# Patient Record
Sex: FEMALE | Race: WHITE | Employment: OTHER | URBAN - NONMETROPOLITAN AREA
[De-identification: names, ages, dates, MRNs, and addresses within clinical notes are randomized per-mention and may not be internally consistent; named-entity substitution may affect disease eponyms.]

---

## 2018-05-08 ENCOUNTER — HOSPITAL ENCOUNTER (EMERGENCY)
Age: 43
Discharge: ANOTHER HOSPITAL | End: 2018-05-09
Attending: EMERGENCY MEDICINE
Payer: COMMERCIAL

## 2018-05-08 DIAGNOSIS — D62 ACUTE BLOOD LOSS ANEMIA: ICD-10-CM

## 2018-05-08 DIAGNOSIS — K92.2 ACUTE GI BLEEDING: Primary | ICD-10-CM

## 2018-05-08 LAB
ABSOLUTE EOS #: 0.2 K/UL (ref 0–0.44)
ABSOLUTE IMMATURE GRANULOCYTE: <0.03 K/UL (ref 0–0.3)
ABSOLUTE LYMPH #: 1.77 K/UL (ref 1.1–3.7)
ABSOLUTE MONO #: 0.41 K/UL (ref 0.1–1.2)
ALBUMIN SERPL-MCNC: 3.2 G/DL (ref 3.5–5.2)
ALBUMIN/GLOBULIN RATIO: 1.6 (ref 1–2.5)
ALP BLD-CCNC: 63 U/L (ref 35–104)
ALT SERPL-CCNC: 7 U/L (ref 5–33)
ANION GAP SERPL CALCULATED.3IONS-SCNC: 11 MMOL/L (ref 9–17)
AST SERPL-CCNC: 13 U/L
BASOPHILS # BLD: 0 % (ref 0–2)
BASOPHILS ABSOLUTE: <0.03 K/UL (ref 0–0.2)
BILIRUB SERPL-MCNC: <0.1 MG/DL (ref 0.3–1.2)
BUN BLDV-MCNC: 5 MG/DL (ref 6–20)
BUN/CREAT BLD: 9 (ref 9–20)
CALCIUM SERPL-MCNC: 8.3 MG/DL (ref 8.6–10.4)
CHLORIDE BLD-SCNC: 104 MMOL/L (ref 98–107)
CO2: 26 MMOL/L (ref 20–31)
CREAT SERPL-MCNC: 0.53 MG/DL (ref 0.5–0.9)
DIFFERENTIAL TYPE: ABNORMAL
EOSINOPHILS RELATIVE PERCENT: 4 % (ref 1–4)
GFR AFRICAN AMERICAN: >60 ML/MIN
GFR NON-AFRICAN AMERICAN: >60 ML/MIN
GFR SERPL CREATININE-BSD FRML MDRD: ABNORMAL ML/MIN/{1.73_M2}
GFR SERPL CREATININE-BSD FRML MDRD: ABNORMAL ML/MIN/{1.73_M2}
GLUCOSE BLD-MCNC: 101 MG/DL (ref 70–99)
HCT VFR BLD CALC: 22.2 % (ref 36.3–47.1)
HEMOGLOBIN: 7.6 G/DL (ref 11.9–15.1)
IMMATURE GRANULOCYTES: 0 %
INR BLD: 1 (ref 0.9–1.2)
LYMPHOCYTES # BLD: 37 % (ref 24–43)
MCH RBC QN AUTO: 29.5 PG (ref 25.2–33.5)
MCHC RBC AUTO-ENTMCNC: 34.2 G/DL (ref 28.4–34.8)
MCV RBC AUTO: 86 FL (ref 82.6–102.9)
MONOCYTES # BLD: 9 % (ref 3–12)
NRBC AUTOMATED: 0 PER 100 WBC
PARTIAL THROMBOPLASTIN TIME: 27.3 SEC (ref 23.2–34.4)
PDW BLD-RTO: 12.6 % (ref 11.8–14.4)
PLATELET # BLD: 263 K/UL (ref 138–453)
PLATELET ESTIMATE: ABNORMAL
PMV BLD AUTO: 9.2 FL (ref 8.1–13.5)
POTASSIUM SERPL-SCNC: 3.1 MMOL/L (ref 3.7–5.3)
PROTHROMBIN TIME: 10 SEC (ref 9.7–12.2)
RBC # BLD: 2.58 M/UL (ref 3.95–5.11)
RBC # BLD: ABNORMAL 10*6/UL
SEG NEUTROPHILS: 50 % (ref 36–65)
SEGMENTED NEUTROPHILS ABSOLUTE COUNT: 2.4 K/UL (ref 1.5–8.1)
SODIUM BLD-SCNC: 141 MMOL/L (ref 135–144)
TOTAL PROTEIN: 5.2 G/DL (ref 6.4–8.3)
WBC # BLD: 4.8 K/UL (ref 3.5–11.3)
WBC # BLD: ABNORMAL 10*3/UL

## 2018-05-08 PROCEDURE — 85730 THROMBOPLASTIN TIME PARTIAL: CPT

## 2018-05-08 PROCEDURE — 96375 TX/PRO/DX INJ NEW DRUG ADDON: CPT

## 2018-05-08 PROCEDURE — 36415 COLL VENOUS BLD VENIPUNCTURE: CPT

## 2018-05-08 PROCEDURE — 85025 COMPLETE CBC W/AUTO DIFF WBC: CPT

## 2018-05-08 PROCEDURE — 96374 THER/PROPH/DIAG INJ IV PUSH: CPT

## 2018-05-08 PROCEDURE — S0028 INJECTION, FAMOTIDINE, 20 MG: HCPCS | Performed by: EMERGENCY MEDICINE

## 2018-05-08 PROCEDURE — 2500000003 HC RX 250 WO HCPCS: Performed by: EMERGENCY MEDICINE

## 2018-05-08 PROCEDURE — 96376 TX/PRO/DX INJ SAME DRUG ADON: CPT

## 2018-05-08 PROCEDURE — 99285 EMERGENCY DEPT VISIT HI MDM: CPT

## 2018-05-08 PROCEDURE — 2580000003 HC RX 258: Performed by: EMERGENCY MEDICINE

## 2018-05-08 PROCEDURE — 85610 PROTHROMBIN TIME: CPT

## 2018-05-08 PROCEDURE — C9113 INJ PANTOPRAZOLE SODIUM, VIA: HCPCS | Performed by: EMERGENCY MEDICINE

## 2018-05-08 PROCEDURE — 6360000002 HC RX W HCPCS: Performed by: EMERGENCY MEDICINE

## 2018-05-08 PROCEDURE — 80053 COMPREHEN METABOLIC PANEL: CPT

## 2018-05-08 PROCEDURE — 6360000002 HC RX W HCPCS: Performed by: PHYSICIAN ASSISTANT

## 2018-05-08 PROCEDURE — 96372 THER/PROPH/DIAG INJ SC/IM: CPT

## 2018-05-08 RX ORDER — PROMETHAZINE HYDROCHLORIDE 25 MG/ML
25 INJECTION, SOLUTION INTRAMUSCULAR; INTRAVENOUS ONCE
Status: COMPLETED | OUTPATIENT
Start: 2018-05-08 | End: 2018-05-08

## 2018-05-08 RX ORDER — DIPHENHYDRAMINE HYDROCHLORIDE 50 MG/ML
25 INJECTION INTRAMUSCULAR; INTRAVENOUS ONCE
Status: COMPLETED | OUTPATIENT
Start: 2018-05-09 | End: 2018-05-09

## 2018-05-08 RX ORDER — DIPHENHYDRAMINE HYDROCHLORIDE 50 MG/ML
50 INJECTION INTRAMUSCULAR; INTRAVENOUS ONCE
Status: COMPLETED | OUTPATIENT
Start: 2018-05-08 | End: 2018-05-08

## 2018-05-08 RX ORDER — PROMETHAZINE HYDROCHLORIDE 25 MG/ML
12.5 INJECTION, SOLUTION INTRAMUSCULAR; INTRAVENOUS ONCE
Status: DISCONTINUED | OUTPATIENT
Start: 2018-05-08 | End: 2018-05-08

## 2018-05-08 RX ORDER — MORPHINE SULFATE 4 MG/ML
2 INJECTION, SOLUTION INTRAMUSCULAR; INTRAVENOUS ONCE
Status: COMPLETED | OUTPATIENT
Start: 2018-05-08 | End: 2018-05-08

## 2018-05-08 RX ORDER — OXYCODONE AND ACETAMINOPHEN 10; 325 MG/1; MG/1
1 TABLET ORAL EVERY 6 HOURS PRN
Status: ON HOLD | COMMUNITY
End: 2020-02-13 | Stop reason: HOSPADM

## 2018-05-08 RX ADMIN — DIPHENHYDRAMINE HYDROCHLORIDE 50 MG: 50 INJECTION, SOLUTION INTRAMUSCULAR; INTRAVENOUS at 20:39

## 2018-05-08 RX ADMIN — MORPHINE SULFATE 2 MG: 4 INJECTION INTRAVENOUS at 23:07

## 2018-05-08 RX ADMIN — MORPHINE SULFATE 2 MG: 4 INJECTION INTRAVENOUS at 21:39

## 2018-05-08 RX ADMIN — FAMOTIDINE 20 MG: 10 INJECTION, SOLUTION INTRAVENOUS at 20:39

## 2018-05-08 RX ADMIN — SODIUM CHLORIDE 80 MG: 9 INJECTION, SOLUTION INTRAVENOUS at 20:37

## 2018-05-08 RX ADMIN — PROMETHAZINE HYDROCHLORIDE 25 MG: 25 INJECTION INTRAMUSCULAR; INTRAVENOUS at 19:29

## 2018-05-08 ASSESSMENT — PAIN DESCRIPTION - DESCRIPTORS: DESCRIPTORS: SHARP

## 2018-05-08 ASSESSMENT — PAIN SCALES - GENERAL
PAINLEVEL_OUTOF10: 7
PAINLEVEL_OUTOF10: 10
PAINLEVEL_OUTOF10: 9
PAINLEVEL_OUTOF10: 10

## 2018-05-08 ASSESSMENT — ENCOUNTER SYMPTOMS
ABDOMINAL PAIN: 0
EYE PAIN: 0
COUGH: 0
SINUS PAIN: 0
SORE THROAT: 0
VOMITING: 1
DIARRHEA: 0
EYE DISCHARGE: 0
NAUSEA: 1
SHORTNESS OF BREATH: 0
SINUS PRESSURE: 0

## 2018-05-08 ASSESSMENT — PAIN DESCRIPTION - PAIN TYPE
TYPE: ACUTE PAIN
TYPE: ACUTE PAIN

## 2018-05-08 ASSESSMENT — PAIN DESCRIPTION - LOCATION
LOCATION: THROAT
LOCATION: ABDOMEN

## 2018-05-08 ASSESSMENT — PAIN DESCRIPTION - FREQUENCY: FREQUENCY: CONTINUOUS

## 2018-05-09 VITALS
SYSTOLIC BLOOD PRESSURE: 122 MMHG | HEART RATE: 90 BPM | DIASTOLIC BLOOD PRESSURE: 80 MMHG | RESPIRATION RATE: 18 BRPM | OXYGEN SATURATION: 98 % | TEMPERATURE: 98.8 F | WEIGHT: 156 LBS | HEIGHT: 67 IN | BODY MASS INDEX: 24.48 KG/M2

## 2018-05-09 PROCEDURE — 96376 TX/PRO/DX INJ SAME DRUG ADON: CPT

## 2018-05-09 PROCEDURE — 6360000002 HC RX W HCPCS: Performed by: PHYSICIAN ASSISTANT

## 2018-05-09 RX ADMIN — DIPHENHYDRAMINE HYDROCHLORIDE 25 MG: 50 INJECTION, SOLUTION INTRAMUSCULAR; INTRAVENOUS at 00:02

## 2018-06-01 ENCOUNTER — HOSPITAL ENCOUNTER (EMERGENCY)
Age: 43
Discharge: HOME | End: 2018-06-01
Payer: COMMERCIAL

## 2018-06-01 VITALS
HEART RATE: 83 BPM | SYSTOLIC BLOOD PRESSURE: 134 MMHG | BODY MASS INDEX: 25.11 KG/M2 | OXYGEN SATURATION: 100 % | HEIGHT: 67 IN | RESPIRATION RATE: 20 BRPM | WEIGHT: 160 LBS | TEMPERATURE: 97.9 F | DIASTOLIC BLOOD PRESSURE: 105 MMHG

## 2018-06-01 DIAGNOSIS — R20.0 NUMBNESS: Primary | ICD-10-CM

## 2018-06-01 PROCEDURE — 96372 THER/PROPH/DIAG INJ SC/IM: CPT

## 2018-06-01 PROCEDURE — 6360000002 HC RX W HCPCS: Performed by: PHYSICIAN ASSISTANT

## 2018-06-01 PROCEDURE — 99283 EMERGENCY DEPT VISIT LOW MDM: CPT

## 2018-06-01 PROCEDURE — 96374 THER/PROPH/DIAG INJ IV PUSH: CPT

## 2018-06-01 RX ORDER — DEXAMETHASONE SODIUM PHOSPHATE 10 MG/ML
10 INJECTION, SOLUTION INTRAMUSCULAR; INTRAVENOUS ONCE
Status: COMPLETED | OUTPATIENT
Start: 2018-06-01 | End: 2018-06-01

## 2018-06-01 RX ORDER — DIPHENHYDRAMINE HCL 25 MG
50 CAPSULE ORAL ONCE
Status: COMPLETED | OUTPATIENT
Start: 2018-06-01 | End: 2018-06-01

## 2018-06-01 RX ORDER — PROMETHAZINE HYDROCHLORIDE 25 MG/ML
25 INJECTION, SOLUTION INTRAMUSCULAR; INTRAVENOUS ONCE
Status: COMPLETED | OUTPATIENT
Start: 2018-06-01 | End: 2018-06-01

## 2018-06-01 RX ADMIN — PROMETHAZINE HYDROCHLORIDE 25 MG: 25 INJECTION INTRAMUSCULAR; INTRAVENOUS at 22:12

## 2018-06-01 RX ADMIN — DEXAMETHASONE SODIUM PHOSPHATE 10 MG: 10 INJECTION, SOLUTION INTRAMUSCULAR; INTRAVENOUS at 21:21

## 2018-06-01 RX ADMIN — DIPHENHYDRAMINE HYDROCHLORIDE 50 MG: 25 CAPSULE ORAL at 22:12

## 2018-06-01 ASSESSMENT — PAIN SCALES - GENERAL
PAINLEVEL_OUTOF10: 8
PAINLEVEL_OUTOF10: 0

## 2018-06-01 ASSESSMENT — PAIN DESCRIPTION - PAIN TYPE: TYPE: ACUTE PAIN

## 2018-06-01 ASSESSMENT — ENCOUNTER SYMPTOMS: COUGH: 0

## 2018-06-02 ENCOUNTER — HOSPITAL ENCOUNTER (INPATIENT)
Age: 43
LOS: 2 days | Discharge: HOME | DRG: 043 | End: 2018-06-04
Attending: EMERGENCY MEDICINE | Admitting: INTERNAL MEDICINE
Payer: COMMERCIAL

## 2018-06-02 DIAGNOSIS — R07.9 CHEST PAIN, UNSPECIFIED TYPE: ICD-10-CM

## 2018-06-02 DIAGNOSIS — R13.10 DYSPHAGIA, UNSPECIFIED TYPE: ICD-10-CM

## 2018-06-02 DIAGNOSIS — G35 MULTIPLE SCLEROSIS EXACERBATION (HCC): Primary | ICD-10-CM

## 2018-06-02 LAB
ANION GAP SERPL CALCULATED.3IONS-SCNC: 13 MMOL/L (ref 9–17)
BUN BLDV-MCNC: 8 MG/DL (ref 6–20)
BUN/CREAT BLD: ABNORMAL (ref 9–20)
CALCIUM SERPL-MCNC: 8.8 MG/DL (ref 8.6–10.4)
CHLORIDE BLD-SCNC: 102 MMOL/L (ref 98–107)
CO2: 22 MMOL/L (ref 20–31)
CREAT SERPL-MCNC: 0.58 MG/DL (ref 0.5–0.9)
EKG ATRIAL RATE: 83 BPM
EKG P AXIS: 44 DEGREES
EKG P-R INTERVAL: 128 MS
EKG Q-T INTERVAL: 350 MS
EKG QRS DURATION: 82 MS
EKG QTC CALCULATION (BAZETT): 411 MS
EKG R AXIS: -7 DEGREES
EKG T AXIS: 8 DEGREES
EKG VENTRICULAR RATE: 83 BPM
GFR AFRICAN AMERICAN: >60 ML/MIN
GFR NON-AFRICAN AMERICAN: >60 ML/MIN
GFR SERPL CREATININE-BSD FRML MDRD: ABNORMAL ML/MIN/{1.73_M2}
GFR SERPL CREATININE-BSD FRML MDRD: ABNORMAL ML/MIN/{1.73_M2}
GLUCOSE BLD-MCNC: 132 MG/DL (ref 70–99)
HCT VFR BLD CALC: 34.4 % (ref 36.3–47.1)
HEMOGLOBIN: 10.7 G/DL (ref 11.9–15.1)
MCH RBC QN AUTO: 25.8 PG (ref 25.2–33.5)
MCHC RBC AUTO-ENTMCNC: 31.1 G/DL (ref 28.4–34.8)
MCV RBC AUTO: 82.9 FL (ref 82.6–102.9)
NRBC AUTOMATED: 0 PER 100 WBC
PDW BLD-RTO: 13.7 % (ref 11.8–14.4)
PLATELET # BLD: 291 K/UL (ref 138–453)
PMV BLD AUTO: 9.9 FL (ref 8.1–13.5)
POTASSIUM SERPL-SCNC: 4.2 MMOL/L (ref 3.7–5.3)
RBC # BLD: 4.15 M/UL (ref 3.95–5.11)
SODIUM BLD-SCNC: 137 MMOL/L (ref 135–144)
TROPONIN INTERP: NORMAL
TROPONIN INTERP: NORMAL
TROPONIN T: <0.03 NG/ML
TROPONIN T: <0.03 NG/ML
WBC # BLD: 15 K/UL (ref 3.5–11.3)

## 2018-06-02 PROCEDURE — 80048 BASIC METABOLIC PNL TOTAL CA: CPT

## 2018-06-02 PROCEDURE — 84484 ASSAY OF TROPONIN QUANT: CPT

## 2018-06-02 PROCEDURE — 6360000002 HC RX W HCPCS: Performed by: EMERGENCY MEDICINE

## 2018-06-02 PROCEDURE — 2580000003 HC RX 258: Performed by: STUDENT IN AN ORGANIZED HEALTH CARE EDUCATION/TRAINING PROGRAM

## 2018-06-02 PROCEDURE — 2500000003 HC RX 250 WO HCPCS: Performed by: STUDENT IN AN ORGANIZED HEALTH CARE EDUCATION/TRAINING PROGRAM

## 2018-06-02 PROCEDURE — 2060000000 HC ICU INTERMEDIATE R&B

## 2018-06-02 PROCEDURE — 93005 ELECTROCARDIOGRAM TRACING: CPT

## 2018-06-02 PROCEDURE — 99285 EMERGENCY DEPT VISIT HI MDM: CPT

## 2018-06-02 PROCEDURE — 36415 COLL VENOUS BLD VENIPUNCTURE: CPT

## 2018-06-02 PROCEDURE — 96374 THER/PROPH/DIAG INJ IV PUSH: CPT

## 2018-06-02 PROCEDURE — 85027 COMPLETE CBC AUTOMATED: CPT

## 2018-06-02 PROCEDURE — 96375 TX/PRO/DX INJ NEW DRUG ADDON: CPT

## 2018-06-02 PROCEDURE — S0028 INJECTION, FAMOTIDINE, 20 MG: HCPCS | Performed by: STUDENT IN AN ORGANIZED HEALTH CARE EDUCATION/TRAINING PROGRAM

## 2018-06-02 PROCEDURE — 85045 AUTOMATED RETICULOCYTE COUNT: CPT

## 2018-06-02 RX ORDER — SODIUM CHLORIDE 0.9 % (FLUSH) 0.9 %
10 SYRINGE (ML) INJECTION PRN
Status: DISCONTINUED | OUTPATIENT
Start: 2018-06-02 | End: 2018-06-04 | Stop reason: HOSPADM

## 2018-06-02 RX ORDER — OXYCODONE HYDROCHLORIDE AND ACETAMINOPHEN 5; 325 MG/1; MG/1
1 TABLET ORAL EVERY 6 HOURS PRN
Status: DISCONTINUED | OUTPATIENT
Start: 2018-06-02 | End: 2018-06-03

## 2018-06-02 RX ORDER — METHYLPREDNISOLONE SODIUM SUCCINATE 125 MG/2ML
125 INJECTION, POWDER, LYOPHILIZED, FOR SOLUTION INTRAMUSCULAR; INTRAVENOUS EVERY 12 HOURS
Status: DISCONTINUED | OUTPATIENT
Start: 2018-06-03 | End: 2018-06-04

## 2018-06-02 RX ORDER — FENTANYL CITRATE 50 UG/ML
50 INJECTION, SOLUTION INTRAMUSCULAR; INTRAVENOUS ONCE
Status: COMPLETED | OUTPATIENT
Start: 2018-06-02 | End: 2018-06-02

## 2018-06-02 RX ORDER — OXYCODONE HYDROCHLORIDE 5 MG/1
5 TABLET ORAL EVERY 6 HOURS PRN
Status: DISCONTINUED | OUTPATIENT
Start: 2018-06-02 | End: 2018-06-03

## 2018-06-02 RX ORDER — PROMETHAZINE HYDROCHLORIDE 25 MG/ML
12.5 INJECTION, SOLUTION INTRAMUSCULAR; INTRAVENOUS ONCE
Status: COMPLETED | OUTPATIENT
Start: 2018-06-02 | End: 2018-06-02

## 2018-06-02 RX ORDER — ONDANSETRON 2 MG/ML
4 INJECTION INTRAMUSCULAR; INTRAVENOUS EVERY 6 HOURS PRN
Status: DISCONTINUED | OUTPATIENT
Start: 2018-06-02 | End: 2018-06-04 | Stop reason: HOSPADM

## 2018-06-02 RX ORDER — DIPHENHYDRAMINE HYDROCHLORIDE 50 MG/ML
25 INJECTION INTRAMUSCULAR; INTRAVENOUS ONCE
Status: COMPLETED | OUTPATIENT
Start: 2018-06-02 | End: 2018-06-02

## 2018-06-02 RX ORDER — SODIUM CHLORIDE 0.9 % (FLUSH) 0.9 %
10 SYRINGE (ML) INJECTION EVERY 12 HOURS SCHEDULED
Status: DISCONTINUED | OUTPATIENT
Start: 2018-06-02 | End: 2018-06-04 | Stop reason: HOSPADM

## 2018-06-02 RX ORDER — METHYLPREDNISOLONE SODIUM SUCCINATE 125 MG/2ML
125 INJECTION, POWDER, LYOPHILIZED, FOR SOLUTION INTRAMUSCULAR; INTRAVENOUS ONCE
Status: COMPLETED | OUTPATIENT
Start: 2018-06-02 | End: 2018-06-02

## 2018-06-02 RX ORDER — FENTANYL CITRATE 50 UG/ML
25 INJECTION, SOLUTION INTRAMUSCULAR; INTRAVENOUS EVERY 4 HOURS PRN
Status: DISCONTINUED | OUTPATIENT
Start: 2018-06-02 | End: 2018-06-03

## 2018-06-02 RX ORDER — OXYCODONE AND ACETAMINOPHEN 10; 325 MG/1; MG/1
1 TABLET ORAL EVERY 6 HOURS PRN
Status: DISCONTINUED | OUTPATIENT
Start: 2018-06-02 | End: 2018-06-02

## 2018-06-02 RX ORDER — ACETAMINOPHEN 325 MG/1
650 TABLET ORAL EVERY 4 HOURS PRN
Status: DISCONTINUED | OUTPATIENT
Start: 2018-06-02 | End: 2018-06-04 | Stop reason: HOSPADM

## 2018-06-02 RX ORDER — SODIUM CHLORIDE 9 MG/ML
INJECTION, SOLUTION INTRAVENOUS CONTINUOUS
Status: ACTIVE | OUTPATIENT
Start: 2018-06-02 | End: 2018-06-03

## 2018-06-02 RX ADMIN — METHYLPREDNISOLONE SODIUM SUCCINATE 125 MG: 125 INJECTION, POWDER, FOR SOLUTION INTRAMUSCULAR; INTRAVENOUS at 20:34

## 2018-06-02 RX ADMIN — PROMETHAZINE HYDROCHLORIDE 12.5 MG: 25 INJECTION INTRAMUSCULAR; INTRAVENOUS at 20:34

## 2018-06-02 RX ADMIN — FENTANYL CITRATE 50 MCG: 50 INJECTION, SOLUTION INTRAMUSCULAR; INTRAVENOUS at 20:34

## 2018-06-02 RX ADMIN — SODIUM CHLORIDE: 9 INJECTION, SOLUTION INTRAVENOUS at 22:26

## 2018-06-02 RX ADMIN — FAMOTIDINE 20 MG: 10 INJECTION, SOLUTION INTRAVENOUS at 22:36

## 2018-06-02 RX ADMIN — DIPHENHYDRAMINE HYDROCHLORIDE 25 MG: 50 INJECTION INTRAMUSCULAR; INTRAVENOUS at 21:30

## 2018-06-02 ASSESSMENT — PAIN DESCRIPTION - ORIENTATION
ORIENTATION: LEFT
ORIENTATION: LEFT

## 2018-06-02 ASSESSMENT — PAIN DESCRIPTION - PAIN TYPE
TYPE: CHRONIC PAIN
TYPE: CHRONIC PAIN

## 2018-06-02 ASSESSMENT — PAIN DESCRIPTION - PROGRESSION
CLINICAL_PROGRESSION: NOT CHANGED
CLINICAL_PROGRESSION: NOT CHANGED

## 2018-06-02 ASSESSMENT — PAIN DESCRIPTION - DESCRIPTORS: DESCRIPTORS: NUMBNESS;TINGLING

## 2018-06-02 ASSESSMENT — PAIN DESCRIPTION - LOCATION
LOCATION: ARM;CHEST;ABDOMEN
LOCATION: OTHER (COMMENT)

## 2018-06-02 ASSESSMENT — PAIN SCALES - GENERAL
PAINLEVEL_OUTOF10: 9

## 2018-06-02 ASSESSMENT — PAIN DESCRIPTION - ONSET: ONSET: ON-GOING

## 2018-06-02 ASSESSMENT — PAIN DESCRIPTION - FREQUENCY
FREQUENCY: CONTINUOUS
FREQUENCY: CONTINUOUS

## 2018-06-03 ENCOUNTER — APPOINTMENT (OUTPATIENT)
Dept: GENERAL RADIOLOGY | Age: 43
DRG: 043 | End: 2018-06-03
Payer: COMMERCIAL

## 2018-06-03 PROBLEM — D64.9 ANEMIA: Status: ACTIVE | Noted: 2018-06-03

## 2018-06-03 LAB
ABSOLUTE EOS #: <0.03 K/UL (ref 0–0.44)
ABSOLUTE EOS #: <0.03 K/UL (ref 0–0.44)
ABSOLUTE IMMATURE GRANULOCYTE: 0.07 K/UL (ref 0–0.3)
ABSOLUTE IMMATURE GRANULOCYTE: 0.08 K/UL (ref 0–0.3)
ABSOLUTE LYMPH #: 0.8 K/UL (ref 1.1–3.7)
ABSOLUTE LYMPH #: 1.06 K/UL (ref 1.1–3.7)
ABSOLUTE MONO #: 0.12 K/UL (ref 0.1–1.2)
ABSOLUTE MONO #: 0.3 K/UL (ref 0.1–1.2)
ABSOLUTE RETIC #: 0.09 M/UL (ref 0.03–0.08)
ALBUMIN SERPL-MCNC: 3.6 G/DL (ref 3.5–5.2)
ALBUMIN/GLOBULIN RATIO: 1.4 (ref 1–2.5)
ALP BLD-CCNC: 58 U/L (ref 35–104)
ALT SERPL-CCNC: 8 U/L (ref 5–33)
ANION GAP SERPL CALCULATED.3IONS-SCNC: 14 MMOL/L (ref 9–17)
AST SERPL-CCNC: 14 U/L
BASOPHILS # BLD: 0 % (ref 0–2)
BASOPHILS # BLD: 0 % (ref 0–2)
BASOPHILS ABSOLUTE: <0.03 K/UL (ref 0–0.2)
BASOPHILS ABSOLUTE: <0.03 K/UL (ref 0–0.2)
BILIRUB SERPL-MCNC: 0.25 MG/DL (ref 0.3–1.2)
BILIRUBIN DIRECT: 0.09 MG/DL
BILIRUBIN, INDIRECT: 0.16 MG/DL (ref 0–1)
BUN BLDV-MCNC: 11 MG/DL (ref 6–20)
BUN/CREAT BLD: ABNORMAL (ref 9–20)
CALCIUM SERPL-MCNC: 8.2 MG/DL (ref 8.6–10.4)
CHLORIDE BLD-SCNC: 105 MMOL/L (ref 98–107)
CO2: 18 MMOL/L (ref 20–31)
CREAT SERPL-MCNC: 0.45 MG/DL (ref 0.5–0.9)
DIFFERENTIAL TYPE: ABNORMAL
DIFFERENTIAL TYPE: ABNORMAL
EOSINOPHILS RELATIVE PERCENT: 0 % (ref 1–4)
EOSINOPHILS RELATIVE PERCENT: 0 % (ref 1–4)
FERRITIN: 11 UG/L (ref 13–150)
GFR AFRICAN AMERICAN: >60 ML/MIN
GFR NON-AFRICAN AMERICAN: >60 ML/MIN
GFR SERPL CREATININE-BSD FRML MDRD: ABNORMAL ML/MIN/{1.73_M2}
GFR SERPL CREATININE-BSD FRML MDRD: ABNORMAL ML/MIN/{1.73_M2}
GLOBULIN: ABNORMAL G/DL (ref 1.5–3.8)
GLUCOSE BLD-MCNC: 136 MG/DL (ref 70–99)
HCT VFR BLD CALC: 32.7 % (ref 36.3–47.1)
HEMOGLOBIN: 9.8 G/DL (ref 11.9–15.1)
IMMATURE GRANULOCYTES: 0 %
IMMATURE GRANULOCYTES: 1 %
IMMATURE RETIC FRACT: 17 % (ref 2.7–18.3)
IRON SATURATION: 5 % (ref 20–55)
IRON: 20 UG/DL (ref 37–145)
LYMPHOCYTES # BLD: 5 % (ref 24–43)
LYMPHOCYTES # BLD: 7 % (ref 24–43)
MCH RBC QN AUTO: 25.6 PG (ref 25.2–33.5)
MCHC RBC AUTO-ENTMCNC: 30 G/DL (ref 28.4–34.8)
MCV RBC AUTO: 85.4 FL (ref 82.6–102.9)
MONOCYTES # BLD: 1 % (ref 3–12)
MONOCYTES # BLD: 2 % (ref 3–12)
NRBC AUTOMATED: 0 PER 100 WBC
PDW BLD-RTO: 13.7 % (ref 11.8–14.4)
PLATELET # BLD: 234 K/UL (ref 138–453)
PLATELET ESTIMATE: ABNORMAL
PLATELET ESTIMATE: ABNORMAL
PMV BLD AUTO: 10.2 FL (ref 8.1–13.5)
POTASSIUM SERPL-SCNC: 4.2 MMOL/L (ref 3.7–5.3)
RBC # BLD: 3.83 M/UL (ref 3.95–5.11)
RBC # BLD: ABNORMAL 10*6/UL
RBC # BLD: ABNORMAL 10*6/UL
RETIC %: 2.2 % (ref 0.5–1.9)
RETIC HEMOGLOBIN: 24 PG (ref 28.2–35.7)
SEG NEUTROPHILS: 91 % (ref 36–65)
SEG NEUTROPHILS: 94 % (ref 36–65)
SEGMENTED NEUTROPHILS ABSOLUTE COUNT: 14.3 K/UL (ref 1.5–8.1)
SEGMENTED NEUTROPHILS ABSOLUTE COUNT: 14.63 K/UL (ref 1.5–8.1)
SODIUM BLD-SCNC: 137 MMOL/L (ref 135–144)
TOTAL IRON BINDING CAPACITY: 374 UG/DL (ref 250–450)
TOTAL PROTEIN: 6.2 G/DL (ref 6.4–8.3)
TROPONIN INTERP: NORMAL
TROPONIN INTERP: NORMAL
TROPONIN T: <0.03 NG/ML
TROPONIN T: <0.03 NG/ML
UNSATURATED IRON BINDING CAPACITY: 354 UG/DL (ref 112–347)
WBC # BLD: 15.8 K/UL (ref 3.5–11.3)
WBC # BLD: ABNORMAL 10*3/UL
WBC # BLD: ABNORMAL 10*3/UL

## 2018-06-03 PROCEDURE — 83540 ASSAY OF IRON: CPT

## 2018-06-03 PROCEDURE — G9170 MEMORY D/C STATUS: HCPCS

## 2018-06-03 PROCEDURE — 97530 THERAPEUTIC ACTIVITIES: CPT

## 2018-06-03 PROCEDURE — G8979 MOBILITY GOAL STATUS: HCPCS

## 2018-06-03 PROCEDURE — 99223 1ST HOSP IP/OBS HIGH 75: CPT | Performed by: INTERNAL MEDICINE

## 2018-06-03 PROCEDURE — 6360000002 HC RX W HCPCS: Performed by: STUDENT IN AN ORGANIZED HEALTH CARE EDUCATION/TRAINING PROGRAM

## 2018-06-03 PROCEDURE — 92611 MOTION FLUOROSCOPY/SWALLOW: CPT

## 2018-06-03 PROCEDURE — 85025 COMPLETE CBC W/AUTO DIFF WBC: CPT

## 2018-06-03 PROCEDURE — 94762 N-INVAS EAR/PLS OXIMTRY CONT: CPT

## 2018-06-03 PROCEDURE — 6370000000 HC RX 637 (ALT 250 FOR IP): Performed by: STUDENT IN AN ORGANIZED HEALTH CARE EDUCATION/TRAINING PROGRAM

## 2018-06-03 PROCEDURE — G8996 SWALLOW CURRENT STATUS: HCPCS

## 2018-06-03 PROCEDURE — 82728 ASSAY OF FERRITIN: CPT

## 2018-06-03 PROCEDURE — 36415 COLL VENOUS BLD VENIPUNCTURE: CPT

## 2018-06-03 PROCEDURE — 83550 IRON BINDING TEST: CPT

## 2018-06-03 PROCEDURE — G8978 MOBILITY CURRENT STATUS: HCPCS

## 2018-06-03 PROCEDURE — 92523 SPEECH SOUND LANG COMPREHEN: CPT

## 2018-06-03 PROCEDURE — 2060000000 HC ICU INTERMEDIATE R&B

## 2018-06-03 PROCEDURE — 74230 X-RAY XM SWLNG FUNCJ C+: CPT

## 2018-06-03 PROCEDURE — 2500000003 HC RX 250 WO HCPCS: Performed by: STUDENT IN AN ORGANIZED HEALTH CARE EDUCATION/TRAINING PROGRAM

## 2018-06-03 PROCEDURE — 80076 HEPATIC FUNCTION PANEL: CPT

## 2018-06-03 PROCEDURE — S0028 INJECTION, FAMOTIDINE, 20 MG: HCPCS | Performed by: STUDENT IN AN ORGANIZED HEALTH CARE EDUCATION/TRAINING PROGRAM

## 2018-06-03 PROCEDURE — G9168 MEMORY CURRENT STATUS: HCPCS

## 2018-06-03 PROCEDURE — 80048 BASIC METABOLIC PNL TOTAL CA: CPT

## 2018-06-03 PROCEDURE — 84484 ASSAY OF TROPONIN QUANT: CPT

## 2018-06-03 PROCEDURE — 97162 PT EVAL MOD COMPLEX 30 MIN: CPT

## 2018-06-03 PROCEDURE — G8997 SWALLOW GOAL STATUS: HCPCS

## 2018-06-03 RX ORDER — OXYCODONE HYDROCHLORIDE AND ACETAMINOPHEN 5; 325 MG/1; MG/1
1 TABLET ORAL EVERY 4 HOURS PRN
Status: DISCONTINUED | OUTPATIENT
Start: 2018-06-03 | End: 2018-06-03

## 2018-06-03 RX ORDER — MORPHINE SULFATE 2 MG/ML
2 INJECTION, SOLUTION INTRAMUSCULAR; INTRAVENOUS EVERY 4 HOURS PRN
Status: DISCONTINUED | OUTPATIENT
Start: 2018-06-03 | End: 2018-06-04

## 2018-06-03 RX ORDER — DIPHENHYDRAMINE HYDROCHLORIDE 50 MG/ML
INJECTION INTRAMUSCULAR; INTRAVENOUS
Status: DISPENSED
Start: 2018-06-03 | End: 2018-06-03

## 2018-06-03 RX ORDER — DIPHENHYDRAMINE HYDROCHLORIDE 50 MG/ML
25 INJECTION INTRAMUSCULAR; INTRAVENOUS ONCE
Status: COMPLETED | OUTPATIENT
Start: 2018-06-03 | End: 2018-06-03

## 2018-06-03 RX ORDER — LORAZEPAM 2 MG/ML
0.5 INJECTION INTRAMUSCULAR ONCE
Status: COMPLETED | OUTPATIENT
Start: 2018-06-03 | End: 2018-06-03

## 2018-06-03 RX ORDER — MORPHINE SULFATE 2 MG/ML
2 INJECTION, SOLUTION INTRAMUSCULAR; INTRAVENOUS EVERY 6 HOURS PRN
Status: DISCONTINUED | OUTPATIENT
Start: 2018-06-03 | End: 2018-06-03

## 2018-06-03 RX ORDER — OXYCODONE HYDROCHLORIDE 5 MG/1
5 TABLET ORAL EVERY 6 HOURS PRN
Status: DISCONTINUED | OUTPATIENT
Start: 2018-06-03 | End: 2018-06-03

## 2018-06-03 RX ADMIN — FENTANYL CITRATE 25 MCG: 50 INJECTION, SOLUTION INTRAMUSCULAR; INTRAVENOUS at 02:36

## 2018-06-03 RX ADMIN — MORPHINE SULFATE 2 MG: 2 INJECTION, SOLUTION INTRAMUSCULAR; INTRAVENOUS at 12:34

## 2018-06-03 RX ADMIN — DIPHENHYDRAMINE HYDROCHLORIDE 25 MG: 50 INJECTION, SOLUTION INTRAMUSCULAR; INTRAVENOUS at 09:22

## 2018-06-03 RX ADMIN — METHYLPREDNISOLONE SODIUM SUCCINATE 125 MG: 125 INJECTION, POWDER, FOR SOLUTION INTRAMUSCULAR; INTRAVENOUS at 20:25

## 2018-06-03 RX ADMIN — TRAZODONE HYDROCHLORIDE 150 MG: 100 TABLET ORAL at 20:25

## 2018-06-03 RX ADMIN — MORPHINE SULFATE 2 MG: 2 INJECTION, SOLUTION INTRAMUSCULAR; INTRAVENOUS at 16:32

## 2018-06-03 RX ADMIN — MORPHINE SULFATE 2 MG: 2 INJECTION, SOLUTION INTRAMUSCULAR; INTRAVENOUS at 08:00

## 2018-06-03 RX ADMIN — FAMOTIDINE 20 MG: 10 INJECTION, SOLUTION INTRAVENOUS at 09:16

## 2018-06-03 RX ADMIN — LORAZEPAM 0.5 MG: 2 INJECTION INTRAMUSCULAR; INTRAVENOUS at 01:49

## 2018-06-03 RX ADMIN — MORPHINE SULFATE 2 MG: 2 INJECTION, SOLUTION INTRAMUSCULAR; INTRAVENOUS at 20:26

## 2018-06-03 RX ADMIN — METHYLPREDNISOLONE SODIUM SUCCINATE 125 MG: 125 INJECTION, POWDER, FOR SOLUTION INTRAMUSCULAR; INTRAVENOUS at 08:10

## 2018-06-03 ASSESSMENT — PAIN DESCRIPTION - FREQUENCY: FREQUENCY: CONTINUOUS

## 2018-06-03 ASSESSMENT — PAIN DESCRIPTION - PROGRESSION
CLINICAL_PROGRESSION: NOT CHANGED

## 2018-06-03 ASSESSMENT — ENCOUNTER SYMPTOMS
DIARRHEA: 0
NAUSEA: 0
SINUS PRESSURE: 0
COUGH: 0
RHINORRHEA: 0
COLOR CHANGE: 0
VOMITING: 0
VOICE CHANGE: 0
SHORTNESS OF BREATH: 0
CONSTIPATION: 0
BACK PAIN: 0
SINUS PAIN: 0
ABDOMINAL PAIN: 0
EYE REDNESS: 0
WHEEZING: 0
TROUBLE SWALLOWING: 1
EYE PAIN: 0

## 2018-06-03 ASSESSMENT — PAIN DESCRIPTION - ONSET: ONSET: ON-GOING

## 2018-06-03 ASSESSMENT — PAIN SCALES - GENERAL
PAINLEVEL_OUTOF10: 7
PAINLEVEL_OUTOF10: 8

## 2018-06-03 ASSESSMENT — PAIN DESCRIPTION - LOCATION
LOCATION: OTHER (COMMENT)
LOCATION: OTHER (COMMENT)

## 2018-06-03 ASSESSMENT — PAIN DESCRIPTION - ORIENTATION
ORIENTATION: LEFT
ORIENTATION: LEFT

## 2018-06-03 ASSESSMENT — PAIN DESCRIPTION - DESCRIPTORS: DESCRIPTORS: NUMBNESS

## 2018-06-03 ASSESSMENT — PAIN DESCRIPTION - PAIN TYPE
TYPE: CHRONIC PAIN
TYPE: CHRONIC PAIN

## 2018-06-04 VITALS
BODY MASS INDEX: 25.4 KG/M2 | HEART RATE: 79 BPM | WEIGHT: 161.82 LBS | DIASTOLIC BLOOD PRESSURE: 101 MMHG | TEMPERATURE: 98.3 F | HEIGHT: 67 IN | RESPIRATION RATE: 21 BRPM | OXYGEN SATURATION: 97 % | SYSTOLIC BLOOD PRESSURE: 151 MMHG

## 2018-06-04 LAB
ABSOLUTE EOS #: <0.03 K/UL (ref 0–0.44)
ABSOLUTE IMMATURE GRANULOCYTE: 0.06 K/UL (ref 0–0.3)
ABSOLUTE LYMPH #: 1.13 K/UL (ref 1.1–3.7)
ABSOLUTE MONO #: 0.44 K/UL (ref 0.1–1.2)
ANION GAP SERPL CALCULATED.3IONS-SCNC: 11 MMOL/L (ref 9–17)
BASOPHILS # BLD: 0 % (ref 0–2)
BASOPHILS ABSOLUTE: <0.03 K/UL (ref 0–0.2)
BUN BLDV-MCNC: 11 MG/DL (ref 6–20)
BUN/CREAT BLD: ABNORMAL (ref 9–20)
CALCIUM SERPL-MCNC: 8.3 MG/DL (ref 8.6–10.4)
CHLORIDE BLD-SCNC: 105 MMOL/L (ref 98–107)
CO2: 22 MMOL/L (ref 20–31)
CREAT SERPL-MCNC: 0.53 MG/DL (ref 0.5–0.9)
DIFFERENTIAL TYPE: ABNORMAL
EOSINOPHILS RELATIVE PERCENT: 0 % (ref 1–4)
GFR AFRICAN AMERICAN: >60 ML/MIN
GFR NON-AFRICAN AMERICAN: >60 ML/MIN
GFR SERPL CREATININE-BSD FRML MDRD: ABNORMAL ML/MIN/{1.73_M2}
GFR SERPL CREATININE-BSD FRML MDRD: ABNORMAL ML/MIN/{1.73_M2}
GLUCOSE BLD-MCNC: 106 MG/DL (ref 65–105)
GLUCOSE BLD-MCNC: 109 MG/DL (ref 65–105)
GLUCOSE BLD-MCNC: 115 MG/DL (ref 70–99)
HCT VFR BLD CALC: 31.4 % (ref 36.3–47.1)
HEMOGLOBIN: 9.5 G/DL (ref 11.9–15.1)
IMMATURE GRANULOCYTES: 0 %
LYMPHOCYTES # BLD: 8 % (ref 24–43)
MCH RBC QN AUTO: 25.7 PG (ref 25.2–33.5)
MCHC RBC AUTO-ENTMCNC: 30.3 G/DL (ref 28.4–34.8)
MCV RBC AUTO: 85.1 FL (ref 82.6–102.9)
MONOCYTES # BLD: 3 % (ref 3–12)
NRBC AUTOMATED: 0 PER 100 WBC
PATHOLOGIST REVIEW: NORMAL
PDW BLD-RTO: 13.9 % (ref 11.8–14.4)
PLATELET # BLD: 247 K/UL (ref 138–453)
PLATELET ESTIMATE: ABNORMAL
PMV BLD AUTO: 10.7 FL (ref 8.1–13.5)
POTASSIUM SERPL-SCNC: 4.2 MMOL/L (ref 3.7–5.3)
RBC # BLD: 3.69 M/UL (ref 3.95–5.11)
RBC # BLD: ABNORMAL 10*6/UL
SEG NEUTROPHILS: 89 % (ref 36–65)
SEGMENTED NEUTROPHILS ABSOLUTE COUNT: 11.88 K/UL (ref 1.5–8.1)
SODIUM BLD-SCNC: 138 MMOL/L (ref 135–144)
SURGICAL PATHOLOGY REPORT: NORMAL
WBC # BLD: 13.5 K/UL (ref 3.5–11.3)
WBC # BLD: ABNORMAL 10*3/UL

## 2018-06-04 PROCEDURE — 80048 BASIC METABOLIC PNL TOTAL CA: CPT

## 2018-06-04 PROCEDURE — 82947 ASSAY GLUCOSE BLOOD QUANT: CPT

## 2018-06-04 PROCEDURE — 36415 COLL VENOUS BLD VENIPUNCTURE: CPT

## 2018-06-04 PROCEDURE — 94762 N-INVAS EAR/PLS OXIMTRY CONT: CPT

## 2018-06-04 PROCEDURE — 76937 US GUIDE VASCULAR ACCESS: CPT

## 2018-06-04 PROCEDURE — 85025 COMPLETE CBC W/AUTO DIFF WBC: CPT

## 2018-06-04 PROCEDURE — 99233 SBSQ HOSP IP/OBS HIGH 50: CPT | Performed by: PSYCHIATRY & NEUROLOGY

## 2018-06-04 PROCEDURE — 6360000002 HC RX W HCPCS: Performed by: STUDENT IN AN ORGANIZED HEALTH CARE EDUCATION/TRAINING PROGRAM

## 2018-06-04 PROCEDURE — 99239 HOSP IP/OBS DSCHRG MGMT >30: CPT | Performed by: INTERNAL MEDICINE

## 2018-06-04 RX ORDER — LANOLIN ALCOHOL/MO/W.PET/CERES
325 CREAM (GRAM) TOPICAL 2 TIMES DAILY WITH MEALS
Status: DISCONTINUED | OUTPATIENT
Start: 2018-06-04 | End: 2018-06-04 | Stop reason: HOSPADM

## 2018-06-04 RX ORDER — LANOLIN ALCOHOL/MO/W.PET/CERES
325 CREAM (GRAM) TOPICAL 2 TIMES DAILY WITH MEALS
Qty: 90 TABLET | Refills: 3 | Status: SHIPPED | OUTPATIENT
Start: 2018-06-04 | End: 2020-04-07

## 2018-06-04 RX ORDER — MORPHINE SULFATE 2 MG/ML
2 INJECTION, SOLUTION INTRAMUSCULAR; INTRAVENOUS EVERY 6 HOURS PRN
Status: DISCONTINUED | OUTPATIENT
Start: 2018-06-04 | End: 2018-06-04 | Stop reason: HOSPADM

## 2018-06-04 RX ORDER — MORPHINE SULFATE 2 MG/ML
2 INJECTION, SOLUTION INTRAMUSCULAR; INTRAVENOUS EVERY 4 HOURS PRN
Status: DISCONTINUED | OUTPATIENT
Start: 2018-06-04 | End: 2018-06-04

## 2018-06-04 RX ADMIN — MORPHINE SULFATE 2 MG: 2 INJECTION, SOLUTION INTRAMUSCULAR; INTRAVENOUS at 12:11

## 2018-06-04 RX ADMIN — MORPHINE SULFATE 2 MG: 2 INJECTION, SOLUTION INTRAMUSCULAR; INTRAVENOUS at 01:27

## 2018-06-04 RX ADMIN — MORPHINE SULFATE 2 MG: 2 INJECTION, SOLUTION INTRAMUSCULAR; INTRAVENOUS at 06:03

## 2018-06-04 ASSESSMENT — PAIN DESCRIPTION - PROGRESSION
CLINICAL_PROGRESSION: NOT CHANGED

## 2018-06-04 ASSESSMENT — PAIN DESCRIPTION - ORIENTATION: ORIENTATION: LEFT

## 2018-06-04 ASSESSMENT — PAIN SCALES - GENERAL
PAINLEVEL_OUTOF10: 8
PAINLEVEL_OUTOF10: 8
PAINLEVEL_OUTOF10: 9

## 2018-06-04 ASSESSMENT — PAIN DESCRIPTION - DESCRIPTORS: DESCRIPTORS: ACHING;CONSTANT;DISCOMFORT

## 2018-06-04 ASSESSMENT — PAIN DESCRIPTION - FREQUENCY: FREQUENCY: CONTINUOUS

## 2018-06-04 ASSESSMENT — PAIN DESCRIPTION - PAIN TYPE: TYPE: ACUTE PAIN

## 2020-02-05 ENCOUNTER — APPOINTMENT (OUTPATIENT)
Dept: CT IMAGING | Age: 45
End: 2020-02-05
Payer: COMMERCIAL

## 2020-02-05 ENCOUNTER — HOSPITAL ENCOUNTER (EMERGENCY)
Age: 45
Discharge: ANOTHER HOSPITAL | End: 2020-02-05
Attending: EMERGENCY MEDICINE
Payer: COMMERCIAL

## 2020-02-05 VITALS
HEART RATE: 77 BPM | WEIGHT: 180 LBS | OXYGEN SATURATION: 95 % | RESPIRATION RATE: 20 BRPM | SYSTOLIC BLOOD PRESSURE: 168 MMHG | TEMPERATURE: 99.7 F | DIASTOLIC BLOOD PRESSURE: 116 MMHG | HEIGHT: 67 IN | BODY MASS INDEX: 28.25 KG/M2

## 2020-02-05 LAB
-: NORMAL
ABSOLUTE EOS #: 0 K/UL (ref 0–0.44)
ABSOLUTE IMMATURE GRANULOCYTE: 0 K/UL (ref 0–0.3)
ABSOLUTE LYMPH #: 1.5 K/UL (ref 1.1–3.7)
ABSOLUTE MONO #: 0.28 K/UL (ref 0.1–1.2)
ANION GAP SERPL CALCULATED.3IONS-SCNC: 14 MMOL/L (ref 9–17)
BASOPHILS # BLD: 0 % (ref 0–2)
BASOPHILS ABSOLUTE: 0 K/UL (ref 0–0.2)
BUN BLDV-MCNC: 12 MG/DL (ref 6–20)
BUN/CREAT BLD: 19 (ref 9–20)
CALCIUM SERPL-MCNC: 9.1 MG/DL (ref 8.6–10.4)
CHLORIDE BLD-SCNC: 101 MMOL/L (ref 98–107)
CO2: 23 MMOL/L (ref 20–31)
CREAT SERPL-MCNC: 0.64 MG/DL (ref 0.5–0.9)
DIFFERENTIAL TYPE: ABNORMAL
EOSINOPHILS RELATIVE PERCENT: 0 % (ref 1–4)
GFR AFRICAN AMERICAN: >60 ML/MIN
GFR NON-AFRICAN AMERICAN: >60 ML/MIN
GFR SERPL CREATININE-BSD FRML MDRD: ABNORMAL ML/MIN/{1.73_M2}
GFR SERPL CREATININE-BSD FRML MDRD: ABNORMAL ML/MIN/{1.73_M2}
GLUCOSE BLD-MCNC: 110 MG/DL (ref 70–99)
HCT VFR BLD CALC: 41.2 % (ref 36.3–47.1)
HEMOGLOBIN: 13.5 G/DL (ref 11.9–15.1)
IMMATURE GRANULOCYTES: 0 %
LYMPHOCYTES # BLD: 16 % (ref 24–43)
MCH RBC QN AUTO: 29.2 PG (ref 25.2–33.5)
MCHC RBC AUTO-ENTMCNC: 32.8 G/DL (ref 28.4–34.8)
MCV RBC AUTO: 89 FL (ref 82.6–102.9)
MONOCYTES # BLD: 3 % (ref 3–12)
MORPHOLOGY: ABNORMAL
NRBC AUTOMATED: 0 PER 100 WBC
PDW BLD-RTO: 13.2 % (ref 11.8–14.4)
PLATELET # BLD: ABNORMAL K/UL (ref 138–453)
PLATELET ESTIMATE: ABNORMAL
PLATELET, FLUORESCENCE: 168 K/UL (ref 138–453)
PLATELET, IMMATURE FRACTION: 4.5 % (ref 1.1–10.3)
PMV BLD AUTO: ABNORMAL FL (ref 8.1–13.5)
POTASSIUM SERPL-SCNC: 4.4 MMOL/L (ref 3.7–5.3)
RBC # BLD: 4.63 M/UL (ref 3.95–5.11)
RBC # BLD: ABNORMAL 10*6/UL
REASON FOR REJECTION: NORMAL
SEDIMENTATION RATE, ERYTHROCYTE: 6 MM (ref 0–20)
SEG NEUTROPHILS: 81 % (ref 36–65)
SEGMENTED NEUTROPHILS ABSOLUTE COUNT: 7.62 K/UL (ref 1.5–8.1)
SODIUM BLD-SCNC: 138 MMOL/L (ref 135–144)
WBC # BLD: 9.4 K/UL (ref 3.5–11.3)
WBC # BLD: ABNORMAL 10*3/UL
ZZ NTE CLEAN UP: ORDERED TEST: NORMAL
ZZ NTE WITH NAME CLEAN UP: SPECIMEN SOURCE: NORMAL

## 2020-02-05 PROCEDURE — 96366 THER/PROPH/DIAG IV INF ADDON: CPT

## 2020-02-05 PROCEDURE — 2580000003 HC RX 258: Performed by: EMERGENCY MEDICINE

## 2020-02-05 PROCEDURE — 96365 THER/PROPH/DIAG IV INF INIT: CPT

## 2020-02-05 PROCEDURE — 36415 COLL VENOUS BLD VENIPUNCTURE: CPT

## 2020-02-05 PROCEDURE — 96372 THER/PROPH/DIAG INJ SC/IM: CPT

## 2020-02-05 PROCEDURE — 99285 EMERGENCY DEPT VISIT HI MDM: CPT

## 2020-02-05 PROCEDURE — 96375 TX/PRO/DX INJ NEW DRUG ADDON: CPT

## 2020-02-05 PROCEDURE — 6370000000 HC RX 637 (ALT 250 FOR IP): Performed by: EMERGENCY MEDICINE

## 2020-02-05 PROCEDURE — 80048 BASIC METABOLIC PNL TOTAL CA: CPT

## 2020-02-05 PROCEDURE — 6360000002 HC RX W HCPCS: Performed by: EMERGENCY MEDICINE

## 2020-02-05 PROCEDURE — 85055 RETICULATED PLATELET ASSAY: CPT

## 2020-02-05 PROCEDURE — 86140 C-REACTIVE PROTEIN: CPT

## 2020-02-05 PROCEDURE — 70450 CT HEAD/BRAIN W/O DYE: CPT

## 2020-02-05 PROCEDURE — 85651 RBC SED RATE NONAUTOMATED: CPT

## 2020-02-05 PROCEDURE — 85025 COMPLETE CBC W/AUTO DIFF WBC: CPT

## 2020-02-05 RX ORDER — METHYLPREDNISOLONE SODIUM SUCCINATE 500 MG/8ML
INJECTION INTRAMUSCULAR; INTRAVENOUS
Status: DISCONTINUED
Start: 2020-02-05 | End: 2020-02-06 | Stop reason: HOSPADM

## 2020-02-05 RX ORDER — ONDANSETRON 2 MG/ML
4 INJECTION INTRAMUSCULAR; INTRAVENOUS ONCE
Status: DISCONTINUED | OUTPATIENT
Start: 2020-02-05 | End: 2020-02-06 | Stop reason: HOSPADM

## 2020-02-05 RX ORDER — PROMETHAZINE HYDROCHLORIDE 25 MG/ML
25 INJECTION, SOLUTION INTRAMUSCULAR; INTRAVENOUS ONCE
Status: COMPLETED | OUTPATIENT
Start: 2020-02-05 | End: 2020-02-05

## 2020-02-05 RX ORDER — LORAZEPAM 1 MG/1
1 TABLET ORAL ONCE
Status: COMPLETED | OUTPATIENT
Start: 2020-02-05 | End: 2020-02-05

## 2020-02-05 RX ORDER — OXYCODONE HYDROCHLORIDE AND ACETAMINOPHEN 5; 325 MG/1; MG/1
2 TABLET ORAL ONCE
Status: COMPLETED | OUTPATIENT
Start: 2020-02-05 | End: 2020-02-05

## 2020-02-05 RX ORDER — MORPHINE SULFATE 4 MG/ML
4 INJECTION, SOLUTION INTRAMUSCULAR; INTRAVENOUS ONCE
Status: COMPLETED | OUTPATIENT
Start: 2020-02-05 | End: 2020-02-05

## 2020-02-05 RX ORDER — DIPHENHYDRAMINE HYDROCHLORIDE 50 MG/ML
50 INJECTION INTRAMUSCULAR; INTRAVENOUS ONCE
Status: COMPLETED | OUTPATIENT
Start: 2020-02-05 | End: 2020-02-05

## 2020-02-05 RX ORDER — MORPHINE SULFATE 10 MG/ML
10 INJECTION, SOLUTION INTRAMUSCULAR; INTRAVENOUS ONCE
Status: COMPLETED | OUTPATIENT
Start: 2020-02-05 | End: 2020-02-05

## 2020-02-05 RX ADMIN — OXYCODONE HYDROCHLORIDE AND ACETAMINOPHEN 2 TABLET: 5; 325 TABLET ORAL at 20:29

## 2020-02-05 RX ADMIN — MORPHINE SULFATE 10 MG: 10 INJECTION INTRAVENOUS at 19:07

## 2020-02-05 RX ADMIN — LORAZEPAM 1 MG: 1 TABLET ORAL at 19:07

## 2020-02-05 RX ADMIN — PROMETHAZINE HYDROCHLORIDE 25 MG: 25 INJECTION, SOLUTION INTRAMUSCULAR; INTRAVENOUS at 20:30

## 2020-02-05 RX ADMIN — MORPHINE SULFATE 4 MG: 4 INJECTION, SOLUTION INTRAMUSCULAR; INTRAVENOUS at 22:38

## 2020-02-05 RX ADMIN — SODIUM CHLORIDE 1000 MG: 9 INJECTION, SOLUTION INTRAVENOUS at 19:48

## 2020-02-05 RX ADMIN — DIPHENHYDRAMINE HYDROCHLORIDE 50 MG: 50 INJECTION, SOLUTION INTRAMUSCULAR; INTRAVENOUS at 19:06

## 2020-02-05 ASSESSMENT — PAIN DESCRIPTION - DESCRIPTORS
DESCRIPTORS: BURNING
DESCRIPTORS: BURNING

## 2020-02-05 ASSESSMENT — PAIN DESCRIPTION - ORIENTATION
ORIENTATION: LEFT
ORIENTATION: LEFT

## 2020-02-05 ASSESSMENT — PAIN SCALES - GENERAL
PAINLEVEL_OUTOF10: 8
PAINLEVEL_OUTOF10: 9
PAINLEVEL_OUTOF10: 9

## 2020-02-05 ASSESSMENT — PAIN DESCRIPTION - FREQUENCY: FREQUENCY: CONTINUOUS

## 2020-02-05 ASSESSMENT — PAIN DESCRIPTION - LOCATION
LOCATION: LEG
LOCATION: OTHER (COMMENT)

## 2020-02-05 ASSESSMENT — PAIN DESCRIPTION - PAIN TYPE: TYPE: ACUTE PAIN

## 2020-02-05 NOTE — ED PROVIDER NOTES
[facility] ED  EMERGENCY DEPARTMENT ENCOUNTER      Pt Name:Camelia Garvey  MRN: [id number]  Birthdate 1975  Date of evaluation: 2/5/2020  Provider: Nicole Rosario MD    CHIEF COMPLAINT     Chief Complaint   Patient presents with    Other     patient reports MS flare-up onset two days ago and worsening         HISTORY OF PRESENT ILLNESS   (Location/Symptom, Timing/Onset, Context/Setting, Quality, Duration, Modifying Factors, Severity)  Note limiting factors. HPI the patient is a 52-year-old female who has a history of multiple sclerosis. He presents today with a flare of her multiple sclerosis. The actual flare began 2 days ago. It is progressively getting worse. She has a numb sensation in her throat. It is hard for her to swallow. It is affecting her gag reflex. She also has left-sided hemiplegia and cannot walk. Her hands are swollen bilaterally. She states when this occurs she usually gets 3 days of steroids and is able to be discharged. Nursing Notes were reviewed. REVIEW OF SYSTEMS    (2-9 systems for level 4, 10 or more for level 5)     Review of Systems   Constitutional: Positive for activity change. Negative for fever. HENT: Negative for congestion. Eyes: Negative for visual disturbance. Respiratory: Negative for cough, shortness of breath and wheezing. Cardiovascular: Negative for chest pain, palpitations and leg swelling. Gastrointestinal: Negative for abdominal distention, abdominal pain, constipation, diarrhea, nausea and vomiting. Genitourinary: Negative for difficulty urinating, dysuria, flank pain and frequency. Musculoskeletal: Positive for gait problem. Negative for back pain, neck pain and neck stiffness. Skin: Negative for color change. Neurological: Negative for dizziness, light-headedness and headaches. Psychiatric/Behavioral: Negative for confusion, decreased concentration and dysphoric mood.               MEDICAL HISTORY     Past Solu-medrol [methylprednisolone]; Ketorolac tromethamine; Pcn [penicillins]; and Zofran    FAMILY HISTORY       Family History   Problem Relation Age of Onset    Cancer Mother     High Blood Pressure Father     Diabetes Brother           SOCIAL HISTORY       Social History     Socioeconomic History    Marital status:      Spouse name: Not on file    Number of children: Not on file    Years of education: Not on file    Highest education level: Not on file   Occupational History     Employer: N/A   Social Needs    Financial resource strain: Not on file    Food insecurity:     Worry: Not on file     Inability: Not on file    Transportation needs:     Medical: Not on file     Non-medical: Not on file   Tobacco Use    Smoking status: Current Every Day Smoker     Packs/day: 0.50     Years: 13.00     Pack years: 6.50     Types: Cigarettes    Smokeless tobacco: Former User     Quit date: 3/28/2016   Substance and Sexual Activity    Alcohol use: No    Drug use: No     Types: Marijuana     Comment: Not since highschool    Sexual activity: Yes     Partners: Male     Comment: currently incarcerated.    Lifestyle    Physical activity:     Days per week: Not on file     Minutes per session: Not on file    Stress: Not on file   Relationships    Social connections:     Talks on phone: Not on file     Gets together: Not on file     Attends Worship service: Not on file     Active member of club or organization: Not on file     Attends meetings of clubs or organizations: Not on file     Relationship status: Not on file    Intimate partner violence:     Fear of current or ex partner: Not on file     Emotionally abused: Not on file     Physically abused: Not on file     Forced sexual activity: Not on file   Other Topics Concern    Not on file   Social History Narrative    Not on file       SCREENINGS             PHYSICAL EXAM  (up to 7 for level 4, 8 or more for level 5)     ED Triage Vitals [02/05/20 1550]

## 2020-02-06 LAB — C-REACTIVE PROTEIN: 5.6 MG/L (ref 0–5)

## 2020-02-06 NOTE — ED NOTES
Blood draws have been attempted x3 by two nurses. Lab aware.       Maranda Benavidez RN  02/05/20 6606

## 2020-02-06 NOTE — ED PROVIDER NOTES
COMPARISON: CT head 06/02/2018 HISTORY: ORDERING SYSTEM PROVIDED HISTORY: L sided weakness TECHNOLOGIST PROVIDED HISTORY: L sided weakness Is the patient pregnant?->No FINDINGS: BRAIN/VENTRICLES: There is no acute intracranial hemorrhage, mass effect or midline shift. No abnormal extra-axial fluid collection. The gray-white differentiation is maintained without evidence of an acute infarct. There is no evidence of hydrocephalus. ORBITS: The visualized portion of the orbits demonstrate no acute abnormality. SINUSES: The visualized paranasal sinuses and mastoid air cells demonstrate no acute abnormality. SOFT TISSUES/SKULL:  No acute abnormality of the visualized skull or soft tissues. No acute intracranial abnormality. RECENT VITALS:     Temp: 99.7 °F (37.6 °C),  Pulse: 77, Resp: 20, BP: (!) 168/116, SpO2: 95 %    This patient is a 53 y.o. Female with left-sided weakness of the left lower and the left upper extremity. Left lower extremity weakness is worse than the right with associated blurriness on the left eye. Patient symptoms are identical to her usual MS exacerbation. Already has been given steroids IV. Symptom onset 2 days ago, CT head was done and did not show any signs of a acute stroke. Patient says that this is identical to her usual MS. No neurology is present here, therefore patient will be transferred for neurology evaluation. Patient prefers to go to [facility].   Transfer to [facility] due to patient preferenc for neurology evaluation     discussed with [facility] hospitalist, patient was accepted to their service. OUTSTANDING TASKS / RECOMMENDATIONS:    1. Reassess     FINAL IMPRESSION:     1. Multiple sclerosis ([facility]        DISPOSITION:         DISPOSITION:  []  Discharge   [x]  Transfer - [facility]   []  Admission -     []  Against Medical Advice   []  Eloped   FOLLOW-UP: No follow-up provider specified.    DISCHARGE MEDICATIONS:

## 2020-02-10 ENCOUNTER — HOSPITAL ENCOUNTER (EMERGENCY)
Age: 45
Discharge: ANOTHER HOSPITAL | End: 2020-02-11
Payer: COMMERCIAL

## 2020-02-10 LAB
ABSOLUTE EOS #: <0.03 K/UL (ref 0–0.44)
ABSOLUTE IMMATURE GRANULOCYTE: 0.19 K/UL (ref 0–0.3)
ABSOLUTE LYMPH #: 0.73 K/UL (ref 1.1–3.7)
ABSOLUTE MONO #: 0.26 K/UL (ref 0.1–1.2)
ANION GAP SERPL CALCULATED.3IONS-SCNC: 13 MMOL/L (ref 9–17)
BASOPHILS # BLD: 0 % (ref 0–2)
BASOPHILS ABSOLUTE: <0.03 K/UL (ref 0–0.2)
BILIRUBIN URINE: NEGATIVE
BUN BLDV-MCNC: 16 MG/DL (ref 6–20)
BUN/CREAT BLD: 27 (ref 9–20)
C-REACTIVE PROTEIN: 0.7 MG/L (ref 0–5)
CALCIUM SERPL-MCNC: 8.8 MG/DL (ref 8.6–10.4)
CHLORIDE BLD-SCNC: 94 MMOL/L (ref 98–107)
CO2: 28 MMOL/L (ref 20–31)
COLOR: YELLOW
COMMENT UA: NORMAL
CREAT SERPL-MCNC: 0.59 MG/DL (ref 0.5–0.9)
DIFFERENTIAL TYPE: ABNORMAL
EOSINOPHILS RELATIVE PERCENT: 0 % (ref 1–4)
GFR AFRICAN AMERICAN: >60 ML/MIN
GFR NON-AFRICAN AMERICAN: >60 ML/MIN
GFR SERPL CREATININE-BSD FRML MDRD: ABNORMAL ML/MIN/{1.73_M2}
GFR SERPL CREATININE-BSD FRML MDRD: ABNORMAL ML/MIN/{1.73_M2}
GLUCOSE BLD-MCNC: 145 MG/DL (ref 70–99)
GLUCOSE URINE: NEGATIVE
HCT VFR BLD CALC: 37.3 % (ref 36.3–47.1)
HEMOGLOBIN: 12.6 G/DL (ref 11.9–15.1)
IMMATURE GRANULOCYTES: 2 %
KETONES, URINE: NEGATIVE
LEUKOCYTE ESTERASE, URINE: NEGATIVE
LYMPHOCYTES # BLD: 7 % (ref 24–43)
MAGNESIUM: 2.1 MG/DL (ref 1.6–2.6)
MCH RBC QN AUTO: 29.2 PG (ref 25.2–33.5)
MCHC RBC AUTO-ENTMCNC: 33.8 G/DL (ref 28.4–34.8)
MCV RBC AUTO: 86.5 FL (ref 82.6–102.9)
MONOCYTES # BLD: 2 % (ref 3–12)
NITRITE, URINE: NEGATIVE
NRBC AUTOMATED: 0 PER 100 WBC
PDW BLD-RTO: 12.8 % (ref 11.8–14.4)
PH UA: 7 (ref 5–9)
PLATELET # BLD: 287 K/UL (ref 138–453)
PLATELET ESTIMATE: ABNORMAL
PMV BLD AUTO: 9.6 FL (ref 8.1–13.5)
POTASSIUM SERPL-SCNC: 3.5 MMOL/L (ref 3.7–5.3)
PROTEIN UA: NEGATIVE
RBC # BLD: 4.31 M/UL (ref 3.95–5.11)
RBC # BLD: ABNORMAL 10*6/UL
SEDIMENTATION RATE, ERYTHROCYTE: 12 MM (ref 0–20)
SEG NEUTROPHILS: 89 % (ref 36–65)
SEGMENTED NEUTROPHILS ABSOLUTE COUNT: 9.55 K/UL (ref 1.5–8.1)
SODIUM BLD-SCNC: 135 MMOL/L (ref 135–144)
SPECIFIC GRAVITY UA: 1.01 (ref 1.01–1.02)
TURBIDITY: CLEAR
URINE HGB: NEGATIVE
UROBILINOGEN, URINE: NORMAL
WBC # BLD: 10.7 K/UL (ref 3.5–11.3)
WBC # BLD: ABNORMAL 10*3/UL

## 2020-02-10 PROCEDURE — 6360000002 HC RX W HCPCS: Performed by: PHYSICIAN ASSISTANT

## 2020-02-10 PROCEDURE — 83735 ASSAY OF MAGNESIUM: CPT

## 2020-02-10 PROCEDURE — 96375 TX/PRO/DX INJ NEW DRUG ADDON: CPT

## 2020-02-10 PROCEDURE — 96372 THER/PROPH/DIAG INJ SC/IM: CPT

## 2020-02-10 PROCEDURE — 36415 COLL VENOUS BLD VENIPUNCTURE: CPT

## 2020-02-10 PROCEDURE — 2580000003 HC RX 258: Performed by: PHYSICIAN ASSISTANT

## 2020-02-10 PROCEDURE — 85651 RBC SED RATE NONAUTOMATED: CPT

## 2020-02-10 PROCEDURE — 96365 THER/PROPH/DIAG IV INF INIT: CPT

## 2020-02-10 PROCEDURE — 99284 EMERGENCY DEPT VISIT MOD MDM: CPT

## 2020-02-10 PROCEDURE — 85025 COMPLETE CBC W/AUTO DIFF WBC: CPT

## 2020-02-10 PROCEDURE — 96376 TX/PRO/DX INJ SAME DRUG ADON: CPT

## 2020-02-10 PROCEDURE — 81003 URINALYSIS AUTO W/O SCOPE: CPT

## 2020-02-10 PROCEDURE — 86140 C-REACTIVE PROTEIN: CPT

## 2020-02-10 PROCEDURE — 80048 BASIC METABOLIC PNL TOTAL CA: CPT

## 2020-02-10 RX ORDER — MORPHINE SULFATE 4 MG/ML
4 INJECTION, SOLUTION INTRAMUSCULAR; INTRAVENOUS ONCE
Status: COMPLETED | OUTPATIENT
Start: 2020-02-10 | End: 2020-02-10

## 2020-02-10 RX ORDER — PROMETHAZINE HYDROCHLORIDE 25 MG/ML
25 INJECTION, SOLUTION INTRAMUSCULAR; INTRAVENOUS ONCE
Status: COMPLETED | OUTPATIENT
Start: 2020-02-10 | End: 2020-02-10

## 2020-02-10 RX ORDER — LORAZEPAM 1 MG/1
1 TABLET ORAL ONCE
Status: DISCONTINUED | OUTPATIENT
Start: 2020-02-10 | End: 2020-02-10

## 2020-02-10 RX ORDER — LORAZEPAM 2 MG/ML
1 INJECTION INTRAMUSCULAR ONCE
Status: COMPLETED | OUTPATIENT
Start: 2020-02-10 | End: 2020-02-10

## 2020-02-10 RX ORDER — DIPHENHYDRAMINE HYDROCHLORIDE 50 MG/ML
50 INJECTION INTRAMUSCULAR; INTRAVENOUS ONCE
Status: COMPLETED | OUTPATIENT
Start: 2020-02-10 | End: 2020-02-10

## 2020-02-10 RX ADMIN — MORPHINE SULFATE 4 MG: 4 INJECTION, SOLUTION INTRAMUSCULAR; INTRAVENOUS at 20:56

## 2020-02-10 RX ADMIN — PROMETHAZINE HYDROCHLORIDE 25 MG: 25 INJECTION, SOLUTION INTRAMUSCULAR; INTRAVENOUS at 19:30

## 2020-02-10 RX ADMIN — DIPHENHYDRAMINE HYDROCHLORIDE 50 MG: 50 INJECTION, SOLUTION INTRAMUSCULAR; INTRAVENOUS at 17:21

## 2020-02-10 RX ADMIN — LORAZEPAM 1 MG: 2 INJECTION INTRAMUSCULAR; INTRAVENOUS at 18:08

## 2020-02-10 RX ADMIN — MORPHINE SULFATE 4 MG: 4 INJECTION, SOLUTION INTRAMUSCULAR; INTRAVENOUS at 19:30

## 2020-02-10 RX ADMIN — SODIUM CHLORIDE 1000 MG: 9 INJECTION, SOLUTION INTRAVENOUS at 17:29

## 2020-02-10 ASSESSMENT — PAIN DESCRIPTION - PAIN TYPE
TYPE: ACUTE PAIN

## 2020-02-10 ASSESSMENT — PAIN SCALES - GENERAL
PAINLEVEL_OUTOF10: 9
PAINLEVEL_OUTOF10: 7
PAINLEVEL_OUTOF10: 9
PAINLEVEL_OUTOF10: 9

## 2020-02-10 ASSESSMENT — PAIN DESCRIPTION - LOCATION
LOCATION: FACE
LOCATION: HEAD;FACE

## 2020-02-10 ASSESSMENT — PAIN DESCRIPTION - FREQUENCY: FREQUENCY: CONTINUOUS

## 2020-02-10 ASSESSMENT — PAIN DESCRIPTION - DESCRIPTORS
DESCRIPTORS: BURNING
DESCRIPTORS: BURNING;THROBBING
DESCRIPTORS: BURNING

## 2020-02-10 ASSESSMENT — PAIN DESCRIPTION - ORIENTATION
ORIENTATION: RIGHT

## 2020-02-10 NOTE — ED PROVIDER NOTES
[facility] ED  EMERGENCY DEPARTMENT ENCOUNTER      Pt Name: Mirian Keyes  MRN: [id number]  [place] 1975  Date of evaluation: 2/10/2020  Provider: Mir Prakash PA-C    CHIEF COMPLAINT       Chief Complaint   Patient presents with    Multiple Sclerosis     pt states she started having a flare three days ago       HISTORY OF PRESENT ILLNESS    Mirian Keyes is a 53 y.o. female who presents to the emergency department from home with complaint of having a flareup of her multiple sclerosis, last flareup was only 5 days ago when she was admitted to [facility] and she was discharged from there this morning but states her symptoms continue to worsen. She stated over the past 3 days she started having more numbness and tingling and weakness usually this affects only her right side but she is feeling it in both sides she also feels like her tongue is thick and she is having difficulty speaking due to her tongue feeling numb. She has had issues with her tongue being numb before and had swallowing studies but she states she is afraid that she is not able to swallow well she is also having difficulty walking. She states that when this happens she usually has to go into the hospital for IV steroids for about 3 days and then she will improve. Denies fevers chills chest pain shortness of breath nausea or vomiting. Patient ws previously cared for by Neurology Dr Katie Tanner but not seen in 7 months. Triage notes and Nursing notes were reviewed by myself. Any discrepancies are addressed above.     PAST MEDICAL HISTORY     Past Medical History:   Diagnosis Date    Blood transfusion reaction     GERD (gastroesophageal reflux disease)     Elk filter in place    [employer]     Movement disorder     [facility]) [facility]     Neuromuscular disorder ([facility]     Optic neuritis due to multiple sclerosis ([facility]     Psychiatric problem     depression    Seizures ([facility]     Self Inability: None    Transportation needs:     Medical: None     Non-medical: None   Tobacco Use    Smoking status: Current Every Day Smoker     Packs/day: 0.50     Years: 13.00     Pack years: 6.50     Types: Cigarettes    Smokeless tobacco: Former User     Quit date: 3/28/2016   Substance and Sexual Activity    Alcohol use: No    Drug use: No     Types: Marijuana     Comment: Not since highschool    Sexual activity: Yes     Partners: Male     Comment: currently incarcerated. Lifestyle    Physical activity:     Days per week: None     Minutes per session: None    Stress: None   Relationships    Social connections:     Talks on phone: None     Gets together: None     Attends Advent service: None     Active member of club or organization: None     Attends meetings of clubs or organizations: None     Relationship status: None    Intimate partner violence:     Fear of current or ex partner: None     Emotionally abused: None     Physically abused: None     Forced sexual activity: None   Other Topics Concern    None   Social History Narrative    None       REVIEW OF SYSTEMS     Review of Systems  Except as noted above the remainder of the review of systems was reviewed and is negative.      SCREENINGS    [place] Coma Scale  Eye Opening: Spontaneous  Best Verbal Response: Oriented  Best Motor Response: Obeys commands  [place] Coma Scale Score: 15      PHYSICAL EXAM    (up to 7 for level 4, 8 or more for level 5)     ED Triage Vitals [02/10/20 1447]   BP Temp Temp Source Pulse Resp SpO2 Height Weight   (!) 178/109 98.4 °F (36.9 °C) Tympanic 98 20 96 % -- 180 lb (81.6 kg)       Physical Exam    DIAGNOSTIC RESULTS     RADIOLOGY: (none if blank)   Interpretation per the Radiologistbelow, if available at the time of this note:    No orders to display       LABS:  Labs Reviewed   CBC WITH AUTO DIFFERENTIAL - Abnormal; Notable for the following components:       Result Value    Seg Neutrophils 89 (*) Lymphocytes 7 (*)     Monocytes 2 (*)     Eosinophils % 0 (*)     Immature Granulocytes 2 (*)     Segs Absolute 9.55 (*)     Absolute Lymph # 0.73 (*)     All other components within normal limits   BASIC METABOLIC PANEL W/ REFLEX TO MG FOR LOW K - Abnormal; Notable for the following components:    Glucose 145 (*)     Bun/Cre Ratio 27 (*)     Potassium 3.5 (*)     Chloride 94 (*)     All other components within normal limits   SEDIMENTATION RATE   MAGNESIUM   C-REACTIVE PROTEIN   URINALYSIS       All other labs were within normal range or not returned as of this dictation. EMERGENCY DEPARTMENT COURSE andMedical Decision Making:     Vitals:    Vitals:    02/10/20 1447 02/10/20 2032 02/10/20 2214   BP: (!) 178/109 (!) 188/115 (!) 166/111   Pulse: 98 72 71   Resp: 20 20 18   Temp: 98.4 °F (36.9 °C)     TempSrc: Tympanic     SpO2: 96% 96% 95%   Weight: 180 lb (81.6 kg)         MDM/   MRI Brain w/o Contrast   02/08/20 15:41:32   IMPRESSION:   1. Mild nonspecific FLAIR signal abnormalities of the supratentorial white matter. Primary considerations include small vessel ischemic changes versus a demyelinating disorder. 2. No acute infarct or mass effect. 3. Evaluation is limited due to motion, artifact, and incomplete imaging. Signed By: Jyoti Villanueva MD, [facility]     Neurology 2/9/20 [facility] Dr EDWARD WHITE [facility]  Reviewed MRI with the patient today was doing some ambulation in room can move leg side to side. She does have mild to moderate white matter disease with no enhancement GI is going to see her today because of her dysphasia. She does have a history of esophageal strictures and has required dilatations in the past. On a positive note there was no enhancement on her MRI and hopefully will be able to get her on Tecfidera in the near future.  She did have to stop Gilenya because it caused migraines     -----    The patient does request something for pain in the emergency department she states that even though her chart states that she is allergic to morphine she is able to take this and she requests this for pain medication. He was started on high-dose Solu-Medrol for MS flare. The patient requests transfer to [facility] for neurology consult. I do speak with Dr. Amy Dietz, the neurologist on-call who agrees to accept the patient in transfer        ED Medications administered this visit:    Medications   diphenhydrAMINE (BENADRYL) injection 50 mg (50 mg Intravenous Given by Other 2/10/20 1721)   methylPREDNISolone sodium (SOLU-MEDROL) 1,000 mg in sodium chloride 0.9 % 250 mL IVPB (0 mg Intravenous Stopped 2/10/20 1855)   LORazepam (ATIVAN) injection 1 mg (1 mg Intravenous Given 2/10/20 1808)   morphine injection 4 mg (4 mg Intravenous Given 2/10/20 1930)   promethazine (PHENERGAN) injection 25 mg (25 mg Intramuscular Given 2/10/20 1930)   morphine injection 4 mg (4 mg Intravenous Given 2/10/20 2056)       CONSULTS: (None if blank)  None    Procedures: (None if blank)       CLINICAL       1.  Multiple sclerosis [facility])          DISPOSITION/PLAN   DISPOSITION Decision To Transfer 02/10/2020 03:29:03 PM             (Please note that portions of this note were completed with a voice recognition program.  Efforts were made to edit the dictations but occasionallywords are mis-transcribed.)      Luis Miguel Feliz II, PA-C (electronically signed)           Luis Miguel Feliz II, PA-C  02/10/20 6399

## 2020-02-11 ENCOUNTER — HOSPITAL ENCOUNTER (INPATIENT)
Age: 45
LOS: 2 days | Discharge: HOME | DRG: 043 | End: 2020-02-13
Attending: PSYCHIATRY & NEUROLOGY | Admitting: PSYCHIATRY & NEUROLOGY
Payer: COMMERCIAL

## 2020-02-11 ENCOUNTER — APPOINTMENT (OUTPATIENT)
Dept: GENERAL RADIOLOGY | Age: 45
DRG: 043 | End: 2020-02-11
Attending: PSYCHIATRY & NEUROLOGY
Payer: COMMERCIAL

## 2020-02-11 ENCOUNTER — APPOINTMENT (OUTPATIENT)
Dept: MRI IMAGING | Age: 45
DRG: 043 | End: 2020-02-11
Attending: PSYCHIATRY & NEUROLOGY
Payer: COMMERCIAL

## 2020-02-11 VITALS
BODY MASS INDEX: 28.19 KG/M2 | DIASTOLIC BLOOD PRESSURE: 114 MMHG | WEIGHT: 180 LBS | HEART RATE: 77 BPM | OXYGEN SATURATION: 97 % | RESPIRATION RATE: 18 BRPM | TEMPERATURE: 98.4 F | SYSTOLIC BLOOD PRESSURE: 172 MMHG

## 2020-02-11 LAB
ABSOLUTE EOS #: <0.03 K/UL (ref 0–0.44)
ABSOLUTE IMMATURE GRANULOCYTE: 0.2 K/UL (ref 0–0.3)
ABSOLUTE LYMPH #: 0.84 K/UL (ref 1.1–3.7)
ABSOLUTE MONO #: 0.44 K/UL (ref 0.1–1.2)
ANION GAP SERPL CALCULATED.3IONS-SCNC: 14 MMOL/L (ref 9–17)
BASOPHILS # BLD: 0 % (ref 0–2)
BASOPHILS ABSOLUTE: <0.03 K/UL (ref 0–0.2)
BUN BLDV-MCNC: 20 MG/DL (ref 6–20)
BUN/CREAT BLD: ABNORMAL (ref 9–20)
CALCIUM SERPL-MCNC: 8.9 MG/DL (ref 8.6–10.4)
CHLORIDE BLD-SCNC: 98 MMOL/L (ref 98–107)
CO2: 25 MMOL/L (ref 20–31)
CREAT SERPL-MCNC: 0.52 MG/DL (ref 0.5–0.9)
DIFFERENTIAL TYPE: ABNORMAL
EOSINOPHILS RELATIVE PERCENT: 0 % (ref 1–4)
GFR AFRICAN AMERICAN: >60 ML/MIN
GFR NON-AFRICAN AMERICAN: >60 ML/MIN
GFR SERPL CREATININE-BSD FRML MDRD: ABNORMAL ML/MIN/{1.73_M2}
GFR SERPL CREATININE-BSD FRML MDRD: ABNORMAL ML/MIN/{1.73_M2}
GLUCOSE BLD-MCNC: 141 MG/DL (ref 70–99)
HCT VFR BLD CALC: 38.9 % (ref 36.3–47.1)
HEMOGLOBIN: 13.2 G/DL (ref 11.9–15.1)
IMMATURE GRANULOCYTES: 2 %
LYMPHOCYTES # BLD: 8 % (ref 24–43)
MCH RBC QN AUTO: 29.8 PG (ref 25.2–33.5)
MCHC RBC AUTO-ENTMCNC: 33.9 G/DL (ref 28.4–34.8)
MCV RBC AUTO: 87.8 FL (ref 82.6–102.9)
MONOCYTES # BLD: 4 % (ref 3–12)
NRBC AUTOMATED: 0 PER 100 WBC
PDW BLD-RTO: 13 % (ref 11.8–14.4)
PLATELET # BLD: 313 K/UL (ref 138–453)
PLATELET ESTIMATE: ABNORMAL
PMV BLD AUTO: 10.2 FL (ref 8.1–13.5)
POTASSIUM SERPL-SCNC: 3.9 MMOL/L (ref 3.7–5.3)
RBC # BLD: 4.43 M/UL (ref 3.95–5.11)
RBC # BLD: ABNORMAL 10*6/UL
SEG NEUTROPHILS: 86 % (ref 36–65)
SEGMENTED NEUTROPHILS ABSOLUTE COUNT: 8.56 K/UL (ref 1.5–8.1)
SODIUM BLD-SCNC: 137 MMOL/L (ref 135–144)
WBC # BLD: 10.1 K/UL (ref 3.5–11.3)
WBC # BLD: ABNORMAL 10*3/UL

## 2020-02-11 PROCEDURE — 6360000002 HC RX W HCPCS: Performed by: STUDENT IN AN ORGANIZED HEALTH CARE EDUCATION/TRAINING PROGRAM

## 2020-02-11 PROCEDURE — 6360000002 HC RX W HCPCS: Performed by: FAMILY MEDICINE

## 2020-02-11 PROCEDURE — A9576 INJ PROHANCE MULTIPACK: HCPCS | Performed by: STUDENT IN AN ORGANIZED HEALTH CARE EDUCATION/TRAINING PROGRAM

## 2020-02-11 PROCEDURE — 6370000000 HC RX 637 (ALT 250 FOR IP): Performed by: STUDENT IN AN ORGANIZED HEALTH CARE EDUCATION/TRAINING PROGRAM

## 2020-02-11 PROCEDURE — 85025 COMPLETE CBC W/AUTO DIFF WBC: CPT

## 2020-02-11 PROCEDURE — 6360000004 HC RX CONTRAST MEDICATION: Performed by: STUDENT IN AN ORGANIZED HEALTH CARE EDUCATION/TRAINING PROGRAM

## 2020-02-11 PROCEDURE — 92611 MOTION FLUOROSCOPY/SWALLOW: CPT

## 2020-02-11 PROCEDURE — 72156 MRI NECK SPINE W/O & W/DYE: CPT

## 2020-02-11 PROCEDURE — 70553 MRI BRAIN STEM W/O & W/DYE: CPT

## 2020-02-11 PROCEDURE — 96376 TX/PRO/DX INJ SAME DRUG ADON: CPT

## 2020-02-11 PROCEDURE — 6360000002 HC RX W HCPCS: Performed by: EMERGENCY MEDICINE

## 2020-02-11 PROCEDURE — 99223 1ST HOSP IP/OBS HIGH 75: CPT | Performed by: STUDENT IN AN ORGANIZED HEALTH CARE EDUCATION/TRAINING PROGRAM

## 2020-02-11 PROCEDURE — 2060000000 HC ICU INTERMEDIATE R&B

## 2020-02-11 PROCEDURE — 2580000003 HC RX 258: Performed by: STUDENT IN AN ORGANIZED HEALTH CARE EDUCATION/TRAINING PROGRAM

## 2020-02-11 PROCEDURE — 36415 COLL VENOUS BLD VENIPUNCTURE: CPT

## 2020-02-11 PROCEDURE — 74230 X-RAY XM SWLNG FUNCJ C+: CPT

## 2020-02-11 PROCEDURE — 80048 BASIC METABOLIC PNL TOTAL CA: CPT

## 2020-02-11 PROCEDURE — 2580000003 HC RX 258: Performed by: FAMILY MEDICINE

## 2020-02-11 PROCEDURE — 72157 MRI CHEST SPINE W/O & W/DYE: CPT

## 2020-02-11 RX ORDER — DIPHENHYDRAMINE HYDROCHLORIDE 50 MG/ML
25 INJECTION INTRAMUSCULAR; INTRAVENOUS PRN
Status: DISCONTINUED | OUTPATIENT
Start: 2020-02-11 | End: 2020-02-13 | Stop reason: HOSPADM

## 2020-02-11 RX ORDER — DIPHENHYDRAMINE HCL 25 MG
50 TABLET ORAL ONCE
Status: DISCONTINUED | OUTPATIENT
Start: 2020-02-11 | End: 2020-02-13 | Stop reason: HOSPADM

## 2020-02-11 RX ORDER — POTASSIUM CHLORIDE 7.45 MG/ML
10 INJECTION INTRAVENOUS PRN
Status: DISCONTINUED | OUTPATIENT
Start: 2020-02-11 | End: 2020-02-13 | Stop reason: HOSPADM

## 2020-02-11 RX ORDER — MAGNESIUM SULFATE 1 G/100ML
1 INJECTION INTRAVENOUS PRN
Status: DISCONTINUED | OUTPATIENT
Start: 2020-02-11 | End: 2020-02-13 | Stop reason: HOSPADM

## 2020-02-11 RX ORDER — SODIUM CHLORIDE 0.9 % (FLUSH) 0.9 %
10 SYRINGE (ML) INJECTION PRN
Status: DISCONTINUED | OUTPATIENT
Start: 2020-02-11 | End: 2020-02-13 | Stop reason: HOSPADM

## 2020-02-11 RX ORDER — LORAZEPAM 2 MG/ML
2 INJECTION INTRAMUSCULAR ONCE
Status: COMPLETED | OUTPATIENT
Start: 2020-02-11 | End: 2020-02-11

## 2020-02-11 RX ORDER — LORAZEPAM 2 MG/ML
0.5 INJECTION INTRAMUSCULAR EVERY 6 HOURS PRN
Status: DISCONTINUED | OUTPATIENT
Start: 2020-02-11 | End: 2020-02-11

## 2020-02-11 RX ORDER — DIPHENHYDRAMINE HYDROCHLORIDE 50 MG/ML
25 INJECTION INTRAMUSCULAR; INTRAVENOUS ONCE
Status: COMPLETED | OUTPATIENT
Start: 2020-02-11 | End: 2020-02-11

## 2020-02-11 RX ORDER — ONDANSETRON 2 MG/ML
4 INJECTION INTRAMUSCULAR; INTRAVENOUS EVERY 6 HOURS PRN
Status: DISCONTINUED | OUTPATIENT
Start: 2020-02-11 | End: 2020-02-13 | Stop reason: HOSPADM

## 2020-02-11 RX ORDER — LORAZEPAM 2 MG/ML
0.5 INJECTION INTRAMUSCULAR ONCE
Status: COMPLETED | OUTPATIENT
Start: 2020-02-11 | End: 2020-02-11

## 2020-02-11 RX ORDER — PREDNISONE 50 MG/1
50 TABLET ORAL EVERY 6 HOURS
Status: DISPENSED | OUTPATIENT
Start: 2020-02-11 | End: 2020-02-12

## 2020-02-11 RX ORDER — MIDAZOLAM HYDROCHLORIDE 1 MG/ML
0.5 INJECTION INTRAMUSCULAR; INTRAVENOUS
Status: COMPLETED | OUTPATIENT
Start: 2020-02-11 | End: 2020-02-11

## 2020-02-11 RX ORDER — SODIUM CHLORIDE 0.9 % (FLUSH) 0.9 %
10 SYRINGE (ML) INJECTION EVERY 12 HOURS SCHEDULED
Status: DISCONTINUED | OUTPATIENT
Start: 2020-02-11 | End: 2020-02-13 | Stop reason: HOSPADM

## 2020-02-11 RX ORDER — SODIUM CHLORIDE 9 MG/ML
INJECTION, SOLUTION INTRAVENOUS CONTINUOUS
Status: DISCONTINUED | OUTPATIENT
Start: 2020-02-11 | End: 2020-02-13 | Stop reason: HOSPADM

## 2020-02-11 RX ORDER — ALPRAZOLAM 1 MG/1
1 TABLET ORAL 3 TIMES DAILY
Status: DISCONTINUED | OUTPATIENT
Start: 2020-02-11 | End: 2020-02-13 | Stop reason: HOSPADM

## 2020-02-11 RX ORDER — POTASSIUM CHLORIDE 20 MEQ/1
40 TABLET, EXTENDED RELEASE ORAL PRN
Status: DISCONTINUED | OUTPATIENT
Start: 2020-02-11 | End: 2020-02-13 | Stop reason: HOSPADM

## 2020-02-11 RX ORDER — FAMOTIDINE 20 MG/1
20 TABLET, FILM COATED ORAL 2 TIMES DAILY
Status: DISCONTINUED | OUTPATIENT
Start: 2020-02-11 | End: 2020-02-13 | Stop reason: HOSPADM

## 2020-02-11 RX ORDER — ACETAMINOPHEN 325 MG/1
650 TABLET ORAL EVERY 4 HOURS PRN
Status: DISCONTINUED | OUTPATIENT
Start: 2020-02-11 | End: 2020-02-13 | Stop reason: HOSPADM

## 2020-02-11 RX ORDER — HYDROCODONE BITARTRATE AND ACETAMINOPHEN 5; 325 MG/1; MG/1
1 TABLET ORAL EVERY 6 HOURS PRN
Status: DISCONTINUED | OUTPATIENT
Start: 2020-02-11 | End: 2020-02-12

## 2020-02-11 RX ADMIN — SODIUM CHLORIDE, PRESERVATIVE FREE 10 ML: 5 INJECTION INTRAVENOUS at 20:36

## 2020-02-11 RX ADMIN — LORAZEPAM 2 MG: 2 INJECTION INTRAMUSCULAR at 12:20

## 2020-02-11 RX ADMIN — SODIUM CHLORIDE 500 MG: 9 INJECTION, SOLUTION INTRAVENOUS at 20:39

## 2020-02-11 RX ADMIN — LORAZEPAM 0.5 MG: 2 INJECTION INTRAMUSCULAR; INTRAVENOUS at 01:51

## 2020-02-11 RX ADMIN — SODIUM CHLORIDE, PRESERVATIVE FREE 10 ML: 5 INJECTION INTRAVENOUS at 13:22

## 2020-02-11 RX ADMIN — PREDNISONE 50 MG: 50 TABLET ORAL at 15:17

## 2020-02-11 RX ADMIN — LORAZEPAM 0.5 MG: 2 INJECTION INTRAMUSCULAR; INTRAVENOUS at 09:12

## 2020-02-11 RX ADMIN — HYDROCODONE BITARTRATE AND ACETAMINOPHEN 1 TABLET: 5; 325 TABLET ORAL at 15:17

## 2020-02-11 RX ADMIN — HYDROCODONE BITARTRATE AND ACETAMINOPHEN 1 TABLET: 5; 325 TABLET ORAL at 22:27

## 2020-02-11 RX ADMIN — PREDNISONE 50 MG: 50 TABLET ORAL at 20:31

## 2020-02-11 RX ADMIN — GADOTERIDOL 16 ML: 279.3 INJECTION, SOLUTION INTRAVENOUS at 13:26

## 2020-02-11 RX ADMIN — SODIUM CHLORIDE 500 MG: 9 INJECTION, SOLUTION INTRAVENOUS at 09:30

## 2020-02-11 RX ADMIN — LORAZEPAM 2 MG: 2 INJECTION INTRAMUSCULAR at 10:15

## 2020-02-11 RX ADMIN — ALPRAZOLAM 1 MG: 1 TABLET ORAL at 20:31

## 2020-02-11 RX ADMIN — DIPHENHYDRAMINE HYDROCHLORIDE 25 MG: 50 INJECTION, SOLUTION INTRAMUSCULAR; INTRAVENOUS at 09:12

## 2020-02-11 RX ADMIN — SODIUM CHLORIDE: 9 INJECTION, SOLUTION INTRAVENOUS at 09:28

## 2020-02-11 RX ADMIN — ALPRAZOLAM 1 MG: 1 TABLET ORAL at 17:57

## 2020-02-11 RX ADMIN — MIDAZOLAM HYDROCHLORIDE 0.5 MG: 1 INJECTION, SOLUTION INTRAMUSCULAR; INTRAVENOUS at 06:53

## 2020-02-11 RX ADMIN — LORAZEPAM 0.5 MG: 2 INJECTION INTRAMUSCULAR; INTRAVENOUS at 15:18

## 2020-02-11 RX ADMIN — DIPHENHYDRAMINE HYDROCHLORIDE 25 MG: 50 INJECTION, SOLUTION INTRAMUSCULAR; INTRAVENOUS at 20:32

## 2020-02-11 ASSESSMENT — ENCOUNTER SYMPTOMS
CONSTIPATION: 0
EYE DISCHARGE: 0
WHEEZING: 0
ABDOMINAL PAIN: 0
VOMITING: 0
SHORTNESS OF BREATH: 0
CHEST TIGHTNESS: 0
EYE REDNESS: 0
COUGH: 0
SORE THROAT: 0
ABDOMINAL DISTENTION: 0
NAUSEA: 0
DIARRHEA: 0
STRIDOR: 0
APNEA: 0

## 2020-02-11 ASSESSMENT — PAIN SCALES - GENERAL
PAINLEVEL_OUTOF10: 10
PAINLEVEL_OUTOF10: 10
PAINLEVEL_OUTOF10: 9
PAINLEVEL_OUTOF10: 9

## 2020-02-11 NOTE — CARE COORDINATION
Patient off unit for testing will return to obtain transition plan    [id number] Patient remains off unit for testing will return later today for assessment and transition plan.

## 2020-02-11 NOTE — H&P
[facility] Neurology   [address] [place] [address]    HISTORY AND PHYSICAL EXAMINATION            Date:   2/11/2020  Patient name:  Meño Moon  Date of admission:  2/11/2020  4:20 AM  MRN:   [id number]  Account:  [de-identified]  YOB: 1975  PCP:    Valerie Mitchell MD  Room:   [address] [place] [address]  Code Status:    Full Code    Chief Complaint:     No chief complaint on file. History Obtained From:     patient      History of Present Illness: The patient is a 53 y.o. Non-/non  female who presents with No chief complaint on file. 54 yo R handed patient presenting with facial weakness, numbness, tingling, and left lower extremity weakness ongoing x3 days. Patient has a history of multiple sclerosis previously on Gilenya no longer taking it due to side effects. Does not follow with a neurologist.  Patient complains of left eye pain on movement concerning for optic neuritis. Complains of whole face numbness and tingling, LUE and LLE  weakness, left foot drop, and  difficulty swallowing. patient has a history of requesting sedation prior to MRIs, please see last note 6/4/2018 by Dr. allison. 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 Patient has had inconclusive diagnosis with multiple prior neurologist evaluations. Patient is requesting to be fully sedated with anesthetic prior to MRI.       Past Medical History:     Past Medical History:   Diagnosis Date    Blood transfusion reaction     GERD (gastroesophageal reflux disease)     [place] filter in place     manchu     Movement disorder     [facility]) [facility]     Neuromuscular disorder ([facility] Zofran    Social History:     Tobacco:    reports that she has been smoking cigarettes. She has a 6.50 pack-year smoking history. She quit smokeless tobacco use about 3 years ago. Alcohol:      reports no history of alcohol use. Drug Use:  reports no history of drug use. Family History:     Family History   Problem Relation Age of Onset    Cancer Mother     High Blood Pressure Father     Diabetes Brother        Review of Systems:     ROS:    Review of Systems   Constitutional: Negative for activity change, appetite change, chills, diaphoresis, fatigue and fever. HENT: Negative for sore throat. Eyes: Negative for discharge and redness. Respiratory: Negative for apnea, cough, chest tightness, shortness of breath, wheezing and stridor. Cardiovascular: Negative for chest pain and leg swelling. Gastrointestinal: Negative for abdominal distention, abdominal pain, constipation, diarrhea, nausea and vomiting. Genitourinary: Negative for difficulty urinating, dysuria, flank pain, frequency, hematuria and urgency. Musculoskeletal: Negative for arthralgias. Neurological: Positive for weakness and numbness. Negative for dizziness, tremors, seizures, syncope, facial asymmetry, speech difficulty, light-headedness and headaches. Hematological: Negative for adenopathy. Physical Exam:   BP (!) 197/111   Pulse 75   Temp 97 °F (36.1 °C) (Oral)   Resp 15   Ht 5' 7\" (1.702 m)   Wt 180 lb 8.9 oz (81.9 kg)   SpO2 99%   BMI 28.28 kg/m²   Temp (24hrs), Av.7 °F (36.5 °C), Min:97 °F (36.1 °C), Max:98.4 °F (36.9 °C)    No results for input(s): POCGLU in the last 72 hours. No intake or output data in the 24 hours ending 20 3242    Physical Exam  Vitals signs and nursing note reviewed. Constitutional:       General: She is not in acute distress. Appearance: She is well-developed. She is not diaphoretic. HENT:      Head: Normocephalic and atraumatic.       Right Ear: External ear normal. Left Ear: External ear normal.   Eyes:      General: No scleral icterus. Right eye: No discharge. Left eye: No discharge. Conjunctiva/sclera: Conjunctivae normal.      Pupils: Pupils are equal, round, and reactive to light. Neck:      Musculoskeletal: Normal range of motion. Cardiovascular:      Rate and Rhythm: Normal rate. Pulmonary:      Effort: Pulmonary effort is normal.   Abdominal:      General: There is no distension. Palpations: Abdomen is soft. Tenderness: There is no abdominal tenderness. There is no guarding. Musculoskeletal: Normal range of motion. General: No tenderness or deformity. Skin:     General: Skin is warm and dry. Capillary Refill: Capillary refill takes less than 2 seconds. Findings: No erythema or rash. Neurological:      General: No focal deficit present. Mental Status: She is alert and oriented to person, place, and time. GCS: GCS eye subscore is 4. GCS verbal subscore is 5. GCS motor subscore is 6. Cranial Nerves: No cranial nerve deficit. Sensory: Sensory deficit present. Motor: Weakness present. No abnormal muscle tone or seizure activity. Coordination: Coordination normal.      Deep Tendon Reflexes: Reflexes are normal and symmetric. Reflexes normal.      Reflex Scores:       Tricep reflexes are 2+ on the right side and 2+ on the left side. Bicep reflexes are 2+ on the right side and 2+ on the left side. Brachioradialis reflexes are 2+ on the right side and 2+ on the left side. Patellar reflexes are 2+ on the right side and 2+ on the left side. Achilles reflexes are 2+ on the right side and 2+ on the left side.   Psychiatric:         Behavior: Behavior normal.           Investigations:      Laboratory Testing:  Recent Results (from the past 24 hour(s))   CBC Auto Differential    Collection Time: 02/10/20  4:32 PM   Result Value Ref Range    WBC 10.7 3.5 - 11.3 k/uL RBC 4.31 3.95 - 5.11 m/uL    Hemoglobin 12.6 11.9 - 15.1 g/dL    Hematocrit 37.3 36.3 - 47.1 %    MCV 86.5 82.6 - 102.9 fL    MCH 29.2 25.2 - 33.5 pg    MCHC 33.8 28.4 - 34.8 g/dL    RDW 12.8 11.8 - 14.4 %    Platelets 412 522 - 371 k/uL    MPV 9.6 8.1 - 13.5 fL    NRBC Automated 0.0 0.0 per 100 WBC    Differential Type NOT REPORTED     WBC Morphology NOT REPORTED     RBC Morphology NOT REPORTED     Platelet Estimate NOT REPORTED     Seg Neutrophils 89 (H) 36 - 65 %    Lymphocytes 7 (L) 24 - 43 %    Monocytes 2 (L) 3 - 12 %    Eosinophils % 0 (L) 1 - 4 %    Basophils 0 0 - 2 %    Immature Granulocytes 2 (H) 0 %    Segs Absolute 9.55 (H) 1.50 - 8.10 k/uL    Absolute Lymph # 0.73 (L) 1.10 - 3.70 k/uL    Absolute Mono # 0.26 0.10 - 1.20 k/uL    Absolute Eos # <0.03 0.00 - 0.44 k/uL    Basophils Absolute <0.03 0.00 - 0.20 k/uL    Absolute Immature Granulocyte 0.19 0.00 - 0.30 k/uL   Basic Metabolic Panel w/ Reflex to MG    Collection Time: 02/10/20  4:32 PM   Result Value Ref Range    Glucose 145 (H) 70 - 99 mg/dL    BUN 16 6 - 20 mg/dL    CREATININE 0.59 0.50 - 0.90 mg/dL    Bun/Cre Ratio 27 (H) 9 - 20    Calcium 8.8 8.6 - 10.4 mg/dL    Sodium 135 135 - 144 mmol/L    Potassium 3.5 (L) 3.7 - 5.3 mmol/L    Chloride 94 (L) 98 - 107 mmol/L    CO2 28 20 - 31 mmol/L    Anion Gap 13 9 - 17 mmol/L    GFR Non-African American >60 >60 mL/min    GFR African American >60 >60 mL/min    GFR Comment          GFR Staging         Sedimentation Rate    Collection Time: 02/10/20  4:32 PM   Result Value Ref Range    Sed Rate 12 0 - 20 mm   C-reactive protein    Collection Time: 02/10/20  4:32 PM   Result Value Ref Range    CRP 0.7 0.0 - 5.0 mg/L   Magnesium    Collection Time: 02/10/20  4:32 PM   Result Value Ref Range    Magnesium 2.1 1.6 - 2.6 mg/dL   Urinalysis, reflex to microscopic    Collection Time: 02/10/20 11:23 PM   Result Value Ref Range    Color, UA YELLOW YELLOW    Turbidity UA CLEAR CLEAR    Glucose, Ur NEGATIVE NEGATIVE Bilirubin Urine NEGATIVE NEGATIVE    Ketones, Urine NEGATIVE NEGATIVE    Specific Gravity, UA 1.015 1.010 - 1.020    Urine Hgb NEGATIVE NEGATIVE    pH, UA 7.0 5.0 - 9.0    Protein, UA NEGATIVE NEGATIVE    Urobilinogen, Urine Normal Normal    Nitrite, Urine NEGATIVE NEGATIVE    Leukocyte Esterase, Urine NEGATIVE NEGATIVE    Urinalysis Comments NOT REPORTED        Imaging/Diagnostics:    Ct Head Wo Contrast    Result Date: 2/5/2020  No acute intracranial abnormality. Assessment :      Primary Problem  <principal problem not specified>    Active Hospital Problems    Diagnosis Date Noted    Multiple sclerosis exacerbation ([facility] Carla Boldeniot 12/08/2016       Plan:     Patient status Admit as inpatient in the  Progressive Unit/Step down    49-year-old female presenting with left-sided weakness, facial numbness and tingling with history of MS-like presentations. 1. MRI Brain W WO  2. MRI Cervical W WO   3. Solumedrol 500 IV BID x6 doses  4. No signs of infection clinically or based on labs  5. Patient consulted pharmacy to verify medications  6. 0.5 mg IV Versed x1 as needed for anxiety  7. Barium swallow study  8. OARRS score 250    Consultations:   IP CONSULT TO IV TEAM  IP CONSULT TO SOCIAL WORK    Patient is admitted as inpatient status because of co-morbidities listed above, severity of signs and symptoms as outlined, requirement for current medical therapies and most importantly because of direct risk to patient if care not provided in a hospital setting. Mitchel Marroquin MD, CORTNEY  PGY-2 Neurology  2/11/2020 at 6:25 AM      Please note that this chart was generated using voice recognition Dragon dictation software. Although every effort was made to ensure the accuracy of this automated transcription, some errors in transcription may have occurred.

## 2020-02-11 NOTE — PROGRESS NOTES
NEUROLOGY INPATIENT PROGRESS NOTE    2/11/2020         Subjective: Farzana Prasad is a  53 y.o. female admitted on 2/11/2020 with Multiple sclerosis exacerbation ([facility] [address] Kevin Ramirez    Briefly, this is a  53 y.o. female admitted on 2/11/2020 with c/o MS exacerbation, last known flare-up was 5 days ago. She initially presented to [place] from home with 3 days of bilateral face, tongue, and mouth numbness. The tongue and mouth numbness had made it where she cannot swallow and she has not been eat/drinking for 3 days d/t fear of choking. She also endorses left sided arm and leg numbness and weakness, she cannot bear weight and needs a walker to ambulate. Additionally, she has left sided eye pain with movement and blurry vision, stating that \"it feels like I have vaseline covering my eye\". These symptoms are worse than her flare-up 5 days ago, which is what brought her back to the hospital so soon after discharge. Between flare-ups she is completely symptom free. She does not see a neurologist outpatient, and last received outpatient treatment with [facility] over 4 years ago. She saw Dr. Lilly Snowden 6 months ago, but does not want to pursue treatment there. 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 Per the patient, when she has a flare up she is admitted to the hospital where she receives 5 days of IV salu-medrol, which is preceded by 50 mg of IV benedryl d/t her allergy. After this time, she is discharged with complete resolution of symptoms. At her last admission in [place], she only received 4 days of steroids, which is why she believes her symptoms have worsened. She has a past medical history significant for PTSD, anxiety, and chronic pain syndrome. She takes xanax 1mg TID, but has been unable to take it for the past few days d/t difficulty swallowing.      Today, the patient is alert and sitting up in difficulty, light-headedness, and headaches. PSYCHIATRIC: Positive for anxiety. SKIN Positive for rash on right arm, near elbow. NEUROLOGIC EXAMINATION  GENERAL  In anxious distress. HEENT  NC/ AT. Left eye: 20/400, right eye: 20/50. HEART  S1 and S2 heard; palpation of pulses: radial pulse    NECK  Supple and no bruits heard   MENTAL STATUS:  Alert, oriented, intact memory, no confusion, normal speech, normal language, no hallucination or delusion   CRANIAL NERVES: II     -      Decreases confrontation on the left side. III,IV,VI -  PERR, EOMs full, no ptosis  V     -     Normal facial sensation   VII    -     Normal facial symmetry  VIII   -     Intact hearing   IX,X -     Symmetrical palate  XI    -     Symmetrical shoulder shrug  XII   -     Midline tongue, no atrophy    MOTOR FUNCTION: Difficult to assess d/t poor patient effort. Normal bulk, normal tone and no involuntary movements, no tremor   SENSORY FUNCTION:  Decreased sensation to touch and pinprick on face b/l and lower extremities b/l. CEREBELLAR FUNCTION:  Intact fine motor control over upper limbs and lower limbs   REFLEX FUNCTION:  Symmetric in upper and lower extremities.     STATION and GAIT  Not assessed     Data:    Lab Results:   CBC:   Recent Labs     02/10/20  1632 02/11/20  0648   WBC 10.7 10.1   HGB 12.6 13.2    313     BMP:    Recent Labs     02/10/20  1632 02/11/20  0648    137   K 3.5* 3.9   CL 94* 98   CO2 28 25   BUN 16 20   CREATININE 0.59 0.52   GLUCOSE 145* 141*         Lab Results   Component Value Date    CHOL 163 11/19/2011    LDLCHOLESTEROL 83 11/19/2011    HDL 38 (L) 11/19/2011    TRIG 211 (H) 11/19/2011    ALT 8 06/03/2018    AST 14 06/03/2018    TSH 0.25 (L) 11/30/2016    INR 1.0 05/08/2018    LABA1C 4.5 07/30/2014    YSTDUREQ91 446 11/30/2016       No results found for: PHENYTOIN, PHENYTOIN, VALPROATE, CBMZ    IMAGING  CT Head WO Contrast 2/05/2020:   No acute intracranial abnormality. Assessment and Plan  This is a 53year old female with a pmh significant for MS, PTSD, and anxiety who presents to the ED for left UE and LE extremity weakness and tingling associated with b/l facial numbness, tongue numbness, and mouth numbness. She believes that she is having a MS flare. Pt is having lots of anxiety and is requesting IV benadryl for symptoms.      MS Flare:   MRI Brain W WO Contrast   MRI Cervical Spine W WO Contrast   MRI Thoracic W WO Contrast     Dysphagia:   FL Modified Barium Swallow W Video     Anxiety:   .5 mg Ativan q6 PRN    Shabbir Kidd  Neurology Resident PGY-2  2/11/2020  9:09 AM

## 2020-02-11 NOTE — PROCEDURES
INSTRUMENTAL SWALLOW REPORT  MODIFIED BARIUM SWALLOW    NAME: Lorice Lake Cantu   : 1975  MRN: [id number]       Date of Eval: 2020              Referring Diagnosis(es):      Past Medical History:  has a past medical history of Blood transfusion reaction, GERD (gastroesophageal reflux disease), [place] filter in place, manchu, Movement disorder, MS (multiple sclerosis) ([address] Neuromuscular disorder ([address] Optic neuritis due to multiple sclerosis ([address] Psychiatric problem, Seizures ([address] Self inflicted injury, and Spinal cord stimulator status. Past Surgical History:  has a past surgical history that includes  section; cervical fusion; Tonsillectomy; lymphadenectomy; Carpal tunnel release; Hysterectomy; back surgery; Endoscopy, colon, diagnostic; Colonoscopy; vascular surgery; Abdomen surgery; and Vena Cava Filter Placement (2011). Current Diet Solid Consistency: NPO  Current Diet Liquid Consistency: NPO       Type of Study: Initial MBS         Patient Complaints/Reason for Referral:  Holly Burden was referred for a MBS to assess the efficiency of his/her swallow function, assess for aspiration, and to make recommendations regarding safe dietary consistencies, effective compensatory strategies, and safe eating environment. Onset of problem:     54 yo lady with numbness of face and throat , trouble swallowing with numbness left arm and leg with bilateral lower extremity weakness . She has history of possible multiple sclerosis with question of somatic overlay in the past . She has seen multiple local neurologist along with lately Dr Izzy Chance in [place] . She has emili on gilenya having stopped this 4 years ago when Dr Nicolás Luna closed his practice . She reports to have intermittent exacerbation every six months usually with left side numbness , weakness of legs getting IV solumedrol  having nomal exam in between .  She reports that she is being considered for possible tecfidera . Patient was at [facility] this past week with weakness of both legs , left side numbness, weakness of left arm with left eye pain and blurriness . She was pulsed with IV solumedrol  for 4 days noting partial improvement sent home although gain developed greater weakness of legs left more merrill right with trouble sustaining weight only walking few steps with walker . There was also numbness in bilateral face and throat unable to swallow not eating anything the past 3 days . She went to [facility] yesterday requesting to be seen at another neurological facility . MRI of Head in 2014 at [facility] with bilateral frontal nonspecific white matter intensities  CSF analysis non inflammatory 0 RBC, 0 WBC , total protein 108 , glucose 27 , IgG synthesis oligoclonal bands negative  She has PTSD and bipolar disease             Behavior/Cognition/Vision/Hearing:  Behavior/Cognition: Alert; Cooperative  Vision: Within Functional Limits  Hearing: Within functional limits    Impressions:  Patient presents with  safe swallow for Regular diet with thin liquids as evidenced by no observed aspiration noted with consistencies tested. Recommend small sips and bites, only feed when alert and awake and upright at 90 degrees for all PO intake. Recommend close monitoring for overt/clinical s/s of aspiration and D/C PO intake and complete Modified Barium Swallow Study should they occur. Results and recommendations reported to RN. Patient Position: Lateral and Patient Degrees: 90      Consistencies Administered: Dysphagia Soft and Bite-Sized (Dysphagia III); Reg solid; Dysphagia Pureed (Dysphagia I); Thin cup    Compensatory Swallowing Strategies Attempted:  Alternate solids and liquids;Small bites/sips;Eat/Feed slowly;Upright as possible for all oral intake  Postural Changes and/or Swallow Maneuvers Trialed: Upright 90 degrees      Recommended Diet:  Solid consistency: Regular  Liquid consistency:

## 2020-02-11 NOTE — CARE COORDINATION
Case Management Initial Discharge Plan  Nichole L Cantu,             Met with:patient to discuss discharge plans. Information verified: address, contacts, phone number, , insurance Yes  PCP: Jose Bronson MD  Date of last visit: past 4 months    Insurance Provider: yessi    Discharge Planning    Living Arrangements:  Spouse/Significant Other   Support Systems:  Spouse/Significant Other    Home has 1 stories  0 stairs to climb to get into front door, 0stairs to climb to reach second floor  Location of bedroom/bathroom in home main    Patient able to perform ADL's:Independent    Current Services (outpatient & in home) none   DME equipment: has walker when she is having a flare  DME provider:       Potential Assistance Needed:  N/A    Patient agreeable to home care: No  [place] of choice provided:  n/a    Prior SNF/Rehab Placement and Facility:   Agreeable to SNF/Rehab: No  [place] of choice provided: n/a   Evaluation: no    Expected Discharge date:  20  Patient expects to be discharged to:  home  Follow Up Appointment: Best Day/ Time: Monday AM    Transportation provider: cab/self/family  Transportation arrangements needed for discharge: Yes    Readmission Risk              Risk of Unplanned Readmission:        14             Does patient have a readmission risk score greater than 14?: No  If yes, follow-up appointment must be made within 7 days of discharge.      Goals of Care: get my medications to get back on my feet      Discharge Plan: home independlenty          Electronically signed by Cavanaugh RN on 20 at 3:56 PM

## 2020-02-12 PROBLEM — I10 ESSENTIAL HYPERTENSION: Status: ACTIVE | Noted: 2020-02-12

## 2020-02-12 LAB
ABSOLUTE EOS #: <0.03 K/UL (ref 0–0.44)
ABSOLUTE IMMATURE GRANULOCYTE: 0.17 K/UL (ref 0–0.3)
ABSOLUTE LYMPH #: 0.71 K/UL (ref 1.1–3.7)
ABSOLUTE MONO #: 0.35 K/UL (ref 0.1–1.2)
ANION GAP SERPL CALCULATED.3IONS-SCNC: 14 MMOL/L (ref 9–17)
BASOPHILS # BLD: 0 % (ref 0–2)
BASOPHILS ABSOLUTE: <0.03 K/UL (ref 0–0.2)
BUN BLDV-MCNC: 21 MG/DL (ref 6–20)
BUN/CREAT BLD: ABNORMAL (ref 9–20)
CALCIUM SERPL-MCNC: 8.1 MG/DL (ref 8.6–10.4)
CHLORIDE BLD-SCNC: 101 MMOL/L (ref 98–107)
CO2: 23 MMOL/L (ref 20–31)
CREAT SERPL-MCNC: 0.51 MG/DL (ref 0.5–0.9)
DIFFERENTIAL TYPE: ABNORMAL
EOSINOPHILS RELATIVE PERCENT: 0 % (ref 1–4)
GFR AFRICAN AMERICAN: >60 ML/MIN
GFR NON-AFRICAN AMERICAN: >60 ML/MIN
GFR SERPL CREATININE-BSD FRML MDRD: ABNORMAL ML/MIN/{1.73_M2}
GFR SERPL CREATININE-BSD FRML MDRD: ABNORMAL ML/MIN/{1.73_M2}
GLUCOSE BLD-MCNC: 145 MG/DL (ref 70–99)
HCT VFR BLD CALC: 38.8 % (ref 36.3–47.1)
HEMOGLOBIN: 12.9 G/DL (ref 11.9–15.1)
IMMATURE GRANULOCYTES: 2 %
LYMPHOCYTES # BLD: 7 % (ref 24–43)
MCH RBC QN AUTO: 29.5 PG (ref 25.2–33.5)
MCHC RBC AUTO-ENTMCNC: 33.2 G/DL (ref 28.4–34.8)
MCV RBC AUTO: 88.6 FL (ref 82.6–102.9)
MONOCYTES # BLD: 3 % (ref 3–12)
NRBC AUTOMATED: 0 PER 100 WBC
PDW BLD-RTO: 13 % (ref 11.8–14.4)
PLATELET # BLD: 282 K/UL (ref 138–453)
PLATELET ESTIMATE: ABNORMAL
PMV BLD AUTO: 10.2 FL (ref 8.1–13.5)
POTASSIUM SERPL-SCNC: 3.8 MMOL/L (ref 3.7–5.3)
RBC # BLD: 4.38 M/UL (ref 3.95–5.11)
RBC # BLD: ABNORMAL 10*6/UL
SEG NEUTROPHILS: 88 % (ref 36–65)
SEGMENTED NEUTROPHILS ABSOLUTE COUNT: 9.48 K/UL (ref 1.5–8.1)
SODIUM BLD-SCNC: 138 MMOL/L (ref 135–144)
WBC # BLD: 10.7 K/UL (ref 3.5–11.3)
WBC # BLD: ABNORMAL 10*3/UL

## 2020-02-12 PROCEDURE — 6360000002 HC RX W HCPCS: Performed by: STUDENT IN AN ORGANIZED HEALTH CARE EDUCATION/TRAINING PROGRAM

## 2020-02-12 PROCEDURE — 6370000000 HC RX 637 (ALT 250 FOR IP): Performed by: STUDENT IN AN ORGANIZED HEALTH CARE EDUCATION/TRAINING PROGRAM

## 2020-02-12 PROCEDURE — 2580000003 HC RX 258: Performed by: STUDENT IN AN ORGANIZED HEALTH CARE EDUCATION/TRAINING PROGRAM

## 2020-02-12 PROCEDURE — 6360000002 HC RX W HCPCS: Performed by: FAMILY MEDICINE

## 2020-02-12 PROCEDURE — 85025 COMPLETE CBC W/AUTO DIFF WBC: CPT

## 2020-02-12 PROCEDURE — 80048 BASIC METABOLIC PNL TOTAL CA: CPT

## 2020-02-12 PROCEDURE — 2060000000 HC ICU INTERMEDIATE R&B

## 2020-02-12 PROCEDURE — 99254 IP/OBS CNSLTJ NEW/EST MOD 60: CPT | Performed by: INTERNAL MEDICINE

## 2020-02-12 PROCEDURE — 99232 SBSQ HOSP IP/OBS MODERATE 35: CPT

## 2020-02-12 PROCEDURE — 6370000000 HC RX 637 (ALT 250 FOR IP): Performed by: INTERNAL MEDICINE

## 2020-02-12 RX ORDER — AMLODIPINE BESYLATE 5 MG/1
5 TABLET ORAL DAILY
Status: DISCONTINUED | OUTPATIENT
Start: 2020-02-12 | End: 2020-02-13 | Stop reason: HOSPADM

## 2020-02-12 RX ORDER — PROMETHAZINE HYDROCHLORIDE 25 MG/ML
12.5 INJECTION, SOLUTION INTRAMUSCULAR; INTRAVENOUS ONCE
Status: COMPLETED | OUTPATIENT
Start: 2020-02-12 | End: 2020-02-12

## 2020-02-12 RX ORDER — SODIUM CHLORIDE 9 MG/ML
10 INJECTION INTRAVENOUS DAILY
Status: DISCONTINUED | OUTPATIENT
Start: 2020-02-12 | End: 2020-02-13 | Stop reason: HOSPADM

## 2020-02-12 RX ORDER — DIPHENHYDRAMINE HYDROCHLORIDE 50 MG/ML
25 INJECTION INTRAMUSCULAR; INTRAVENOUS ONCE
Status: COMPLETED | OUTPATIENT
Start: 2020-02-12 | End: 2020-02-12

## 2020-02-12 RX ORDER — KETOROLAC TROMETHAMINE 15 MG/ML
15 INJECTION, SOLUTION INTRAMUSCULAR; INTRAVENOUS
Status: COMPLETED | OUTPATIENT
Start: 2020-02-12 | End: 2020-02-12

## 2020-02-12 RX ORDER — MORPHINE SULFATE 2 MG/ML
1 INJECTION, SOLUTION INTRAMUSCULAR; INTRAVENOUS EVERY 4 HOURS PRN
Status: DISCONTINUED | OUTPATIENT
Start: 2020-02-12 | End: 2020-02-13

## 2020-02-12 RX ORDER — SUCRALFATE 1 G/1
1 TABLET ORAL EVERY 6 HOURS SCHEDULED
Status: DISCONTINUED | OUTPATIENT
Start: 2020-02-12 | End: 2020-02-13 | Stop reason: HOSPADM

## 2020-02-12 RX ORDER — ALPRAZOLAM 1 MG/1
1 TABLET ORAL ONCE
Status: DISCONTINUED | OUTPATIENT
Start: 2020-02-12 | End: 2020-02-13 | Stop reason: HOSPADM

## 2020-02-12 RX ORDER — PANTOPRAZOLE SODIUM 40 MG/10ML
40 INJECTION, POWDER, LYOPHILIZED, FOR SOLUTION INTRAVENOUS DAILY
Status: DISCONTINUED | OUTPATIENT
Start: 2020-02-12 | End: 2020-02-13 | Stop reason: HOSPADM

## 2020-02-12 RX ADMIN — Medication 10 ML: at 22:57

## 2020-02-12 RX ADMIN — SODIUM CHLORIDE, PRESERVATIVE FREE 10 ML: 5 INJECTION INTRAVENOUS at 22:00

## 2020-02-12 RX ADMIN — DIPHENHYDRAMINE HYDROCHLORIDE 25 MG: 50 INJECTION, SOLUTION INTRAMUSCULAR; INTRAVENOUS at 03:37

## 2020-02-12 RX ADMIN — DIPHENHYDRAMINE HYDROCHLORIDE 25 MG: 50 INJECTION, SOLUTION INTRAMUSCULAR; INTRAVENOUS at 08:44

## 2020-02-12 RX ADMIN — DIPHENHYDRAMINE HYDROCHLORIDE 25 MG: 50 INJECTION, SOLUTION INTRAMUSCULAR; INTRAVENOUS at 17:02

## 2020-02-12 RX ADMIN — PROMETHAZINE HYDROCHLORIDE 12.5 MG: 25 INJECTION INTRAMUSCULAR; INTRAVENOUS at 09:06

## 2020-02-12 RX ADMIN — SODIUM CHLORIDE 500 MG: 9 INJECTION, SOLUTION INTRAVENOUS at 17:03

## 2020-02-12 RX ADMIN — MORPHINE SULFATE 1 MG: 2 INJECTION, SOLUTION INTRAMUSCULAR; INTRAVENOUS at 21:33

## 2020-02-12 RX ADMIN — DIPHENHYDRAMINE HYDROCHLORIDE 25 MG: 50 INJECTION, SOLUTION INTRAMUSCULAR; INTRAVENOUS at 22:57

## 2020-02-12 RX ADMIN — ONDANSETRON 4 MG: 2 INJECTION INTRAMUSCULAR; INTRAVENOUS at 22:57

## 2020-02-12 RX ADMIN — ALPRAZOLAM 1 MG: 1 TABLET ORAL at 21:33

## 2020-02-12 RX ADMIN — SUCRALFATE 1 G: 1 TABLET ORAL at 17:03

## 2020-02-12 RX ADMIN — KETOROLAC TROMETHAMINE 15 MG: 15 INJECTION, SOLUTION INTRAMUSCULAR; INTRAVENOUS at 03:37

## 2020-02-12 RX ADMIN — FAMOTIDINE 20 MG: 20 TABLET, FILM COATED ORAL at 08:44

## 2020-02-12 RX ADMIN — ALPRAZOLAM 1 MG: 1 TABLET ORAL at 08:44

## 2020-02-12 RX ADMIN — FAMOTIDINE 20 MG: 20 TABLET, FILM COATED ORAL at 21:33

## 2020-02-12 RX ADMIN — TRAZODONE HYDROCHLORIDE 150 MG: 100 TABLET ORAL at 21:33

## 2020-02-12 RX ADMIN — AMLODIPINE BESYLATE 5 MG: 5 TABLET ORAL at 17:01

## 2020-02-12 RX ADMIN — MORPHINE SULFATE 1 MG: 2 INJECTION, SOLUTION INTRAMUSCULAR; INTRAVENOUS at 17:02

## 2020-02-12 RX ADMIN — ALPRAZOLAM 1 MG: 1 TABLET ORAL at 13:18

## 2020-02-12 RX ADMIN — HYDROCODONE BITARTRATE AND ACETAMINOPHEN 1 TABLET: 5; 325 TABLET ORAL at 06:19

## 2020-02-12 RX ADMIN — SODIUM CHLORIDE, PRESERVATIVE FREE 10 ML: 5 INJECTION INTRAVENOUS at 10:55

## 2020-02-12 ASSESSMENT — PAIN SCALES - GENERAL
PAINLEVEL_OUTOF10: 9
PAINLEVEL_OUTOF10: 9
PAINLEVEL_OUTOF10: 8
PAINLEVEL_OUTOF10: 8
PAINLEVEL_OUTOF10: 10

## 2020-02-12 NOTE — PROGRESS NOTES
Smoking Cessation - topics covered   []  Health Risks  []  Benefits of Quitting   []  Smoking Cessation  []  Patient has no history of tobacco use per note in significant history. []  Patient is former smoker per note in significant history. Patient quit in   []  No need for tobacco cessation education. []  Booklet given  [x]  Patient verbalizes understanding. [x]  Patient denies need for tobacco cessation education. []  Unable to meet with patient today. Will follow up as able.   Bianca Knight  3:05 PM

## 2020-02-12 NOTE — CONSULTS
[address]     Department of Internal Medicine - Staff Internal Medicine Teaching Service          ADMISSION NOTE/HISTORY AND PHYSICAL EXAMINATION   Date: 2/12/2020  Patient Name: Gonzalez Law  Date of admission: 2/11/2020  4:20 AM  YOB: 1975  PCP: Cecile Matta MD  History Obtained From:  patient, electronic medical record    Consult Reason:     Consult Reason: Hematemesis and medical management    HISTORY OF PRESENTING ILLNESS     The patient is a pleasant 53 y.o. female presents with a chief complaint of face numbness, difficulty swallowing and bilateral lower extremity weakness. Patient was in outside care facility last week with MS exacerbation and treated with IV Solu-Medrol, eventually discharged. However as per the patient symptoms got worsened. Neurology admitted and evaluation shown possible multiple sclerosis exacerbation. Started on IV Solu-Medrol 200 mg for 6 days. MRI head shown/of increased intensity in frontal lobes. MRI cervical and thoracic spine were unremarkable. Modified barium swallow study was normal and patient on regular diet. Internal medicine consulted for nausea, hematemesis started this morning and for medical management. As per the patient symptoms started this morning, associated upper abdominal pain. Denies weight changes, black stools, diarrhea/constipation. Last bowel movement yesterday. No urinary symptoms. During evaluation, bright red blood seen in her tray, without saliva/food particles/dark color. As per the patient, similar episode 6 years ago and EGD was done to cauterize the vessels. However previous records shown, EGD 1/2019 for similar symptoms and was negative. 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  History of multiple admissions past with similar episodes with extensive evaluation.   Patient IV line leaking backwards and as per the nurse patient went to the restroom and possible ingestion of blood and spitting out. Past records also shown similar episodes. Patient stated she can take IV Protonix only with Benadryl because of allergy as skin rash. Requested multiple pain medications overnight since admission.     Review of Systems:  General ROS: Completed and except as mentioned above were negative   HEENT ROS: Completed and except as mentioned above were negative   Allergy and Immunology ROS:  Completed and except as mentioned above were negative  Hematological and Lymphatic ROS:  Completed and except as mentioned above were negative  Respiratory ROS:  Completed and except as mentioned above were negative  Cardiovascular ROS:  Completed and except as mentioned above were negative  Gastrointestinal ROS: Completed and except as mentioned above were negative  Genito-Urinary ROS:  Completed and except as mentioned above were negative  Musculoskeletal ROS:  Completed and except as mentioned above were negative  Neurological ROS:  Completed and except as mentioned above were negative  Skin & Dermatological ROS:  Completed and except as mentioned above were negative  Psychological ROS:  Completed and except as mentioned above were negative    PAST MEDICAL HISTORY     Past Medical History:   Diagnosis Date    Blood transfusion reaction     GERD (gastroesophageal reflux disease)     [place] filter in place    [employer]     Movement disorder     MS (multiple sclerosis) ([address]     Neuromuscular disorder ([address]     Optic neuritis due to multiple sclerosis ([address]     Psychiatric problem     depression    Seizures ([address]     Self inflicted injury     Spinal cord stimulator status     placement and removal       PAST SURGICAL HISTORY     Past Surgical History:   Procedure Laterality Date    ABDOMEN SURGERY      BACK SURGERY      CARPAL TUNNEL RELEASE      CERVICAL FUSION       SECTION      COLONOSCOPY      ENDOSCOPY, COLON, DIAGNOSTIC      HYSTERECTOMY      LYMPHADENECTOMY  TONSILLECTOMY      VASCULAR SURGERY      VENA CAVA FILTER PLACEMENT  09/2011    GFF Placed in 2011       ALLERGIES     Iv dye [iodides]; Protonix [pantoprazole sodium]; Fentanyl; Solu-medrol [methylprednisolone]; Ketorolac tromethamine; Pcn [penicillins]; and Zofran    MEDICATIONS PRIOR TO ADMISSION     Prior to Admission medications    Medication Sig Start Date End Date Taking? Authorizing Provider   busPIRone HCl (BUSPAR PO) Take 0.3 mg by mouth 3 times daily    Yes Historical Provider, MD   ferrous sulfate 325 (65 Fe) MG EC tablet Take 1 tablet by mouth 2 times daily (with meals)  Patient not taking: Reported on 2/11/2020 6/4/18   Brayan Lozada MD   oxyCODONE-acetaminophen (PERCOCET)  MG per tablet Take 1 tablet by mouth every 6 hours as needed for Pain. Dede Schmid Historical Provider, MD   ALPRAZolam Deri Earing) 1 MG tablet Take 1 mg by mouth 3 times daily    Historical Provider, MD   vitamin D (ERGOCALCIFEROL) 38294 UNITS capsule Take 1 capsule by mouth once a week for 6 doses 11/30/16 1/5/17  Nancy Bhatt MD   sertraline (ZOLOFT) 100 MG tablet Take 2 tablets by mouth daily [facility] 11/30/16   Nancy Bhatt MD   estrogens, conjugated, (PREMARIN) 1.25 MG tablet Take 1 tablet by mouth daily 11/30/16   Nancy Bhatt MD   traZODone (DESYREL) 100 MG tablet Take 1.5 tablets by mouth nightly 1/2 tab to 1 tab ;   Verified by [facility] pharmacy GO1- 11/30/16   Nancy Bhatt MD       SOCIAL HISTORY     Tobacco: Stated 2 packs for 2 weeks, smoking for more than 20 years. Alcohol: Denied as never alcoholic.   Illicits: Denies    FAMILY HISTORY     Family History   Problem Relation Age of Onset    Cancer Mother     High Blood Pressure Father     Diabetes Brother        PHYSICAL EXAM     Vitals: BP (!) 170/98   Pulse 72   Temp 97.6 °F (36.4 °C) (Oral)   Resp 11   Ht 5' 7\" (1.702 m)   Wt 180 lb 8.9 oz (81.9 kg)   SpO2 100%   BMI 28.28 kg/m²   Tmax: Temp (24hrs), Av.4 °F (36.3 °C), Min:97 °F (36.1 °C), Max:98.2 °F (36.8 °C)    Last Body weight:   Wt Readings from Last 3 Encounters:   20 180 lb 8.9 oz (81.9 kg)   02/10/20 180 lb (81.6 kg)   20 180 lb (81.6 kg)     Body Mass Index : Body mass index is 28.28 kg/m². PHYSICAL EXAMINATION:  Constitutional: This is a well developed, well nourished, 25-29.9 - Overweight 53y.o. year old female who is alert, oriented, cooperative and in no apparent distress. Head:normocephalic and atraumatic. EENT:  PERRLA. mucosa was without erythema, exudates or cobblestoning. No thrush was noted. Neck: Supple without thyromegaly. No elevated JVP. Trachea was midline. Respiratory: Chest was symmetrical without dullness to percussion. Breath sounds bilaterally were clear to auscultation. There were no wheezes, rhonchi or rales. There is no intercostal retraction or use of accessory muscles. Cardiovascular: Regular without murmur, clicks, gallops or rubs. Abdomen: Slightly rounded and soft without organomegaly. Epigastric tenderness but No rebound, rigidity or guarding was appreciated. Bowel sounds present. Musculoskeletal: Normal curvature of the spine. No gross muscle weakness. Extremities:  No lower extremity edema, tenderness, varicosities or erythema. Muscle size, tone and strength are normal.  No involuntary movements are noted. Skin:  Warm and dry. Good color, turgor and pigmentation.   Neurological/Psychiatric: The patient's general behavior, level of consciousness, thought content and emotional status is normal.          INVESTIGATIONS     Laboratory Testing:     Recent Results (from the past 24 hour(s))   Basic Metabolic Panel w/ Reflex to MG    Collection Time: 20  5:03 AM   Result Value Ref Range    Glucose 145 (H) 70 - 99 mg/dL    BUN 21 (H) 6 - 20 mg/dL    CREATININE 0.51 0.50 - 0.90 mg/dL    Bun/Cre Ratio NOT REPORTED 9 - 20    Calcium 8.1 (L) 8.6 - 10.4 mg/dL    Sodium 138 135 - 144 mmol/L    Potassium 3.8 3.7 - 5.3 mmol/L    Chloride 101 98 - 107 mmol/L    CO2 23 20 - 31 mmol/L    Anion Gap 14 9 - 17 mmol/L    GFR Non-African American >60 >60 mL/min    GFR African American >60 >60 mL/min    GFR Comment          GFR Staging NOT REPORTED    CBC auto differential    Collection Time: 02/12/20  5:03 AM   Result Value Ref Range    WBC 10.7 3.5 - 11.3 k/uL    RBC 4.38 3.95 - 5.11 m/uL    Hemoglobin 12.9 11.9 - 15.1 g/dL    Hematocrit 38.8 36.3 - 47.1 %    MCV 88.6 82.6 - 102.9 fL    MCH 29.5 25.2 - 33.5 pg    MCHC 33.2 28.4 - 34.8 g/dL    RDW 13.0 11.8 - 14.4 %    Platelets 774 211 - 550 k/uL    MPV 10.2 8.1 - 13.5 fL    NRBC Automated 0.0 0.0 per 100 WBC    Differential Type NOT REPORTED     Seg Neutrophils 88 (H) 36 - 65 %    Lymphocytes 7 (L) 24 - 43 %    Monocytes 3 3 - 12 %    Eosinophils % 0 (L) 1 - 4 %    Basophils 0 0 - 2 %    Immature Granulocytes 2 (H) 0 %    Segs Absolute 9.48 (H) 1.50 - 8.10 k/uL    Absolute Lymph # 0.71 (L) 1.10 - 3.70 k/uL    Absolute Mono # 0.35 0.10 - 1.20 k/uL    Absolute Eos # <0.03 0.00 - 0.44 k/uL    Basophils Absolute <0.03 0.00 - 0.20 k/uL    Absolute Immature Granulocyte 0.17 0.00 - 0.30 k/uL    WBC Morphology NOT REPORTED     RBC Morphology NOT REPORTED     Platelet Estimate NOT REPORTED        Imaging:   Ct Head Wo Contrast    Result Date: 2/5/2020  No acute intracranial abnormality. Mri Cervical Spine W Wo Contrast    Result Date: 2/11/2020  Anterior hardware fixation at H1-0 without complication. Mild multilevel degenerative disc disease with uncovertebral and facet hypertrophy resulting in canal stenosis at C3-4. Unremarkable pre and post-contrast evaluation of the spinal cord. Mri Thoracic Spine W Wo Contrast    Result Date: 2/11/2020  Multilevel degenerative disc disease and multilevel degenerative facet hypertrophy without canal stenosis or foraminal narrowing.      Mri Brain W Wo Contrast    Result Date: 2/11/2020  Scattered FLAIR signal

## 2020-02-12 NOTE — PROGRESS NOTES
Occupational Therapy    Occupational Therapy Not Seen Note    DATE: 2020  Name: Nacho Andrews  : 1975  MRN: [id number]    Patient not available for Occupational Therapy due to:    Patient Declined: D/t pain, RN informed and states pt just received pain meds, hold OT eval.    Next Scheduled Treatment: Attempt in pm as time allows.     Electronically signed by Donavan Cohen OT on 2020 at 8:30 AM

## 2020-02-12 NOTE — PROGRESS NOTES
Physical Therapy  DATE: 2020    NAME: Thien Yeager  MRN: [id number]   : 1975    Patient not seen this date for Physical Therapy due to:  [] Blood transfusion in progress  [] Hemodialysis  []  Patient Declined  [] Spine Precautions   [] Strict Bedrest  [] Surgery/ Procedure  [] Testing      [x] Other: pt refused at this time d/t pain, nursing notified; ck back in pm as able or 20        [] PT being discontinued at this time. Patient independent. No further needs. [] PT being discontinued at this time as the patient has been transferred to palliative care. No further needs. Brandon Benton   This treatment/evaluation completed by signing SPT. Signing PT agrees with treatment and documentation.

## 2020-02-12 NOTE — PROGRESS NOTES
NEUROLOGY INPATIENT PROGRESS NOTE    2/12/2020         Subjective: Brian Green is a  53 y.o. female admitted on 2/11/2020 with Multiple sclerosis exacerbation ([facility] [address] Mark Duenas    Briefly, this is a  53 y.o. female admitted on 2/11/2020 with c/o MS exacerbation, last known flare-up was 5 days ago. She initially presented to [facility] from home with 3 days of bilateral face, tongue, and mouth numbness. The tongue and mouth numbness made it where she cannot swallow and she has not been eat/drinking for 3 days d/t fear of choking. She also endorses left sided arm and leg numbness and weakness, she cannot bear weight and needs a walker to ambulate. Additionally, she has left sided eye pain with movement and blurry vision, stating that \"it feels like I have vaseline covering my eye\". These symptoms are worse than her flare-up 5 days ago, which is what brought her back to the hospital so soon after discharge. Between flare-ups she is completely symptom free. She does not see a neurologist outpatient, and last received outpatient treatment with [facility] over 4 years ago. She saw Dr. Katelynn Mar 6 months ago, but does not want to pursue treatment there. Of note, she was admited to [facility] in 2012 where she was diagnosed with Factitious Disorder Imposed on Self.         She has a past medical history significant for PTSD, anxiety, and chronic pain syndrome. Overnight, the patient repeatedly asked for pain medication, requesting narcotics. She was was told she could not get any narcotics, she settled for benadryl and  Toradol. This morning she was once again asking for pain medication and c/o non-specific pain. A few minutes later she began to vomit blood. The blood was bright red with some large, dark clots. There was no food or bile mixed with the blood. She has experienced this before several years ago, but does not remember the cause of hematemesis at that time.  Prior to the episode she was in the bathroom and the nurse notes that there was blood in the IV. She has a documented history of multiple EGD's which showed evidence of swallowed blood and no acute causes of the bleeding. She has a documented history of swallowing blood from her central line and then coughing it up in the presence of nurses. She later had another episode of hematemesis and her IV was found to be uncapped and bloody. The IV was wrapped by the nurse and a sitter was placed in the room. GI was consulted and we will wait on their guidance as how to proceed. In regards to the MS flare, pt reports no improvement of symptoms. She is still experiencing facial numbness, throat and mouth numbness, left eye pain with movement, and left sided arm and leg weakness. She states that she is no better than she was when she was admitted. She is still experiencing severe anxiety and requesting versed for the symptoms. She has xanax TID ordered. She is requesting to be placed back on her salu-medrol and IV benadryl, which was being held d/t hematemesis. No current facility-administered medications on file prior to encounter. Current Outpatient Medications on File Prior to Encounter   Medication Sig Dispense Refill    busPIRone HCl (BUSPAR PO) Take 0.3 mg by mouth 3 times daily       ferrous sulfate 325 (65 Fe) MG EC tablet Take 1 tablet by mouth 2 times daily (with meals) (Patient not taking: Reported on 2/11/2020) 90 tablet 3    oxyCODONE-acetaminophen (PERCOCET)  MG per tablet Take 1 tablet by mouth every 6 hours as needed for Pain. Julio Crate ALPRAZolam (XANAX) 1 MG tablet Take 1 mg by mouth 3 times daily      vitamin D (ERGOCALCIFEROL) 74715 UNITS capsule Take 1 capsule by mouth once a week for 6 doses 6 capsule 0    sertraline (ZOLOFT) 100 MG tablet Take 2 tablets by mouth daily Verified by [facility] pharmacy on 07- 30 tablet 3    estrogens, conjugated, (PREMARIN) 1.25 MG tablet Take 1 tablet by mouth daily 21 tablet 3    traZODone (DESYREL) 100 MG tablet Take 1.5 tablets by mouth nightly 1/2 tab to 1 tab ;   Verified by [facility] pharmacy QD7- 30 tablet 3       Allergies: Nichole L Cantu is allergic to iv dye [iodides]; protonix [pantoprazole sodium]; fentanyl; solu-medrol [methylprednisolone]; ketorolac tromethamine; pcn [penicillins]; and zofran.     Past Medical History:   Diagnosis Date    Blood transfusion reaction     GERD (gastroesophageal reflux disease)     Alexandra filter in place    [facility]     Movement disorder     [facility] sclerosis) ([facility])     Neuromuscular disorder ([facility] [address]     Optic neuritis due to multiple sclerosis ([facility] [address]     Psychiatric problem     depression    Seizures ([facility] [address]     Self inflicted injury     Spinal cord stimulator status     placement and removal       Past Surgical History:   Procedure Laterality Date    ABDOMEN SURGERY      BACK SURGERY      CARPAL TUNNEL RELEASE      CERVICAL FUSION       SECTION      COLONOSCOPY      ENDOSCOPY, COLON, DIAGNOSTIC      HYSTERECTOMY      LYMPHADENECTOMY      TONSILLECTOMY      VASCULAR SURGERY      VENA CAVA FILTER PLACEMENT  2011    GFF Placed in        Medications:     pantoprazole  40 mg Intravenous Daily    And    sodium chloride (PF)  10 mL Intravenous Daily    sucralfate  1 g Oral 4 times per day    sodium chloride flush  10 mL Intravenous 2 times per day    famotidine  20 mg Oral BID    enoxaparin  40 mg Subcutaneous Daily    [Held by provider] methylPREDNISolone  500 mg Intravenous Q12H    diphenhydrAMINE  50 mg Oral Once    ALPRAZolam  1 mg Oral TID    traZODone  150 mg Oral Nightly     PRN Meds include: sodium chloride flush, potassium chloride **OR** potassium alternative oral replacement **OR** potassium chloride, magnesium sulfate, magnesium hydroxide, ondansetron, [Held by provider] acetaminophen, sodium chloride flush, [Held by provider] HYDROcodone 5 mg - acetaminophen, diphenhydrAMINE    Objective:   BP upper and lower extremities. STATION and GAIT  Gait normal        Data:    Lab Results:   CBC:   Recent Labs     02/10/20  1632 02/11/20  0648 02/12/20  0503   WBC 10.7 10.1 10.7   HGB 12.6 13.2 12.9    313 282     BMP:    Recent Labs     02/10/20  1632 02/11/20  0648 02/12/20  0503    137 138   K 3.5* 3.9 3.8   CL 94* 98 101   CO2 28 25 23   BUN 16 20 21*   CREATININE 0.59 0.52 0.51   GLUCOSE 145* 141* 145*         Lab Results   Component Value Date    CHOL 163 11/19/2011    LDLCHOLESTEROL 83 11/19/2011    HDL 38 (L) 11/19/2011    TRIG 211 (H) 11/19/2011    ALT 8 06/03/2018    AST 14 06/03/2018    TSH 0.25 (L) 11/30/2016    INR 1.0 05/08/2018    LABA1C 4.5 07/30/2014    [id number] 446 11/30/2016       No results found for: PHENYTOIN, PHENYTOIN, VALPROATE, CBMZ    IMAGING  CT Head WO Contrast 2/05/2020:   No acute intracranial abnormality. MRI THORACIC SPINE W WO CONTRAST 2/11/2020:   Multilevel degenerative disc disease and multilevel degenerative facet  hypertrophy without canal stenosis or foraminal narrowing. MRI CERVICAL SPINE W WO CONTRAST 2/11/2020: Anterior hardware fixation at A9-4 without complication. Mild multilevel degenerative disc disease with uncovertebral and facet  hypertrophy resulting in canal stenosis at C3-4. Unremarkable pre and post-contrast evaluation of the spinal cord. MRI BRAIN 2/11/2020:   Scattered FLAIR signal abnormalities most pronounced in the frontal lobes  that may correlate with patient's history of a demyelinating process. J[facility]  is no evidence for active or acute demyelination. Assessment and Plan  This is a 53year old female with a pmh significant for MS, PTSD, and anxiety who presents to the ED for left UE and LE extremity weakness and tingling associated with b/l facial numbness, tongue numbness, and mouth numbness. She believes that she is having a MS flare.  Pt is having lots of anxiety and is requesting versed and morphine.       MS Flare:   MRI studies show no acute demyelinating processes.   Continue IV salu-medrol   Encourage her to participate in PT/OT      Dysphagia:   Pt passed swallow study and can have a normal diet      Anxiety:   .5 mg Ativan q6 PRN    PHILL Fletcher   2/12/2020  11:15 AM

## 2020-02-12 NOTE — CONSULTS
Onset    Cancer Mother     High Blood Pressure Father     Diabetes Brother        REVIEW OF SYSTEMS:    Constitutional: No fever, no chills, no lethargy, no weakness. HEENT:  No headache, otalgia, itchy eyes, nasal discharge or sore throat. + facial weakness  Cardiac:  No chest pain, dyspnea, orthopnea or PND. Chest:   No cough, phlegm or wheezing. Abdomen:  No abdominal pain, nausea or vomiting. Neuro:  + focal weakness, abnormal movements or seizure like activity. Skin:   No rashes, no itching. :   No hematuria, no pyuria, no dysuria, no flank pain. Extremities:  No swelling or joint pains. ROS was otherwise negative except as mentioned in the [address] PHYSICAL EXAM:    BP (!) 149/97   Pulse 80   Temp 97.1 °F (36.2 °C) (Oral)   Resp 20   Ht 5' 7\" (1.702 m)   Wt 180 lb 8.9 oz (81.9 kg)   SpO2 100%   BMI 28.28 kg/m²     GENERAL:   Well developed, Well nourished, No apparent distress  HEAD:   Normocephalic, Atraumatic  EENT:   EOMI, Sclera not icteric, Oropharynx moist   NECK:   Supple, Trachea midline  LUNGS:  CTA Bilaterally  HEART:  RRR, No murmur  ABDOMEN:   Soft, epigastric tenderness, Nondistended, BS WNL  EXT:   No clubbing. No cyanosis. No edema. SKIN:   No rashes. No jaundice. No stigmata of liver disease.     MUSC/SKEL:   Adequate muscle bulk for patient's age, No significant synovitis, No deformities  NEURO:  A&O x Three, CN II- XII grossly intact      LABS AND IMAGING:     CBC  Recent Labs     02/10/20  1632 02/11/20  0648 02/12/20  0503   WBC 10.7 10.1 10.7   HGB 12.6 13.2 12.9   HCT 37.3 38.9 38.8   MCV 86.5 87.8 88.6   MCH 29.2 29.8 29.5   MCHC 33.8 33.9 33.2    313 282       BMP  Recent Labs     02/10/20  1632 02/11/20  0648 02/12/20  0503    137 138   K 3.5* 3.9 3.8   CL 94* 98 101   CO2 28 25 23   BUN 16 20 21*   CREATININE 0.59 0.52 0.51   GLUCOSE 145* 141* 145*   CALCIUM 8.8 8.9 8.1*       LFTS  No results for input(s): ALKPHOS, ALT, AST, PROT, BILITOT, BILIDIR, LABALBU in the last 72 hours. AMYLASE/LIPASE/AMMONIA  No results for input(s): AMYLASE, LIPASE, AMMONIA in the last 72 hours. PT/INR  No results for input(s): PROTIME, INR in the last 72 hours. ANEMIA STUDIES  No results for input(s): IRON, LABIRON, TIBC, UIBC, FERRITIN, [id number], FOLATE, OCCULTBLD in the last 72 hours. IMAGING  Ct Head Wo Contrast    Result Date: 2/5/2020  EXAMINATION: CT OF THE HEAD WITHOUT CONTRAST  2/5/2020 7:28 pm TECHNIQUE: CT of the head was performed without the administration of intravenous contrast. Dose modulation, iterative reconstruction, and/or weight based adjustment of the mA/kV was utilized to reduce the radiation dose to as low as reasonably achievable. COMPARISON: CT head 06/02/2018 HISTORY: ORDERING SYSTEM PROVIDED HISTORY: L sided weakness TECHNOLOGIST PROVIDED HISTORY: L sided weakness Is the patient pregnant?->No FINDINGS: BRAIN/VENTRICLES: There is no acute intracranial hemorrhage, mass effect or midline shift. No abnormal extra-axial fluid collection. The gray-white differentiation is maintained without evidence of an acute infarct. There is no evidence of hydrocephalus. ORBITS: The visualized portion of the orbits demonstrate no acute abnormality. SINUSES: The visualized paranasal sinuses and mastoid air cells demonstrate no acute abnormality. SOFT TISSUES/SKULL:  No acute abnormality of the visualized skull or soft tissues. No acute intracranial abnormality. Mri Cervical Spine W Wo Contrast    Result Date: 2/11/2020  EXAMINATION: MRI OF THE CERVICAL SPINE WITHOUT AND WITH CONTRAST  2/11/2020 11:46 am: TECHNIQUE: Multiplanar multisequence MRI of the cervical spine was performed without and with the administration of intravenous contrast. COMPARISON: Cervical spine performed 07/30/2014.  HISTORY: ORDERING SYSTEM PROVIDED HISTORY: MS TECHNOLOGIST PROVIDED HISTORY: MS Is the patient pregnant?->No FINDINGS: BONES/ALIGNMENT: There is anterior

## 2020-02-12 NOTE — PLAN OF CARE
Problem: Pain:  Goal: Pain level will decrease  Description  Pain level will decrease  2/12/2020 0539 by Yokasta Garcia RN  Outcome: Ongoing     Problem: Pain:  Goal: Control of acute pain  Description  Control of acute pain  2/12/2020 0539 by Yokasta Garcia RN  Outcome: Ongoing     Problem: Pain:  Goal: Control of chronic pain  Description  Control of chronic pain  2/12/2020 0539 by Yokasta Garcia RN  Outcome: Ongoing    Patient's pain is assessed on a 0-10 pain scale frequently. Patient taught to call out when early onset of pain is felt. Pain is managed with PRN pain medication, repositioning, and emotional support.

## 2020-02-13 VITALS
HEART RATE: 64 BPM | SYSTOLIC BLOOD PRESSURE: 145 MMHG | OXYGEN SATURATION: 98 % | DIASTOLIC BLOOD PRESSURE: 84 MMHG | TEMPERATURE: 98 F | BODY MASS INDEX: 28.37 KG/M2 | WEIGHT: 180.78 LBS | HEIGHT: 67 IN | RESPIRATION RATE: 20 BRPM

## 2020-02-13 LAB
ABSOLUTE EOS #: 0 K/UL (ref 0–0.4)
ABSOLUTE IMMATURE GRANULOCYTE: 0 K/UL (ref 0–0.3)
ABSOLUTE LYMPH #: 0.21 K/UL (ref 1–4.8)
ABSOLUTE MONO #: 0.21 K/UL (ref 0.1–0.8)
ANION GAP SERPL CALCULATED.3IONS-SCNC: 15 MMOL/L (ref 9–17)
BASOPHILS # BLD: 0 % (ref 0–2)
BASOPHILS ABSOLUTE: 0 K/UL (ref 0–0.2)
BUN BLDV-MCNC: 16 MG/DL (ref 6–20)
BUN/CREAT BLD: ABNORMAL (ref 9–20)
CALCIUM SERPL-MCNC: 7.8 MG/DL (ref 8.6–10.4)
CHLORIDE BLD-SCNC: 103 MMOL/L (ref 98–107)
CO2: 24 MMOL/L (ref 20–31)
CREAT SERPL-MCNC: 0.47 MG/DL (ref 0.5–0.9)
DIFFERENTIAL TYPE: ABNORMAL
EOSINOPHILS RELATIVE PERCENT: 0 % (ref 1–4)
GFR AFRICAN AMERICAN: >60 ML/MIN
GFR NON-AFRICAN AMERICAN: >60 ML/MIN
GFR SERPL CREATININE-BSD FRML MDRD: ABNORMAL ML/MIN/{1.73_M2}
GFR SERPL CREATININE-BSD FRML MDRD: ABNORMAL ML/MIN/{1.73_M2}
GLUCOSE BLD-MCNC: 121 MG/DL (ref 70–99)
HCT VFR BLD CALC: 37.2 % (ref 36.3–47.1)
HEMOGLOBIN: 12.4 G/DL (ref 11.9–15.1)
IMMATURE GRANULOCYTES: 0 %
LYMPHOCYTES # BLD: 2 % (ref 24–44)
MAGNESIUM: 2.2 MG/DL (ref 1.6–2.6)
MCH RBC QN AUTO: 29.3 PG (ref 25.2–33.5)
MCHC RBC AUTO-ENTMCNC: 33.3 G/DL (ref 28.4–34.8)
MCV RBC AUTO: 87.9 FL (ref 82.6–102.9)
MONOCYTES # BLD: 2 % (ref 1–7)
MORPHOLOGY: NORMAL
NRBC AUTOMATED: 0 PER 100 WBC
PDW BLD-RTO: 12.7 % (ref 11.8–14.4)
PLATELET # BLD: 266 K/UL (ref 138–453)
PLATELET ESTIMATE: ABNORMAL
PMV BLD AUTO: 10.1 FL (ref 8.1–13.5)
POTASSIUM SERPL-SCNC: 3.3 MMOL/L (ref 3.7–5.3)
RBC # BLD: 4.23 M/UL (ref 3.95–5.11)
RBC # BLD: ABNORMAL 10*6/UL
SEG NEUTROPHILS: 96 % (ref 36–66)
SEGMENTED NEUTROPHILS ABSOLUTE COUNT: 9.88 K/UL (ref 1.8–7.7)
SODIUM BLD-SCNC: 142 MMOL/L (ref 135–144)
WBC # BLD: 10.3 K/UL (ref 3.5–11.3)
WBC # BLD: ABNORMAL 10*3/UL

## 2020-02-13 PROCEDURE — 99232 SBSQ HOSP IP/OBS MODERATE 35: CPT | Performed by: INTERNAL MEDICINE

## 2020-02-13 PROCEDURE — 36415 COLL VENOUS BLD VENIPUNCTURE: CPT

## 2020-02-13 PROCEDURE — 80048 BASIC METABOLIC PNL TOTAL CA: CPT

## 2020-02-13 PROCEDURE — 6370000000 HC RX 637 (ALT 250 FOR IP): Performed by: STUDENT IN AN ORGANIZED HEALTH CARE EDUCATION/TRAINING PROGRAM

## 2020-02-13 PROCEDURE — 97530 THERAPEUTIC ACTIVITIES: CPT

## 2020-02-13 PROCEDURE — 6360000002 HC RX W HCPCS: Performed by: FAMILY MEDICINE

## 2020-02-13 PROCEDURE — 6360000002 HC RX W HCPCS: Performed by: STUDENT IN AN ORGANIZED HEALTH CARE EDUCATION/TRAINING PROGRAM

## 2020-02-13 PROCEDURE — 85025 COMPLETE CBC W/AUTO DIFF WBC: CPT

## 2020-02-13 PROCEDURE — 2580000003 HC RX 258: Performed by: STUDENT IN AN ORGANIZED HEALTH CARE EDUCATION/TRAINING PROGRAM

## 2020-02-13 PROCEDURE — 83735 ASSAY OF MAGNESIUM: CPT

## 2020-02-13 PROCEDURE — C9113 INJ PANTOPRAZOLE SODIUM, VIA: HCPCS | Performed by: STUDENT IN AN ORGANIZED HEALTH CARE EDUCATION/TRAINING PROGRAM

## 2020-02-13 PROCEDURE — 97162 PT EVAL MOD COMPLEX 30 MIN: CPT

## 2020-02-13 PROCEDURE — 97166 OT EVAL MOD COMPLEX 45 MIN: CPT

## 2020-02-13 PROCEDURE — 97535 SELF CARE MNGMENT TRAINING: CPT

## 2020-02-13 RX ORDER — POTASSIUM CHLORIDE 20 MEQ/1
40 TABLET, EXTENDED RELEASE ORAL 2 TIMES DAILY WITH MEALS
Status: DISCONTINUED | OUTPATIENT
Start: 2020-02-13 | End: 2020-02-13 | Stop reason: HOSPADM

## 2020-02-13 RX ORDER — HYDROCODONE BITARTRATE AND ACETAMINOPHEN 5; 325 MG/1; MG/1
1 TABLET ORAL
Status: COMPLETED | OUTPATIENT
Start: 2020-02-13 | End: 2020-02-13

## 2020-02-13 RX ADMIN — DIPHENHYDRAMINE HYDROCHLORIDE 25 MG: 50 INJECTION, SOLUTION INTRAMUSCULAR; INTRAVENOUS at 05:52

## 2020-02-13 RX ADMIN — SODIUM CHLORIDE 10 ML: 9 INJECTION INTRAMUSCULAR; INTRAVENOUS; SUBCUTANEOUS at 09:29

## 2020-02-13 RX ADMIN — ONDANSETRON 4 MG: 2 INJECTION INTRAMUSCULAR; INTRAVENOUS at 09:29

## 2020-02-13 RX ADMIN — SODIUM CHLORIDE 500 MG: 9 INJECTION, SOLUTION INTRAVENOUS at 05:30

## 2020-02-13 RX ADMIN — AMLODIPINE BESYLATE 5 MG: 5 TABLET ORAL at 09:29

## 2020-02-13 RX ADMIN — SODIUM CHLORIDE 500 MG: 0.9 INJECTION, SOLUTION INTRAVENOUS at 12:27

## 2020-02-13 RX ADMIN — HYDROCODONE BITARTRATE AND ACETAMINOPHEN 1 TABLET: 5; 325 TABLET ORAL at 12:28

## 2020-02-13 RX ADMIN — PANTOPRAZOLE SODIUM 40 MG: 40 INJECTION, POWDER, FOR SOLUTION INTRAVENOUS at 09:29

## 2020-02-13 RX ADMIN — MORPHINE SULFATE 1 MG: 2 INJECTION, SOLUTION INTRAMUSCULAR; INTRAVENOUS at 07:15

## 2020-02-13 RX ADMIN — MORPHINE SULFATE 1 MG: 2 INJECTION, SOLUTION INTRAMUSCULAR; INTRAVENOUS at 03:13

## 2020-02-13 RX ADMIN — DIPHENHYDRAMINE HYDROCHLORIDE 25 MG: 50 INJECTION, SOLUTION INTRAMUSCULAR; INTRAVENOUS at 12:27

## 2020-02-13 RX ADMIN — Medication 10 ML: at 03:13

## 2020-02-13 RX ADMIN — ALPRAZOLAM 1 MG: 1 TABLET ORAL at 09:29

## 2020-02-13 RX ADMIN — FAMOTIDINE 20 MG: 20 TABLET, FILM COATED ORAL at 09:29

## 2020-02-13 ASSESSMENT — PAIN SCALES - GENERAL
PAINLEVEL_OUTOF10: 10
PAINLEVEL_OUTOF10: 10
PAINLEVEL_OUTOF10: 8
PAINLEVEL_OUTOF10: 9

## 2020-02-13 ASSESSMENT — PAIN DESCRIPTION - PAIN TYPE
TYPE: ACUTE PAIN
TYPE: ACUTE PAIN

## 2020-02-13 ASSESSMENT — PAIN DESCRIPTION - ORIENTATION
ORIENTATION: RIGHT
ORIENTATION: RIGHT

## 2020-02-13 ASSESSMENT — PAIN DESCRIPTION - FREQUENCY: FREQUENCY: CONTINUOUS

## 2020-02-13 ASSESSMENT — PAIN DESCRIPTION - DESCRIPTORS
DESCRIPTORS: ACHING;TENDER;DISCOMFORT;SORE
DESCRIPTORS: ACHING;DISCOMFORT;TINGLING;NUMBNESS

## 2020-02-13 ASSESSMENT — PAIN DESCRIPTION - LOCATION
LOCATION: GENERALIZED
LOCATION: LEG;ARM;FACE

## 2020-02-13 NOTE — PROGRESS NOTES
Genotype   No results found for: HEPATITISCGENOTYPE    HCV Quantitative   No results found for: HCVQNT    LIVER WORK UP:    AFP  No results found for: AFP    Alpha 1 antitrypsin   No results found for: A1A    Anti - Liver/Kidney Ab  No results found for: LIVER-KIDNEYMICROSOMALAB    CHAPIS  Lab Results   Component Value Date    CHAPIS POSITIVE 07/30/2014       AMA  No results found for: Wilmejia Crowder    ASMA  No results found for: SMOOTHMUSCAB    Ceruloplasmin  No results found for: CERULOPLSM    Celiac panel  No results found for: TISSTRNTIIGG, TTGIGA, IGA    PT/INR  No results for input(s): PROTIME, INR in the last 72 hours. Cancer Markers:  CEA:  No results for input(s): CEA in the last 72 hours. Ca 125:  No results for input(s):  in the last 72 hours. Ca 19-9:   Invalid input(s):   AFP: No results for input(s): AFP in the last 72 hours. Lactic acid:Invalid input(s): LACTIC ACID    Radiology Review:    No results found. Principal Problem:    Multiple sclerosis exacerbation ([address]  Active Problems:    Münchausen's syndrome    hx of Self-inflicted injury    Hematemesis without nausea    Essential hypertension  Resolved Problems:    * No resolved hospital problems. *       GI Assessment:  1. Hematemesis    Thank you for allowing me to participate in the care of your patient. Please feel free to contact me with any questions or concerns.      Girish Veras, [address] [facility]  [phone]

## 2020-02-13 NOTE — PROGRESS NOTES
This is a 53 y.o. female admitted 2/11/2020 for [facility] exacerbation [facility] Marek Meng. Patient admitted for chief complaint of face numbness and difficulty swallowing with bilateral lower extremity weakness, concern for MS exacerbation. Patient was admitted under neurology and was receiving high-dose Solu-Medrol. Patient today started complaining of nausea and bright red blood hematemesis in the central medicine was consulted. Patient also complains of severe pain throughout her body. Patient has had similar history of throwing up bright red blood, concern of munchausen syndrome. In the past patient has self-inflicted throat cuts, she has even sucked up her IV line blood to throw back as hematemesis. She has history of significant opioid use. Patient has had EGD in January 2019 which was negative. BMP:   Recent Labs     02/10/20  1632 02/11/20  0648 02/12/20  0503    137 138   K 3.5* 3.9 3.8   CL 94* 98 101   CO2 28 25 23   BUN 16 20 21*   CREATININE 0.59 0.52 0.51   GLUCOSE 145* 141* 145*     CBC: )  Recent Labs     02/10/20  1632 02/11/20  0648 02/12/20  0503   WBC 10.7 10.1 10.7   HGB 12.6 13.2 12.9   HCT 37.3 38.9 38.8    313 282          Assessment    Principal Problem:    Multiple sclerosis exacerbation (HCC)  Active Problems:    Münchausen's syndrome    hx of Self-inflicted injury    Hematemesis without nausea  Resolved Problems:    * No resolved hospital problems. *        Plan   Start Protonix 40 mg IV  Daily and Pepcid 20 mg twice a day as patient is on high-dose Solu-Medrol  Start Carafate  Sitter at bedside  GI consulted by neurology, no acute intervention    Hypertension-start amlodipine 5 mg    MS treatment as per neurology-on Solu-Medrol    Hold Lovenox as concern for hematemesis, put on EPC cuffs.     Xiomara Castro MD            Department of Internal Medicine  [facility], [facility]         2/12/2020, 9:51 PM

## 2020-02-13 NOTE — PROGRESS NOTES
1455- Paged Neuro Resident Dr. Marychuy Espana to inform him pt is throwing up blood. The vomit in the pan appeared to be strictly blood. Resident Marychuy Espana arrived at bedside. Pt refusing to take PO medications. Pt stated, \"I want all of my medications to be IV. I cannot take oral medications they upset my stomach. Also I have taken 50 of benadryl for 5 years before my steroid and I want it that way. \" Pt refused to accept teaching provided by Dr. Marychuy Espana stating, \"I will talk to my doctor in the morning. \" RN will continue to monitor.

## 2020-02-13 NOTE — PROGRESS NOTES
Vena Cava Filter Placement (09/2011). Restrictions  Restrictions/Precautions  Restrictions/Precautions: General Precautions, Fall Risk, Up as Tolerated  Required Braces or Orthoses?: No  Position Activity Restriction  Other position/activity restrictions: Inconsistent symptoms in R extremities noted, sitter in room    Subjective   General  Patient assessed for rehabilitation services?: Yes  Family / Caregiver Present: No  Diagnosis: MS exacerbation, Munchhausen's syndrome, R weakness/numbness/pain  Patient Currently in Pain: Yes  Pain Assessment  Pain Assessment: 0-10  Pain Level: 9(no grimaces or evidence of distress during session)  Pain Type: Acute pain  Pain Location: Generalized(\"whole R side from my face and tongue to toes\")  Pain Orientation: Right  Pain Descriptors: Aching;Discomfort;Tingling;Numbness  Pain Frequency: Continuous  Non-Pharmaceutical Pain Intervention(s): Ambulation/Increased Activity; Distraction; Emotional support  Response to Pain Intervention: Patient Satisfied  Vital Signs  Temp: 98 °F (36.7 °C)  Temp Source: Oral  Pulse: 64  Heart Rate Source: Monitor  Resp: 20  BP: (!) 145/84  BP Location: Left lower arm  BP Upper/Lower: Lower  MAP (mmHg): 95  Patient Position: Supine  Patient Currently in Pain: Yes  Oxygen Therapy  SpO2: 98 %  O2 Device: None (Room air)  Social/Functional History  Social/Functional History  Lives With: Significant other  Type of Home: Mobile home  Home Layout: One level  Home Access: Stairs to enter with rails  Entrance Stairs - Number of Steps: 4 FANG (sometimes scoots on bottom)  Entrance Stairs - Rails: Left  Bathroom Shower/Tub: Tub/Shower unit  Bathroom Toilet: Standard  Bathroom Equipment: Grab bars in shower  Bathroom Accessibility: Accessible  Home Equipment: Rolling walker(uses during MS exacerbations)  ADL Assistance: Independent  Homemaking Assistance: Independent  Homemaking Responsibilities: Yes  Ambulation Assistance: Independent  Transfer Assistance: Independent  Active : Yes(\"On good days\")  Mode of Transportation: Car  Occupation: On disability  Leisure & Hobbies: movies, hang with sig other  Additional Comments: Sig other home often     Objective   Vision: Within Functional Limits  Hearing: Within functional limits    Orientation  Overall Orientation Status: Within Functional Limits     Balance  Sitting Balance: Supervision  Standing Balance: Contact guard assistance  Standing Balance  Time: 9 min  Activity: Pt stood bedside, short func mob around room, required 1 seated rest break d/t unsteadiness mainly  Functional Mobility  Functional - Mobility Device: Rolling Walker  Activity: Other  Assist Level: Moderate assistance  Functional Mobility Comments: Pt's R foot was internally rotating, sliding behind L foot, and moving in inconsistent ways this date, pt observed to be able to dorsiflex ankle at times-other times pt dragging foot and taking both arms from RW handles seperately, fall risk   ADL  Feeding: Stand by assistance;Setup; Increased time to complete  Grooming: Setup; Increased time to complete;Contact guard assistance  UE Bathing: Minimal assistance;Setup; Increased time to complete  LE Bathing: Moderate assistance;Setup; Increased time to complete  UE Dressing: Moderate assistance;Setup; Increased time to complete  LE Dressing: Setup; Increased time to complete; Moderate assistance  Toileting: Setup; Increased time to complete; Moderate assistance  Additional Comments: Pt donned socks sitting up in bed-had to use LUE to bring foot to opposite thigh to reach foot d/t weakness/numbness, inconsistencies noted throughout session were pt would scratch ear with RUE and then later hand would fall off RW handle, pt was provided a built-up handle, pt's ADL's graded based on current deficits shown to writer this date  Tone RUE  RUE Tone: Normotonic  Tone LUE  LUE Tone: Normotonic  Coordination  Movements Are Fluid And Coordinated: No  Coordination and Movement description: Fine motor impairments;Gross motor impairments;Right UE;Decreased speed;Decreased accuracy     Bed mobility  Supine to Sit: Supervision  Sit to Supine: Supervision  Scooting: Modified independent  Transfers  Sit to stand: Contact guard assistance  Stand to sit: Contact guard assistance  Transfer Comments: Pt educated on pushing through LUE during transfers, able to reach back with LUE when sitting onto EOB appropriately     Cognition  Overall Cognitive Status: [facility]  Cognition Comment: Pt has a hx of anxiety/depression, per chart pt has been diagnosed with Munchausen's syndrome, pt cooperative with therapy     Sensation  Overall Sensation Status: Impaired(reports N/T R side face, tongue, UE/LE)  Light Touch: Partial deficits in the RLE;Partial deficits in the RUE(And R side of face/tongue)        LUE AROM (degrees)  LUE AROM : WFL  RUE AROM (degrees)  RUE AROM : Exceptions  R Shoulder Flexion 0-180: Observed to 90 degrees, during official MMT only able to acheive ~40 degrees,later observed reaching for cup on table, pt dropping RUE quickly as if the arm is weak and \"giving out\"  R Elbow Flexion 0-145: [facility]'s  R Elbow Extension 145-0: [facility]'s  Right Hand AROM (degrees)  Right Hand AROM: [facility]  Right Hand General AROM: Slow coordination, later in session pt demo'd inability to grasp RW handle properly, pt mentioned not being able to feel it d/t numbness, pt observed to make full composite fist this date  LUE Strength  Gross LUE Strength: [facility]  RUE Strength  Gross RUE Strength: Exceptions to [facility]  R Shoulder Flex: 3-/5  R Elbow Flex: 4-/5  R Elbow Ext: 4-/5  R Hand General: 4/5  RUE Strength Comment: Inconsistent symptoms noted this date, pt demo's tremors at times at times      Plan   Plan  Times per week: 3-4x  Current Treatment Recommendations: Functional Mobility Training, Balance Training, Endurance Training, Home Management Training, Equipment Evaluation, Education, & procurement, Safety Education & Training, Self-Care / ADL, Patient/Caregiver Education & Training, Pain Management, Strengthening, Neuromuscular Re-education        AM-PAC Inpatient Daily Activity Raw Score: 17 (02/13/20 1119)  AM-PAC Inpatient ADL T-Scale Score : 37.26 (02/13/20 1119)  ADL Inpatient CMS 0-100% Score: 50.11 (02/13/20 1119)  ADL Inpatient CMS G-Code Modifier : CK (02/13/20 1119)    Goals  Short term goals  Time Frame for Short term goals: Pt will by discharge  Short term goal 1: demo ADL UB/LB dressing/bathing activity with adaptive tech's, increased time and SUP  Short term goal 2: demo good safety awareness during func mob around room using LRD and SBA  Short term goal 3: demo RUE strength of 5/5 grossly for use in ADL completion  Short term goal 4: demo standing during func activity for 11 min with 1 seated rest break PRN, LRD, and CGA     Therapy Time   Individual Concurrent Group Co-treatment   Time In 1020         Time Out 1050         Minutes 30         Timed Code Treatment Minutes: [address], OTR/L

## 2020-02-13 NOTE — DISCHARGE INSTR - COC
Continuity of Care Form    Patient Name: Heriberto Rosen   :  1975  MRN:  [id number]    Admit date:  2020  Discharge date:  ***    Code Status Order: Full Code   Advance Directives:     Admitting Physician:  Maggi Syed MD  PCP: Shan Luna MD    Discharging Nurse: [facility] Unit/Room#: 0864/5394-94  Discharging Unit Phone Number: ***    Emergency Contact:   Extended Emergency Contact Information  Primary Emergency Contact: Irena Hale   [address] Phone: [phone]  Relation: Parent    Past Surgical History:  Past Surgical History:   Procedure Laterality Date    ABDOMEN SURGERY      BACK SURGERY      CARPAL TUNNEL RELEASE      CERVICAL FUSION       SECTION      COLONOSCOPY      ENDOSCOPY, COLON, DIAGNOSTIC      HYSTERECTOMY      LYMPHADENECTOMY      TONSILLECTOMY      VASCULAR SURGERY      VENA CAVA FILTER PLACEMENT  2011    GFF Placed in        Immunization History: There is no immunization history on file for this patient.     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    S/P IVC filter Z95.828    Spinal cord stimulator status Z96.89    hx of Self-inflicted injury J78.33    Left-sided weakness R53.1    Chronic neck pain M54.2, G89.29    Chronic low back pain M54.5, G89.29    Multiple sclerosis exacerbation (HCC) G35    Acute extremity pain M79.609    hx of GI bleeding K92.2    History of DVT (deep vein thrombosis) Z86.718    Diplopia H53.2    Dysphagia R13.10    Blurry vision, left eye H53.8    Numbness R20.0    Anemia D64.9    Hematemesis without nausea K92.0    Essential hypertension I10       Isolation/Infection:   Isolation          No Isolation        Patient Infection Status     None to display          Nurse Assessment:  Last Vital Signs: BP (!) 145/84   Pulse 64   Temp 98 °F (36.7 °C) (Oral)   Resp 20   Ht 5' 7.01\" (1.702 m)   Wt 180 lb 12.4 oz (82 kg)   SpO2 98%   BMI 28.31 kg/m²     Last documented pain score (0-10 scale): Pain Level: 8  Last Weight:   Wt Readings from Last 1 Encounters:   02/13/20 180 lb 12.4 oz (82 kg)     Mental Status:  oriented    IV Access:  - None    Nursing Mobility/ADLs:  Walking   Assisted  Transfer  Assisted  Bathing  Independent  Dressing  Independent  Toileting  Independent  Feeding  [address]  Independent  Med Delivery   whole    Wound Care Documentation and Therapy:        Elimination:  Continence:   · Bowel: Yes  · Bladder: Yes  Urinary Catheter: None   Colostomy/Ileostomy/Ileal Conduit: No       Date of Last BM: ***  No intake or output data in the 24 hours ending 02/13/20 1321  No intake/output data recorded. Safety Concerns:     None    Impairments/Disabilities:      None    Nutrition Therapy:  Current Nutrition Therapy:   - Oral Diet:  General    Routes of Feeding: Oral  Liquids: No Restrictions  Daily Fluid Restriction: no  Last Modified Barium Swallow with Video (Video Swallowing Test): not done    Treatments at the Time of Hospital Discharge:   Respiratory Treatments: ***  Oxygen Therapy:  is not on home oxygen therapy.   Ventilator:    - No ventilator support    Rehab Therapies: Physical Therapy  Weight Bearing Status/Restrictions: No weight bearing restirctions  Other Medical Equipment (for information only, NOT a DME order):  ***  Other Treatments: ***    Patient's personal belongings (please select all that are sent with patient):  None    RN SIGNATURE:  Electronically signed by Aaron Lipscomb RN on 2/13/20 at 1:25 PM    CASE MANAGEMENT/SOCIAL WORK SECTION    Inpatient Status Date: ***    Readmission Risk Assessment Score:  Readmission Risk              Risk of Unplanned Readmission:        17           Discharging to Facility/ Agency   · Name: · Address:  · Phone:  · Fax:    Dialysis Facility (if applicable)   · Name:  · Address:  · Dialysis Schedule:  · Phone:  · Fax:    / signature: {Esignature:[id number]}    PHYSICIAN SECTION    Prognosis: Fair    Condition at Discharge: Stable    Rehab Potential (if transferring to Rehab): Good    Recommended Labs or Other Treatments After Discharge:     Physician Certification: I certify the above information and transfer of Perla Tirado  is necessary for the continuing treatment of the diagnosis listed and that she requires Home Care for less 30 days.      Update Admission H&P: No change in H&P    PHYSICIAN SIGNATURE:  Electronically signed by Judit Moyer MD on 2/13/20 at 1:21 PM

## 2020-02-13 NOTE — PROGRESS NOTES
Physical Therapy    Facility/Department: [facility]  Initial Assessment    NAME: Sammie Hahn  : 1975  MRN: [id number]    Date of Service: 2020    Discharge Recommendations:    Further therapy recommended at discharge. PT Equipment Recommendations  Equipment Needed: No  Other: pt reports having equiptment at baseline    Assessment   Body structures, Functions, Activity limitations: Decreased functional mobility ; Decreased ROM; Decreased strength;Decreased safe awareness;Decreased balance;Decreased endurance;Decreased sensation  Assessment: Pt is supervision to MOD I with bed mobility from flattened position without use of bedrails. Has inconsistencies in ROM and strength testing, initially demos tremoring in R UE and inability to lift past 30 deg then reaches out to grab for water on traytable; LE demos significantly reduced strength when sitting EOB and then has no buckling during amb. Amb x 15 ft RW with CGA for safety with varying inconsistencies in gait mechanics pertaining to R ankle and LE, no LOB noted. Pt perseverates throughout session on steriods helping her and thinking getting more will help fix her problems. Based on presentation pt is currently unsafe to return to home setting. Pt would benefit from acute PT services at this time to address deficits. Prognosis: Good  Decision Making: Medium Complexity  PT Education: PT Role;Plan of Care;General Safety;Gait Training;Transfer Training;Equipment  REQUIRES PT FOLLOW UP: Yes  Activity Tolerance  Activity Tolerance: Patient limited by fatigue;Patient limited by endurance; Patient limited by pain       Patient Diagnosis(es): There were no encounter diagnoses.      has a past medical history of Blood transfusion reaction, Essential hypertension, GERD (gastroesophageal reflux disease), Alexandra filter in place, manchu, Movement disorder, MS (multiple sclerosis) ([facility] [address] Neuromuscular disorder ([facility] [address] Optic neuritis due to multiple sclerosis [facility]), Psychiatric problem, Seizures ([facility] [address] Self inflicted injury, and Spinal cord stimulator status. has a past surgical history that includes  section; cervical fusion; Tonsillectomy; lymphadenectomy; Carpal tunnel release; Hysterectomy; back surgery; Endoscopy, colon, diagnostic; Colonoscopy; vascular surgery; Abdomen surgery; and Vena Cava Filter Placement (2011). Restrictions  Restrictions/Precautions  Restrictions/Precautions: General Precautions, Fall Risk, Up as Tolerated  Required Braces or Orthoses?: No  Position Activity Restriction  Other position/activity restrictions: Inconsistent symptoms in R extremities noted, sitter in room  Vision/Hearing  Vision: Within Functional Limits  Hearing: Within functional limits     Subjective  General  Chart Reviewed: Yes  Patient assessed for rehabilitation services?: Yes  Response To Previous Treatment: Not applicable  Family / Caregiver Present: No  Follows Commands: Within Functional Limits  General Comment  Comments: co-eval with OT  Subjective  Subjective: RN reports pt is agreeable to therapy. Pt is lying in bed upon arrival and reports she has been waiting for therapy to come she knows she has to do it. Pain Screening  Patient Currently in Pain: Yes  Pain Assessment  Pain Assessment: 0-10  Pain Level: 9(no grimaces or evidence of distress during session)  Pain Type: Acute pain  Pain Location: Generalized(\"whole R side from my face and tongue to toes\")  Pain Orientation: Right  Pain Descriptors: Aching;Discomfort;Tingling;Numbness  Pain Frequency: Continuous  Non-Pharmaceutical Pain Intervention(s): Ambulation/Increased Activity; Distraction; Emotional support  Response to Pain Intervention: Patient Satisfied  Vital Signs  Patient Currently in Pain: Yes  Pre Treatment Pain Screening  Intervention List: Patient able to continue with treatment    Orientation  Orientation  Overall Orientation Status: Within Functional Limits  Social/Functional Left in bed, Patient at risk for falls, Call light within reach  Restraints  Initially in place: No    AM-PAC Score  AM-PAC Inpatient Mobility Raw Score : 21 (02/13/20 1149)  AM-PAC Inpatient T-Scale Score : 50.25 (02/13/20 1149)  Mobility Inpatient CMS 0-100% Score: 28.97 (02/13/20 1149)  Mobility Inpatient CMS G-Code Modifier : [place] (02/13/20 1149)          Goals  Short term goals  Time Frame for Short term goals: 12 visits  Short term goal 1: Complete x6 steps with supervision using L handrail ascending for safe home entry/exit  Short term goal 2: Amb x100ft with supervision using RW or least restrictive device for increased independence in home/community  Short term goal 3: Improve standing static and dynamic balance to good to reduce risk of falls and injury  Short term goal 4: Tolerate 30 minutes of activity to increase tolerance to therapy interventions  Short term goal 5: Participate in AROM ther-ex to build strength to aide in return to prior level       Therapy Time   Individual Concurrent Group Co-treatment   Time In 1020         Time Out 1050         Minutes 30         Timed Code Treatment Minutes: 12 Minutes       Milagros Kaur   This treatment/evaluation completed by signing SPT. Signing PT agrees with treatment and documentation.

## 2020-02-13 NOTE — PROGRESS NOTES
right sided face pain, bilateral face numbness, tongue numbness and throat numbness along with right sided arm and leg weakness. She was able to work with PT and OT today. She was told by a staff member that she was going to be discharged today at which time she requested AMA papers so that she could meet her boyfriend who is only available to pick her up around 1:15 PM. She states that she would prefer to be actually discharged at this time, but will sign out AMA if it is the only way she can meet her ride in time. No current facility-administered medications on file prior to encounter. Current Outpatient Medications on File Prior to Encounter   Medication Sig Dispense Refill    busPIRone HCl (BUSPAR PO) Take 0.3 mg by mouth 3 times daily       ferrous sulfate 325 (65 Fe) MG EC tablet Take 1 tablet by mouth 2 times daily (with meals) (Patient not taking: Reported on 2/11/2020) 90 tablet 3    oxyCODONE-acetaminophen (PERCOCET)  MG per tablet Take 1 tablet by mouth every 6 hours as needed for Pain. Julio Crate ALPRAZolam (XANAX) 1 MG tablet Take 1 mg by mouth 3 times daily      vitamin D (ERGOCALCIFEROL) 17586 UNITS capsule Take 1 capsule by mouth once a week for 6 doses 6 capsule 0    sertraline (ZOLOFT) 100 MG tablet Take 2 tablets by mouth daily Verified by [facility] pharmacy on 07- 30 tablet 3    estrogens, conjugated, (PREMARIN) 1.25 MG tablet Take 1 tablet by mouth daily 21 tablet 3    traZODone (DESYREL) 100 MG tablet Take 1.5 tablets by mouth nightly 1/2 tab to 1 tab ;   Verified by [facility] pharmacy SK2- 30 tablet 3       Allergies: Nichole L Cantu is allergic to iv dye [iodides]; protonix [pantoprazole sodium]; fentanyl; solu-medrol [methylprednisolone]; ketorolac tromethamine; pcn [penicillins]; and zofran.     Past Medical History:   Diagnosis Date    Blood transfusion reaction     Essential hypertension 2/12/2020    GERD (gastroesophageal reflux disease)     neurologist outpatient.        Alfonso Rouse, [address]  2/13/2020  1:14 PM

## 2020-02-13 NOTE — DISCHARGE SUMMARY
Neurology Discharge Summary     Patient Identification:  Shirlee Skiff is a 53 y.o. female. :  1975  Admit Date:  2020  Discharge date and time: 2020  1:54 PM   Attending Provider: No att. providers found     Account Number: [de-identified]                                   Admission Diagnoses:   Multiple sclerosis exacerbation ([facility] [address] [place]    Discharge Diagnoses:  Principal Problem:    Multiple sclerosis exacerbation ([facility] [address]  Active Problems:    Münchausen's syndrome    hx of Self-inflicted injury    Hematemesis without nausea    Essential hypertension  Resolved Problems:    * No resolved hospital problems.  *  discharge condition stable    Discharge Medications:    Discharge Medication List as of 2020  1:27 PM           Details   busPIRone HCl (BUSPAR PO) Take 0.3 mg by mouth 3 times daily Historical Med      ferrous sulfate 325 (65 Fe) MG EC tablet Take 1 tablet by mouth 2 times daily (with meals), Disp-90 tablet, R-3Normal      ALPRAZolam (XANAX) 1 MG tablet Take 1 mg by mouth 3 times daily      vitamin D (ERGOCALCIFEROL) 57447 UNITS capsule Take 1 capsule by mouth once a week for 6 doses, Disp-6 capsule, R-0      estrogens, conjugated, (PREMARIN) 1.25 MG tablet Take 1 tablet by mouth daily, Disp-21 tablet, R-3      traZODone (DESYREL) 100 MG tablet Take 1.5 tablets by mouth nightly 1/2 tab to 1 tab ;   Verified by [facility] pharmacy FM5-, Disp-30 tablet, R-3           Discharge Medication List as of 2020  1:27 PM      STOP taking these medications       oxyCODONE-acetaminophen (PERCOCET)  MG per tablet Comments:   Reason for Stopping:         sertraline (ZOLOFT) 100 MG tablet Comments:   Reason for Stopping:                 Consults:   Medicine, GI    Hospital Course:    53year old female with a pmh significant for MS, PTSD, and anxiety who presents to the ED for left UE and LE extremity weakness and tingling associated with b/l facial numbness, tongue numbness, and mouth disease with uncovertebral and facet  hypertrophy resulting in canal stenosis at C3-4. Unremarkable pre and post-contrast evaluation of the spinal cord.     MRI BRAIN 2/11/2020:   Scattered FLAIR signal abnormalities most pronounced in the frontal lobes  that may correlate with patient's history of a demyelinating process. Kostas Torres  is no evidence for active or acute demyelination. Disposition:   home    Patient Instructions: Activity: activity as tolerated  Diet: cardiac diet  Follow-up with neurology in 4 weeks.   Restrictions: Driving No, Swimming No, Operating heavy machinery No, Compromising heights No          Favio Koch MD

## 2020-02-13 NOTE — PROGRESS NOTES
started this morning and for medical management. As per the patient symptoms started this morning, associated upper abdominal pain. Denies weight changes, black stools, diarrhea/constipation. Last bowel movement yesterday. No urinary symptoms. During evaluation, bright red blood seen in her tray, without saliva/food particles/dark color.     As per the patient, similar episode 6 years ago and EGD was done to cauterize the vessels. However previous records shown, EGD 2019 for similar symptoms and was negative. 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  History of multiple admissions past with similar episodes with extensive evaluation. Patient IV line leaking backwards and as per the nurse patient went to the restroom and possible ingestion of blood and spitting out. Past records also shown similar episodes. Patient stated she can take IV Protonix only with Benadryl because of allergy as skin rash. Requested multiple pain medications overnight since admission. OBJECTIVE     Vital Signs:  BP (!) 154/93   Pulse 91   Temp 97 °F (36.1 °C) (Oral)   Resp 19   Ht 5' 7\" (1.702 m)   Wt 180 lb 8.9 oz (81.9 kg)   SpO2 96%   BMI 28.28 kg/m²     Temp (24hrs), Av.5 °F (36.4 °C), Min:97 °F (36.1 °C), Max:98.2 °F (36.8 °C)    No intake/output data recorded. Physical Exam:  Constitutional: This is a well developed, well nourished, 25-29.9 - Overweight 53y.o. year old female who is alert, oriented, cooperative and in no apparent distress. Head:normocephalic and atraumatic. EENT:  Oral mucosa was without erythema, exudates or cobblestoning. No thrush was noted. Neck: Supple without thyromegaly. No elevated JVP. Trachea was midline. Respiratory: Chest was symmetrical.  Breath sounds bilaterally were clear to auscultation. There were no wheezes, rhonchi or rales. There is no intercostal retraction or use of accessory muscles. Cardiovascular: Regular without murmur, or clicks.  No added 02/11/20  0648 02/12/20  0503    137 138   K 3.5* 3.9 3.8   CL 94* 98 101   CO2 28 25 23   BUN 16 20 21*   CREATININE 0.59 0.52 0.51     BNP: No results for input(s): BNP in the last 72 hours. PT/INR: No results for input(s): PROTIME, INR in the last 72 hours. APTT: No results for input(s): APTT in the last 72 hours. CARDIAC ENZYMES: No results for input(s): CKMB, CKMBINDEX, TROPONINI in the last 72 hours. Invalid input(s): CKTOTAL;3  FASTING LIPID PANEL:  Lab Results   Component Value Date    CHOL 163 11/19/2011    HDL 38 (L) 11/19/2011    TRIG 211 (H) 11/19/2011     LIVER PROFILE: No results for input(s): AST, ALT, ALB, BILIDIR, BILITOT, ALKPHOS in the last 72 hours. MICROBIOLOGY:   Lab Results   Component Value Date/Time    CULTURE NO GROWTH 6 DAYS 11/29/2016 01:51 PM    CULTURE  11/29/2016 01:51 PM     Alban Yangab [address] [place] [address] [phone]    CULTURE NO GROWTH 6 DAYS 11/29/2016 01:51 PM    CULTURE  11/29/2016 01:51 PM     Alban Schwab [address] [place] [address] [phone]       Imaging:    Mri Cervical Spine W Wo Contrast    Result Date: 2/11/2020  Anterior hardware fixation at Y3-2 without complication. Mild multilevel degenerative disc disease with uncovertebral and facet hypertrophy resulting in canal stenosis at C3-4. Unremarkable pre and post-contrast evaluation of the spinal cord. Mri Thoracic Spine W Wo Contrast    Result Date: 2/11/2020  Multilevel degenerative disc disease and multilevel degenerative facet hypertrophy without canal stenosis or foraminal narrowing. Mri Brain W Wo Contrast    Result Date: 2/11/2020  Scattered FLAIR signal abnormalities most pronounced in the frontal lobes that may correlate with patient's history of a demyelinating process. There is no evidence for active or acute demyelination. Fl Modified Barium Swallow W Video    Result Date: 2/11/2020  No laryngeal penetration or aspiration.  Please see separate speech pathology report for full discussion of findings and recommendations. ASSESSMENT & PLAN     ASSESSMENT / PLAN:   1. Hematemesis and nausea: Previous extensive work up, including EGD 1/19, were unremarkable. No other alarming features and liver disease. GI evaluated, no further work up at this time. Continue IV Protonix 40 mg daily, Carafate and sitter at bedside. 2. Hypertension: Not on home medications. Continue Amlodipine 5 mg. 3. Tobacco abuse: Counseled, continue smoking cessation. 4. H/o Münchhausen syndrome: Needs Psychiatric evaluation to prevent further hospitalizations and extensive work up. 5. Multiple sclerosis exacerbation: Management as per primary.        Thank you for allowing us to see Thien Yeager in consultation for hematemesis and medical management. Please reach out to us with any concerns or questions.   We will sign off        Becky Chavarria MD  Internal Medicine Resident, PGY-1  [address] [facility]

## 2020-02-18 ASSESSMENT — ENCOUNTER SYMPTOMS
COLOR CHANGE: 0
WHEEZING: 0
COUGH: 0
NAUSEA: 0
CONSTIPATION: 0
VOMITING: 0
ABDOMINAL PAIN: 0
DIARRHEA: 0
ABDOMINAL DISTENTION: 0
BACK PAIN: 0
SHORTNESS OF BREATH: 0

## 2020-03-04 NOTE — ED NOTES
Addendum to original note     The original note excluded the physical exam on this patient it is being added to this note as the original note has been signed and locked. Physical exam based upon recollection and review of notes. I do clearly remember taking care of this patient, she was in room 7 in the emergency department. All the interaction at this time. Physical exam  Active and oriented ×3. Nontoxic. No acute distress. Well-hydrated. Head is atraumatic, facies symmetrical.  Patient is demonstrating some odd tongue and mouth movements there is some slight slurring to speech. Pupils equal round and reactive to light. Neck is supple. No adenopathy. Respirations nonlabored. Lungs clear to auscultation. No wheezes rales or rhonchi noted. Heart regular rate and rhythm. No murmur noted  Abdomen positive bowel sounds, no bruit. soft and nontender. No organomegaly or masses noted. No pulsatile masses noted. Skin free of any obvious rashes or lesions. Extremities without edema. No calf tenderness noted.            Efrem López II, BARRERA  03/04/20 [id number]

## 2020-04-07 ENCOUNTER — HOSPITAL ENCOUNTER (EMERGENCY)
Age: 45
Discharge: LEFT BEFORE DISCHARGE | End: 2020-04-07
Attending: EMERGENCY MEDICINE
Payer: COMMERCIAL

## 2020-04-07 VITALS
HEART RATE: 88 BPM | SYSTOLIC BLOOD PRESSURE: 162 MMHG | DIASTOLIC BLOOD PRESSURE: 115 MMHG | RESPIRATION RATE: 16 BRPM | TEMPERATURE: 97.5 F | OXYGEN SATURATION: 98 % | BODY MASS INDEX: 28.19 KG/M2 | WEIGHT: 180 LBS

## 2020-04-07 PROCEDURE — 99282 EMERGENCY DEPT VISIT SF MDM: CPT

## 2020-04-07 RX ORDER — ACETAMINOPHEN 500 MG
1000 TABLET ORAL ONCE
Status: DISCONTINUED | OUTPATIENT
Start: 2020-04-07 | End: 2020-04-07 | Stop reason: HOSPADM

## 2020-04-07 ASSESSMENT — ENCOUNTER SYMPTOMS
ABDOMINAL PAIN: 0
SHORTNESS OF BREATH: 0
EYE PAIN: 0

## 2020-04-07 ASSESSMENT — PAIN DESCRIPTION - LOCATION: LOCATION: ARM;LEG

## 2020-04-07 ASSESSMENT — PAIN DESCRIPTION - DESCRIPTORS: DESCRIPTORS: ACHING

## 2020-04-07 ASSESSMENT — PAIN DESCRIPTION - ONSET: ONSET: GRADUAL

## 2020-04-07 ASSESSMENT — PAIN DESCRIPTION - ORIENTATION: ORIENTATION: RIGHT;LEFT

## 2020-04-07 ASSESSMENT — PAIN DESCRIPTION - PAIN TYPE: TYPE: ACUTE PAIN

## 2020-04-07 ASSESSMENT — PAIN SCALES - GENERAL: PAINLEVEL_OUTOF10: 10

## 2020-04-07 ASSESSMENT — PAIN DESCRIPTION - FREQUENCY: FREQUENCY: CONTINUOUS

## 2020-04-07 ASSESSMENT — PAIN DESCRIPTION - PROGRESSION: CLINICAL_PROGRESSION: GRADUALLY WORSENING

## 2020-04-07 NOTE — ED PROVIDER NOTES
None     Active member of club or organization: None     Attends meetings of clubs or organizations: None     Relationship status: None    Intimate partner violence     Fear of current or ex partner: None     Emotionally abused: None     Physically abused: None     Forced sexual activity: None   Other Topics Concern    None   Social History Narrative    None       REVIEW OF SYSTEMS       Review of Systems   Constitutional: Negative for fever. HENT: Negative for congestion. Eyes: Negative for pain. Respiratory: Negative for shortness of breath. Cardiovascular: Positive for leg swelling. Negative for chest pain. Gastrointestinal: Negative for abdominal pain. Genitourinary: Negative for dysuria. Skin: Negative for rash. Allergic/Immunologic: Negative for immunocompromised state. Neurological: Negative for headaches. PHYSICAL EXAM    (up to 7 for level 4, 8 or more for level 5)     ED Triage Vitals [04/07/20 1047]   BP Temp Temp Source Pulse Resp SpO2 Height Weight   (!) 162/115 97.5 °F (36.4 °C) Tympanic 88 16 98 % -- 180 lb (81.6 kg)       Physical Exam  Vitals signs and nursing note reviewed. Constitutional:       General: She is not in acute distress. Appearance: She is well-developed. HENT:      Head: Normocephalic and atraumatic. Eyes:      General:         Right eye: No discharge. Left eye: No discharge. Extraocular Movements: Extraocular movements intact. Conjunctiva/sclera: Conjunctivae normal.      Comments: Pupils are equal and reactive. Extraocular movements are intact to all 4 quadrants. Neck:      Musculoskeletal: Neck supple. Cardiovascular:      Rate and Rhythm: Normal rate and regular rhythm. Pulmonary:      Effort: Pulmonary effort is normal. No respiratory distress. Breath sounds: No stridor. Abdominal:      General: There is no distension. Palpations: Abdomen is soft.    Musculoskeletal:         General: Swelling and

## 2020-05-13 ENCOUNTER — HOSPITAL ENCOUNTER (EMERGENCY)
Age: 45
Discharge: HOME | End: 2020-05-14
Attending: EMERGENCY MEDICINE
Payer: COMMERCIAL

## 2020-05-13 PROCEDURE — 99283 EMERGENCY DEPT VISIT LOW MDM: CPT

## 2020-05-13 PROCEDURE — 96372 THER/PROPH/DIAG INJ SC/IM: CPT

## 2020-05-13 RX ORDER — LORAZEPAM 1 MG/1
1 TABLET ORAL EVERY 8 HOURS PRN
COMMUNITY

## 2020-05-13 RX ORDER — NICOTINE 21 MG/24HR
1 PATCH, TRANSDERMAL 24 HOURS TRANSDERMAL EVERY 24 HOURS
COMMUNITY
End: 2021-03-05

## 2020-05-13 ASSESSMENT — PAIN DESCRIPTION - ORIENTATION: ORIENTATION: RIGHT

## 2020-05-13 ASSESSMENT — PAIN DESCRIPTION - DESCRIPTORS: DESCRIPTORS: THROBBING

## 2020-05-13 ASSESSMENT — PAIN DESCRIPTION - LOCATION: LOCATION: ARM;CHEST

## 2020-05-13 ASSESSMENT — PAIN SCALES - GENERAL: PAINLEVEL_OUTOF10: 10

## 2020-05-14 VITALS
HEART RATE: 102 BPM | RESPIRATION RATE: 16 BRPM | TEMPERATURE: 97.4 F | BODY MASS INDEX: 26.62 KG/M2 | WEIGHT: 170 LBS | OXYGEN SATURATION: 96 %

## 2020-05-14 PROCEDURE — 6360000002 HC RX W HCPCS: Performed by: EMERGENCY MEDICINE

## 2020-05-14 RX ADMIN — ENOXAPARIN SODIUM 120 MG: 120 INJECTION SUBCUTANEOUS at 02:22

## 2020-05-14 ASSESSMENT — ENCOUNTER SYMPTOMS
ABDOMINAL PAIN: 0
CONSTIPATION: 0
RHINORRHEA: 0
SORE THROAT: 0
SHORTNESS OF BREATH: 0
COUGH: 0
BLOOD IN STOOL: 0
RECTAL PAIN: 0
ANAL BLEEDING: 0
VOMITING: 0
NAUSEA: 0
DIARRHEA: 0

## 2020-05-14 NOTE — ED PROVIDER NOTES
[facility] [place] ED  EMERGENCY DEPARTMENT ENCOUNTER      Pt Name: Leticia Andujar  MRN: [id number]  [place] 1975  Date of evaluation: 5/13/2020  Provider: Apurva Beltran MD    CHIEF COMPLAINT     Chief Complaint   Patient presents with    Other     pt states had midline in right upper arm taken out 3 hours ago, Dr. Alexandro Lentz told to come to ER for blood clot         HISTORY OF PRESENT ILLNESS   (Location/Symptom, Timing/Onset, Context/Setting,Quality, Duration, Modifying Factors, Severity)  Note limiting factors. Leticia Andujar is a51 y.o. female who presents to the emergency department with right arm pain. Patient reports that she was seen at [facility] and diagnosed with a right upper arm venous thrombosis but declined treatment. Patient reports she was under the impression that she had Eliquis at home however she discovered she was completely out. Patient reports she called her doctor who recommended she follow-up at [facility] emergency department for treatment however she did not have enough gas to make it there so came to [place] for further evaluation and treatment. No chest pain or shortness of breath. She reports she had a PICC line on the right arm for a recent admission for MS flare. Nursing Notes were reviewed. REVIEW OF SYSTEMS    (2-9 systems for level 4, 10 or more for level 5)     Review of Systems   Constitutional: Negative for activity change, appetite change and fever. HENT: Negative for congestion, postnasal drip, rhinorrhea and sore throat. Respiratory: Negative for cough and shortness of breath. Cardiovascular: Negative for chest pain, palpitations and leg swelling. Gastrointestinal: Negative for abdominal pain, anal bleeding, blood in stool, constipation, diarrhea, nausea, rectal pain and vomiting. Musculoskeletal: Negative for myalgias. Skin: Negative for rash. Psychiatric/Behavioral: Negative for suicidal ideas.        Except as noted above the remainder of the review of systems was reviewed and negative. PAST MEDICAL HISTORY     Past Medical History:   Diagnosis Date    Blood transfusion reaction     Essential hypertension 2020    GERD (gastroesophageal reflux disease)     [place] filter in place    [employer]     Movement disorder     [facility] [address] [facility]     Neuromuscular disorder ([address]     Optic neuritis due to multiple sclerosis ([address]     Psychiatric problem     depression    Seizures ([address]     Self inflicted injury     Spinal cord stimulator status     placement and removal         SURGICALHISTORY       Past Surgical History:   Procedure Laterality Date    ABDOMEN SURGERY      BACK SURGERY      CARPAL TUNNEL RELEASE      CERVICAL FUSION       SECTION      COLONOSCOPY      ENDOSCOPY, COLON, DIAGNOSTIC      HYSTERECTOMY      LYMPHADENECTOMY      TONSILLECTOMY      VASCULAR SURGERY      VENA CAVA FILTER PLACEMENT  2011    GFF Placed in          CURRENT MEDICATIONS       Discharge Medication List as of 2020  2:07 AM      CONTINUE these medications which have NOT CHANGED    Details   LORazepam (ATIVAN) 1 MG tablet Take 1 mg by mouth every 8 hours as needed for Anxiety. Historical Med      nicotine (NICODERM CQ) 21 MG/24HR Place 1 patch onto the skin every 24 hoursHistorical Med      HYDROmorphone HCl (DILAUDID PO) Take 4 mg by mouth every 4-6 hours as neededHistorical Med      busPIRone HCl (BUSPAR PO) Take 0.3 mg by mouth 3 times daily Historical Med      estrogens, conjugated, (PREMARIN) 1.25 MG tablet Take 1 tablet by mouth daily, Disp-21 tablet, R-3      ALPRAZolam (XANAX) 1 MG tablet Take 1 mg by mouth 3 times daily      vitamin D (ERGOCALCIFEROL) 69536 UNITS capsule Take 1 capsule by mouth once a week for 6 doses, Disp-6 capsule, R-0      traZODone (DESYREL) 100 MG tablet Take 1.5 tablets by mouth nightly 1/2 tab to 1 tab ;   Verified by [facility] pharmacy QA7-, Disp-30 [facility] without prescription for Eliquis. Patient apparently thought that she had the medication at home; however, when she arrived at home she did not have it. Patient comes in to [place] for further evaluation treatment. Patient was treated with Lovenox 1.5 mg/kg here in the ED and given a prescription for a starter pack for Eliquis. Discussed importance of following up with her primary care physician to discuss further evaluation and treatment. He was discharged in good condition with stable vitals. CONSULTS:  None    PROCEDURES:  Unlessotherwise noted below, none     Procedures    FINAL IMPRESSION      1. Acute deep vein thrombosis (DVT) of brachial vein of right upper extremity (HCC)          DISPOSITION/PLAN   DISPOSITION Decision To Discharge 05/14/2020 01:31:32 AM      PATIENT REFERRED TO:  Gayla Chavez MD    Call   Please call tomorrow to discuss diagnosis of deep venous thrombosis of the right brachial vein      DISCHARGE MEDICATIONS:  Discharge Medication List as of 5/14/2020  2:07 AM      START taking these medications    Details   apixaban (ELIQUIS DVT/PE STARTER PACK) 5 MG TABS tablet Take 10 mg (2 tablets) orally twice daily for 7 days, then take 5 mg (1 tablet) orally twice daily thereafter., Disp-74 tablet, R-0Print                    (Please note that portions of this note were completed with a voice recognition program.  Efforts were made to edit the dictations but occasionally words are mis-transcribed. )      Viviana Damian MD (electronically signed)  Attending Emergency Physician            Viviana Damian MD  05/14/20 [id number]

## 2020-05-17 PROBLEM — L03.90 CELLULITIS: Status: ACTIVE | Noted: 2020-05-17

## 2020-05-18 ENCOUNTER — HOSPITAL ENCOUNTER (INPATIENT)
Age: 45
LOS: 1 days | Discharge: HOME | DRG: 197 | End: 2020-05-19
Attending: INTERNAL MEDICINE | Admitting: INTERNAL MEDICINE
Payer: COMMERCIAL

## 2020-05-18 PROBLEM — I82.621 ACUTE DEEP VEIN THROMBOSIS (DVT) OF RIGHT UPPER EXTREMITY (HCC): Status: ACTIVE | Noted: 2020-05-18

## 2020-05-18 LAB
ANION GAP SERPL CALCULATED.3IONS-SCNC: 10 MMOL/L (ref 9–17)
BUN BLDV-MCNC: 12 MG/DL (ref 6–20)
BUN/CREAT BLD: ABNORMAL (ref 9–20)
CALCIUM SERPL-MCNC: 8.7 MG/DL (ref 8.6–10.4)
CHLORIDE BLD-SCNC: 99 MMOL/L (ref 98–107)
CO2: 27 MMOL/L (ref 20–31)
CREAT SERPL-MCNC: 0.51 MG/DL (ref 0.5–0.9)
GFR AFRICAN AMERICAN: >60 ML/MIN
GFR NON-AFRICAN AMERICAN: >60 ML/MIN
GFR SERPL CREATININE-BSD FRML MDRD: ABNORMAL ML/MIN/{1.73_M2}
GFR SERPL CREATININE-BSD FRML MDRD: ABNORMAL ML/MIN/{1.73_M2}
GLUCOSE BLD-MCNC: 100 MG/DL (ref 70–99)
HCT VFR BLD CALC: 35.6 % (ref 36.3–47.1)
HEMOGLOBIN: 11.8 G/DL (ref 11.9–15.1)
MCH RBC QN AUTO: 29.6 PG (ref 25.2–33.5)
MCHC RBC AUTO-ENTMCNC: 33.1 G/DL (ref 28.4–34.8)
MCV RBC AUTO: 89.4 FL (ref 82.6–102.9)
NRBC AUTOMATED: 0 PER 100 WBC
PDW BLD-RTO: 14 % (ref 11.8–14.4)
PLATELET # BLD: 227 K/UL (ref 138–453)
PMV BLD AUTO: 9.7 FL (ref 8.1–13.5)
POTASSIUM SERPL-SCNC: 4.1 MMOL/L (ref 3.7–5.3)
RBC # BLD: 3.98 M/UL (ref 3.95–5.11)
SODIUM BLD-SCNC: 136 MMOL/L (ref 135–144)
WBC # BLD: 8.1 K/UL (ref 3.5–11.3)

## 2020-05-18 PROCEDURE — 85027 COMPLETE CBC AUTOMATED: CPT

## 2020-05-18 PROCEDURE — 36415 COLL VENOUS BLD VENIPUNCTURE: CPT

## 2020-05-18 PROCEDURE — 80048 BASIC METABOLIC PNL TOTAL CA: CPT

## 2020-05-18 PROCEDURE — 76937 US GUIDE VASCULAR ACCESS: CPT

## 2020-05-18 PROCEDURE — 99220 PR INITIAL OBSERVATION CARE/DAY 70 MINUTES: CPT | Performed by: INTERNAL MEDICINE

## 2020-05-18 PROCEDURE — 6370000000 HC RX 637 (ALT 250 FOR IP): Performed by: NURSE PRACTITIONER

## 2020-05-18 PROCEDURE — 2500000003 HC RX 250 WO HCPCS: Performed by: NURSE PRACTITIONER

## 2020-05-18 PROCEDURE — APPSS45 APP SPLIT SHARED TIME 31-45 MINUTES: Performed by: NURSE PRACTITIONER

## 2020-05-18 PROCEDURE — 96365 THER/PROPH/DIAG IV INF INIT: CPT

## 2020-05-18 PROCEDURE — 96375 TX/PRO/DX INJ NEW DRUG ADDON: CPT

## 2020-05-18 PROCEDURE — 1200000000 HC SEMI PRIVATE

## 2020-05-18 PROCEDURE — 2580000003 HC RX 258: Performed by: NURSE PRACTITIONER

## 2020-05-18 PROCEDURE — 6360000002 HC RX W HCPCS: Performed by: INTERNAL MEDICINE

## 2020-05-18 PROCEDURE — 6360000002 HC RX W HCPCS: Performed by: NURSE PRACTITIONER

## 2020-05-18 PROCEDURE — 6370000000 HC RX 637 (ALT 250 FOR IP): Performed by: INTERNAL MEDICINE

## 2020-05-18 PROCEDURE — G0378 HOSPITAL OBSERVATION PER HR: HCPCS

## 2020-05-18 PROCEDURE — 99219 PR INITIAL OBSERVATION CARE/DAY 50 MINUTES: CPT | Performed by: SURGERY

## 2020-05-18 PROCEDURE — 96372 THER/PROPH/DIAG INJ SC/IM: CPT

## 2020-05-18 PROCEDURE — G0379 DIRECT REFER HOSPITAL OBSERV: HCPCS

## 2020-05-18 RX ORDER — CLINDAMYCIN HYDROCHLORIDE 150 MG/1
600 CAPSULE ORAL EVERY 6 HOURS SCHEDULED
Status: DISCONTINUED | OUTPATIENT
Start: 2020-05-18 | End: 2020-05-19

## 2020-05-18 RX ORDER — ACETAMINOPHEN 325 MG/1
650 TABLET ORAL EVERY 6 HOURS PRN
Status: DISCONTINUED | OUTPATIENT
Start: 2020-05-18 | End: 2020-05-19 | Stop reason: HOSPADM

## 2020-05-18 RX ORDER — SODIUM CHLORIDE 9 MG/ML
INJECTION, SOLUTION INTRAVENOUS CONTINUOUS
Status: DISCONTINUED | OUTPATIENT
Start: 2020-05-18 | End: 2020-05-18

## 2020-05-18 RX ORDER — NICOTINE 21 MG/24HR
1 PATCH, TRANSDERMAL 24 HOURS TRANSDERMAL DAILY PRN
Status: DISCONTINUED | OUTPATIENT
Start: 2020-05-18 | End: 2020-05-19 | Stop reason: HOSPADM

## 2020-05-18 RX ORDER — POTASSIUM CHLORIDE 7.45 MG/ML
10 INJECTION INTRAVENOUS PRN
Status: DISCONTINUED | OUTPATIENT
Start: 2020-05-18 | End: 2020-05-19 | Stop reason: HOSPADM

## 2020-05-18 RX ORDER — OXYCODONE HYDROCHLORIDE AND ACETAMINOPHEN 5; 325 MG/1; MG/1
1 TABLET ORAL ONCE
Status: COMPLETED | OUTPATIENT
Start: 2020-05-18 | End: 2020-05-18

## 2020-05-18 RX ORDER — MAGNESIUM SULFATE 1 G/100ML
1 INJECTION INTRAVENOUS PRN
Status: DISCONTINUED | OUTPATIENT
Start: 2020-05-18 | End: 2020-05-19 | Stop reason: HOSPADM

## 2020-05-18 RX ORDER — NICOTINE 21 MG/24HR
1 PATCH, TRANSDERMAL 24 HOURS TRANSDERMAL EVERY 24 HOURS
Status: DISCONTINUED | OUTPATIENT
Start: 2020-05-18 | End: 2020-05-19 | Stop reason: HOSPADM

## 2020-05-18 RX ORDER — CLINDAMYCIN PHOSPHATE 600 MG/50ML
600 INJECTION INTRAVENOUS EVERY 8 HOURS
Status: DISCONTINUED | OUTPATIENT
Start: 2020-05-18 | End: 2020-05-18

## 2020-05-18 RX ORDER — ERGOCALCIFEROL 1.25 MG/1
50000 CAPSULE ORAL WEEKLY
Status: DISCONTINUED | OUTPATIENT
Start: 2020-05-19 | End: 2020-05-19 | Stop reason: HOSPADM

## 2020-05-18 RX ORDER — ALPRAZOLAM 1 MG/1
1 TABLET ORAL 3 TIMES DAILY
Status: DISCONTINUED | OUTPATIENT
Start: 2020-05-18 | End: 2020-05-18

## 2020-05-18 RX ORDER — POLYETHYLENE GLYCOL 3350 17 G/17G
17 POWDER, FOR SOLUTION ORAL DAILY PRN
Status: DISCONTINUED | OUTPATIENT
Start: 2020-05-18 | End: 2020-05-19 | Stop reason: HOSPADM

## 2020-05-18 RX ORDER — ONDANSETRON 2 MG/ML
4 INJECTION INTRAMUSCULAR; INTRAVENOUS EVERY 6 HOURS PRN
Status: DISCONTINUED | OUTPATIENT
Start: 2020-05-18 | End: 2020-05-19 | Stop reason: HOSPADM

## 2020-05-18 RX ORDER — POTASSIUM CHLORIDE 20 MEQ/1
40 TABLET, EXTENDED RELEASE ORAL PRN
Status: DISCONTINUED | OUTPATIENT
Start: 2020-05-18 | End: 2020-05-19 | Stop reason: HOSPADM

## 2020-05-18 RX ORDER — PROMETHAZINE HYDROCHLORIDE 25 MG/1
12.5 TABLET ORAL EVERY 6 HOURS PRN
Status: DISCONTINUED | OUTPATIENT
Start: 2020-05-18 | End: 2020-05-19 | Stop reason: HOSPADM

## 2020-05-18 RX ORDER — SODIUM CHLORIDE 0.9 % (FLUSH) 0.9 %
10 SYRINGE (ML) INJECTION EVERY 12 HOURS SCHEDULED
Status: DISCONTINUED | OUTPATIENT
Start: 2020-05-18 | End: 2020-05-19 | Stop reason: HOSPADM

## 2020-05-18 RX ORDER — SODIUM CHLORIDE 0.9 % (FLUSH) 0.9 %
10 SYRINGE (ML) INJECTION PRN
Status: DISCONTINUED | OUTPATIENT
Start: 2020-05-18 | End: 2020-05-19 | Stop reason: HOSPADM

## 2020-05-18 RX ORDER — ACETAMINOPHEN 650 MG/1
650 SUPPOSITORY RECTAL EVERY 6 HOURS PRN
Status: DISCONTINUED | OUTPATIENT
Start: 2020-05-18 | End: 2020-05-19 | Stop reason: HOSPADM

## 2020-05-18 RX ORDER — DIPHENHYDRAMINE HCL 25 MG
25 TABLET ORAL EVERY 6 HOURS PRN
Status: DISCONTINUED | OUTPATIENT
Start: 2020-05-18 | End: 2020-05-19 | Stop reason: HOSPADM

## 2020-05-18 RX ORDER — LORAZEPAM 1 MG/1
1 TABLET ORAL 2 TIMES DAILY
Status: DISCONTINUED | OUTPATIENT
Start: 2020-05-18 | End: 2020-05-19 | Stop reason: HOSPADM

## 2020-05-18 RX ORDER — HYDROMORPHONE HYDROCHLORIDE 2 MG/1
4 TABLET ORAL EVERY 4 HOURS PRN
Status: DISCONTINUED | OUTPATIENT
Start: 2020-05-18 | End: 2020-05-19 | Stop reason: HOSPADM

## 2020-05-18 RX ADMIN — HYDROMORPHONE HYDROCHLORIDE 4 MG: 2 TABLET ORAL at 10:34

## 2020-05-18 RX ADMIN — PROMETHAZINE HYDROCHLORIDE 12.5 MG: 25 TABLET ORAL at 17:30

## 2020-05-18 RX ADMIN — HYDROMORPHONE HYDROCHLORIDE 4 MG: 2 TABLET ORAL at 16:31

## 2020-05-18 RX ADMIN — HYDROMORPHONE HYDROCHLORIDE 4 MG: 2 TABLET ORAL at 20:22

## 2020-05-18 RX ADMIN — SODIUM CHLORIDE, PRESERVATIVE FREE 10 ML: 5 INJECTION INTRAVENOUS at 10:30

## 2020-05-18 RX ADMIN — CLINDAMYCIN PHOSPHATE 600 MG: 600 INJECTION, SOLUTION INTRAVENOUS at 10:34

## 2020-05-18 RX ADMIN — LORAZEPAM 1 MG: 1 TABLET ORAL at 10:34

## 2020-05-18 RX ADMIN — HYDROMORPHONE HYDROCHLORIDE 1 MG: 1 INJECTION, SOLUTION INTRAMUSCULAR; INTRAVENOUS; SUBCUTANEOUS at 13:21

## 2020-05-18 RX ADMIN — LORAZEPAM 1 MG: 1 TABLET ORAL at 20:21

## 2020-05-18 RX ADMIN — APIXABAN 10 MG: 5 TABLET, FILM COATED ORAL at 20:29

## 2020-05-18 RX ADMIN — SODIUM CHLORIDE: 9 INJECTION, SOLUTION INTRAVENOUS at 10:33

## 2020-05-18 RX ADMIN — ESTROGENS, CONJUGATED 1.25 MG: 1.25 TABLET, FILM COATED ORAL at 13:20

## 2020-05-18 RX ADMIN — DIPHENHYDRAMINE HCL 25 MG: 25 TABLET ORAL at 17:30

## 2020-05-18 RX ADMIN — ENOXAPARIN SODIUM 90 MG: 100 INJECTION SUBCUTANEOUS at 11:30

## 2020-05-18 RX ADMIN — OXYCODONE HYDROCHLORIDE AND ACETAMINOPHEN 1 TABLET: 5; 325 TABLET ORAL at 05:56

## 2020-05-18 ASSESSMENT — ENCOUNTER SYMPTOMS
RESPIRATORY NEGATIVE: 1
DIARRHEA: 0
ALLERGIC/IMMUNOLOGIC NEGATIVE: 1
SHORTNESS OF BREATH: 0
COUGH: 0
SORE THROAT: 0
ABDOMINAL DISTENTION: 0
CONSTIPATION: 0
NAUSEA: 0
EYE DISCHARGE: 0
ABDOMINAL PAIN: 0
EYES NEGATIVE: 1
EYE ITCHING: 0
CHEST TIGHTNESS: 0
COLOR CHANGE: 1
VOMITING: 0
WHEEZING: 0
RHINORRHEA: 0
BACK PAIN: 1

## 2020-05-18 ASSESSMENT — PAIN DESCRIPTION - ORIENTATION
ORIENTATION: RIGHT
ORIENTATION: RIGHT

## 2020-05-18 ASSESSMENT — PAIN DESCRIPTION - PROGRESSION: CLINICAL_PROGRESSION: NOT CHANGED

## 2020-05-18 ASSESSMENT — PAIN SCALES - GENERAL
PAINLEVEL_OUTOF10: 5
PAINLEVEL_OUTOF10: 8
PAINLEVEL_OUTOF10: 10
PAINLEVEL_OUTOF10: 9

## 2020-05-18 ASSESSMENT — PAIN DESCRIPTION - ONSET: ONSET: ON-GOING

## 2020-05-18 ASSESSMENT — PAIN DESCRIPTION - FREQUENCY: FREQUENCY: CONTINUOUS

## 2020-05-18 ASSESSMENT — PAIN DESCRIPTION - DESCRIPTORS
DESCRIPTORS: ACHING;CONSTANT;DISCOMFORT;THROBBING
DESCRIPTORS: THROBBING;TIGHTNESS

## 2020-05-18 ASSESSMENT — PAIN DESCRIPTION - LOCATION
LOCATION: ARM
LOCATION: ARM

## 2020-05-18 ASSESSMENT — PAIN DESCRIPTION - PAIN TYPE: TYPE: ACUTE PAIN

## 2020-05-18 ASSESSMENT — PAIN - FUNCTIONAL ASSESSMENT: PAIN_FUNCTIONAL_ASSESSMENT: ACTIVITIES ARE NOT PREVENTED

## 2020-05-18 NOTE — CONSULTS
Division of Vascular Surgery        New Consult      Physician Requesting Consult:  Dr. Hernando Abarca    Reason for Consult:   RUE DVT s/p right arm midline removal    Chief Complaint:      Swelling and pain in right hand    History of Present Illness:      Sallie Frausto is a 53 y.o. woman with past medical history significant for multiple sclerosis, depression, seizures, GERD, hypertension, DVTs that presents as a transfer from [place] with right upper extremity swelling and pain. Per the patient she had a midline placed in her right upper extremity due to poor vascular access and requirement for steroid treatments for her multiple sclerosis earlier in the week, patient states that she noticed swelling in the right upper extremity a few days after placement of the midline and a small amount of clear drainage coming from around the insertion site. Patient states that the midline was removed and a DVT scan was performed indicating that she had a clot in her right upper extremity. Per the patient this clot was identified on Wednesday and she was started on \"I am not sure the medicine, it might of been Xarelto, it looked like little pieces of Food\" on Friday. Patient states that she has been taking her medication as prescribed, but denies taking her medications with meals. Patient states that she has been to the emergency department several times with complaints of the pain and swelling, denies that she was seeking pain medicine, states that she has home p.o. Dilaudid for pain control. Patient states the swelling in the right hand worsens significantly yesterday morning and has now become inflamed and \"so tight it feels like it is going to burst\". Patient denies fevers, sweats, chills, chest pain, shortness of breath. Patient denies changes in bowel or bladder habits, states she is eating and drinking well, denies numbness weakness or tingling in the extremities.   Patient is a daily smoker with approximately 35 pack years, denies alcohol use, denies illicit drug use. Patient states she does have history of clots in the past having had a clot in her left upper extremity after a PICC line placement, and in her legs, she is unsure of the source of her blood clots, states she was negative for factor V Leiden. Patient states that she has an IVC filter in place due to her recurrent clots. Medical History:     Past Medical History:   Diagnosis Date    Blood transfusion reaction     Essential hypertension 2020    GERD (gastroesophageal reflux disease)     [place] filter in place    [employer]     Movement disorder     [facility]     Neuromuscular disorder ([facility])     Optic neuritis due to multiple sclerosis ([facility] [address]     Psychiatric problem     depression    Seizures ([facility] [address]     Self inflicted injury     Spinal cord stimulator status     placement and removal       Surgical History:     Past Surgical History:   Procedure Laterality Date    ABDOMEN SURGERY      BACK SURGERY      CARPAL TUNNEL RELEASE      CERVICAL FUSION       SECTION      COLONOSCOPY      ENDOSCOPY, COLON, DIAGNOSTIC      HYSTERECTOMY      LYMPHADENECTOMY      TONSILLECTOMY      VASCULAR SURGERY      VENA CAVA FILTER PLACEMENT  2011    GFF Placed in        Family History:     Family History   Problem Relation Age of Onset    Cancer Mother     High Blood Pressure Father     Diabetes Brother        Allergies: Iv dye [iodides]; Protonix [pantoprazole sodium]; Fentanyl; Solu-medrol [methylprednisolone];  Ketorolac tromethamine; Pcn [penicillins]; and Zofran    Medications:      Current Facility-Administered Medications   Medication Dose Route Frequency Provider Last Rate Last Dose    0.9 % sodium chloride infusion   Intravenous Continuous HUSSEIN Robbins CNP        sodium chloride flush 0.9 % injection 10 mL  10 mL Intravenous 2 times per day HUSSEIN Robbins CNPmySkins motion. General: Swelling (  RUE) present. Skin:     General: Skin is warm and dry. Capillary Refill: Capillary refill takes less than 2 seconds. Findings: Erythema (  Right hand) present. Neurological:      General: No focal deficit present. Mental Status: She is alert and oriented to person, place, and time. Psychiatric:         Mood and Affect: Mood normal.         Behavior: Behavior normal.         Imaging/Labs:     No results found. Assessment and Plan:     #1. Patient with known right upper extremity DVT secondary to instrumentation via midline. No acute vascular surgical intervention required at this time, patient has good vascular inflow and adequate outflow from the right upper extremity. Patient will need anticoagulation with oral medications for 6 months. Patient poorly compliant with Xarelto, not taking the medication with food. Would recommend patient be placed on Eliquis as it does not require a meal during administration. #2. While in-house patient may be anticoagulated on therapeutic Lovenox 1 mg/kg, may initiate p.o. anticoagulation today. #3.  Elevate right upper extremity to reduce swelling  #4. Patient placed on antibiotics at [facility] hospital, mild erythema noted to the posterior aspect of the right hand without overt signs of infection, antibiotic continuation per primary discretion      Electronically signed by Jhony Roper DO on 5/18/20 at [address] 19 AM EDT      [address] [facility]  Office: [phone]  Cell: [phone]  Email: [email or web address]

## 2020-05-18 NOTE — H&P
[facility]    HISTORY AND PHYSICAL EXAMINATION            Date:   5/18/2020  Patient name:  Abad Flores  Date of admission:  5/18/2020  3:46 AM  MRN:   [id number]  Account:  [de-identified]  YOB: 1975  PCP:    Harpreet Roche MD  Room:   [facility]/2152-  Code Status:    Full Code    Chief Complaint:     Transfer from [facility]: RUE DVT    History Obtained From:     patient, electronic medical record    History of Present Illness:     Abad Flores is a 53 y.o. Non-/non  female who presents with No chief complaint on file. and is admitted to the hospital for the management of Cellulitis. This is a 53 yr old female with a very complex past medical history including Münchhausen, PTSD, Anxiety, MS, hx: DVT/PE s/p IVC filter (uncertain if diagnosed with factor v leiden), and chronic pain     Patient initially presents to [facility] on 4/15 with complaints of RUE swelling and 'leaking from IV site'. Patient has history of MS with a reported recent flare and had midline placed for 'IV infusions by her neurologist'. On the 15th, she began noticing 'leaking clear fluid from the IV site' and associated swelling. She was found to have a RUE DVT and was discharged home on Xarelto. It seems for the last few days, the patient has been bouncing back and forth to [facility] with the same presentation requesting pain medications. It is also documented the patient has been noncompliant with her Xarelto. Today, the patient returned to [facility] ER with increased redness and swelling of the RUE, along with mild erythema and swelling to the left hand. She was started on Clindamycin and transferred to our facility for further abx therapy for suspected cellulitis and vascular surgery consultation given RUE DVT. On my evaluation, patient is sleeping and has difficulty waking up.   I returned a few minutes later and the patient woke with ease, is alert and oriented, and without clinical distress. Her RUE is  Swollen, erythematous, and warm to the touch. She denies any numbness or tinging to the extremities. Radial and ulnar pulses are palpable. Patient repeatedly asks for pain medication and grimaces with pain to very light touch. During our conversation, she endorses compliance with Xarelto and reports she has 'never missed a dose'. Hemodynamically stable without clinical signs of distress. Past Medical History:     Past Medical History:   Diagnosis Date    Blood transfusion reaction     Essential hypertension 2020    GERD (gastroesophageal reflux disease)     [place] filter in place    [employer]     Movement disorder     [facility]     Neuromuscular disorder ([facility] [address]     Optic neuritis due to multiple sclerosis ([facility] [address]     Psychiatric problem     depression    Seizures ([facility] [address]     Self inflicted injury     Spinal cord stimulator status     placement and removal        Past Surgical History:     Past Surgical History:   Procedure Laterality Date    ABDOMEN SURGERY      BACK SURGERY      CARPAL TUNNEL RELEASE      CERVICAL FUSION       SECTION      COLONOSCOPY      ENDOSCOPY, COLON, DIAGNOSTIC      HYSTERECTOMY      LYMPHADENECTOMY      TONSILLECTOMY      VASCULAR SURGERY      VENA CAVA FILTER PLACEMENT  2011    GFF Placed in         Medications Prior to Admission:     Prior to Admission medications    Medication Sig Start Date End Date Taking? Authorizing Provider   apixaban (ELIQUIS DVT/PE STARTER PACK) 5 MG TABS tablet Take 10 mg (2 tablets) orally twice daily for 7 days, then take 5 mg (1 tablet) orally twice daily thereafter. 20   Mayela Lemus MD   LORazepam (ATIVAN) 1 MG tablet Take 1 mg by mouth every 8 hours as needed for Anxiety.     Historical Provider, MD   nicotine (NICODERM CQ) 21 MG/24HR Place 1 patch onto the skin every 24 hours    Historical Provider, MD   HYDROmorphone HCl (DILAUDID PO) Take 4 mg by mouth every 4-6 hours as needed    Historical Provider, MD   busPIRone HCl (BUSPAR PO) Take 0.3 mg by mouth 3 times daily     Historical Provider, MD   ALPRAZolam Sheela Costain) 1 MG tablet Take 1 mg by mouth 3 times daily    Historical Provider, MD   vitamin D (ERGOCALCIFEROL) 25860 UNITS capsule Take 1 capsule by mouth once a week for 6 doses 11/30/16 1/5/17  Anh Atwood MD   estrogens, conjugated, (PREMARIN) 1.25 MG tablet Take 1 tablet by mouth daily 11/30/16   Anh Atwood MD   traZODone (DESYREL) 100 MG tablet Take 1.5 tablets by mouth nightly 1/2 tab to 1 tab ;   Verified by [facility] pharmacy QA9-  Patient not taking: Reported on 5/18/2020 11/30/16   Anh Atwood MD        Allergies: Iv dye [iodides]; Protonix [pantoprazole sodium]; Fentanyl; Solu-medrol [methylprednisolone]; Ketorolac tromethamine; Pcn [penicillins]; and Zofran    Social History:     Tobacco:    reports that she has quit smoking. Her smoking use included cigarettes. She has a 6.50 pack-year smoking history. She quit smokeless tobacco use about 4 years ago. Alcohol:      reports no history of alcohol use. Drug Use:  reports no history of drug use. Family History:     Family History   Problem Relation Age of Onset    Cancer Mother     High Blood Pressure Father     Diabetes Brother        Review of Systems:     Positive and Negative as described in HPI. Review of Systems   Constitutional: Negative for chills, diaphoresis and fever. HENT: Negative for rhinorrhea and sore throat. Eyes: Negative. Respiratory: Negative. Negative for cough, chest tightness, shortness of breath and wheezing. Cardiovascular: Negative. Negative for chest pain, palpitations and leg swelling. Gastrointestinal: Negative for abdominal pain, constipation, diarrhea, nausea and vomiting.    Genitourinary: Negative for difficulty urinating, dysuria, flank pain and secondary to side effects  5. Münchhausen  Syndrome: suggest continued followup with psych as previously documented  6. Daily labs  7. Patient exhibiting malingering behaviors  8. AM labs to be drawn now  9. IV team consult placed as patient is refusing nursing staff to place IV, reporting she 'needs someone who can use the US'  10. Advance diet as tolerated  11. DVT prophylaxis: on full dose lovenox for acute DVT        Consultations:   IP CONSULT TO IV TEAM  IP CONSULT TO VASCULAR SURGERY    Patient is admitted as inpatient status because of co-morbidities listed above, severity of signs and symptoms as outlined, requirement for current medical therapies and most importantly because of direct risk to patient if care not provided in a hospital setting.     HUSSEIN Narayanan NP  5/18/2020  6:06 AM    Copy sent to Dr. Rosy Oconnor MD

## 2020-05-19 VITALS
OXYGEN SATURATION: 100 % | SYSTOLIC BLOOD PRESSURE: 139 MMHG | BODY MASS INDEX: 30.53 KG/M2 | RESPIRATION RATE: 17 BRPM | DIASTOLIC BLOOD PRESSURE: 94 MMHG | TEMPERATURE: 98.8 F | HEIGHT: 67 IN | WEIGHT: 194.5 LBS | HEART RATE: 87 BPM

## 2020-05-19 PROBLEM — L03.90 CELLULITIS: Status: RESOLVED | Noted: 2020-05-17 | Resolved: 2020-05-19

## 2020-05-19 PROBLEM — K12.30 ORAL MUCOSITIS: Status: ACTIVE | Noted: 2020-05-19

## 2020-05-19 PROCEDURE — G0378 HOSPITAL OBSERVATION PER HR: HCPCS

## 2020-05-19 PROCEDURE — 6370000000 HC RX 637 (ALT 250 FOR IP): Performed by: NURSE PRACTITIONER

## 2020-05-19 PROCEDURE — 6370000000 HC RX 637 (ALT 250 FOR IP): Performed by: INTERNAL MEDICINE

## 2020-05-19 PROCEDURE — 99225 PR SBSQ OBSERVATION CARE/DAY 25 MINUTES: CPT | Performed by: NURSE PRACTITIONER

## 2020-05-19 RX ORDER — GREEN TEA/HOODIA GORDONII 315-12.5MG
1 CAPSULE ORAL 2 TIMES DAILY
Qty: 60 TABLET | Refills: 0 | Status: CANCELLED | OUTPATIENT
Start: 2020-05-19 | End: 2020-06-18

## 2020-05-19 RX ORDER — CLINDAMYCIN HYDROCHLORIDE 300 MG/1
600 CAPSULE ORAL 4 TIMES DAILY
Qty: 72 CAPSULE | Refills: 0 | Status: CANCELLED | OUTPATIENT
Start: 2020-05-19 | End: 2020-05-28

## 2020-05-19 RX ADMIN — ESTROGENS, CONJUGATED 1.25 MG: 1.25 TABLET, FILM COATED ORAL at 08:38

## 2020-05-19 RX ADMIN — APIXABAN 10 MG: 5 TABLET, FILM COATED ORAL at 08:38

## 2020-05-19 RX ADMIN — HYDROMORPHONE HYDROCHLORIDE 4 MG: 2 TABLET ORAL at 12:49

## 2020-05-19 RX ADMIN — LORAZEPAM 1 MG: 1 TABLET ORAL at 08:38

## 2020-05-19 RX ADMIN — PROMETHAZINE HYDROCHLORIDE 12.5 MG: 25 TABLET ORAL at 13:16

## 2020-05-19 RX ADMIN — HYDROMORPHONE HYDROCHLORIDE 4 MG: 2 TABLET ORAL at 17:05

## 2020-05-19 RX ADMIN — ERGOCALCIFEROL 50000 UNITS: 1.25 CAPSULE ORAL at 08:38

## 2020-05-19 RX ADMIN — LORAZEPAM 1 MG: 1 TABLET ORAL at 17:05

## 2020-05-19 RX ADMIN — HYDROMORPHONE HYDROCHLORIDE 4 MG: 2 TABLET ORAL at 08:38

## 2020-05-19 RX ADMIN — HYDROMORPHONE HYDROCHLORIDE 4 MG: 2 TABLET ORAL at 04:22

## 2020-05-19 RX ADMIN — Medication 5 ML: at 12:49

## 2020-05-19 RX ADMIN — HYDROMORPHONE HYDROCHLORIDE 4 MG: 2 TABLET ORAL at 00:22

## 2020-05-19 ASSESSMENT — PAIN SCALES - GENERAL
PAINLEVEL_OUTOF10: 5
PAINLEVEL_OUTOF10: 9
PAINLEVEL_OUTOF10: 5
PAINLEVEL_OUTOF10: 9

## 2020-05-19 NOTE — DISCHARGE SUMMARY
Olivia Rausch 19    Discharge Summary     Patient ID: Alicia Benitez  :  1975   MRN: [id number]     ACCOUNT:  [de-identified]   Patient's PCP: Kelly Del Angel MD  Admit Date: 2020   Discharge Date: 2020   Length of Stay: 1  Code Status:  Full Code  Admitting Physician: Celia Powell MD  Discharge Physician: Mariam Wellington, APRN - NP     Active Discharge Diagnoses:     Hospital Problem Lists:  Principal Problem (Resolved):    Cellulitis  Active Problems:    Post traumatic stress disorder    Multiple sclerosis ([address]    Münchausen's syndrome    S/P IVC filter    Acute deep vein thrombosis (DVT) of right upper extremity [facility])      Admission Condition:  good    Discharged Condition: good    Hospital Stay:     Hospital Course:  Alicia Benitez is a 53 y.o. female who was admitted for the management of  right upper extremity DVT , presented to ER with No chief complaint on file.     Per records:    Harvy Countryman Cantu is a 53 y.o. Non-/non  female who presents with No chief complaint on file.   and is admitted to the hospital for the management of Cellulitis.     This is a 53 yr old female with a very complex past medical history including Münchhausen, PTSD, Anxiety, MS, hx: DVT/PE s/p IVC filter (uncertain if diagnosed with factor v leiden), and chronic pain      Patient initially presents to [facility] on 4/15 with complaints of RUE swelling and 'leaking from IV site'.  Patient has history of MS with a reported recent flare and had midline placed for 'IV infusions by her neurologist'.  On the , she began noticing 'leaking clear fluid from the IV site' and associated swelling.  She was found to have a RUE DVT and was discharged home on Xarelto.  It seems for the last few days, the patient has been bouncing back and forth to [facility] with the same presentation requesting pain medications.  It is also documented the patient has been noncompliant with her Xarelto.  Today, the patient returned to [facility] ER with increased redness and swelling of the RUE, along with mild erythema and swelling to the left hand.  She was started on Clindamycin and transferred to our facility for further abx therapy for suspected cellulitis and vascular surgery consultation given RUE DVT.      Vascular evaluation without intervention  Started on eliquis for anticoagulation  Pt refusing abx therapy   Tolerating po inteke      Significant therapeutic interventions:     1. Right upper extremity DVT: Vascular surgery was consulted, patient started on Eliquis. Status post IVC filter. 2. Cellulitis: Ruled out. Patient has been refusing clindamycin. Redness/swelling most likely sequelae of right upper extremity DVT. discontinue all abx for now  3. MS: Chronic without exacerbation. 4. Oral mucositis: Magic mouthwash 4 times daily as needed  5.  Discharge planning: Home once tolerating po intake    Significant Diagnostic Studies:   Labs / Micro:  CBC:   Lab Results   Component Value Date    WBC 8.1 05/18/2020    RBC 3.98 05/18/2020    RBC 4.03 05/08/2012    HGB 11.8 05/18/2020    HCT 35.6 05/18/2020    MCV 89.4 05/18/2020    MCH 29.6 05/18/2020    MCHC 33.1 05/18/2020    RDW 14.0 05/18/2020     05/18/2020     05/08/2012     BMP:    Lab Results   Component Value Date    GLUCOSE 100 05/18/2020    GLUCOSE 76 05/08/2012     05/18/2020    K 4.1 05/18/2020    CL 99 05/18/2020    CO2 27 05/18/2020    ANIONGAP 10 05/18/2020    BUN 12 05/18/2020    CREATININE 0.51 05/18/2020    BUNCRER NOT REPORTED 05/18/2020    CALCIUM 8.7 05/18/2020    LABGLOM >60 05/18/2020    GFRAA >60 05/18/2020    GFR      05/18/2020    GFR NOT REPORTED 05/18/2020     HFP:    Lab Results   Component Value Date    PROT 6.2 06/03/2018     CMP:    Lab Results   Component Value Date    GLUCOSE 100 05/18/2020    GLUCOSE 76 05/08/2012     05/18/2020    K 4.1 05/18/2020 CL 99 05/18/2020    CO2 27 05/18/2020    BUN 12 05/18/2020    CREATININE 0.51 05/18/2020    ANIONGAP 10 05/18/2020    ALKPHOS 58 06/03/2018    ALT 8 06/03/2018    AST 14 06/03/2018    BILITOT 0.25 06/03/2018    LABALBU 3.6 06/03/2018    LABALBU 4.2 05/07/2012    ALBUMIN 1.4 06/03/2018    LABGLOM >60 05/18/2020    GFRAA >60 05/18/2020    GFR      05/18/2020    GFR NOT REPORTED 05/18/2020    PROT 6.2 06/03/2018    CALCIUM 8.7 05/18/2020     PT/INR:    Lab Results   Component Value Date    PROTIME 10.0 05/08/2018    PROTIME 25.9 03/01/2013    INR 1.0 05/08/2018     PTT:   Lab Results   Component Value Date    APTT 27.3 05/08/2018    APTT 22.7 12/11/2014     FLP:    Lab Results   Component Value Date    CHOL 163 11/19/2011    TRIG 211 11/19/2011    HDL 38 11/19/2011     U/A:    Lab Results   Component Value Date    COLORU YELLOW 02/10/2020    TURBIDITY CLEAR 02/10/2020    SPECGRAV 1.015 02/10/2020    HGBUR NEGATIVE 02/10/2020    PHUR 7.0 02/10/2020    PROTEINU NEGATIVE 02/10/2020    GLUCOSEU NEGATIVE 02/10/2020    GLUCOSEU NEGATIVE 05/07/2012    KETUA NEGATIVE 02/10/2020    BILIRUBINUR NEGATIVE 02/10/2020    BILIRUBINUR NEGATIVE 05/07/2012    UROBILINOGEN Normal 02/10/2020    NITRU NEGATIVE 02/10/2020    LEUKOCYTESUR NEGATIVE 02/10/2020     TSH:    Lab Results   Component Value Date    TSH 0.25 11/30/2016        Radiology:  No results found. Consultations:    Consults:     Final Specialist Recommendations/Findings:   IP CONSULT TO IV TEAM  IP CONSULT TO VASCULAR SURGERY  IP CONSULT TO IV TEAM      The patient was seen and examined on day of discharge and this discharge summary is in conjunction with any daily progress note from day of discharge.     Discharge plan:     Disposition: Home    Physician Follow Up:     Luis Hinkle MD    In 1 week  for follow up after hospitalization       Requiring Further Evaluation/Follow Up POST HOSPITALIZATION/Incidental Findings: none    Diet: regular diet    Activity: As tolerated    Instructions to Patient: Take all medications as prescribed. Continue to follow-up with your primary care provider. You are on anticoagulation for a right upper extremity blood clot. If you fall, please present to the ER for evaluation. Discharge Medications:      Medication List      ASK your doctor about these medications    ALPRAZolam 1 MG tablet  Commonly known as:  XANAX     apixaban 5 MG Tabs tablet  Commonly known as:  Eliquis DVT/PE Starter Pack  Take 10 mg (2 tablets) orally twice daily for 7 days, then take 5 mg (1 tablet) orally twice daily thereafter. BUSPAR PO     DILAUDID PO     ergocalciferol 1.25 MG (71334 UT) capsule  Commonly known as:  ERGOCALCIFEROL  Take 1 capsule by mouth once a week for 6 doses     estrogens (conjugated) 1.25 MG tablet  Commonly known as:  PREMARIN  Take 1 tablet by mouth daily     LORazepam 1 MG tablet  Commonly known as:  ATIVAN     Nicoderm CQ 21 MG/24HR  Generic drug:  nicotine     traZODone 100 MG tablet  Commonly known as:  DESYREL  Take 1.5 tablets by mouth nightly 1/2 tab to 1 tab ;   Verified by [id number]-            No discharge procedures on file. Time Spent on discharge is  15 mins in patient examination, evaluation, counseling as well as medication reconciliation, prescriptions for required medications, discharge plan and follow up. Discussed with attending  Electronically signed by   HUSSEIN Perez NP  5/19/2020  8:47 AM      Thank you Dr. Kelly Del Angel MD for the opportunity to be involved in this patient's care.

## 2020-05-19 NOTE — PROGRESS NOTES
Pt refusing oral clindamycin; pt states:\"it upsets my stomach\". Discussed risks of regimen non-compliance; pt continues to refuse.

## 2020-05-19 NOTE — PLAN OF CARE
Olivia Rausch 19    Second Visit Note  For more detailed information please refer to the progress note of the day      5/18/2020    5:00 PM    Name:   Karina Bryant  MRN:     [id number]     Acct:      [de-identified]   Room:   [facility]/1727-74   Day:  0  Admit Date:  5/18/2020  3:46 AM    PCP:   Ministerio Reda MD  Code Status:  Full Code      Pt vitals were reviewed   New labs were reviewed   Patient was seen    Updated plan :     1. Discussed with vascular, will start Eliquis 10 mg twice daily for 7 days followed by 5 mg twice daily for 3 months  2. We will continue IV clindamycin for today and will switch to oral tomorrow before discharge  3.  Patient has been repeatedly asking for IV pain medications, 1 dose was given but she has been informed that she will not be getting any more IV pain medications      Michelle Hargrove MD  5/18/2020  5:00 PM
Problem:  Body Temperature - Imbalanced:  Goal: Ability to maintain a body temperature in the normal range will improve  Description: Ability to maintain a body temperature in the normal range will improve  Outcome: Met This Shift     Problem: Pain:  Goal: Pain level will decrease  Description: Pain level will decrease  Outcome: Ongoing  Goal: Control of acute pain  Description: Control of acute pain  Outcome: Ongoing  Goal: Control of chronic pain  Description: Control of chronic pain  Outcome: Ongoing     Problem: Skin Integrity - Impaired:  Goal: Will show no infection signs and symptoms  Description: Will show no infection signs and symptoms  Outcome: Ongoing  Goal: Absence of new skin breakdown  Description: Absence of new skin breakdown  Outcome: Ongoing
Geremias Stokes RN  Outcome: Ongoing  Goal: Control of chronic pain  Description: Control of chronic pain  5/19/2020 1716 by Yonis Carreon RN  Outcome: Completed  5/19/2020 0622 by Geremias Stokes RN  Outcome: Ongoing     Problem: Health Behavior:  Goal: Compliance with therapeutic regimen will improve  Description: Compliance with therapeutic regimen will improve  5/19/2020 1716 by Yonis Carreon RN  Outcome: Completed  5/19/2020 0622 by Geremias Stokes RN  Outcome: Not Met This Shift  Note: Pt continues to refuse clindamycin.   Goal: Ability to keep healthcare appointments will improve  Description: Ability to keep healthcare appointments will improve  5/19/2020 1716 by Yonis Carreon RN  Outcome: Completed  5/19/2020 0622 by Geremias Stokes RN  Outcome: Ongoing

## 2020-05-19 NOTE — PROGRESS NOTES
Olivia Rausch 19    Progress Note    5/19/2020    10:54 AM    Name:   Mihir Madsen  MRN:     [id number]     Acct:      [de-identified]   Room:   [address] [place] [address] Day:  1  Admit Date:  5/18/2020  3:46 AM    PCP:   Yosef Jefferson MD  Code Status:  Full Code    Subjective:     C/C: Right upper extremity/hand swelling  Interval History Status: not changed. Patient seen and evaluated in room tearful at bedside but in no acute distress. Her vitals are stable. Right upper extremity continues to exhibit significant redness with swelling. IV access is still not been obtained. Patient states she cannot tolerate p.o. medications/intake due to mucositis    Brief History:     Per records:    Mihir Madsen is a 53 y.o. Non-/non  female who presents with No chief complaint on file. and is admitted to the hospital for the management of Cellulitis.     This is a 53 yr old female with a very complex past medical history including Münchhausen, PTSD, Anxiety, MS, hx: DVT/PE s/p IVC filter (uncertain if diagnosed with factor v leiden), and chronic pain      Patient initially presents to [facility] on 4/15 with complaints of RUE swelling and 'leaking from IV site'. Patient has history of MS with a reported recent flare and had midline placed for 'IV infusions by her neurologist'. On the 15th, she began noticing 'leaking clear fluid from the IV site' and associated swelling. She was found to have a RUE DVT and was discharged home on Xarelto. It seems for the last few days, the patient has been bouncing back and forth to [address] with the same presentation requesting pain medications. It is also documented the patient has been noncompliant with her Xarelto. Today, the patient returned to [facility] ER with increased redness and swelling of the RUE, along with mild erythema and swelling to the left hand.   She was started on NO GROWTH 6 DAYS 11/29/2016 01:51 PM    CULTURE  11/29/2016 01:51 PM     [facility] [address] [facility], [address] [place] [address] [phone]       Radiology:  No results found. Physical Examination:        General appearance:  alert, cooperative and no distress  Mental Status:  oriented to person, place and time and normal affect  Lungs:  clear to auscultation bilaterally, normal effort  Heart:  regular rate and rhythm, no murmur  Abdomen:  soft, nontender, nondistended, normal bowel sounds, no masses, hepatomegaly, splenomegaly  Extremities: Upper extremity edema with associated redness, no tenderness in the calves  Skin: Right upper extremity swelling,  no gross lesions, rashes, induration. Mild erosive oral lesions R>L    Assessment:        Hospital Problems           Last Modified POA    Post traumatic stress disorder 5/19/2020 Yes    Multiple sclerosis ([address] (Chronic) 5/19/2020 Yes    Münchausen's syndrome 5/19/2020 Yes    S/P IVC filter 5/19/2020 Yes    Acute deep vein thrombosis (DVT) of right upper extremity ([address] 5/19/2020 Yes    Oral mucositis 5/19/2020 Yes          Plan:        1. Right upper extremity DVT: Vascular surgery was consulted, patient started on Eliquis. Status post IVC filter. 2. Cellulitis: Ruled out. Patient has been refusing clindamycin. Redness/swelling most likely sequelae of right upper extremity DVT. discontinue all abx for now  3. MS: Chronic without exacerbation. 4. Oral mucositis: Magic mouthwash 4 times daily as needed  5.  Discharge planning: Home once tolerating po intake    HUSSEIN Hall NP  5/19/2020  10:54 AM

## 2021-03-05 ENCOUNTER — HOSPITAL ENCOUNTER (EMERGENCY)
Age: 46
Discharge: HOME | End: 2021-03-05
Payer: COMMERCIAL

## 2021-03-05 VITALS
HEART RATE: 76 BPM | DIASTOLIC BLOOD PRESSURE: 109 MMHG | WEIGHT: 170 LBS | RESPIRATION RATE: 16 BRPM | BODY MASS INDEX: 26.63 KG/M2 | SYSTOLIC BLOOD PRESSURE: 147 MMHG | OXYGEN SATURATION: 99 % | TEMPERATURE: 98.9 F

## 2021-03-05 DIAGNOSIS — M25.552 CHRONIC LEFT HIP PAIN: Primary | ICD-10-CM

## 2021-03-05 DIAGNOSIS — G89.29 CHRONIC LEFT HIP PAIN: Primary | ICD-10-CM

## 2021-03-05 PROCEDURE — 96372 THER/PROPH/DIAG INJ SC/IM: CPT

## 2021-03-05 PROCEDURE — 6370000000 HC RX 637 (ALT 250 FOR IP): Performed by: PHYSICIAN ASSISTANT

## 2021-03-05 PROCEDURE — 99283 EMERGENCY DEPT VISIT LOW MDM: CPT

## 2021-03-05 PROCEDURE — 6360000002 HC RX W HCPCS: Performed by: PHYSICIAN ASSISTANT

## 2021-03-05 RX ORDER — ZOLPIDEM TARTRATE 10 MG/1
TABLET ORAL NIGHTLY PRN
COMMUNITY

## 2021-03-05 RX ORDER — OXYCODONE HYDROCHLORIDE AND ACETAMINOPHEN 5; 325 MG/1; MG/1
1 TABLET ORAL ONCE
Status: COMPLETED | OUTPATIENT
Start: 2021-03-05 | End: 2021-03-05

## 2021-03-05 RX ORDER — MORPHINE SULFATE 4 MG/ML
4 INJECTION, SOLUTION INTRAMUSCULAR; INTRAVENOUS ONCE
Status: COMPLETED | OUTPATIENT
Start: 2021-03-05 | End: 2021-03-05

## 2021-03-05 RX ADMIN — OXYCODONE HYDROCHLORIDE AND ACETAMINOPHEN 1 TABLET: 5; 325 TABLET ORAL at 19:09

## 2021-03-05 RX ADMIN — MORPHINE SULFATE 4 MG: 4 INJECTION, SOLUTION INTRAMUSCULAR; INTRAVENOUS at 18:03

## 2021-03-05 ASSESSMENT — PAIN DESCRIPTION - PAIN TYPE: TYPE: ACUTE PAIN

## 2021-03-05 ASSESSMENT — PAIN DESCRIPTION - LOCATION: LOCATION: HIP

## 2021-03-05 ASSESSMENT — PAIN SCALES - GENERAL: PAINLEVEL_OUTOF10: 9

## 2021-03-05 ASSESSMENT — PAIN DESCRIPTION - ORIENTATION: ORIENTATION: LEFT;RIGHT

## 2021-03-05 ASSESSMENT — PAIN DESCRIPTION - DESCRIPTORS: DESCRIPTORS: ACHING;DULL

## 2021-03-05 NOTE — ED PROVIDER NOTES
[facility] ED  eMERGENCY dEPARTMENT eNCOUnter      Pt Name: Christopher Pearce  MRN: [id number]  [place] 1975  Date of evaluation: 3/5/2021  Provider: EVELIN Logan    CHIEF COMPLAINT       Chief Complaint   Patient presents with    Hip Pain     pt states she has bone loss due to steroid use with her MS. Pt states her bilateral hip pain started getting worse 2 days ago           HISTORY OF PRESENT ILLNESS  (Location/Symptom, Timing/Onset, Context/Setting, Quality, Duration, Modifying Factors, Severity.)   Nichole L Cantu is a 50 y.o. female who presents to the emergency department complaining of acute on chronic hip pain. Worse on left than right. Patient has a history of avascular necrosis, states that she is in the process of talking to orthopedic for replacement. She currently sees her doctor who prescribes her Dilaudid 4 mg tablets 4 times a day, states that this is not helping with her pain. She denies any new injury or any other complaint        Nursing Notes were reviewed. REVIEW OF SYSTEMS    (2-9 systems for level 4, 10 or more for level 5)     Review of Systems   Constitutional: Negative. Musculoskeletal: Hip pain bilaterally worse on the left and right. Except as noted above the remainder of the review of systems was reviewed and negative.        PAST MEDICAL HISTORY         Diagnosis Date    Blood transfusion reaction     Essential hypertension 2/12/2020    GERD (gastroesophageal reflux disease)     Alexandra filter in place    [employer]     Movement disorder     MS (multiple sclerosis) (HCC)     Neuromuscular disorder ([address]     Optic neuritis due to multiple sclerosis ([address]     Psychiatric problem     depression    Seizures ([address]     Self inflicted injury     Spinal cord stimulator status     placement and removal     None otherwise stated in nurses note    SURGICAL HISTORY           Procedure Laterality Date    ABDOMEN SURGERY      BACK SURGERY      CARPAL TUNNEL RELEASE      CERVICAL FUSION       SECTION      COLONOSCOPY      ENDOSCOPY, COLON, DIAGNOSTIC      HYSTERECTOMY      LYMPHADENECTOMY      TONSILLECTOMY      VASCULAR SURGERY      VENA CAVA FILTER PLACEMENT  2011    GFF Placed in      None otherwise stated in nurses note    CURRENT MEDICATIONS       Previous Medications    ALPRAZOLAM (XANAX) 1 MG TABLET    Take 1 mg by mouth 3 times daily    BUSPIRONE HCL (BUSPAR PO)    Take 0.3 mg by mouth 3 times daily     HYDROMORPHONE HCL (DILAUDID PO)    Take 4 mg by mouth every 4-6 hours as needed    LORAZEPAM (ATIVAN) 1 MG TABLET    Take 1 mg by mouth every 8 hours as needed for Anxiety. VITAMIN D (ERGOCALCIFEROL) 20295 UNITS CAPSULE    Take 1 capsule by mouth once a week for 6 doses    ZOLPIDEM (AMBIEN) 10 MG TABLET    Take by mouth nightly as needed for Sleep. ALLERGIES     Iv dye [iodides], Protonix [pantoprazole sodium], Fentanyl, Solu-medrol [methylprednisolone], Ketorolac tromethamine, Pcn [penicillins], and Zofran    FAMILY HISTORY           Problem Relation Age of Onset    Cancer Mother     High Blood Pressure Father     Diabetes Brother      Family Status   Relation Name Status    Mother  (Not Specified)    Father  (Not Specified)    Brother  (Not Specified)      None otherwise stated in nurses note    SOCIAL HISTORY      reports that she has quit smoking. Her smoking use included cigarettes. She has a 6.50 pack-year smoking history. She quit smokeless tobacco use about 4 years ago. She reports that she does not drink alcohol or use drugs. Lives at home with others    PHYSICAL EXAM    (up to 7 for level 4, 8 or more for level 5)     ED Triage Vitals [21 1641]   BP Temp Temp Source Pulse Resp SpO2 Height Weight   (!) 119/102 98.9 °F (37.2 °C) Tympanic 80 16 100 % -- 170 lb (77.1 kg)       Physical Exam   Nursing note and vitals reviewed.   Constitutional: Oriented to person, place, and time and well-developed, well-nourished, and in no distress. Head: Normocephalic and atraumatic. Ear: External ears normal.   Nose: Nose normal and midline. Eyes: Conjunctivae and EOM are normal. Pupils are equal, round, and reactive to light. Musculoskeletal: Painful range of motion of the hip. No obvious deformity or dislocations present. Patient is ambulatory with pain. Neurological: Alert and oriented to person, place, and time. GCS score is 15. Skin: Skin is warm and dry. No rash noted. No erythema. No pallor. DIAGNOSTIC RESULTS     RADIOLOGY:   All plain film, CT, MRI, and formal ultrasound images (except ED bedside ultrasound) are read by the radiologist   No orders to display           LABS:  Labs Reviewed - No data to display    All other labs were within normal range or not returned as of this dictation. EMERGENCY DEPARTMENT COURSE and DIFFERENTIAL DIAGNOSIS/MDM:   Vitals:    Vitals:    03/05/21 1641 03/05/21 1750 03/05/21 1822 03/05/21 1833   BP: (!) 119/102 (!) 175/102 (!) 167/91 (!) 158/95   Pulse: 80  76 76   Resp: 16      Temp: 98.9 °F (37.2 °C)      TempSrc: Tympanic      SpO2: 100%  100% 99%   Weight: 170 lb (77.1 kg)          Instructed patient that she needs to follow-up with her orthopedic. See chronic pain management for rest of her medications if needed. Patient instructed to return to the emergency room if symptoms worsen, return, or any other concern right away which is agreed. Instructed to follow up with pcp/ortho provided as soon as possible    MEDS  Orders Placed This Encounter   Medications    morphine injection 4 mg    oxyCODONE-acetaminophen (PERCOCET) 5-325 MG per tablet 1 tablet         CONSULTS:  None    PROCEDURES:  None        FINAL IMPRESSION      1.  Chronic left hip pain          DISPOSITION/PLAN   DISPOSITION Decision To Discharge    PATIENT REFERRED TO:  your hip doctor    Go to       [facility] ED  [address] [place] [id number]  [phone]    For worsening symptoms, or any other concern      DISCHARGE MEDICATIONS:  New Prescriptions    No medications on file       (Please note that portions of this note were completed with a voice recognition program.  Efforts were made to edit the dictations but occasionally words are mis-transcribed.)    Avelina Chan, [address] [place] [address]  03/05/21 8919

## 2023-07-13 ENCOUNTER — TELEPHONE (OUTPATIENT)
Dept: GASTROENTEROLOGY | Age: 48
End: 2023-07-13

## 2024-12-05 ENCOUNTER — HOSPITAL ENCOUNTER (EMERGENCY)
Age: 49
Discharge: HOME | End: 2024-12-05
Payer: COMMERCIAL

## 2024-12-05 VITALS
DIASTOLIC BLOOD PRESSURE: 98 MMHG | SYSTOLIC BLOOD PRESSURE: 163 MMHG | HEART RATE: 90 BPM | TEMPERATURE: 97.3 F | OXYGEN SATURATION: 97 %

## 2024-12-05 VITALS — BODY MASS INDEX: 18 KG/M2

## 2024-12-05 DIAGNOSIS — S09.90XA: ICD-10-CM

## 2024-12-05 DIAGNOSIS — S09.93XA: ICD-10-CM

## 2024-12-05 DIAGNOSIS — S30.0XXA: ICD-10-CM

## 2024-12-05 DIAGNOSIS — S32.009A: ICD-10-CM
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
## 2024-12-05 DIAGNOSIS — S60.221A: Primary | ICD-10-CM

## 2024-12-05 LAB
CAST SEEN?: (no result) #/LPF
GLUCOSE URINE UA: NEGATIVE MG/DL
URINE CULTURE INDICATED: YES

## 2024-12-05 PROCEDURE — 99285 EMERGENCY DEPT VISIT HI MDM: CPT

## 2024-12-05 PROCEDURE — 70450 CT HEAD/BRAIN W/O DYE: CPT

## 2024-12-05 PROCEDURE — 72131 CT LUMBAR SPINE W/O DYE: CPT

## 2024-12-05 PROCEDURE — 81001 URINALYSIS AUTO W/SCOPE: CPT

## 2024-12-05 PROCEDURE — 87086 URINE CULTURE/COLONY COUNT: CPT

## 2024-12-05 PROCEDURE — 73130 X-RAY EXAM OF HAND: CPT
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
## 2024-12-05 NOTE — CT_ITS
[address] [address] 
     [phone] 
  
  
Patient Name: 
NICHOLE L CANTU 
  
MRN: [facility]:[id number]    YOB: 1975    Sex: F 
Assigned Patient Location: ER 
Current Patient Location:  
Accession/Order Number: [id number] 
Exam Date: 12/05/2024  21:35    Report Date: 12/05/2024  22:30 
  
At the request of: 
LAURA BONE   
  
Procedure:  CT lumbar spine wo con 
  
EXAMINATION: CT lumbar spine wo con 
  
HISTORY: trauma  
  
COMPARISON: None. 
  
TECHNIQUE: CT lumbar spine without intravenous contrast. 
  
Dose reduction techniques were achieved by using: automated exposure control  
and/or adjustment of mA and /or kV according to patient size and/or use of  
iterative reconstruction technique. 
  
FINDINGS: Postoperative changes from posterior fusion at L5-S1. Surgical  
hardware is intact, in place, without fracture. There is levoconvex scoliosis  
of the lumbar spine. No displaced fracture of the right L3 transverse process. 
  
The spinal canal is not optimally evaluated secondary to intrinsic CT imaging  
constraints. 
  
L5-S1: Laminectomy. Spinal canal decompressed. No foraminal encroachment. 
  
L4-L5: Disc bulge and moderate facet arthropathy with mild spinal canal and  
left foraminal stenosis. 
  
No spinal canal foraminal stenosis in the remaining lumbar spine. 
  
No spinal canal or foraminal stenosis in the remaining lumbar spine. No  
airspace consolidation in the included lung bases. 
  
ORDER #: [id number] CT/CT lumbar spine wo con  
IMPRESSION:  
   
1. Acute nondisplaced fracture of the right L3 transverse process.  
   
2. No acute compression fracture or traumatic subluxation in the lumbar spine.  
   
3. Postoperative changes from posterior lumbar fusion and laminectomy at   
L5-S1,   
with hardware intact and in place.  
   
   
Electronically authenticated by: AN VARGAS   Date: 12/05/2024  22:30
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
## 2024-12-05 NOTE — XR_ITS
The [place] [id number] 
     [phone] 
  
  
Patient Name: 
NICHOLE L CANTU 
  
MRN: [id number]    YOB: 1975    Sex: F 
Assigned Patient Location: ER 
Current Patient Location: ED.MAIN 
Accession/Order Number: [id number] 
Exam Date: 12/05/2024  21:35    Report Date: 12/05/2024  22:45 
  
At the request of: 
LAURA BONE   
  
Procedure:  XR hand RT min 3V 
  
EXAMINATION: XR hand RT min 3V, , 12/5/2024 9:35 PM EST 
  
INDICATION:  
injury 
  
HISTORY:  
Ordering Provider Reason for Exam: injury 
Technologist Note: 
Additional: 
  
COMPARISON: None.  
  
TECHNIQUE: Right hand x-ray: 3 view(s).  
  
FINDINGS: 
  
No acute fracture. Joint alignment is anatomic. Joint spaces are preserved.  
Soft tissues are within normal limits. 
  
ORDER #: [id number] XR/XR hand RT min 3V  
IMPRESSION:   
   
No acute fracture or traumatic malalignment.  
   
   
Electronically authenticated by: COLEEN CHIRINOS   Date: 12/05/2024  22:45

## 2024-12-08 ENCOUNTER — HOSPITAL ENCOUNTER (EMERGENCY)
Age: 49
Discharge: HOME | End: 2024-12-08
Payer: COMMERCIAL

## 2024-12-08 VITALS
TEMPERATURE: 97.88 F | OXYGEN SATURATION: 100 % | DIASTOLIC BLOOD PRESSURE: 86 MMHG | SYSTOLIC BLOOD PRESSURE: 126 MMHG | HEART RATE: 72 BPM

## 2024-12-08 VITALS — BODY MASS INDEX: 19.6 KG/M2

## 2024-12-08 DIAGNOSIS — F41.9: ICD-10-CM

## 2024-12-08 DIAGNOSIS — M54.9: ICD-10-CM
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
## 2024-12-08 DIAGNOSIS — S32.009A: Primary | ICD-10-CM

## 2024-12-08 PROCEDURE — 96372 THER/PROPH/DIAG INJ SC/IM: CPT

## 2024-12-08 PROCEDURE — 99284 EMERGENCY DEPT VISIT MOD MDM: CPT
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
## 2025-01-16 ENCOUNTER — HOSPITAL ENCOUNTER (EMERGENCY)
Dept: HOSPITAL 101 - ER | Age: 50
Discharge: LEFT BEFORE DISCHARGE | End: 2025-01-16
Payer: COMMERCIAL

## 2025-01-16 DIAGNOSIS — Z53.21: Primary | ICD-10-CM

## 2025-01-16 NOTE — XMS_ITS
Comprehensive CCD (C-CDA v2.1)  
  
                          Created on: 2025  
  
  
CANTU, NICHOLE LYNN  
External Reference #: [id number],PersonID:[id number]  
: 1975  
Sex: Undifferentiated  
  
Demographics  
  
  
                                        Address             [address] [place]  [address]  
   
                                        Home Phone          [phone]  
   
                                        Home Phone          [phone]  
   
                                        Home Phone          [phone]  
   
                                        Home Phone          [phone]  
   
                                        Home Phone          [phone]  
   
                                        Home Phone          [phone]  
   
                                        Home Phone          [phone]  
   
                                        Home Phone          [phone]  
   
                                        Preferred Language  en  
   
                                        Marital Status        
   
                                        Restoration Affiliation Unknown  
   
                                        Race                Unknown  
   
                                        Ethnic Group        Not  or Lati  
no  
  
  
Author  
  
  
                                        Organization        [facility]  
  
  
Care Team Providers  
  
  
                                Care Team Member Name Role            Phone  
   
                                PROVIDER, UNKNOWN Admitting       Unavailable  
   
                                PROVIDER, UNKNOWN Attending       Unavailable  
   
                                PATIENT, SELF   Referring       Unavailable  
   
                                NATALIA HUGO Primary Care    Unavailable  
   
                                Andrei Hayes Primary Care Provider [phone]  
   
                                Matt Valdes Primary Care Provider Unav  
ailable  
   
                                GATITO BROCK Admitting       Unavaila  
GATITO Mcgill Attending       Unavaila  
YANNA Landers    Referring       Unavailable  
   
                                ANDREI HAYES Primary Care    Unavailabl  
e  
   
                                LOIS SANCHEZ Consulting      Unavailable  
   
                                BRANDAN DUNN Consulting      Unavailab  
le  
   
                                SHAUN VERENA Consulting      Unavailable  
   
                                KENYON HEALY Admitting       Unavailable  
   
                                ISHAN BARKER     Referring       Unavailable  
   
                                KHGISEL CANDE, MATT Primary Care    Unavailabl  
e  
   
                                SMITH GATES Consulting      Unavailable  
   
                                SAVITA HORN    Attending       Unavailable  
   
                                Vishal Castellon Matt Primary Care Provider [phone]  
   
                                KELSIEORSAND CANDE, MATT Primary Care    Unavailabl  
e  
   
                                KHORSAND CANDE, MATT Primary Care    Unavailabl  
CADEN Pantoja Attending       Unavailable  
   
                                LAMONT CRAWFORD    Attending       Unavailable  
   
                                KHMRAYAND CANDE, MATT Primary Care    UnavailANDREI Weir   Referring       Unavailable  
   
                                ANDREI HAYES   Primary Care    Unavailable  
   
                                MICHAEL CHEN    Attending       Unavailable  
   
                                MICHAEL CHEN    Admitting       Unavailable  
   
                                DAVE CASTELLON   Primary Care Physician [phone]  
   
                                Sintia Seay Unavailable     Unavailable  
   
                                Mray Borja  Unavailable     Unavailable  
   
                                Eris Columba Unavailable     Unavailable  
   
                                [facility] Primary Care    Unava  
ilable  
   
                                DANTE, MONIKA   Attending       Unavailable  
   
                                DANTE, MONIKA   Consulting      Unavailable  
   
                                DANTE, MONIKA   Admitting       Unavailable  
   
                                SAILAJA CASTRO Consulting      Unavailable  
   
                                DANTE, MONIKA   Attending       Unavailable  
   
                                DANTE, MONIKA   Consulting      Unavailable  
   
                                DANTE, MONIKA   Admitting       Unavailable  
   
                                [facility]    Unava  
ilable  
   
                                NADIA SALAS Consulting      Unavailable  
   
                                [facility]    Unava  
ilable  
   
                                PAUL ., THOMAS    Attending       Unavailable  
   
                                PAUL ., THOMAS    Admitting       Unavailable  
   
                                Yobani HILL  Admitting       Unavailable  
   
                                Breezy Goetz Attending       Unavailable  
   
                                Brierfield, De Consulting      Unavailable  
   
                                Brierfield, De Consulting      Unavailable  
   
                                Brierfield, De Consulting      Unavailable  
   
                                Brierfield, De Consulting      Unavailable  
   
                                Brierfield, De Consulting      Unavailable  
   
                                Brierfield, De Consulting      Unavailable  
   
                                Brierfield, De Consulting      Unavailable  
   
                                Brierfield, De Consulting      Unavailable  
   
                                Brierfield, De Consulting      Unavailable  
   
                                Ned Henry   Attending       Unavailable  
   
                                Vishal Castellon MD [facility] Primary Care Provider [phone]  
[phone]  
   
                                ROBERT Tello Attending Provide  
[phone]  
   
                                Martin Tello Admitting       Unava  
ilable  
   
                                [facility]    Unavailabl  
e  
   
                                Martin Tello Attending       Unava  
ilable  
   
                                [facility]    Unavailabl  
e  
   
                                Christian Cruz Attending       Unavailable  
   
                                Christian Cruz Admitting       Unavailable  
   
                                Henry Mccain MD Attending       Luis Alberto Elaine MD Primary Care    Unavailab  
Vickey Flowers MD Admitting       Unavail  
able  
   
                                Vickey Edwards MD Attending       Unavail  
able  
   
                                Luis Alberto Castellon MD Primary Care    Unavailab  
Henry Espinoza MD Consulting      Luis Alberto Elaine MD Primary Care    Unavailab  
mark Pérez MD, Alessia Cardenas Admitting       Amol Pérez MD, Alessia Cardenas Attending       Henry Enriquez MD Consulting      DEEP Toro    Admitting       Unavailable  
   
                                DEEP TRIANA    Attending       Unavailable  
   
                                ETHAN RENEE Attending       Unavailable  
   
                                DEEP TRIANA    Attending       Unavailable  
   
                                DEEP TRIANA    Attending       Unavailable  
   
                                DEEP TRIANA    Attending       Unavailable  
   
                                ETHAN RENEE Referring       Unavailable  
   
                                HENRY MCCAIN Referring       Unavailable  
   
                                THOMAS HODGES Primary Care    Unavailable  
  
  
  
Allergies  
  
  
                                                    Allergy   
Classification      Reported Allergen(s) Allergy Type        Date of   
Onset                     Reaction(s)               Facility  
   
                                                      
(13 sources)                            fentaNYL;   
Translations:   
[fentanyl]                Drug Allergy              20  
14                        Itching                   [email or web address] [facility] [employer] Phone:   
[phone]  
   
                                        Comment on above:   Has tolerated morphi  
ne and hydromorphone in the past without   
issue   
   
                                                      
(7 sources)         Ketorolac           Drug Allergy        20  
12                        Hives                     [email or web address] [facility] [employer] Phone:   
[phone]  
41  
   
                                                      
(13 sources)                            methylPREDNISolone;   
Translations:   
[methylprednisolone]      Drug Allergy              20  
14                        Rash                      [email or web address] [facility] [employer] Phone:   
[phone]  
41  
   
                                                      
(3 sources)                             Morphine;   
Translations:   
[MORPHINE]                Drug Allergy              10-15-20  
13                        Rash                      [email or web address] [facility] [employer] Phone:   
[phone]  
41  
   
                                                      
(11 sources)                            Ondansetron;   
Translations:   
[ondansetron]             Drug Allergy              20  
12                                      Nausea And   
Vomiting                                [email or web address] [facility] [employer] Phone:   
[phone]  
41  
   
                                                      
(7 sources)         pantoprazole        Drug Allergy        20  
14                        Anaphylaxis               [email or web address] [facility] [employer] Phone:   
[phone]  
41  
   
                                                      
(13 sources)                            Penicillins;   
Translations:   
[penicillins]                           Propensity to   
adverse   
reactions to   
drug                                    20  
09                                      Nausea And   
Vomiting,   
Anaphylaxis   
(disorder)                              [email or web address] [facility] [employer] Phone:   
[phone]  
41  
   
                                                      
(3 sources)                             Blood-Group Specific   
Substance;   
Translations:   
[BLOOD-GROUP SPECIFIC   
SUBSTANCE]                              Propensity to   
adverse   
reactions to   
drug                                    20  
13                        Hives                     [email or web address] [facility] [employer] Phone:   
[phone]  
   
                                                      
(8 sources)                             Iodides;   
Translations:   
[IODIDES]                               Propensity to   
adverse   
reactions to   
drug                                    20  
18                                      Hives,   
Shortness Of   
Breath                                  [email or web address] [facility] [employer] Phone:   
[phone]  
41  
   
                                                      
(2 sources)                             Adhesive Tape;   
Translations:   
[ADHESIVE TAPE]                         Propensity to   
adverse   
reactions   
(disorder)                              20  
13                                                  The   
[facility]   
Repository  
   
                                                      
(4 sources)                             Atropine;   
Translations:   
[ATROPINE]                Drug Allergy              20  
10                        Hives                     [facility]   
[facility]   
Repository  
   
                                                      
(4 sources)                             Ketorolac;   
Translations:   
[Toradol]                 Drug Allergy              20  
09                                                  [facility]   
[facility]   
Repository  
   
                                                      
(3 sources)         Ondansetron         Drug Allergy        20  
09                                                  [facility]   
[facility]   
Repository  
   
                                                      
(4 sources)                             pantoprazole;   
Translations:   
[Protonix]                Drug Allergy              20  
14                                                  [facility]   
[facility]   
Repository  
   
                                                      
(2 sources)               Penicillins               Drug allergy   
(disorder)                              20  
09                                                  [facility]   
[facility]   
Repository  
   
                                                      
(3 sources)                             Adhesive Tape;   
Translations: [Tape] Drug allergy                                    [facility]  
   
                                                      
(3 sources)                             Contrast media;   
Translations:   
[Contrast Dye]      Drug allergy                            difficulty   
breathing,   
[facility]  
   
                                                      
(4 sources)                             Famotidine;   
Translations:   
[famotidine]              Drug Allergy              20  
23                        [facility]  
   
                                                      
(5 sources)                             Ketorolac;   
Translations:   
[ketorolac]               Drug Allergy              20  
09                        [facility]  
   
                                        Comment on above:   Tolerates ibuprofen   
   
                                                      
(5 sources)                             Nalbuphine;   
Translations:   
[nalbuphine]              Drug Allergy              10-30-20  
09                        itching                   [facility]  
   
                                                      
(7 sources)                             pantoprazole;   
Translations:   
[pantoprazole]            Drug Allergy              20  
14                                      Weal   
(disorder),   
Cutaneous   
eruption   
(morphologic   
abnormality)                            [facility]  
   
                                        Comment on above:   pt on protonix iv cu  
rrently-pt states is ok as long as   
benedryl is   
given   
   
                                                      
(1 source)   Morphine     Drug Allergy                           [facility]   
Repository  
   
                                                      
(2 sources)         predniSONE          Drug Allergy        20  
23                        Hives                     [facility]   
Repository  
   
                                                      
(2 sources)               Omnipaque                 Drug allergy   
(disorder)                                                  [facility]   
Repository  
   
                                                      
(1 source)                              Acetaminophen;   
Translations:   
[Ofirmev]       Drug Allergy                                    [facility]   
Repository  
   
                                                      
(1 source)                              methylPREDNISolone /   
Neomycin;   
Translations:   
[methylprednisolone-n  
eomycin topical] Drug Allergy                                    [facility]   
Repository  
   
                                                      
(1 source)                              pantoprazole;   
Translations:   
[Protonix IV]   Drug Allergy                                    [facility]   
Repository  
   
                                                      
(3 sources)                             Ondansetron;   
Translations:   
[ONDANSETRON]             Drug Allergy              20  
12                        [facility]  
   
                                                      
(1 source)                              Gadolinium-Containing   
Contrast Medi                           Allergy to   
substance                               20  
23                                      Swelling of   
Lip/Tongue/Thr  
oat                                     [facility]  
   
                                                      
(1 source)                              Iodinated Contrast   
Media                                   Allergy to   
substance                               20  
23                                      Swelling of   
Lip/Tongue/Thr  
oat                                     [facility]  
   
                                                      
(1 source)                MRI contrast              Allergy to   
substance                               20  
23                                      Swelling of   
Lip/Tongue/Thr  
oat                                     [facility]  
   
                                                      
(1 source)                              methylPREDNISolone;   
Translations:   
[SOLU-Medrol]   Drug Allergy                                    [facility]   
Repository  
   
                                                      
(1 source)                              Penicillin;   
Translations:   
[penicillin]    Drug Allergy                                    [facility]   
Repository  
   
                                                      
(1 source)                              iodinated   
radiocontrast dyes;   
Translations:   
[iodinated   
radiocontrast dyes]                     Propensity to   
adverse   
reactions to   
drug   
(disorder)                                                  [facility]   
Repository  
   
                                                      
(2 sources)                             Adhesive agent;   
Translations:   
[ADHESIVE]                              Propensity to   
adverse   
reactions to   
drug   
(disorder)                              20  
15                                                  [facility]   
Repository  
   
                                                      
(2 sources)                             Contrast media;   
Translations: [DYE]                     Propensity to   
adverse   
reactions to   
drug   
(disorder)                              20  
18                                                  [facility]   
Repository  
   
                                                      
(2 sources)                             Metoclopramide;   
Translations:   
[METOCLOPRAMIDE HCL]      Drug Allergy              20  
12                                                  [facility]   
Repository  
   
                                                      
(2 sources)                             ADHESIVE   
TAPE-SILICONES;   
Translations:   
[ADHESIVE   
TAPE-SILICONES]                         Propensity to   
adverse   
reactions to   
drug   
(disorder)                              20  
17                                                  [facility]   
Repository  
   
                                                      
(1 source)                              Ondansetron;   
Translations:   
[ONDANSETRON HCL   
(PF)]                     Drug Allergy              11-10-20  
16                                                  [facility]   
[facility]  
  
  
  
Medications  
Current Medications  
  
  
  
                      Medication Drug Class(es) Dates      Sig (Normalized) Sig   
(Original)  
   
                                                    Acetaminophen  
(2 sources)                                         Start:   
2020                                          acetaminophen (TYLENOL)   
tablet 650 mg  
  
  
  
                                Start: 2020                 acetaminophen   
(TYLENOL) tablet 650 mg  
  
  
  
                                                    ALPRAZolam 1 mg oral   
tablet  
(10 sources)        Benzodiazepine      Start: 2020   take 1 mg by   
mouth three   
times daily                             1 mg, Oral, 3   
TIMES DAILY,   
First dose on 20 at 1500  
  
  
  
                                                    Start: 2019  
End: 2020                         take 0.5 mg by mouth three   
times daily                             Alprazolam (Xanax) 1 mg Tablet   
Discontinued 0.5 MG PO Three times   
daily 0 5 2019 3:26pm May   
17th, 2020 8:03am  
   
                                                    Start: 2019  
End: 2019                         take 1 tablet by mouth three   
times daily                             Alprazolam (Xanax) 1 mg Tablet   
Discontinued 1 MG PO Three times   
daily 2019 12:00am 2019 3:31pm  
  
  
  
                                                    amLODIPine 5 mg   
oral tablet  
(3 sources)                             Dihydropyridine   
Calcium Channel   
Blocker                   Start: 01-         take 1 tablet   
by mouth once   
daily                                   Norvasc 5 mg Tab   
5 mg = 1 tab(s),   
Oral, Daily,   
Refills(s) 0   
Start Date:   
1/10/23 Status:   
Ordered  
  
  
  
                                Start: 2020                 amLODIPine (NO  
RVASC) tablet 5 mg  
  
  
  
                                                    apixaban 5 mg   
oral tablet  
(6 sources)                             Factor Xa   
Inhibitor                               Start: 2020  
End: 2020                         take 1 tablet   
by mouth twice   
daily                                   apixaban (ELIQUIS)   
5 MG TABS tablet   
Take 1 tablet by   
mouth 2 times   
daily 60 tablet 0   
2020 Active  
  
  
  
                                Start: 2020                 apixaban (ELIQ  
UIS) tablet 5 mg  
   
                                                    Start: 2020  
End: 2020                                     apixaban (ELIQUIS) tablet 10  
 mg  
   
                                                    Start: 2020  
End: 2020                         take 2 tablets by mouth twice   
daily                                   apixaban (ELIQUIS) 5 MG TABS   
tablet Take 2 tablets by mouth 2   
times daily for 7 days 28 tablet 0   
2020 Active  
  
  
  
                                                    busPIRone hydrochloride 10   
mg oral tablet  
(8 sources)                             Start: 2020   take 30 mg by   
mouth once daily                        Buspirone Active 30   
MG PO Daily 2020 1:00am  
  
  
  
                                                    Start: 2019  
End: 2020                         take 30 mg by mouth once daily   
in the morning                          Buspirone Discontinued 30 MG PO   
Every morning 2019   
12:45pm 2020 7:15am  
   
                                                    Start: 2019  
End: 2019                         take 5 mg by mouth three times   
daily                                   Buspirone Discontinued 5 MG PO Three   
times daily 90 May 20th, 2019   
12:00am 2019 12:45pm  
   
                                                            take 0.3 mg by mouth  
 three   
times daily                             busPIRone HCl (BUSPAR PO) Take 0.3   
mg by mouth 3 times daily 0 Active  
   
                                                            take 0.3 mg by mouth  
 three   
times daily                             busPIRone HCl (BUSPAR PO) Take 0.3   
mg by mouth 3 times daily 0   
Suspended  
  
  
  
                                                    cephalexin 500 mg   
oral capsule  
(1 source)                              Cephalosporin   
Antibacterial                           Start:   
2020                              take 1   
capsule by   
mouth four   
times daily                             Cephalexin (Keflex)   
500 mg capsule   
Active 500 MG PO   
Four times daily 28   
7 2020 1:00am  
   
                                                    clotrimazole 10 mg   
oral lozenge  
(1 source)                Azole Antifungal          Start:   
2020                              take 10 mg   
by mouth   
five times   
daily                                   Clotrimazole Active   
10 MG PO 5 times per   
day 2020 1:00am  
   
                                                    diphenhydrAMINE   
hydrochloride 25 mg   
oral tablet  
(6 sources)                             Histamine-1   
Receptor   
Antagonist                              Start:   
2020                                          diphenhydrAMINE   
(BENADRYL) tablet 25   
mg  
  
  
  
                                                    Start: 2020  
End: 2020                                     diphenhydrAMINE (BENADRYL) i  
njection 25 mg  
   
                                                    Start: 02-  
End: 02-                                     diphenhydrAMINE (BENADRYL) i  
njection 50 mg  
   
                                                    Start: 2020  
End: 2020                                     diphenhydrAMINE (BENADRYL) i  
njection 50 mg  
  
  
  
                                                    DULoxetine 60 mg   
delayed release   
oral capsule  
(3 sources)                             Serotonin and   
Norepinephrine   
Reuptake Inhibitor        Start: 2020         take 120 mg by   
mouth once   
daily at   
bedtime                                 Duloxetine   
Active 120 MG PO   
Daily at bedtime   
2020 12:00am  
  
  
  
                                                    Start: 2019  
End: 2020           take 30 mg by mouth once daily Duloxetine Discontinued  
 30 MG PO   
Daily 2019 12:45pm   
2020 7:31am  
   
                                                    Start: 2019  
End: 2019           take 90 mg by mouth once daily Duloxetine Discontinued  
 90 MG PO   
Daily 90 May 20th, 2019 12:00am 2019 12:45pm  
  
  
  
                                                    ergocalciferol 33325   
unt oral capsule  
(8 sources)                             Provitamin D2   
Compound                                Start:   
2016                              take 1   
capsule by   
mouth every   
week                                    vitamin D   
(ERGOCALCIFEROL)   
34487 UNITS capsule   
Take 1 capsule by   
mouth once a week for   
6 doses 6 capsule 0   
2016 Active  
   
                                                    estrogens,   
conjugated (usp) 0.9   
mg oral tablet  
(11 sources)              Estrogen                  Start:   
10-                              take 1   
tablet by   
mouth once   
daily                                   Premarin 0.9 mg Tab   
0.9 mg = 1 tab(s),   
Oral, Daily, # 30   
tab(s), Refills(s) 0   
Start Date: 21   
Status: Ordered  
  
  
  
                                                    Start: 2018  
End: 10-                                     Conjugated Estrogens (Premar  
in) 1.25   
mg tablet Discontinued 0.9 MG PO   
Daily May 3rd, 2018 12:00am 2020 7:02pm  
   
                                                    Start: 2016  
End: 2020                         take 1.25 mg by mouth once   
daily                                   1.25 mg, Oral, DAILY, First dose on   
20 at 1045 Give only if okay   
with vascular  
  
  
  
                                                    famotidine 20 mg   
oral tablet  
(1 source)                              Histamine-2 Receptor   
Antagonist          Start: 2020                       famotidine   
(PEPCID) tablet   
20 mg  
   
                                                    ibuprofen 200 mg   
oral tablet  
(2 sources)                             Nonsteroidal   
Anti-inflammatory   
Drug                Start: 2021                       ibuprofen 200 mg   
Tab 400 mg = 2   
tab(s), Oral,   
PRN Pain/Fever,   
Refills(s) 0   
Start Date:   
21 Status:   
Ordered  
   
                                                    labetalol   
hydrochloride 100 mg   
oral tablet  
(1 source)                              beta-Adrenergic   
Blocker                   Start: 2020         take 100 mg   
by mouth   
twice daily                             Labetalol Active   
100 MG PO Twice   
daily 2020   
1:00am  
   
                                                    lisinopril 40 mg   
oral tablet  
(1 source)                              Angiotensin   
Converting Enzyme   
Inhibitor                 Start: 2020         take 40 mg   
by mouth   
once daily                              Lisinopril   
Active 40 MG PO   
Daily 2020   
1:00am  
   
                                                    LORazepam 1 mg oral   
tablet  
(13 sources)        Benzodiazepine      Start: 2020   take 1 mg   
by mouth   
twice daily                             1 mg, Oral, 2   
TIMES DAILY,   
First dose on   
20 at   
1045  
  
  
  
                                        Start: 2020   take 1 tablet by kev  
th three   
times daily                             LORazepam 1 mg Tab 1 mg = 1 tab(s),   
Oral, TID, Refills(s) 0 Start Date:   
3/10/21 Status: Ordered  
   
                                                    Start: 2020  
End: 2020                                     LORazepam (ATIVAN) injection  
 2 mg  
   
                                                    Start: 2020  
End: 2020                                     LORazepam (ATIVAN) injection  
 0.5 mg  
   
                                                    Start: 02-  
End: 02-                                     LORazepam (ATIVAN) injection  
 1 mg  
   
                                                    Start: 2020  
End: 2020                                     LORazepam (ATIVAN) tablet 1   
mg  
  
  
  
                                                    Magic Mouthwash (Miracle   
Mouthwash) 5 mL  
(1 source)                      Start: 2020                 Magic Mouthwas  
h (Miracle   
Mouthwash) 5 mL  
   
                                                    magnesium hydroxide 80 mg/ml  
   
oral suspension  
(2 sources)                     Start: 2020                 magnesium hydr  
oxide (MILK OF   
MAGNESIA) 400 MG/5ML suspension   
30 mL  
  
  
  
                                                    Start: 2019  
End: 2020           take 1 mL by mouth twice daily Magnesium Hydroxide (Mi  
lk Of   
Magnesia) 400 mg/5 mL Suspension   
Discontinued 30 ML PO Twice daily 0   
2019 12:00am May 17th,   
2020 8:04am  
  
  
  
                                                    100 ml   
magnesium   
sulfate 10   
mg/ml   
injection  
(2   
sources)                                            Start:   
2020                              take 1 mg   
intravenous   
route every   
hour as   
needed                                  1 g, Intravenous, at 100 mL/hr, Administ  
er over 1 Hours, PRN, Other, Per   
IV Magnesium Replacement Protocol, Starting 20   
at 0425 &nbsp;Mg Lab&nbsp;&nbsp;&nbsp;&nbsp;Replacement Action&nbsp;1.4-
1.6&nbsp;&nbsp;&nbsp;&nbsp;1 gram IVPB x 2   
 
doses&nbsp;&nbsp;&nbsp;&nbsp;&nbsp;&nbsp;&nbsp;&nbsp;&nbsp;&nbsp;&nbsp;&nbsp;&nb  
sp;&nbsp;&nbsp;&nbsp;&nbsp;&nbsp;&nbsp;(2 gram   
Total)&nbsp;1.0-1.3&nbsp;&nbsp;&nbsp;&nbsp;1 gram IVPB x 4   
 
doses&nbsp;&nbsp;&nbsp;&nbsp;&nbsp;&nbsp;&nbsp;&nbsp;&nbsp;&nbsp;&nbsp;&nbsp;&nb  
sp;&nbsp;&nbsp;&nbsp;&nbsp;&nbsp;&nbsp;(4 gram   
Total)&nbsp;<1.0&nbsp;&nbsp;&nbsp;&nbsp;&nbsp;&nbsp;&nbsp;&nbsp; CALL PHYSICIAN 
and   
 
&nbsp;&nbsp;&nbsp;&nbsp;&nbsp;&nbsp;&nbsp;&nbsp;&nbsp;&nbsp;&nbsp;&nbsp;&nbsp;&n  
bsp;&nbsp;&nbsp;&nbsp;1 gram IVPB x 4 doses &nbsp;   
&nbsp;&nbsp;&nbsp;&nbsp;&nbsp;&nbsp;(4 gram Total)&nbsp;&nbsp;Infuse at 1 
gram/hr&nbsp;Repeat Mag level next AM&nbsp;Protocol not   
for use in Patients with CrCl<30ml/min&nbsp;  
  
  
  
                                Start: 2020                 magnesium sulf  
ate 1 g in dextrose 5% 100 mL IVPB  
  
  
  
                                                    24 hr nicotine   
0.875 mg/hr   
transdermal   
system  
(6 sources)                             Cholinergic   
Nicotinic   
Agonist                                 Start:   
2020  
End:   
2021                              apply 1 dose   
transdermal   
route every   
twenty-four   
hours                                   1 patch, Transdermal,   
Administer over 24 Hours,   
EVERY 24 HOURS, First dose   
on 20 at 1100   
Apply new patch to   
nonhairy, clean, dry skin   
on the upper body or upper   
outer arm. &nbsp;Rotate   
patch sites.&nbsp;Notify   
pharmacy if patient or   
provider prefers patch to   
be removed at bedtime and   
replaced in the   
morning.&nbsp;Hazardous   
Medication -- Refer to   
facility policy for   
handling and   
disposal.&nbsp;&nbsp;  
  
  
  
                                        Start: 2020   apply 1 dose transde  
rmal   
route once daily as needed              1 patch, Transdermal, Administer over   
24 Hours, DAILY PRN, if pateint smokes,   
Starting 20 at 0425 Apply new   
patch to nonhairy, clean, dry skin on   
the upper body or upper outer arm.   
&nbsp;Rotate patch sites.&nbsp;Notify   
pharmacy if patient or provider prefers   
patch to be removed at bedtime and   
replaced in the morning.&nbsp;Hazardous   
Medication -- Refer to facility policy   
for handling and disposal.&nbsp;&nbsp;  
   
                                                    Start: 2019  
End: 2019                                     Nicotine Discontinued 1 EACH  
 TRANSDERML   
Daily 30 May 20th, 2019 12:00am 2019 3:31pm  
  
  
  
                                                    2 ml ondansetron 2   
mg/ml injection  
(1 source)                              Serotonin-3   
Receptor   
Antagonist          Start: 2020                       ondansetron   
(ZOFRAN) injection   
4 mg  
   
                                                    oxybutynin chloride   
5 mg oral tablet  
(1 source)                              Cholinergic   
Muscarinic   
Antagonist                Start: 2020         take 5 mg by   
mouth once   
daily at   
bedtime                                 Oxybutynin   
Chloride Active 5   
MG PO Daily at   
bedtime 2020 1:00am  
   
                                                    pantoprazole   
(PROTONIX)   
injection 40 mg  
(1 source)                      Start: 2020                 pantoprazole   
(PROTONIX)   
injection 40 mg  
   
                                                    polyethylene glycol   
3350 42951 mg   
powder for oral   
solution  
(1 source)      Osmotic Laxative Start: 2020                 17 g, Oral, D  
AILY   
PRN, Constipation,   
Starting Mon   
20 at 0425   
First line therapy   
for constipation  
   
                                                    Potassium Chloride  
(3 sources)                     Start: 2020                 potassium chlo  
ride   
(KLOR-CON M)   
extended release   
tablet 40 mEq  
  
  
  
                                                    Start: 2020  
End: 2020                                     potassium chloride (KLOR-CON  
 M) extended release tablet 40 mEq  
   
                                Start: 2020                 potassium chlo  
ride (KLOR-CON M) extended release tablet 40   
mEq  
  
  
  
                                                    pregabalin 100 mg   
oral capsule  
(3 sources)                             Start: 2020   take 1 capsule   
by mouth three   
times daily                             pregabalin 100 mg   
Cap 100 mg = 1   
cap(s), Oral, TID,   
Refills(s) 0 Start   
Date: 3/10/21   
Status: Ordered  
   
                                                    Promethazine  
(5 sources)     Phenothiazine   Start: 2020                 promethazine   
(PHENERGAN) tablet   
12.5 mg  
  
  
  
                                                    Start: 2020  
End: 2020                                     promethazine (PHENERGAN) inj  
ection   
12.5 mg  
   
                                                    Start: 02-  
End: 02-                                     promethazine (PHENERGAN) inj  
ection 25   
mg  
   
                                                    Start: 2020  
End: 2020                                     promethazine (PHENERGAN) inj  
ection 25   
mg  
   
                                                    Start: 2019  
End: 2020                         take 25 mg by mouth every six   
hours                                   Promethazine Discontinued 25 MG PO   
Q6H  12:00am   
2020 7:15am  
  
  
  
                                                    rivaroxaban 20 mg   
oral tablet  
(2 sources)                             Factor Xa   
Inhibitor                 Start: 2020         take 1 tablet   
by mouth at   
dinner                                  Rivaroxaban   
(Xarelto Dvt-Pe   
Treat 30d Start) 15   
mg (42)- 20 mg (9)   
tablets,dose pack   
Active 0 PO   
.COMPLEX 51   
2020   
1:00am must   
administer with   
evening meal  
  
  
  
                                                    Start: 2020  
End: 10-                         take 1 tablet by mouth once   
daily in the evening                    Rivaroxaban (Xarelto) 20 mg Tablet   
Discontinued 20 MG PO Every evening   
May 17th, 2020 12:00am 2020 7:04pm  
  
  
  
                                                    3 ml sodium chloride 9 mg/ml  
   
injection  
(7 sources)                     Start: 2020                 10 mL, Intrave  
nous, EVERY 12   
HOURS SCHEDULED (2 times per   
day), First dose on 20   
at 0900  
  
  
  
                                        Start: 2020   take 10 mL intraveno  
us route once   
as needed                               10 mL, Intravenous, PRN, Line   
Care, After every IV line use,   
Starting 20 at 0425  
   
                                                    Start: 2020  
End: 2020                                     Intravenous, at 75 mL/hr,   
CONTINUOUS, Starting 20   
at 0500  
   
                                Start: 2020                 0.9 % sodium c  
hloride infusion  
   
                                Start: 2020                 sodium chlorid  
e flush 0.9 %   
injection 10 mL  
  
  
  
                                                    sucralfate 1000 mg oral   
tablet  
(4 sources)     Aluminum Complex Start: 2020                 sucralfate (C  
ARAFATE)   
tablet 1 g  
  
  
  
                                                    Start: 2019  
End: 2019                         take 1 tablet by mouth before   
mealtime                                Sucralfate (Carafate) 1 gram tablet   
Discontinued 1 GM PO before meals   
84  12:00am 2019 12:50pm  
   
                                                    Start: 2018  
End: 2019                         take 1 g by mouth once before   
mealtime                                Sucralfate (Carafate) 100 mg/mL   
suspension Discontinued 1 GM PO   
3x/Day before meals & bedtime May   
5th, 2018 4:33pm May 13th, 2019   
6:26pm  
   
                                                    Start: 2018  
End: 2018                         take 1 g by mouth once before   
mealtime                                Sucralfate Discontinued 1 GM PO   
3x/Day before meals & bedtime 14   
May 4th, 2018 12:00am May 5th, 2018   
4:33pm  
  
  
  
                                                    sulfamethoxazole   
800 mg /   
trimethoprim 160   
mg oral tablet  
(1 source)                              Dihydrofolate   
Reductase   
Inhibitor   
Antibacterial,   
Sulfonamide   
Antimicrobial                           Start:   
2020                              take 1   
tablet   
by mouth   
twice   
daily                                   Sulfamethoxazole-Trimethoprim   
(Bactrim Ds) 800-160 mg tablet   
Active 1 TAB PO Twice daily  1:00am  
   
                                                    teriflunomide 14   
mg oral tablet  
(1 source)                              Pyrimidine   
Synthesis   
Inhibitor                               Start:   
2020                              take 1   
tablet   
by mouth   
once   
daily                                   Teriflunomide (Aubagio) 14 mg   
tablet Active 14 MG PO Daily   
2020 1:00am  
   
                                                    traMADol   
hydrochloride 50   
mg oral tablet  
(3 sources)               Opioid Agonist            Start:   
2020                              take 1   
tablet   
by mouth   
four   
times   
daily as   
needed   
for pain                                tramadol 50 mg oral tablet 50   
mg = 1 tab(s), Oral, QID, PRN   
for pain, # 60 tab(s),   
Refills(s) 0 Start Date:   
3/10/21 Status: Ordered  
   
                                                    traZODone   
hydrochloride 150   
mg oral tablet  
(9 sources)                             Serotonin   
Reuptake   
Inhibitor                               Start:   
2020                              take 150   
mg by   
mouth   
once   
daily                                   150 mg, Oral, NIGHTLY, First   
dose on 20 at   
  
  
  
                                Start: 2020 take 150 mg by mouth once pepe  
y 150 mg, Oral, NIGHTLY, First   
dose on   
20 at 2100  
   
                                                    Start: 2016  
End: 2021                         take 1.5 tablets by mouth once   
daily, then take 0.5 tablet by   
mouth, then take 1 tablet by   
mouth                                   traZODone (DESYREL) 100 MG tablet   
Take 1.5 tablets by mouth nightly   
1/2 tab to 1 tab ; Verified by   
[facility]  30   
tablet 3 2016   
Discontinued (LIST CLEANUP)  
  
  
  
                                                    zolpidem   
tartrate 10 mg   
oral tablet  
(2 sources)                             gamma-Aminobutyric   
Acid-ergic Agonist        Start: 2020         take 10 mg by   
mouth once   
daily at   
bedtime                                 Zolpidem   
Active 10 MG   
PO Daily at   
bedtime   
2020 1:00am  
  
  
  
Completed/Discontinued Medications  
  
  
  
                      Medication Drug Class(es) Dates      Sig (Normalized) Sig   
(Original)  
   
                                                    acetaminophen 325 mg /   
HYDROcodone bitartrate   
5 mg oral tablet  
(2 sources)               Opioid Agonist            Start:   
2020  
End:   
2020                                          HYDROcodone-acetam  
inophen (NORCO)   
5-325 MG per   
tablet 1 tablet  
   
                                                    acetaminophen 325 mg /   
oxyCODONE   
hydrochloride 5 mg   
oral tablet  
(11 sources)              Opioid Agonist            Start:   
2021  
End:   
2021                                          oxyCODONE-acetamin  
ophen (PERCOCET)   
5-325 MG per   
tablet 1 tablet  
  
  
  
                                                    Start: 2020  
End: 2020                                     oxyCODONE-acetaminophen (PER  
COCET) 5-325   
MG per tablet 1 tablet  
   
                                                    Start: 2020  
End: 2020                                     oxyCODONE-acetaminophen (PER  
COCET) 5-325   
MG per tablet 2 tablet  
   
                                                    Start: 2019  
End: 2020                         take 1 tablet by mouth   
every four to six hours                 Oxycodone-Acetaminophen (Percocet) 7.5-3  
25   
mg tablet Discontinued 1 TAB PO EVERY 4-6   
HOURS 1 5 July 31st, 2019 May 17th, 2020   
8:04am  
   
                                                    Start: 2019  
End: 2019                                     Oxycodone-Acetaminophen Disc  
ontinued 1 TAB   
PO Q4H 2019 12:00am 2019 3:31pm 1 or 2 tabs  
   
                                                    Start: 2018  
End: 2019                         take 1 tablet by mouth   
every four hours                        Oxycodone-Acetaminophen (Percocet) 5-325  
   
mg tablet Discontinued 1 TAB PO Q4H May   
5th, 2018 4:33pm May 13th, 2019 6:26pm  
   
                                                      
End: 2020                         take 1 tablet by mouth   
every six hours as needed   
for pain                                oxyCODONE-acetaminophen (PERCOCET) 10-32  
5   
MG per tablet Take 1 tablet by mouth every   
6 hours as needed for Pain.. 0 2020   
Discontinued (Stop Taking at Discharge)  
  
  
  
                                                    aspirin 81 mg   
delayed release   
oral tablet  
(1 source)                              Platelet Aggregation   
Inhibitor, Nonsteroidal   
Anti-inflammatory Drug                  Start:   
2019  
End: 2020                         take 81 mg   
by mouth   
once daily                              Aspirin   
Discontinued 81   
MG PO Daily 0   
2019   
12:00am May 17th,   
2020 8:03am  
   
                                                    bisacodyl 5 mg   
delayed release   
oral tablet  
(1 source)                Stimulant Laxative        Start:   
2019  
End: 10-                         take 10 mg   
by mouth   
once daily                              Bisacodyl   
Discontinued 10   
MG PO Daily    
12:00am 2020 7:01pm  
   
                                                    citalopram 20 mg   
oral tablet  
(1 source)                              Serotonin Reuptake   
Inhibitor                               Start:   
2018  
End: 2019                         take 1   
tablet by   
mouth once   
daily                                   Citalopram   
(Celexa) 20 mg   
tablet   
Discontinued 20   
MG PO Daily May   
3rd, 2018 12:00am   
May 13th, 2019   
6:26pm  
   
                                                    50 ml clindamycin   
12 mg/ml   
injection  
(2 sources)                             Lincosamide   
Antibacterial                           Start:   
2020  
End: 2020                                     clindamycin   
(CLEOCIN) 600 mg   
in dextrose 5 %   
50 mL IVPB  
  
  
  
                                                    Start: 2020  
End: 2020                         take 450 mg by mouth three   
times daily                             Clindamycin Hcl Discontinued 450 MG   
PO Three times daily 90 10 May 17th,   
2020 12:00am 2020   
9:38pm  
  
  
  
                                                    1 ml enoxaparin   
sodium 100 mg/ml   
prefilled syringe  
(4 sources)                             Low Molecular Weight   
Heparin                                 Start: 2020  
End: 2020                                     90 mg (rounded from 88.2   
mg = 1 mg/kg 88.2 kg),   
Subcutaneous, 2 TIMES   
DAILY, First dose on 20 at 0900  
  
  
  
                                                    Start: 2020  
End: 2020                                     enoxaparin (LOVENOX) injecti  
on 120 mg  
   
                                                    Start: 2019  
End: 2020                                     enoxaparin (LOVENOX) injecti  
on 40 mg  
  
  
  
                                                    escitalopram 10 mg   
oral tablet  
(1 source)                              Serotonin   
Reuptake   
Inhibitor                               Start: 2019  
End: 2019                         take 10 mg by   
mouth once   
daily                                   Escitalopram   
Oxalate   
Discontinued 10 MG   
PO Daily May 18th,   
2019 12:00am May   
20th, 2019 9:40am  
   
                                                    ferrous sulfate 325   
mg delayed release   
oral tablet  
(4 sources)                                         Start: 2018  
End: 2020                         take 1 tablet   
by mouth twice   
daily at   
mealtime                                ferrous sulfate   
325 (65 Fe) MG EC   
tablet Take 1   
tablet by mouth 2   
times daily (with   
meals) 90 tablet 3   
2018   
Discontinued (LIST   
CLEANUP)  
   
                                                    gabapentin 800 mg   
oral tablet  
(3 sources)                             Anti-epileptic   
Agent                                   Start: 2020  
End: 2020                         take 800 mg by   
mouth three   
times daily                             Gabapentin   
Discontinued 800   
MG PO Three times   
daily 2020 1:00am   
2020 7:15am  
  
  
  
                                                    Start: 2019  
End: 2020                         take 1200 mg by mouth three   
times daily                             Gabapentin Discontinued 1200 MG PO   
Three times daily May 18th, 2019   
12:00am 2020 7:15am  
   
                                                    Start: 2018  
End: 2019                         take 1 capsule by mouth once   
daily                                   Gabapentin (Neurontin) 300 mg   
capsule Discontinued 300 MG PO Daily   
May 3rd, 2018 12:00am May 13th, 2019   
6:26pm  
  
  
  
                                                    gadoteridol (PROHANCE)   
injection 16 mL  
(1 source)                                          Start: 2020  
End: 2020                                     gadoteridol (PROHANCE)   
injection 16 mL  
   
                                                    HYDROmorphone   
hydrochloride 4 mg oral   
tablet  
(9 sources)               Opioid Agonist            Start: 2020  
End: 2020                                     HYDROmorphone (DILAUDID)   
injection 1 mg  
  
  
  
                                        Start: 2020   take 4 mg by mouth e  
very four   
hours as needed for pain                4 mg, Oral, EVERY 4 HOURS PRN, Pain   
Mild (1-3), Pain Moderate (4-6),   
Starting Mon 20 at 1022  
   
                                                    Start: 2020  
End: 2020                                     Hydromorphone Discontinued T  
ABLET   
2020 1:00am 2020 7:15am  
   
                                        Start: 2020   take 1 tablet by kev  
th every   
four hours as needed for pain           Dilaudid 4 mg Tab 4 mg = 1 tab(s),   
Oral, q4hr, PRN for pain, or as   
needed, Refills(s) 0 Start Date:   
20 Status: Ordered  
  
  
  
                                                    1 ml ketorolac   
tromethamine 15   
mg/ml cartridge  
(1 source)                              Nonsteroidal   
Anti-inflammatory Drug,   
Cyclooxygenase Inhibitor                Start: 2020  
End: 2020                                     ketorolac (TORADOL)   
injection 15 mg  
   
                                                    Magic Mouthwash   
(MIRACLE MOUTHWASH)  
(2 sources)                                         Start: 2020  
End: 2021                                     Magic Mouthwash   
(MIRACLE MOUTHWASH)   
Swish and spit 5 mLs   
4 times daily as   
needed for   
Irritation 200 mL 0   
2020   
Discontinued (LIST   
CLEANUP)  
  
  
  
                                Start: 2020                 Magic Mouthwas  
h (MIRACLE MOUTHWASH) Swish and spit 5 mLs 4   
times   
daily as needed for Irritation 200 mL 0 2020 Active  
  
  
  
                                                    methylPREDNISolone sodium   
(SOLU-MEDROL) 1,000 mg in sodium   
chloride 0.9 % 250 mL IVPB  
(2 sources)                                         Start: 02-  
End: 02-                                     methylPREDNISolone sodium   
(SOLU-MEDROL) 1,000 mg in   
sodium chloride 0.9 % 250 mL   
IVPB  
  
  
  
                                                    Start: 2020  
End: 2020                                     methylPREDNISolone sodium (S  
HIWOT-MEDROL) 1,000 mg in sodium   
chloride 0.9 % 250 mL IVPB  
  
  
  
                                                    methylPREDNISolone sodium   
(SOLU-MEDROL) 500 mg in sodium   
chloride 0.9 % 250 mL IVPB  
(2 sources)                                         Start: 2020  
End: 2020                                     methylPREDNISolone sodium   
(SOLU-MEDROL) 500 mg in sodium   
chloride 0.9 % 250 mL IVPB  
  
  
  
                                                    Start: 2020  
End: 2020                                     methylPREDNISolone sodium (S  
HIWOT-MEDROL) 500 mg in sodium   
chloride   
0.9 % 250 mL IVPB  
  
  
  
                                                    2 ml midazolam 1 mg/ml   
injection  
(1 source)                Benzodiazepine            Start: 2020  
End: 2020                                     midazolam (VERSED)   
injection 0.5 mg  
   
                                                    1 ml morphine sulfate 4   
mg/ml cartridge  
(6 sources)               Opioid Agonist            Start: 2021  
End: 2021                                     morphine injection 4 mg  
  
  
  
                                                    Start: 2020  
End: 2020                                     morphine (PF) injection 1 mg  
   
                                                    Start: 02-  
End: 02-                                     morphine injection 4 mg  
   
                                                    Start: 02-  
End: 02-                                     morphine injection 4 mg  
   
                                                    Start: 2020  
End: 2020                                     morphine injection 4 mg  
   
                                                    Start: 2020  
End: 2020                                     morphine injection 10 mg  
  
  
  
                                                    omeprazole 20 mg   
delayed release   
oral capsule  
(1 source)                              Proton Pump   
Inhibitor                               Start:   
2018  
End: 2019                         take 40 mg by   
mouth once daily                        Omeprazole   
Discontinued 40   
MG PO Daily 30   
May 4th, 2018   
12:00am May 13th,   
2019 6:26pm  
   
                                                    predniSONE 50 mg   
oral tablet  
(1 source)                                          Start:   
2020  
End: 2020                                     predniSONE   
(DELTASONE)   
tablet 50 mg  
   
                                                    sertraline 100   
mg oral tablet  
(3 sources)                             Serotonin Reuptake   
Inhibitor                               Start:   
2016  
End: 2020                         take 2 tablets by   
mouth once daily,   
then take 7-30   
tablets by mouth                        sertraline   
(ZOLOFT) 100 MG   
tablet Take 2   
tablets by mouth   
daily Verified by   
[facility]   
on 2014 30   
tablet 3   
2016   
Discontinued   
(Stop Taking at   
Discharge)  
   
                                                    vancomycin 1500   
mg injection  
(1 source)                              Glycopeptide   
Antibacterial                           Start:   
2019  
End: 2020                         take 1.5 g   
intravenously   
every twelve hours                      Vancomycin   
Discontinued 1.5   
GM IV Q12H    
12:00am May 17th,   
2020 8:04am  
  
  
  
Problems  
Active Problems  
  
  
                      Problem Classification Problem    Date       Documented Da  
te Episodic/Chronic  
   
                                                    Anxiety disorders  
(20 sources)                            Anxiety; Translations:   
[Posttraumatic stress   
disorder]                               Onset:   
  
3                         2016                Chronic  
   
                                                    Blindness and vision   
defects  
(15 sources)                            Blurring of visual   
image; Translations:   
[Diplopia]                              2018          Episodic  
   
                                                    Deficiency and other   
anemia  
(10 sources)                            Anemia; Translations:   
[Anemia, unspecified]                   Onset:   
  
8                         2018                Episodic  
   
                                                    Disorders of lipid   
metabolism  
(1 source)                              Hyperlipidemia;   
Translations:   
[Hyperlipidemia,   
unspecified]                            2019          Chronic  
   
                                                    E Codes:   
Natural/environment  
(1 source)                              Other and unspecified   
overexertion or   
strenuous movements or   
postures, initial   
encounter;   
Translations: [OTH AND   
UN[facility] OVREXRT/STRN   
MVMT/POS INT]                           Onset:   
  
3                                                   Episodic  
   
                                                    Esophageal disorders  
(2 sources)                             Gastroesophageal   
reflux disease without   
esophagitis                             2020          Chronic  
   
                                                    Essential hypertension  
(7 sources)                             Essential   
hypertension;   
Translations:   
[Hypertensive   
disorder]                               Onset:   
  
0                         2020                Chronic  
   
                                                    Fluid and electrolyte   
disorders  
(1 source)                              Hypokalemia;   
Translations:   
[Hypokalemia]                           2019          Episodic  
   
                                                    Gastroduodenal ulcer   
(except hemorrhage)  
(1 source)                              Peptic ulcer;   
Translations: [Peptic   
ulcer, site   
unspecified,   
unspecified as acute   
or chronic, without   
hemorrhage or   
perforation]                            2019          Chronic  
   
                                                    Gastroduodenal ulcer   
(except hemorrhage)  
(1 source)                              H/O: peptic ulcer;   
Translations:   
[Personal history of   
peptic ulcer disease]                     2019          Episodic  
   
                                                    Gastrointestinal   
hemorrhage  
(17 sources)                            Gastrointestinal   
hemorrhage;   
Translations:   
[Hematemesis]                           2016          Episodic  
   
                                                    Inflammation;   
infection of eye   
(except that caused by   
tuberculosis or   
sexually   
transmitteddisease)  
(1 source)                              Optic neuritis;   
Translations:   
[Unspecified optic   
neuritis]                               Onset:   
  
3                                                   Chronic  
   
                                                    Malaise and fatigue  
(3 sources)                             Right hemiparesis;   
Translations:   
[Weakness]                              Onset:   
10-  
3                         2019                Episodic  
   
                                                    Miscellaneous mental   
health disorders  
(10 sources)                            Munchausen's syndrome;   
Translations:   
[Functional   
paraparesis]                            Onset:   
10-  
3                         2018                Chronic  
   
                                                    Mood disorders  
(2 sources)                             Severe major   
depression, single   
episode; Translations:   
[Major depressive   
disorder, single   
episode, severe   
without psychotic   
features]                               2019          Chronic  
   
                                                    Multiple sclerosis  
(20 sources)                            Exacerbation of   
multiple sclerosis;   
Translations:   
[Multiple sclerosis]                    Onset:   
10-  
3                         2018                Chronic  
   
                                                    Nonspecific chest pain  
(1 source)                              Chest pain;   
Translations: [Chest   
pain, unspecified]                      Onset:   
  
3                                                   Episodic  
   
                                                    Nutritional   
deficiencies  
(1 source)                              Deficiency of   
macronutrients;   
Translations:   
[Unspecified   
protein-calorie   
malnutrition]                           2019          Chronic  
   
                                                    Osteoarthritis  
(2 sources)                             Unilateral primary   
osteoarthritis of   
first carpometacarpal   
joint, left hand;   
Translations:   
[Unilateral primary   
osteoarthritis of   
first carpometacarpal   
joint, left hand]                       Onset:   
  
3                                                   Chronic  
   
                                                    Other aftercare  
(1 source)                              Polypharmacy ;   
Translations: [Other   
long term (current)   
drug therapy]                           2020          Episodic  
   
                                                    Other congenital   
anomalies  
(7 sources)                             Disorder of extremity;   
Translations: [Acute   
extremity pain]                         2016          Chronic  
   
                                                    Other connective   
tissue disease  
(2 sources)                             Arthrodesis status;   
Translations:   
[Arthrodesis status]                    Onset:   
10-  
3                                                   Episodic  
   
                                                    Other gastrointestinal   
disorders  
(10 sources)                            Dysphagia;   
Translations:   
[Dysphagia,   
unspecified]                            2016          Episodic  
   
                                                    Other injuries and   
conditions due to   
external causes  
(8 sources)                             Self inflicted injury;   
Translations:   
[Self-inflicted   
injury]                                 2016          Episodic  
   
                                                    Other injuries and   
conditions due to   
external causes  
(1 source)                              Unspecified injury of   
left wrist, hand and   
finger(s), initial   
encounter;   
Translations: [UNS INJ   
LT WRIST HAND FINGERS   
INIT]                                   Onset:   
  
3                                                   Episodic  
   
                                                    Other nervous system   
disorders  
(1 source)                              Chronic pain;   
Translations: [Other   
chronic pain]                           Onset:   
  
3                                                   Chronic  
   
                                                    Other nervous system   
disorders  
(1 source)                              Neuropathy;   
Translations:   
[Polyneuropathy,   
unspecified]                            2019          Chronic  
   
                                                    Other nervous system   
disorders  
(1 source)                              Unable to walk;   
Translations:   
[Difficulty in   
walking, not elsewhere   
classified]                             2020          Chronic  
   
                                                    Other nervous system   
disorders  
(1 source)                              Central pain syndrome;   
Translations: [Central   
pain syndrome]                          Onset:   
  
4                                                   Chronic  
   
                                                    Other non-traumatic   
joint disorders  
(1 source)                              Hip pain;   
Translations: [Chronic   
left hip pain]                                              Episodic  
   
                                                    Other non-traumatic   
joint disorders  
(3 sources)                             Pain in left wrist;   
Translations: [PAIN IN   
LEFT WRIST]                             Onset:   
  
3                                                   Episodic  
   
                                                    Paralysis  
(7 sources)                             Left hemiparesis;   
Translations:   
[Left-sided weakness]                   Onset:   
  
4                         2016                Chronic  
   
                                                    Phlebitis;   
thrombophlebitis and   
thromboembolism  
(16 sources)                            H/O: Deep vein   
thrombosis;   
Translations: [Acute   
deep venous thrombosis   
of upper extremity]                     Onset:   
  
6                         2016                Episodic  
   
                                                    Phlebitis;   
thrombophlebitis and   
thromboembolism  
(3 sources)                             Acute deep venous   
thrombosis of right   
upper extremity;   
Translations: [Acute   
deep vein thrombosis   
(DVT) of right upper   
extremity]                              Onset:   
  
0                         2020                  
   
                                                    Residual codes;   
unclassified  
(2 sources)                             Patient encounter   
status; Translations:   
[Other specified   
health status]                          Onset:   
  
3                                                   Episodic  
   
                                                    Residual codes;   
unclassified  
(2 sources)     Chronic pain                    2021      Episodic  
   
                                                    Residual codes;   
unclassified  
(1 source)                              Pain; Translations:   
[Pain, unspecified]                     2019          Episodic  
   
                                                    Residual codes;   
unclassified  
(1 source)                              Bilateral upper limb   
edema; Translations:   
[Localized edema]                       2019          Episodic  
   
                                                    Skin and subcutaneous   
tissue infections  
(6 sources)                             Cellulitis;   
Translations:   
[Cellulitis,   
unspecified]                            Onset:   
  
0  
Resolved:   
  
0                         2020                Episodic  
   
                                                    Spondylosis;   
intervertebral disc   
disorders; other back   
problems  
(4 sources)                             Degeneration of   
intervertebral disc;   
Translations: [Other   
intervertebral disc   
degeneration, lumbar   
region]                                 Onset:   
10-  
3                         2014                Chronic  
   
                                                    Spondylosis;   
intervertebral disc   
disorders; other back   
problems  
(17 sources)                            Chronic neck pain;   
Translations: [Chronic   
low back pain]                          Onset:   
  
4                         2016                Episodic  
   
                                                    Substance-related   
disorders  
(12 sources)                            Substance abuse;   
Translations: [Smoker]                  Onset:   
  
3                         2015                Chronic  
   
                                        Comment on above:   Added secondary to d  
ocumentation in Social History.   
   
                                                    Unclassified  
(7 sources)                             Postprocedural state   
finding; Translations:   
[Spinal cord   
stimulator status]                                            
   
                                                    Unclassified  
(2 sources)                             Patient encounter   
status                                  2023            
   
                                                    Unclassified  
(1 source)                              Low back pain,   
unspecified;   
Translations: [Low   
back pain,   
unspecified]                            Onset:   
10-  
3                                                     
   
                                                    Urinary tract   
infections  
(2 sources)                             Escherichia coli   
urinary tract   
infection;   
Translations: [Urinary   
tract infection, site   
not specified]                          2019          Episodic  
  
  
Past or Other Problems  
  
  
                                                    Problem   
Classification  Problem         Date            Documented Date Episodic/Chronic  
   
                                                    Diseases of mouth;   
excluding dental  
(3 sources)                             Stomatitis;   
Translations: [Oral   
mucositis]                              Onset:   
2020                Episodic  
   
                                                    E Codes: Fall  
(1 source)                              Unspecified fall,   
initial encounter;   
Translations:   
[UNSPECIFIED FALL   
INITIAL ENCOUNTER]                      Onset:   
2022                                          Episodic  
   
                                                    Other aftercare  
(1 source)                              Other long term   
(current) drug   
therapy;   
Translations: [OTH   
LONG TERM CURRENT   
DRUG THERAPY]                           Onset:   
2022                                          Episodic  
   
                                                    Other injuries and   
conditions due to   
external causes  
(1 source)                              History of falling;   
Translations:   
[HISTORY OF FALLING]                    Onset:   
2022                                          Episodic  
   
                                                    Other nervous system   
disorders  
(7 sources)                             Numbness;   
Translations:   
[Numbness]                              Onset:   
2018                Episodic  
   
                                                    Other nervous system   
disorders  
(2 sources)                             Other acute   
postprocedural pain;   
Translations: [Other   
acute postprocedural   
pain]                                   Onset:   
2023                                          Episodic  
   
                                                    Other non-traumatic   
joint disorders  
(3 sources)                             Pain in right hip;   
Translations: [PAIN   
IN RIGHT HIP]                           Onset:   
2022                                          Episodic  
   
                                                    Pulmonary heart   
disease  
(1 source)                              Personal history of   
pulmonary embolism;   
Translations:   
[PERSONAL HISTORY   
PULMONARY EMBOLISM]                     Onset:   
2022                                          Episodic  
   
                                                    Residual codes;   
unclassified  
(8 sources)                             History of insertion   
of inferior vena   
caval filter;   
Translations: [S/P   
IVC filter]                             Onset:   
2016                Episodic  
   
                                                    Superficial injury;   
contusion  
(1 source)                              Contusion of right   
hip, initial   
encounter;   
Translations:   
[CONTUSION RIGHT HIP   
INITIAL ENC]                            Onset:   
2022                                          Episodic  
   
                                                    Unclassified  
(1 source)                              Low back pain,   
unspecified;   
Translations: [Low   
back pain,   
unspecified]                            Onset:   
10-                                            
  
  
  
Results  
  
  
                          Test Name    Value        Interpretation Reference   
Range                                   Facility  
   
                                                    Cortisol [Mass/Vol]on 2024   
   
                      CORTISOL   9.4 ug/dL  Normal                [facility]  
   
                                        Comment on above:   Result Comment:  
Due to the diurnal variation of cortisol  
levels in normal subjects, all cortisol  
measurements should be referenced to the  
time of day of sample collection.  
AM Cortisol Age>=6 6.7-22.4 ug/dL  
PM Cortisol Age>=6 <10 ug/dL  
---------------------------------------------   
   
                                                            Performed By: #### 2  
[id number] ####  
[facility] ([id number])  
[address] [place] [address]   
   
                                                    THYROID PROFILEon 2024  
   
   
                      Free T4 [Mass/Vol] 0.83 ng/dL Normal     0.61-1.60  [facility]  
   
                                        Comment on above:   Performed By: #### T  
HYR ####  
[facility] ([id number])  
[address] [place] [address], [address]  
[place] [id number]   
   
                      TSH        0.71 uIU/mL Normal     0.49-4.67  [facility]  
   
                                        Comment on above:   Performed By: #### T  
HYR ####  
[facility] ([id number])  
[address] [place] [address] [address]  
[place] [address]   
   
                                                    36on 2023   
   
                                        36                  Patient received a   
call from the   
radiology and said   
that she can take one  
benadryl before she   
goes and they will   
give her sedation as   
well. Her test is  
still being performed   
tomorrow.           [facility]  
   
                                        36                  States patient is   
allergic to the dye   
they cannot do the   
mylegram, they tried  
contacting patient her   
vm is full. She will   
not be getting her   
mylegram  
tomorrow. Please   
advise what other   
order she would get   
and send it over to  
radiology. They also   
cannot do sedation   
anymore.            [facility]  
   
                                                    Telephoneon 2023   
   
                                        Telephone           [phone] Cantu,Nicho le Lynn 1975 F  
  
  
Date Provider   
Department [place]  
2023 ERIK WOOD MP ORTHO MPORTHO  
  
  
  
No family history on   
file                [facility]  
   
                                                    Orders Onlyon 10-   
   
                                        Orders Only         [id number] Cantu,Nicho le Lynn 1975 F  
  
  
Date [facility]   
Department [place]  
10/27/2023   
[id number], NICK MP   
ORTHO MPORTHO  
  
  
  
No family history on   
file                [facility]  
   
                                        Orders Only         [id number] Cantu,Nicho le Lynn 1975 F  
  
  
Date Provider   
Department [place]  
10/27/2023   
[id number] ADAMSON   
MP ORTHO MPORTHO  
  
  
  
No family history on   
file                [facility]  
   
                                                    Follow-Upon 10-   
   
                                        Follow-Up           [id number] Cantu,Nicho le Lynn 1975  
Date [facility]   
Department [place]  
10/20/2023 Gorge-ETHAN RENEE MP ORTHO   
MPORTHO  
No family history on   
file  
Level of Service:[id number]   
[place] OFFICE/OUTPATIENT   
ESTABLISHED LOW MDM   
20-29 MIN  
Reason for Visit and   
Comments:  
New Patient [632]   [facility]  
   
                                                    .eGFRon 10-   
   
                                                    GFR/1.73 sq   
M.predicted MDRD   
(S/P/Bld) [Vol   
rate/Area]      mL/min/{1.73_m2} Normal          >=60            [facility]  
   
                                        Comment on above:   Result Comment: [facility]  
 Laboratories have implemented the eGFR   
calculation approach that does not have a coefficient for race   
and that conforms to the NKF-ASN Task Force Recommendations.  
Stages of Chronic Kidney Disease GFR  
Stage 3a Mild to moderate loss of kidney function 59 to 45  
Stage 3b Moderate to severe loss of kidney function 44 to 33  
Stage 4 Severe loss of kidney function 29 to 15  
Stage 5 Kidney failure Less than 15  
GFR calculated using the CKD-Epi Creatinine Equation ():  
eGFR = 142 X min(SCr/?, 1)? X max(SCr /?, 1)-1.200 X 0.9938Age X   
1.012 [if female]  
Abbreviations/Units:  
eGFR (estimated glomerular filtration rate) = mL/min/1.73 m2  
SCr (standardized serum creatinine) = mg/dL  
? = 0.7 (females) or 0.9 (males)  
? = -0.241 (females) or -0.302 (males)  
min = indicates the minimum of SCr/? or 1  
max = indicates the maximum of SCr/? or 1  
Age = years   
   
                                                            Performed By: #### M  
G ####  
[place] [id number]   
   
                                                    CBC w/ Diffon 10-   
   
                                                    Erythrocyte   
distribution width   
(RBC) [Ratio]   13.4 %          Normal          11.6-14.8       [facility]  
   
                                        Comment on above:   Performed By: #### C  
OMP ####  
[place] [id number]   
   
                                                    Hematocrit (Bld)   
[Volume fraction] 39.2 %          Normal          36.0-46.0       [facility]  
   
                                        Comment on above:   Performed By: #### C  
OMP ####  
[place] [id number]   
   
                                                    Hemoglobin (Bld)   
[Mass/Vol]      13.4 g/dL       Normal          12.0-16.0       [facility]  
   
                                        Comment on above:   Performed By: #### C  
OMP ####  
[place] [id number]   
   
                                                    MCH (RBC) [Entitic   
mass]           29.5 pg         Normal          27.0-35.0       [facility]  
   
                                        Comment on above:   Performed By: #### C  
OMP ####  
[place] [id number]   
   
                      MCHC       34.1 %     Normal     31.0-37.0  [facility]  
   
                                        Comment on above:   Performed By: #### C  
OMP ####  
[place] [id number]   
   
                                                    MCV (RBC) [Entitic   
vol]            86.3 fL         Normal          80.0-100.0      [facility]  
   
                                        Comment on above:   Performed By: #### C  
OMP ####  
[address] [address]   
   
                      Platelet   271 x10*3/mcL Normal     150-450    [facility]  
   
                                        Comment on above:   Performed By: #### C  
OMP ####  
[address] [address]   
   
                                                    Platelet mean volume   
(Bld) [Entitic vol] 8.3 fL          Normal          6.7-10.6        [facility]  
   
                                        Comment on above:   Performed By: #### C  
OMP ####  
[address] [address]   
   
                      RBC        4.54 x10*6/mcL Normal     3.80-5.20  [facility]  
   
                                        Comment on above:   Performed By: #### C  
OMP ####  
[address] [address]   
   
                      WBC        9.6 x10*3/mcL Normal     4.5-11.0   [facility]  
   
                                        Comment on above:   Performed By: #### C  
OMP ####  
[address] [address]   
   
                                                    CMPon 10-   
   
                      Albumin [Mass/Vol] 3.3 g/dL   Normal     3.2-4.9    [facility]  
   
                                        Comment on above:   Performed By: #### .  
Manual Diff ####  
[address] [address]   
   
                                                    Albumin/Globulin   
[Mass ratio]    1.0 {ratio}     Low             1.1-2.2         [facility]  
   
                                        Comment on above:   Performed By: #### .  
Manual Diff ####  
[address] [address]   
   
                      Alk Phos   72 IU/L    Normal     32-91      [facility]  
   
                                        Comment on above:   Performed By: #### .  
Manual Diff ####  
[address] [address]   
   
                                                    ALT [Catalytic   
activity/Vol]   27 U/L          Normal          14-54           [facility]  
   
                                        Comment on above:   Performed By: #### .  
Manual Diff ####  
[address] [address]   
   
                      Anion gap [Moles/Vol] 13 mmol/L  Normal     7-17       [facility]  
   
                                        Comment on above:   Performed By: #### .  
Manual Diff ####  
[address] [address]   
   
                                                    AST [Catalytic   
activity/Vol]   20 U/L          Normal          15-41           [facility]  
   
                                        Comment on above:   Performed By: #### .  
Manual Diff ####  
[address] [address]   
   
                      Bili Total 0.4 mg/dL  Normal     0.3-1.2    [facility]  
   
                                        Comment on above:   Performed By: #### .  
Manual Diff ####  
[address] [address]   
   
                      Calcium [Mass/Vol] 8.6 mg/dL  Normal     8.5-10.3   [facility]  
   
                                        Comment on above:   Performed By: #### .  
Manual Diff ####  
[address] [address]   
   
                      Chloride [Moles/Vol] 104 mmol/L Normal          [facility]  
   
                                        Comment on above:   Performed By: #### .  
Manual Diff ####  
[address] [address]   
   
                      CO2 [Moles/Vol] 26 mmol/L  Normal     22-32      [facility]  
   
                                        Comment on above:   Performed By: #### .  
Manual Diff ####  
[address] [address]   
   
                      Creatinine [Mass/Vol] 0.82 mg/dL Normal     0.44-1.03  [facility]  
   
                                        Comment on above:   Performed By: #### .  
Manual Diff ####  
[address] [address]   
   
                      Glucose [Mass/Vol] 175 mg/dL  High       70-99      [facility]  
   
                                        Comment on above:   Performed By: #### .  
Manual Diff ####  
[address] [address]   
   
                      Potassium [Moles/Vol] 3.9 mmol/L Normal     3.4-4.8    [facility]  
   
                                        Comment on above:   Performed By: #### .  
Manual Diff ####  
[address] [address]   
   
                      Protein [Mass/Vol] 6.5 g/dL   Normal     6.5-8.1    [facility]  
   
                                        Comment on above:   Performed By: #### .  
Manual Diff ####  
[address] [address]   
   
                      Sodium [Moles/Vol] 139 mmol/L Normal     133-142    [facility]  
   
                                        Comment on above:   Performed By: #### .  
Manual Diff ####  
[address] [address]   
   
                                                    Urea nitrogen   
[Mass/Vol]      25 mg/dL        Normal          8-26            [facility]  
   
                                        Comment on above:   Performed By: #### .  
Manual Diff ####  
[address] [address]   
   
                                                    Urea   
nitrogen/Creatinine   
[Mass ratio]    30.5 mg/mg      High            10.0-20.0       [facility]  
   
                                        Comment on above:   Performed By: #### .  
Manual Diff ####  
[address] [address]   
   
                                                    Diff Autoon 10-   
   
                      Baso Absolute 0.0 x10*3/mcL Normal     0.0-0.2    [facility]  
   
                                        Comment on above:   Performed By: #### .  
Automated Diff ####  
[address] [address]   
   
                                                    Basophils/100 WBC   
(Bld)           0.0 %           Normal          0.0-1.5         [facility]  
   
                                        Comment on above:   Performed By: #### .  
Automated Diff ####  
[address] [address]   
   
                      Eos Absolute 0.0 x10*3/mcL Normal     0.0-0.4    [facility]  
   
                                        Comment on above:   Performed By: #### .  
Automated Diff ####  
[address] [address]   
   
                                                    Eosinophils/100 WBC   
(Bld)           0.0 %           Normal          0.0-5.4         [facility]  
   
                                        Comment on above:   Performed By: #### .  
Automated Diff ####  
[address] [address]   
   
                      Lymph Absolute 0.3 x10*3/mcL Low        1.0-4.8    [facility]  
   
                                        Comment on above:   Performed By: #### .  
Automated Diff ####  
[address] [address]   
   
                                                    Lymphocytes/100 WBC   
(Bld)           2.8 %           Low             27.2-40.8       [facility]  
   
                                        Comment on above:   Performed By: #### .  
Automated Diff ####  
[address] [address]   
   
                      Mono Absolute 0.1 x10*3/mcL Normal     0.1-1.1    [facility]  
   
                                        Comment on above:   Performed By: #### .  
Automated Diff ####  
[address] [address]   
   
                                                    Monocytes/100 WBC   
(Bld)           0.9 %           Low             3.7-11.9        [facility]  
   
                                        Comment on above:   Performed By: #### .  
Automated Diff ####  
[address] [address]   
   
                      Neutro Absolute 9.2 x10*3/mcL High       1.8-7.7    [facility]  
   
                                        Comment on above:   Performed By: #### .  
Automated Diff ####  
[address] [address]   
   
                      Neutro Auto 96.3 %     High       47.2-70.8  [facility]  
   
                                        Comment on above:   Performed By: #### .  
Automated Diff ####  
[address] [address]   
   
                                                    Diff Ana 10-   
   
                                                    Band form   
neutrophils/100 WBC   
(Bld)           2 %             Normal          0-5             [facility]  
   
                                        Comment on above:   Performed By: #### .  
Manual Diff ####  
[address] [address]   
   
                                                    Basophils/100 WBC   
(Bld)           0 %             Normal          0-3             [facility]  
   
                                        Comment on above:   Performed By: #### .  
Manual Diff ####  
[address] [address]   
   
                                                    Eosinophils/100 WBC   
(Bld)           0 %             Normal          0-7             [facility]  
   
                                        Comment on above:   Performed By: #### .  
Manual Diff ####  
[address] [address]   
   
                                                    Lymphocytes/100 WBC   
(Bld)           3 %             Low             14-42           [facility]  
   
                                        Comment on above:   Performed By: #### .  
Manual Diff ####  
[address] [address]   
   
                                                    Monocytes/100 WBC   
(Bld)           0 %             Low             1-11            [facility]  
   
                                        Comment on above:   Performed By: #### .  
Manual Diff ####  
[address] [address]   
   
                      Platelet estimate Adequate   Normal                [facility]  
   
                                        Comment on above:   Performed By: #### .  
Manual Diff ####  
[address] [address]   
   
                                                    RBC morphology   
finding Nom (Bld) Normal          Normal                          [facility]  
   
                                        Comment on above:   Performed By: #### .  
Manual Diff ####  
[address] [address]   
   
                      React Lymph Man 1 %        Normal     0-5        [facility]  
   
                                        Comment on above:   Performed By: #### .  
Manual Diff ####  
[address] [address]   
   
                      Segs Man   94 %       High       49-79      [facility]  
   
                                        Comment on above:   Performed By: #### .  
Manual Diff ####  
[address] [address]   
   
                                                    Inpatient Clinical Summaryon  
 10-   
   
                                                    Inpatient Clinical   
Summary                                 [address] [address]  
(Inserted Image.   
Unable to display)   
[address] [address]  
Clinical Summary  
Person Information  
Name: Cantu, Nichole Lynn Age: 48 Years   
: 1975  
Sex: Female PCP:   
Cande SMITH, Luis Alberto MATAMOROS  
Marital Status:  
 Phone: PCP:   
[phone]  
Race:  
White Ethnicity:  
Not  or    
Language:  
English  
MRN: [id number] Visit   
Id: FIN: [id number]  
Visit Reason:  
Generalized pain; MS   
flare-up Speciality:   
Acuity:  
Enc Type: Inpatient   
Med Service: Emergency   
Medicine  
Arrival:  
10/11/2023 19:54:00   
Discharge: Dispo Type:  
Address:  
[place]   
[place] [address]  
Diagnosis: 1:Multiple   
sclerosis exacerbation  
Discharged To:  
Home Treatments:  
Devices/Equipment:  
Professional Skilled   
Services:  
Special Services and   
Community Resources:  
Mode of Discharge   
Transportation:  
Discharge Orders  
Allergies  
fentaNYL (Headache)  
Toradol (Hives)  
Zofran (Hives)  
Protonix (Hives)   
(itchy)  
penicillin (Hives)  
SOLU-Medrol (hives)   
(Hives)  
iodinated   
radiocontrast dyes   
(Hives)  
Functional Status:  
Sensory Deficits:  
History of Falls: None  
Mobility Assistance   
Prior to Admission:  
ADLs: Independent  
Gait: Unable to assess  
Ambulation Assist:  
Assistive Device:   
Walker  
Special Orthopedic   
Devices:  
Current Level of   
Assistance for   
Self-Care/Mobility:  
Cognitive Status:  
Orientation:  
Orientation Assessment   
Oriented x 4  
Level of   
Consciousness: Alert  
Characteristics of   
Speech: Clear  
Aspiration Risk: None  
Affect/Behavior:   
Appropriate, Calm,   
Cooperative  
Laboratory or Other   
Results This Visit   
(last charted value   
for your 10/11/2023   
visit)  
Hematology  
10/18/2023 4:30 AM  
WBC: 9.6 x10  
RBC: 4.54 x10  
Segs Man: 94 % --   
Normal range between (   
49 and 79 )  
Lymph Man: 3 % --   
Normal range between (   
14 and 42 )  
Neutro Auto: 96.3 % --   
Normal range between (   
47.2 and 70.8 )  
Lymph Auto: 2.8 % --   
Normal range between (   
27.2 and 40.8 )  
Mono Auto: 0.9 % --   
Normal range between (   
3.7 and 11.9 )  
Eos Auto: 0.0 % --   
Normal range between (   
0.0 and 5.4 )  
Basophil Auto: 0.0 %   
-- Normal range   
between ( 0.0 and 1.5   
)  
Monocyte Man: 0 % --   
Normal range between (   
1 and 11 )  
Eos Man: 0 % -- Normal   
range between ( 0 and   
7 )  
Basophil Man: 0 % --   
Normal range between (   
0 and 3 )  
Baso Absolute: 0.0 x10  
MCV: 86.3 fL -- Normal   
range between ( 80.0   
and 100.0 )  
RBC Morph: Normal  
MCHC: 34.1 % -- Normal   
range between ( 31.0   
and 37.0 )  
Lymph Absolute: 0.3   
x10  
Hct: 39.2 % -- Normal   
range between ( 36.0   
and 46.0 )  
React Lymph Man: 1 %   
-- Normal range   
between ( 0 and 5 )  
Mono Absolute: 0.1 x10  
MCH: 29.5 pg -- Normal   
range between ( 27.0   
and 35.0 )  
Neutro Absolute: 9.2   
x10  
Hgb: 13.4 g/dL --   
Normal range between (   
12.0 and 16.0 )  
Mean Platelet Volume:   
8.3 fL -- Normal range   
between ( 6.7 and 10.6   
)  
Band Man: 2 % --   
Normal range between (   
0 and 5 )  
Platelet: 271 x10  
Eos Absolute: 0.0 x10  
RDW: 13.4 % -- Normal   
range between ( 11.6   
and 14.8 )  
Platelet estimate:   
Adequate  
Urinalysis  
10/11/2023 10:32 PM  
UA Color: Colorless  
UA Urobilinogen:   
Normal mg/dL  
UA Bili: Negative  
UA Ketones: Negative   
mg/dL  
UA Leukocyte Esterase:   
Negative  
UA Nitrite: Negative  
UA Glucose: Normal   
mg/dL  
UA Protein: Negative   
mg/dL  
UA Blood: Negative  
UA Spec Grav: 1.016 --   
Normal range between (   
1.003 and 1.035 )  
UA pH: 6.5  
UA Clarity: Clear  
UA Source: Clean Catch  
UA Mucus: Present /LPF  
UA WBC Quant: 1 /HPF   
-- Normal range   
between ( 0 and 5 )  
UA RBC Quant: 0 /HPF   
-- Normal range   
between ( 0 and 5 )  
UA Squepi Cells Quant:   
4 /HPF -- Normal range   
between ( 0 and 29 )  
Chemistry  
10/18/2023 4:30 AM  
Creatinine Lvl: 0.82   
mg/dL -- Normal range   
between ( 0.44 and   
1.03 )  
BUN: 25 mg/dL --   
Normal range between (   
8 and 26 )  
Glucose Lvl: 175 mg/dL   
-- Normal range   
between ( 70 and 99 )  
Potassium Lvl: 3.9   
mmol/L -- Normal range   
between ( 3.4 and 4.8   
)  
AST: 20 IU/L -- Normal   
range between ( 15 and   
41 )  
ALT: 27 IU/L -- Normal   
range between ( 14 and   
54 )  
Sodium Lvl: 139 mmol/L   
-- Normal range   
between ( 133 and 142   
)  
Calcium Lvl: 8.6 mg/dL   
-- Normal range   
between ( 8.5 and 10.3   
)  
Albumin Lvl: 3.3 g/dL   
-- Normal range   
between ( 3.2 and 4.9   
)  
Total Protein: 6.5   
g/dL -- Normal range   
between ( 6.5 and 8.1   
)  
Bili Total: 0.4 mg/dL   
-- Normal range   
between ( 0.3 and 1.2   
)  
Alk Phos: 72 IU/L --   
Normal range between (   
32 and 91 )  
Chloride: 104 mmol/L   
-- Normal range   
between ( 98 and 110 )  
CO2: 26 mmol/L --   
Normal range between (   
22 and 32 )  
Anion Gap: 13 --   
Normal range between (   
7 and 17 )  
Estimated GFR: >60   
mL/min/1.73m?  
BUN Crea Ratio: 30.5   
-- Normal range   
between ( 10.0 and   
20.0 )  
AG Ratio: 1.0 --   
Normal range between (   
1.1 and 2.2 )  
10/11/2023 9:44 PM  
CRP: 0.22 mg/dL --   
Normal range between (   
0.00 and 0.75 )  
Phosphorus: 3.6 mg/dL   
-- Normal range   
between ( 2.5 and 4.6   
)  
Magnesium Lvl: 2.0   
mg/dL -- Normal range   
between ( 1.7 and 2.4   
)  
Molecular  
10 (more content not   
included)...        Normal                                  [facility]  
   
                                                    Progress Note-Nurseon 10-18-  
2023   
   
                                        Progress Note-Nurse PT refused MRI while  
   
down in MRI because PT   
wanted different   
medication. Kalyn   
was notified as well   
as the hospitalist by   
RN. They both decided   
she could be   
discharged and follow   
up with Kalyn   
outpatient and have   
the MRI done   
outpatient. PT stated   
 I am calling   
Kalyn's office   
myself, this is bull   
shit, I am not going   
home with no answers   
about my brain.    
Kalyn and   
hospitalist were   
contacted a second   
time by RN. RN was   
told to go ahead and   
discharge PT. RN went   
over discharge papers   
with PT and was going   
to discontinue lines,   
PT stated  NO! you are   
not taking my lines   
out until I talk to   
Kalyn, it may need   
one to stay in until   
tomorrow.  PT got on   
her personal phone to   
call Kalyn. RN   
notified Hospitalist   
of the situation.  
Electronically signed   
by  
______________________  
______________________  
_____  
Willa Price MAHSA 10/18/23   
12:26 EDT           Normal                                  [facility]  
   
                                                    .eGFRon 10-   
   
                                                    GFR/1.73 sq   
M.predicted MDRD   
(S/P/Bld) [Vol   
rate/Area]      mL/min/{1.73_m2} Normal          >=60            [facility]  
   
                                        Comment on above:   Result Comment: [facility]  
 Laboratories have implemented the eGFR   
calculation approach that does not have a coefficient for race   
and that conforms to the NKF-ASN Task Force Recommendations.  
Stages of Chronic Kidney Disease GFR  
Stage 3a Mild to moderate loss of kidney function 59 to 45  
Stage 3b Moderate to severe loss of kidney function 44 to 33  
Stage 4 Severe loss of kidney function 29 to 15  
Stage 5 Kidney failure Less than 15  
GFR calculated using the CKD-Epi Creatinine Equation ():  
eGFR = 142 X min(SCr/?, 1)? X max(SCr /?, 1)-1.200 X 0.9938Age X   
1.012 [if female]  
Abbreviations/Units:  
eGFR (estimated glomerular filtration rate) = mL/min/1.73 m2  
SCr (standardized serum creatinine) = mg/dL  
? = 0.7 (females) or 0.9 (males)  
? = -0.241 (females) or -0.302 (males)  
min = indicates the minimum of SCr/? or 1  
max = indicates the maximum of SCr/? or 1  
Age = years   
   
                                                            Performed By: #### E  
GFR ####[facility] [place] [address]   
   
                                                    CBC w/ Diffon 10-   
   
                                                    Erythrocyte   
distribution width   
(RBC) [Ratio]   13.5 %          Normal          11.6-14.8       [facility]  
   
                                        Comment on above:   Performed By: #### M  
G ####  
[facility]  
[address] [place] [address]   
   
                                                    Hematocrit (Bld)   
[Volume fraction] 39.6 %          Normal          36.0-46.0       [facility]  
   
                                        Comment on above:   Performed By: #### M  
G ####  
[address] [address]   
   
                                                    Hemoglobin (Bld)   
[Mass/Vol]      13.8 g/dL       Normal          12.0-16.0       [facility]  
   
                                        Comment on above:   Performed By: #### M  
G ####  
[address] [address]   
   
                                                    MCH (RBC) [Entitic   
mass]           30.0 pg         Normal          27.0-35.0       [facility]  
   
                                        Comment on above:   Performed By: #### M  
G ####  
[address] [address]   
   
                      MCHC       34.8 %     Normal     31.0-37.0  [facility]  
   
                                        Comment on above:   Performed By: #### M  
G ####  
[address] [address]   
   
                                                    MCV (RBC) [Entitic   
vol]            86.2 fL         Normal          80.0-100.0      [facility]  
   
                                        Comment on above:   Performed By: #### M  
G ####  
[address] [address]   
   
                      Platelet   267 x10*3/mcL Normal     150-450    [facility]  
   
                                        Comment on above:   Performed By: #### M  
G ####  
[address] [address]   
   
                                                    Platelet mean volume   
(Bld) [Entitic vol] 8.5 fL          Normal          6.7-10.6        [facility]  
   
                                        Comment on above:   Performed By: #### M  
G ####  
[address] [address]   
   
                      RBC        4.60 x10*6/mcL Normal     3.80-5.20  [facility]  
   
                                        Comment on above:   Performed By: #### M  
G ####  
[address] [address]   
   
                      WBC        8.3 x10*3/mcL Normal     4.5-11.0   [facility]  
   
                                        Comment on above:   Performed By: #### M  
G ####  
[address] [address]   
   
                                                    CMPon 10-   
   
                      Albumin [Mass/Vol] 3.3 g/dL   Normal     3.2-4.9    [facility]  
   
                                        Comment on above:   Performed By: #### .  
Manual Diff ####  
[address] [address]   
   
                                                    Albumin/Globulin   
[Mass ratio]    1.0 {ratio}     Low             1.1-2.2         [facility]  
   
                                        Comment on above:   Performed By: #### .  
Manual Diff ####  
[address] [address]   
   
                      Alk Phos   62 IU/L    Normal     32-91      [facility]  
   
                                        Comment on above:   Performed By: #### .  
Manual Diff ####  
[address] [address]   
   
                                                    ALT [Catalytic   
activity/Vol]   24 U/L          Normal          14-54           [facility]  
   
                                        Comment on above:   Performed By: #### .  
Manual Diff ####  
[address] [address]   
   
                      Anion gap [Moles/Vol] 12 mmol/L  Normal     7-17       [facility]  
   
                                        Comment on above:   Performed By: #### .  
Manual Diff ####  
[address] [address]   
   
                                                    AST [Catalytic   
activity/Vol]   23 U/L          Normal          15-41           [facility]  
   
                                        Comment on above:   Performed By: #### .  
Manual Diff ####  
[address] [address]   
   
                      Bili Total 0.5 mg/dL  Normal     0.3-1.2    [facility]  
   
                                        Comment on above:   Performed By: #### .  
Manual Diff ####  
[address] [address]   
   
                      Calcium [Mass/Vol] 8.4 mg/dL  Low        8.5-10.3   [facility]  
   
                                        Comment on above:   Performed By: #### .  
Manual Diff ####  
[address] [address]   
   
                      Chloride [Moles/Vol] 102 mmol/L Normal          [facility]  
   
                                        Comment on above:   Performed By: #### .  
Manual Diff ####  
[address] [address]   
   
                      CO2 [Moles/Vol] 27 mmol/L  Normal     22-32      [facility]  
   
                                        Comment on above:   Performed By: #### .  
Manual Diff ####  
[address] [address]   
   
                      Creatinine [Mass/Vol] 0.65 mg/dL Normal     0.44-1.03  [facility]  
   
                                        Comment on above:   Performed By: #### .  
Manual Diff ####  
[address] [address]   
   
                      Glucose [Mass/Vol] 126 mg/dL  High       70-99      [facility]  
   
                                        Comment on above:   Performed By: #### .  
Manual Diff ####  
[address] [address]   
   
                      Potassium [Moles/Vol] 3.7 mmol/L Normal     3.4-4.8    [facility]  
   
                                        Comment on above:   Performed By: #### .  
Manual Diff ####  
[address] [address]   
   
                      Protein [Mass/Vol] 6.5 g/dL   Normal     6.5-8.1    [facility]  
   
                                        Comment on above:   Performed By: #### .  
Manual Diff ####  
[address] [address]   
   
                      Sodium [Moles/Vol] 137 mmol/L Normal     133-142    [facility]  
   
                                        Comment on above:   Performed By: #### .  
Manual Diff ####  
[address] [address]   
   
                                                    Urea nitrogen   
[Mass/Vol]      20 mg/dL        Normal          8-26            [facility]  
   
                                        Comment on above:   Performed By: #### .  
Manual Diff ####  
[address] [address]   
   
                                                    Urea   
nitrogen/Creatinine   
[Mass ratio]    30.8 mg/mg      High            10.0-20.0       [facility]  
   
                                        Comment on above:   Performed By: #### .  
Manual Diff ####  
[address] [address]   
   
                                                    Diff Autoon 10-   
   
                      Baso Absolute 0.0 x10*3/mcL Normal     0.0-0.2    [facility]  
   
                                        Comment on above:   Performed By: #### .  
Automated Diff ####  
[address] [address]   
   
                                                    Basophils/100 WBC   
(Bld)           0.1 %           Normal          0.0-1.5         [facility]  
   
                                        Comment on above:   Performed By: #### .  
Automated Diff ####  
[address] [address]   
   
                      Eos Absolute 0.0 x10*3/mcL Normal     0.0-0.4    [facility]  
   
                                        Comment on above:   Performed By: #### .  
Automated Diff ####  
[address] [address]   
   
                                                    Eosinophils/100 WBC   
(Bld)           0.0 %           Normal          0.0-5.4         [facility]  
   
                                        Comment on above:   Performed By: #### .  
Automated Diff ####  
[address] [address]   
   
                      Lymph Absolute 0.4 x10*3/mcL Low        1.0-4.8    [facility]  
   
                                        Comment on above:   Performed By: #### .  
Automated Diff ####  
[address] [address]   
   
                                                    Lymphocytes/100 WBC   
(Bld)           5.3 %           Low             27.2-40.8       [facility]  
   
                                        Comment on above:   Performed By: #### .  
Automated Diff ####  
[address] [address]   
   
                      Mono Absolute 0.1 x10*3/mcL Normal     0.1-1.1    [facility]  
   
                                        Comment on above:   Performed By: #### .  
Automated Diff ####  
[address] [address]   
   
                                                    Monocytes/100 WBC   
(Bld)           1.4 %           Low             3.7-11.9        [facility]  
   
                                        Comment on above:   Performed By: #### .  
Automated Diff ####  
[facility]  
[address] [place] [address]   
   
                      Neutro Absolute 7.7 x10*3/mcL Normal     1.8-7.7    [facility]  
   
                                        Comment on above:   Performed By: #### .  
Automated Diff ####  
[facility]  
[address] [place] [address]   
   
                      Neutro Auto 93.2 %     High       47.2-70.8  [facility]  
   
                                        Comment on above:   Performed By: #### .  
Automated Diff ####  
[id number] [place] [address]   
   
                                                    Neurology Progress Noteon 10  
-   
   
                                                    Neurology Progress   
Note                                    Subjective  
Patient is still   
concerned about her   
facial symptoms.   
Subsequently she will   
be scheduled for   
follow-up brain MRI   
without and with   
contrast she will need   
Ativan 2 mg IV as well   
as promethazine 50 mg   
IV before procedure.   
She will require 1   
more dose of IV   
Solu-Medrol tonight  
Review of Systems  
Constitutional: [No   
fevers, chills,   
sweats]  
Eye: [No recent visual   
problems]  
ENMT: [No ear pain,   
nasal congestion, sore   
throat]  
Respiratory: [No   
shortness of breath,   
cough]  
Cardiovascular: [No   
Chest pain,   
palpitations, syncope]  
Gastrointestinal: [No   
nausea, vomiting,   
diarrhea]  
Genitourinary: [No   
hematuria]  
Objective  
Vitals & Measurements  
T: 36.6 ?C (Oral) HR:   
80 (Peripheral) RR: 18   
BP: 138/81 SpO2: 95%  
HT: 169 cm WT: 73.8 kg   
BMI: 24.26  
Additional Vitals  
No qualifying data   
available.  
Lab Results  
Test Name Test Result   
Date/Time  
WBC 8.3 x10  
Hgb 13.8 g/dL   
10/17/2023 04:20 EDT  
Hct 39.6 % 10/17/2023   
04:20 EDT  
Platelet 267 x10  
Sodium Lvl 137 mmol/L   
10/17/2023 04:20 EDT  
Potassium Lvl 3.7   
mmol/L 10/17/2023   
04:20 EDT  
Chloride 102 mmol/L   
10/17/2023 04:20 EDT  
CO2 27 mmol/L   
10/17/2023 04:20 EDT  
Glucose Lvl 126 mg/dL   
(High) 10/17/2023   
04:20 EDT  
BUN 20 mg/dL   
10/17/2023 04:20 EDT  
Creatinine Lvl 0.65   
mg/dL 10/17/2023 04:20   
EDT  
Bili Total 0.5 mg/dL   
10/17/2023 04:20 EDT  
Alk Phos 62 IU/L   
10/17/2023 04:20 EDT  
AST 23 IU/L 10/17/2023   
04:20 EDT  
ALT 24 IU/L 10/17/2023   
04:20 EDT  
Microbiology - Current   
Encounter  
No qualifying data   
available.  
Diagnostic Results  
Computed Tomography  
CT Angio Chest w/   
Contrast  
10/15/23 17:13:38  
IMPRESSION:  
Negative exam. No   
pulmonary embolism or   
any acute   
cardiopulmonary   
abnormality   
demonstrated.  
Signed By: Mayito Bailey MD  
Physical Exam  
Alert oriented x3   
normal speech no   
weakness cranial   
nerves II through XII   
normal motor strength   
is 5 MRC for the upper   
extremities right leg   
4 MRC left leg little   
effort and lifting it   
up  
Medications  
Inpatient  
acetaminophen, 650 mg,   
Oral, q6hr, PRN  
acetaminophen, 650 mg,   
Oral, q6hr, PRN  
amLODIPine, 10 mg,   
Oral, qAM  
Ativan, 1 mg= 0.5 mL,   
IV Push, q8hr  
Ativan, 0.5 mg= 0.25   
mL, IV Push, BID, PRN  
Benadryl, 25 mg= 0.5   
mL, IV Push, q4hr, PRN  
Dilaudid, 1 mg= 1 mL,   
IV Push, q4hr, PRN  
DULoxetine, 30 mg,   
Oral, BID  
enoxaparin, 40 mg= 0.4   
mL, Subcutaneous,   
Daily  
LORazepam, 2 mg= 1 mL,   
IV Push, Once  
Lyrica, 150 mg, Oral,   
TID  
methylPREDNISolone,   
1000 mg, IV Push, Once  
naloxone, 0.4 mg= 1   
mL, IV Push, q2min,   
PRN  
nicotine 10 mg   
inhalation device, 10   
mg= 1 inh, Inhale,   
q1hr, PRN  
Normal Saline Flush   
0.9% injectable   
solution, 10 mL, IV   
Push, As Indicated,   
PRN  
Normal Saline Flush   
0.9% injectable   
solution, 10 mL, IV   
Push, BID  
oxyCODONE-acetaminophe  
n 10 mg-325 mg oral   
tablet, 1 tabs, Oral,   
QID, PRN  
ozanimod (Zeposia)   
0.92mg caps, 0.92 mg=   
1 caps, Oral, Daily  
Premarin, 0.625 mg,   
Oral, qAM  
traMADol, 50 mg, Oral,   
QID, PRN  
Assessment/Plan  
1. Multiple sclerosis   
exacerbation  
IV Solu-Medrol 1 g   
tonight MRI of brain   
without and with   
contrast ordered will   
require promethazine   
50 mg IV and Ativan 2   
mg IV before MRI  
Orders:  
DULoxetine, 30 mg,   
Oral, Cap-DR, BID,   
First Dose: 10/16/23   
21:00:00 EDT, Dispense   
From Location:   
[id number],   
10/16/23 19:48:00 EDT  
LORazepam, 2 mg, IV   
Push, Injection, Once,   
First Dose: 10/17/23   
18:16:00 EDT, Stop   
Date: 10/17/23   
18:16:00 EDT, Dispense   
From Location:   
[id number],   
10/17/23 18:16:00 EDT  
methylPREDNISolone,   
1,000 mg, IV Push,   
Injection, Once, First   
Dose: 10/17/23   
18:15:00 EDT, Stop   
Date: 10/17/23   
18:15:00 EDT, 10/17/23   
18:15:00 EDT  
MRI Brain w/ + w/o   
Contrast  
Electronically signed   
by  
______________________  
______________________  
_____  
Henry Mccain MD 10/17/23 18:23   
EDT                 Normal                                  [facility]  
   
                                                    .eGFRon 10-   
   
                                                    GFR/1.73 sq   
M.predicted MDRD   
(S/P/Bld) [Vol   
rate/Area]      mL/min/{1.73_m2} Normal          >=60            [facility]  
   
                                        Comment on above:   Result Comment: [facility]  
 Laboratories have implemented the eGFR   
calculation approach that does not have a coefficient for race   
and that conforms to the NKF-ASN Task Force Recommendations.  
Stages of Chronic Kidney Disease GFR  
Stage 3a Mild to moderate loss of kidney function 59 to 45  
Stage 3b Moderate to severe loss of kidney function 44 to 33  
Stage 4 Severe loss of kidney function 29 to 15  
Stage 5 Kidney failure Less than 15  
GFR calculated using the CKD-Epi Creatinine Equation ():  
eGFR = 142 X min(SCr/?, 1)? X max(SCr /?, 1)-1.200 X 0.9938Age X   
1.012 [if female]  
Abbreviations/Units:  
eGFR (estimated glomerular filtration rate) = mL/min/1.73 m2  
SCr (standardized serum creatinine) = mg/dL  
? = 0.7 (females) or 0.9 (males)  
? = -0.241 (females) or -0.302 (males)  
min = indicates the minimum of SCr/? or 1  
max = indicates the maximum of SCr/? or 1  
Age = years   
   
                                                            Performed By: #### .  
Manual Diff ####  
[place] [id number]   
   
                                                    CBC w/ Diffon 10-   
   
                                                    Erythrocyte   
distribution width   
(RBC) [Ratio]   13.6 %          Normal          11.6-14.8       [facility]  
   
                                        Comment on above:   Performed By: #### C  
OMP ####  
[place] [id number]   
   
                                                    Hematocrit (Bld)   
[Volume fraction] 38.3 %          Normal          36.0-46.0       [facility]  
   
                                        Comment on above:   Performed By: #### C  
OMP ####  
[place] [id number]   
   
                                                    Hemoglobin (Bld)   
[Mass/Vol]      12.8 g/dL       Normal          12.0-16.0       [facility]  
   
                                        Comment on above:   Performed By: #### C  
OMP ####  
[address] [address]   
   
                                                    MCH (RBC) [Entitic   
mass]           29.0 pg         Normal          27.0-35.0       [facility]  
   
                                        Comment on above:   Performed By: #### C  
OMP ####  
[address] [address]   
   
                      MCHC       33.4 %     Normal     31.0-37.0  [facility]  
   
                                        Comment on above:   Performed By: #### C  
OMP ####  
[address] [address]   
   
                                                    MCV (RBC) [Entitic   
vol]            86.7 fL         Normal          80.0-100.0      [facility]  
   
                                        Comment on above:   Performed By: #### C  
OMP ####  
[address] [address]   
   
                      Platelet   226 x10*3/mcL Normal     150-450    [facility]  
   
                                        Comment on above:   Performed By: #### C  
OMP ####  
[address] [address]   
   
                                                    Platelet mean volume   
(Bld) [Entitic vol] 8.5 fL          Normal          6.7-10.6        [facility]  
   
                                        Comment on above:   Performed By: #### C  
OMP ####  
[address] [address]   
   
                      RBC        4.42 x10*6/mcL Normal     3.80-5.20  [facility]  
   
                                        Comment on above:   Performed By: #### C  
OMP ####  
[address] [address]   
   
                      WBC        11.3 x10*3/mcL High       4.5-11.0   [facility]  
   
                                        Comment on above:   Performed By: #### C  
OMP ####  
[address] [address]   
   
                                                    CMPon 10-   
   
                      Albumin [Mass/Vol] 3.2 g/dL   Normal     3.2-4.9    [facility]  
   
                                        Comment on above:   Performed By: #### C  
OMP ####  
[address] [address]   
   
                                                    Albumin/Globulin   
[Mass ratio]    1.1 {ratio}     Normal          1.1-2.2         [facility]  
   
                                        Comment on above:   Performed By: #### C  
OMP ####  
[address] [address]   
   
                      Alk Phos   54 IU/L    Normal     32-91      [facility]  
   
                                        Comment on above:   Performed By: #### C  
OMP ####  
[address] [address]   
   
                                                    ALT [Catalytic   
activity/Vol]   21 U/L          Normal          14-54           [facility]  
   
                                        Comment on above:   Performed By: #### C  
OMP ####  
[address] [address]   
   
                      Anion gap [Moles/Vol] 11 mmol/L  Normal     7-17       [facility]  
   
                                        Comment on above:   Performed By: #### C  
OMP ####  
[address] [address]   
   
                                                    AST [Catalytic   
activity/Vol]   22 U/L          Normal          15-41           [facility]  
   
                                        Comment on above:   Performed By: #### C  
OMP ####  
[address] [address]   
   
                      Bili Total 0.7 mg/dL  Normal     0.3-1.2    [facility]  
   
                                        Comment on above:   Performed By: #### C  
OMP ####  
[address] [address]   
   
                      Calcium [Mass/Vol] 8.5 mg/dL  Normal     8.5-10.3   [facility]  
   
                                        Comment on above:   Performed By: #### C  
OMP ####  
[address] [address]   
   
                      Chloride [Moles/Vol] 105 mmol/L Normal          [facility]  
   
                                        Comment on above:   Performed By: #### C  
OMP ####  
[address] [address]   
   
                      CO2 [Moles/Vol] 26 mmol/L  Normal     22-32      [facility]  
   
                                        Comment on above:   Performed By: #### C  
OMP ####  
[address] [address]   
   
                      Creatinine [Mass/Vol] 0.53 mg/dL Normal     0.44-1.03  [facility]  
   
                                        Comment on above:   Performed By: #### C  
OMP ####  
[address] [place] [address]   
   
                      Glucose [Mass/Vol] 82 mg/dL   Normal     70-99      [facility]  
   
                                        Comment on above:   Performed By: #### C  
OMP ####  
[address] [address]   
   
                      Potassium [Moles/Vol] 3.7 mmol/L Normal     3.4-4.8    [facility]  
   
                                        Comment on above:   Performed By: #### C  
OMP ####  
[address] [address]   
   
                      Protein [Mass/Vol] 6.2 g/dL   Low        6.5-8.1    [facility]  
   
                                        Comment on above:   Performed By: #### C  
OMP ####  
[address] [address]   
   
                      Sodium [Moles/Vol] 138 mmol/L Normal     133-142    [facility]  
   
                                        Comment on above:   Performed By: #### C  
OMP ####  
[address] [address]   
   
                                                    Urea nitrogen   
[Mass/Vol]      17 mg/dL        Normal          8-26            [facility]  
   
                                        Comment on above:   Performed By: #### C  
OMP ####  
[address] [address]   
   
                                                    Urea   
nitrogen/Creatinine   
[Mass ratio]    32.1 mg/mg      High            10.0-20.0       [facility]  
   
                                        Comment on above:   Performed By: #### C  
OMP ####  
[address] [address]   
   
                                                    Diff Autoon 10-   
   
                      Baso Absolute 0.0 x10*3/mcL Normal     0.0-0.2    [facility]  
   
                                        Comment on above:   Performed By: #### .  
Automated Diff ####  
[address] [address]   
   
                                                    Basophils/100 WBC   
(Bld)           0.4 %           Normal          0.0-1.5         [facility]  
   
                                        Comment on above:   Performed By: #### .  
Automated Diff ####  
[address] [address]   
   
                      Eos Absolute 0.2 x10*3/mcL Normal     0.0-0.4    [facility]  
   
                                        Comment on above:   Performed By: #### .  
Automated Diff ####  
[address] [address]   
   
                                                    Eosinophils/100 WBC   
(Bld)           1.4 %           Normal          0.0-5.4         [facility]  
   
                                        Comment on above:   Performed By: #### .  
Automated Diff ####  
[address] [address]   
   
                      Lymph Absolute 2.1 x10*3/mcL Normal     1.0-4.8    [facility]  
   
                                        Comment on above:   Performed By: #### .  
Automated Diff ####  
[address] [address]   
   
                                                    Lymphocytes/100 WBC   
(Bld)           18.6 %          Low             27.2-40.8       [facility]  
   
                                        Comment on above:   Performed By: #### .  
Automated Diff ####  
[address] [address]   
   
                      Mono Absolute 0.8 x10*3/mcL Normal     0.1-1.1    [facility]  
   
                                        Comment on above:   Performed By: #### .  
Automated Diff ####  
[address] [address]   
   
                                                    Monocytes/100 WBC   
(Bld)           7.3 %           Normal          3.7-11.9        [facility]  
   
                                        Comment on above:   Performed By: #### .  
Automated Diff ####  
[address] [address]   
   
                      Neutro Absolute 8.2 x10*3/mcL High       1.8-7.7    [facility]  
   
                                        Comment on above:   Performed By: #### .  
Automated Diff ####  
[address] [address]   
   
                      Neutro Auto 72.3 %     High       47.2-70.8  [facility]  
   
                                        Comment on above:   Performed By: #### .  
Automated Diff ####  
[address] [address]   
   
                                                    Diff [facility] 10-   
   
                                                    Band form   
neutrophils/100 WBC   
(Bld)           0 %             Normal          0-5             [facility]  
   
                                        Comment on above:   Performed By: #### .  
Manual Diff ####  
[address] [address]   
   
                                                    Basophils/100 WBC   
(Bld)           0 %             Normal          0-3             [facility]  
   
                                        Comment on above:   Performed By: #### .  
Manual Diff ####  
[address] [address]   
   
                                                    Eosinophils/100 WBC   
(Bld)           0 %             Normal          0-7             [facility]  
   
                                        Comment on above:   Performed By: #### .  
Manual Diff ####  
[address] [address]   
   
                                                    Lymphocytes/100 WBC   
(Bld)           21 %            Normal          14-42           [facility]  
   
                                        Comment on above:   Performed By: #### .  
Manual Diff ####  
[address] [address]   
   
                                                    Monocytes/100 WBC   
(Bld)           8 %             Normal          1-11            [facility]  
   
                                        Comment on above:   Performed By: #### .  
Manual Diff ####  
[address] [address]   
   
                      Platelet estimate Adequate   Normal                [facility]  
   
                                        Comment on above:   Performed By: #### .  
Manual Diff ####  
[address] [address]   
   
                                                    RBC morphology   
finding Nom (Bld) Normal          Normal                          [facility]  
   
                                        Comment on above:   Performed By: #### .  
Manual Diff ####  
[address] [address]   
   
                      Segs Man   71 %       Normal     49-79      [facility]  
   
                                        Comment on above:   Performed By: #### .  
Manual Diff ####  
[address] [address]   
   
                                                    Neurology Progress Noteon 10  
-   
   
                                                    Neurology Progress   
Note                                    Subjective  
Experiencing   
depression which makes   
her nerve pain worse   
still weakness in left   
leg she had a CT   
angiogram of the chest   
last night which was   
normal she does have a   
midline placed has   
very poor venous   
access. She is   
agreeable to starting   
duloxetine  
Review of Systems  
Constitutional: [No   
fevers, chills,   
sweats]  
Eye: [No recent visual   
problems]  
ENMT: [No ear pain,   
nasal congestion, sore   
throat]  
Respiratory: [No   
shortness of breath,   
cough]  
Cardiovascular: [No   
Chest pain,   
palpitations, syncope]  
Gastrointestinal: [No   
nausea, vomiting,   
diarrhea]  
Genitourinary: [No   
hematuria]  
Objective  
Vitals & Measurements  
T: 36.6 ?C (Oral) HR:   
73 (Peripheral) RR: 18   
BP: 113/76 SpO2: 98%  
HT: 169 cm WT: 73.4 kg   
BMI: 24.26  
Additional Vitals  
No qualifying data   
available.  
Lab Results  
Microbiology - Current   
Encounter  
No qualifying data   
available.  
Diagnostic Results  
Computed Tomography  
CT Angio Chest w/   
Contrast  
10/15/23 17:13:38  
IMPRESSION:  
Negative exam. No   
pulmonary embolism or   
any acute   
cardiopulmonary   
abnormality   
demonstrated.  
Signed By: Maiyto Bailey MD  
Physical Exam  
Alert tearful cranial   
nerves II through XII   
are symmetric motor   
strength is normal for   
both upper extremities   
able to lift right leg   
off the bed fairly   
easy does not make   
much effort to lift up   
left leg no new   
deficits  
Medications  
Inpatient  
acetaminophen, 650 mg,   
Oral, q6hr, PRN  
acetaminophen, 650 mg,   
Oral, q6hr, PRN  
amLODIPine, 10 mg,   
Oral, qAM  
Ativan, 1 mg= 0.5 mL,   
IV Push, q8hr  
Ativan, 0.5 mg= 0.25   
mL, IV Push, BID, PRN  
Benadryl, 25 mg= 0.5   
mL, IV Push, q4hr, PRN  
Dilaudid, 1 mg= 1 mL,   
IV Push, q4hr, PRN  
DULoxetine, 30 mg,   
Oral, BID  
enoxaparin, 40 mg= 0.4   
mL, Subcutaneous,   
Daily  
Lyrica, 150 mg, Oral,   
TID  
naloxone, 0.4 mg= 1   
mL, IV Push, q2min,   
PRN  
nicotine 10 mg   
inhalation device, 10   
mg= 1 inh, Inhale,   
q1hr, PRN  
Normal Saline Flush   
0.9% injectable   
solution, 10 mL, IV   
Push, As Indicated,   
PRN  
Normal Saline Flush   
0.9% injectable   
solution, 10 mL, IV   
Push, BID  
oxyCODONE-acetaminophe  
n 10 mg-325 mg oral   
tablet, 1 tabs, Oral,   
QID, PRN  
ozanimod (Zeposia)   
0.92mg caps, 0.92 mg=   
1 caps, Oral, Daily  
Premarin, 0.625 mg,   
Oral, qAM  
traMADol, 50 mg, Oral,   
QID, PRN  
Assessment/Plan  
1. Multiple sclerosis   
exacerbation  
She was started on   
Cymbalta 30 mg twice a   
day to help with nerve   
pain as well as   
depression  
Orders:  
DULoxetine, 30 mg,   
Oral, Cap-DR, BID,   
First Dose: 10/16/23   
21:00:00 EDT, Dispense   
From Location:   
[address],   
10/16/23 19:48:00 EDT  
Central Venous Line   
Care  
Measure Circumference  
Nurse PICC Insertion  
Patient Education  
Electronically signed   
by  
______________________  
______________________  
_____  
Henry Mccain MD 10/16/23 19:50   
EDT                 Normal                                  [facility]  
   
                                                    VL Extremity Venous Duplex U  
pper Lefton 10-   
   
                                                    VL Extremity Venous   
Duplex Upper Left                       ***Preliminary   
Technologist Report***  
A left upper extremity   
venous study was   
performed.  
Appeared to be a   
normal venous study of   
the visualized veins   
of the left upper   
extremity. No evidence   
of acute or chronic   
DVT. Limited   
visualization of the   
radial and ulnar   
arteries due to IV and   
distal bandaging but   
appeared to be patent   
from proximal to mid.   
Limited visualization   
of the basilic and   
cephalic veins due to   
small vessel size but   
appeared to be patent.  
Normal comparative of   
the right subclavian   
vein.  
Preliminary results   
were called to Fern HARPER on 6th floor at   
07:50.  
Sonographer: Kamila Tabares RVT  
______________________  
___________________  
**Radiologist Report**  
CLINICAL HISTORY: Left   
arm swelling.  
EXAMINATION:  
Real-time sonogram of   
the left upper   
extremity was   
performed and   
supplemented with   
compression,   
augmentation and color   
flow Doppler. There is   
no prior study   
available for   
comparison.  
FINDINGS:  
Deep Veins: No   
thrombus.  
Compression and   
augmentation: Normal.  
Venous blood flow:   
Normal phasic flow.  
Superficial Veins: No   
thrombus.  
Varicosities: None.  
Collaterals:   
Unremarkable.  
Other: Unremarkable.  
CONCLUSIONS:  
No evidence of deep   
venous thrombosis.  
****Final****  
Signed by: Umesh Villafana MD  
Signed (Electronic   
Signature): 10.16.2023   
4:43 pm  
Transcribed by: Kamila Tabares  
Transcribed DT/TM:   
10.16.2023 10:46  
(If Report is Signed,   
Electronically Signed   
in Other Vendor   
System)             Normal                                  [facility]  
   
                                                    .eGFRon 10-   
   
                                                    GFR/1.73 sq   
M.predicted MDRD   
(S/P/Bld) [Vol   
rate/Area]      mL/min/{1.73_m2} Normal          >=60            [facility]  
   
                                        Comment on above:   Result Comment: [facility]  
 Laboratories have implemented the eGFR   
calculation approach that does not have a coefficient for race   
and that conforms to the NKF-ASN Task Force Recommendations.  
Stages of Chronic Kidney Disease GFR  
Stage 3a Mild to moderate loss of kidney function 59 to 45  
Stage 3b Moderate to severe loss of kidney function 44 to 33  
Stage 4 Severe loss of kidney function 29 to 15  
Stage 5 Kidney failure Less than 15  
GFR calculated using the CKD-Epi Creatinine Equation ():  
eGFR = 142 X min(SCr/?, 1)? X max(SCr /?, 1)-1.200 X 0.9938Age X   
1.012 [if female]  
Abbreviations/Units:  
eGFR (estimated glomerular filtration rate) = mL/min/1.73 m2  
SCr (standardized serum creatinine) = mg/dL  
? = 0.7 (females) or 0.9 (males)  
? = -0.241 (females) or -0.302 (males)  
min = indicates the minimum of SCr/? or 1  
max = indicates the maximum of SCr/? or 1  
Age = years   
   
                                                            Performed By: #### .  
Automated Diff ####  
[place] [id number]   
   
                                                    CBC w/ Diffon 10-   
   
                                                    Erythrocyte   
distribution width   
(RBC) [Ratio]   13.7 %          Normal          11.6-14.8       [facility]  
   
                                        Comment on above:   Performed By: #### .  
Manual Diff ####  
[place] [id number]   
   
                                                    Hematocrit (Bld)   
[Volume fraction] 37.0 %          Normal          36.0-46.0       [facility]  
   
                                        Comment on above:   Performed By: #### .  
Manual Diff ####  
[place] [id number]   
   
                                                    Hemoglobin (Bld)   
[Mass/Vol]      12.7 g/dL       Normal          12.0-16.0       [facility]  
   
                                        Comment on above:   Performed By: #### .  
Manual Diff ####  
[place] [id number]   
   
                                                    MCH (RBC) [Entitic   
mass]           30.0 pg         Normal          27.0-35.0       [facility]  
   
                                        Comment on above:   Performed By: #### .  
Manual Diff ####  
[place] [id number]   
   
                      MCHC       34.3 %     Normal     31.0-37.0  [facility]  
   
                                        Comment on above:   Performed By: #### .  
Manual Diff ####  
[place] [id number]   
   
                                                    MCV (RBC) [Entitic   
vol]            87.7 fL         Normal          80.0-100.0      [facility]  
   
                                        Comment on above:   Performed By: #### .  
Manual Diff ####  
[place] [id number]   
   
                      Platelet   203 x10*3/mcL Normal     150-450    [facility]  
   
                                        Comment on above:   Performed By: #### .  
Manual Diff ####  
[place] [id number]   
   
                                                    Platelet mean volume   
(Bld) [Entitic vol] 8.7 fL          Normal          6.7-10.6        [facility]  
   
                                        Comment on above:   Performed By: #### .  
Manual Diff ####  
[place] [id number]   
   
                      RBC        4.22 x10*6/mcL Normal     3.80-5.20  [facility]  
   
                                        Comment on above:   Performed By: #### .  
Manual Diff ####  
[place] [id number]   
   
                      WBC        12.5 x10*3/mcL High       4.5-11.0   [facility]  
   
                                        Comment on above:   Performed By: #### .  
Manual Diff ####  
[place] [id number]   
   
                                                    CMPon 10-   
   
                      Albumin [Mass/Vol] 3.5 g/dL   Normal     3.2-4.9    [facility]  
   
                                        Comment on above:   Performed By: #### C  
BCI ####  
[place] [id number]   
   
                                                    Albumin/Globulin   
[Mass ratio]    1.1 {ratio}     Normal          1.1-2.2         [facility]  
   
                                        Comment on above:   Performed By: #### C  
BCI ####  
[address] [address]   
   
                      Alk Phos   59 IU/L    Normal     32-91      [facility]  
   
                                        Comment on above:   Performed By: #### C  
BCI ####  
[address] [place] [address]   
   
                                                    ALT [Catalytic   
activity/Vol]   34 U/L          Normal          14-54           [facility]  
   
                                        Comment on above:   Performed By: #### C  
BCI ####  
[address] [address]   
   
                      Anion gap [Moles/Vol] 13 mmol/L  Normal     7-17       [facility]  
   
                                        Comment on above:   Performed By: #### C  
BCI ####  
[address] [address]   
   
                                                    AST [Catalytic   
activity/Vol]   28 U/L          Normal          15-41           [facility]  
   
                                        Comment on above:   Performed By: #### C  
BCI ####  
[address] [place] [address]   
   
                      Bili Total 0.4 mg/dL  Normal     0.3-1.2    [facility]  
   
                                        Comment on above:   Performed By: #### C  
BCI ####  
[address] [address]   
   
                      Calcium [Mass/Vol] 8.9 mg/dL  Normal     8.5-10.3   [facility]  
   
                                        Comment on above:   Performed By: #### C  
BCI ####  
[address] [place] [address]   
   
                      Chloride [Moles/Vol] 108 mmol/L Normal          [facility]  
   
                                        Comment on above:   Performed By: #### C  
BCI ####  
[address] [place] [address]   
   
                      CO2 [Moles/Vol] 25 mmol/L  Normal     22-32      [facility]  
   
                                        Comment on above:   Performed By: #### C  
BCI ####  
[address] [address]   
   
                      Creatinine [Mass/Vol] 0.75 mg/dL Normal     0.44-1.03  [facility]  
   
                                        Comment on above:   Performed By: #### C  
BCI ####  
[address] [address]   
   
                      Glucose [Mass/Vol] 137 mg/dL  High       70-99      [facility]  
   
                                        Comment on above:   Performed By: #### C  
BCI ####  
[address] [address]   
   
                      Potassium [Moles/Vol] 3.7 mmol/L Normal     3.4-4.8    [facility]  
   
                                        Comment on above:   Performed By: #### C  
BCI ####  
[address] [address]   
   
                      Protein [Mass/Vol] 6.7 g/dL   Normal     6.5-8.1    [facility]  
   
                                        Comment on above:   Performed By: #### C  
BCI ####  
[address] [address]   
   
                      Sodium [Moles/Vol] 142 mmol/L Normal     133-142    [facility]  
   
                                        Comment on above:   Performed By: #### C  
BCI ####  
[address] [address]   
   
                                                    Urea nitrogen   
[Mass/Vol]      22 mg/dL        Normal          8-26            [facility]  
   
                                        Comment on above:   Performed By: #### C  
BCI ####  
[address] [address]   
   
                                                    Urea   
nitrogen/Creatinine   
[Mass ratio]    29.3 mg/mg      High            10.0-20.0       [facility]  
   
                                        Comment on above:   Performed By: #### C  
BCI ####  
[address] [address]   
   
                                                    CT Angio Chest w/ Contraston  
 10-   
   
                                                    CT Angio Chest w/   
Contrast                                EXAM: CT Angio Chest   
w/ Contrast;   
[id number]  
REASON FOR EXAM:   
 Other (please   
specify), Pulmonary   
embolism (PE)   
suspected, unknown   
D-dimer   
TECHNIQUE: Helical CT   
images of the chest   
were obtained after   
the administration of   
IV contrast.   
Multiplanar reformats   
and maximum intensity   
projection images were   
created at the   
scanner. Dose   
reduction technique   
used: Automated   
exposure control   
and/or adjustment of   
the mA and/or kV   
according to patient   
size and/or use of   
iterative   
reconstruction   
technique.  
COMPARISON: None.  
FINDINGS:  
Technical quality:   
Good.  
Chest:  
Support devices: None.  
Visualized Thyroid: No   
nodules.  
Chest wall: Within   
normal limits.  
Bianka/mediastinum/esoph  
suha: No mass.  
Thoracic lymph nodes:   
No enlarged   
supraclavicular,   
mediastinal, hilar or   
axillary lymph nodes.  
Heart and vasculature:  
-No pulmonary artery   
filling defect to   
suggest pulmonary   
embolism.  
-No pericardial   
effusion or aortic   
aneurysm.  
Visualized portions of   
the upper abdomen:   
Within normal limits.  
Musculoskeletal: No   
acute abnormality or   
suspicious osseous   
lesion.  
Lungs/airways:  
-No significant nodule   
or infiltrate.  
-The central airways   
are patent.  
Pleura: No pleural   
effusion or   
pneumothorax.  
IMPRESSION:  
Negative exam. No   
pulmonary embolism or   
any acute   
cardiopulmonary   
abnormality   
demonstrated.  
Radiation Dose   
Estimate:  
CTDI(mGy):0.870113 / /   
/ kVp:120.409713 /   
mAs:0.607500 / / /   
DLP(mGy-cm):2.291358Bl  
dy Part: Abdomen  
CTDI(mGy):2.899267 / /   
/ kVp:120.899023 /   
mAs:39.412505 / / /   
DLP(mGy-cm):2.156315Zq  
dy Part: Abdomen  
CTDI(mGy):23.608438 /   
/ / kVp:120.482198 /   
mAs:39.104957 / / /   
DLP(mGy-cm):23.093494Q  
kristina Part: Abdomen  
CTDI(mGy):4.482096 / /   
/ kVp:100.281538 /   
mAs:129.143760 / / /   
DLP(mGy-cm):127.600018  
Body Part: Abdomen  
***** Final *****  
Dictated by: Mayito Bailey MD  
Dictated DT/TM:   
10.15.2023 5:10 pm  
Signed by: Mayito Bailey MD  
Signed (Electronic   
Signature): 10.15.2023   
5:13 pm  
(If Report Is Signed,   
Electronically Signed   
in Other Vendor   
System)             Normal                                  [facility]  
   
                                                    Diff Autoon 10-   
   
                      Baso Absolute 0.0 x10*3/mcL Normal     0.0-0.2    [facility]  
   
                                        Comment on above:   Performed By: #### .  
Manual Diff ####  
[address] [address]   
   
                                                    Basophils/100 WBC   
(Bld)           0.2 %           Normal          0.0-1.5         [facility]  
   
                                        Comment on above:   Performed By: #### .  
Manual Diff ####  
[address] [address]   
   
                      Eos Absolute 0.0 x10*3/mcL Normal     0.0-0.4    [facility]  
   
                                        Comment on above:   Performed By: #### .  
Manual Diff ####  
[address] [address]   
   
                                                    Eosinophils/100 WBC   
(Bld)           0.0 %           Normal          0.0-5.4         [facility]  
   
                                        Comment on above:   Performed By: #### .  
Manual Diff ####  
[address] [address]   
   
                      Lymph Absolute 0.5 x10*3/mcL Low        1.0-4.8    [facility]  
   
                                        Comment on above:   Performed By: #### .  
Manual Diff ####  
[address] [address]   
   
                                                    Lymphocytes/100 WBC   
(Bld)           4.2 %           Low             27.2-40.8       [facility]  
   
                                        Comment on above:   Performed By: #### .  
Manual Diff ####  
[address] [address]   
   
                      Mono Absolute 0.2 x10*3/mcL Normal     0.1-1.1    [facility]  
   
                                        Comment on above:   Performed By: #### .  
Manual Diff ####  
[address] [address]   
   
                                                    Monocytes/100 WBC   
(Bld)           1.2 %           Low             3.7-11.9        [facility]  
   
                                        Comment on above:   Performed By: #### .  
Manual Diff ####  
[address] [address]   
   
                      Neutro Absolute 11.8 x10*3/mcL High       1.8-7.7    [facility]  
   
                                        Comment on above:   Performed By: #### .  
Manual Diff ####  
[facility]  
[address] [place] [address]   
   
                      Neutro Auto 94.4 %     High       47.2-70.8  [facility]  
   
                                        Comment on above:   Performed By: #### .  
Manual Diff ####  
[facility]  
[address] [place] [address]   
   
                                                    Neurology Progress Noteon 10  
-   
   
                                                    Neurology Progress   
Note                                    Subjective  
Continues to have   
problems with vascular   
access as well as left   
facial and extremity   
pain and some   
heaviness of her left   
leg she may need 2   
extra days of IV   
Solu-Medrol. There is   
been no changes in her   
exam however some of   
her strength appears   
to be functional  
Review of Systems  
Constitutional: [No   
fevers, chills,   
sweats]  
Eye: [No recent visual   
problems]  
ENMT: [No ear pain,   
nasal congestion, sore   
throat]  
Respiratory: [No   
shortness of breath,   
cough]  
Cardiovascular: [No   
Chest pain,   
palpitations, syncope]  
Gastrointestinal: [No   
nausea, vomiting,   
diarrhea]  
Genitourinary: [No   
hematuria]  
Objective  
Vitals & Measurements  
T: 36.5 ?C (Oral) HR:   
71 (Monitored) RR: 16   
BP: 126/77 SpO2: 98%  
HT: 169 cm WT: 73.4 kg   
BMI: 24.26  
Additional Vitals  
No qualifying data   
available.  
Lab Results  
Test Name Test Result   
Date/Time  
WBC 12.5 x10  
Hgb 12.7 g/dL   
10/15/2023 05:22 EDT  
Hct 37.0 % 10/15/2023   
05:22 EDT  
Platelet 203 x10  
Sodium Lvl 142 mmol/L   
10/15/2023 05:22 EDT  
Potassium Lvl 3.7   
mmol/L 10/15/2023   
05:22 EDT  
Chloride 108 mmol/L   
10/15/2023 05:22 EDT  
CO2 25 mmol/L   
10/15/2023 05:22 EDT  
Glucose Lvl 137 mg/dL   
(High) 10/15/2023   
05:22 EDT  
BUN 22 mg/dL   
10/15/2023 05:22 EDT  
Creatinine Lvl 0.75   
mg/dL 10/15/2023 05:22   
EDT  
Bili Total 0.4 mg/dL   
10/15/2023 05:22 EDT  
Alk Phos 59 IU/L   
10/15/2023 05:22 EDT  
AST 28 IU/L 10/15/2023   
05:22 EDT  
ALT 34 IU/L 10/15/2023   
05:22 EDT  
Microbiology - Current   
Encounter  
No qualifying data   
available.  
Diagnostic Results  
Diagnostic Radiology  
XR Neck Soft Tissue  
10/12/23 16:15:44  
IMPRESSION:  
No radiopaque foreign   
body.  
Signed By: Umesh Villafana MD  
**********************  
**********************  
******  
XR Chest 1 View  
10/12/23 16:14:21  
IMPRESSION:  
No acute   
cardiopulmonary   
process.  
Signed By: Umesh Villafana MD  
Physical Exam  
Alert oriented x3   
cranial nerves II   
through XII are   
symmetric motor exam   
shows left-sided   
weakness involving the   
leg although she does   
not give great effort   
she moves her right   
leg and upper   
extremities well deep   
tendon reflexes are   
1-2+ throughout toes   
are downgoing  
Medications  
Inpatient  
acetaminophen, 650 mg,   
Oral, q6hr, PRN  
acetaminophen, 650 mg,   
Oral, q6hr, PRN  
amLODIPine, 10 mg,   
Oral, qAM  
Ativan, 1 mg= 0.5 mL,   
IV Push, q8hr  
Ativan, 0.5 mg= 0.25   
mL, IV Push, BID, PRN  
Benadryl, 25 mg= 0.5   
mL, IV Push, q4hr, PRN  
Benadryl, 50 mg, Oral,   
Daily  
Dilaudid, 1 mg= 1 mL,   
IV Push, q4hr, PRN  
enoxaparin, 40 mg= 0.4   
mL, Subcutaneous,   
Daily  
Lyrica, 150 mg, Oral,   
TID  
methylPREDNISolone  
naloxone, 0.4 mg= 1   
mL, IV Push, q2min,   
PRN  
nicotine 10 mg   
inhalation device, 10   
mg= 1 inh, Inhale,   
q1hr, PRN  
Normal Saline Flush   
0.9% injectable   
solution, 10 mL, IV   
Push, As Indicated,   
PRN  
Normal Saline Flush   
0.9% injectable   
solution, 10 mL, IV   
Push, BID  
oxyCODONE-acetaminophe  
n 10 mg-325 mg oral   
tablet, 1 tabs, Oral,   
QID, PRN  
ozanimod (Zeposia)   
0.92mg caps, 0.92 mg=   
1 caps, Oral, Daily  
Premarin, 0.625 mg,   
Oral, qAM  
traMADol, 50 mg, Oral,   
QID, PRN  
Assessment/Plan  
1. Multiple sclerosis   
exacerbation  
Venous access awaiting   
placement of a   
midline. Problem is   
frequent. However when   
she had her first port   
it was infected within   
48 hours and   
subsequently they have   
not been placed again  
Electronically signed   
by  
______________________  
______________________  
_____  
Henry Mccain MD 10/15/23 13:21   
EDT                 Normal                                  [facility]  
   
                                                    .eGFRon 10-   
   
                                                    GFR/1.73 sq   
M.predicted MDRD   
(S/P/Bld) [Vol   
rate/Area]      mL/min/{1.73_m2} Normal          >=60            [facility]  
   
                                        Comment on above:   Result Comment: [facility]  
 Laboratories have implemented the eGFR   
calculation approach that does not have a coefficient for race   
and that conforms to the NKF-ASN Task Force Recommendations.  
Stages of Chronic Kidney Disease GFR  
Stage 3a Mild to moderate loss of kidney function 59 to 45  
Stage 3b Moderate to severe loss of kidney function 44 to 33  
Stage 4 Severe loss of kidney function 29 to 15  
Stage 5 Kidney failure Less than 15  
GFR calculated using the CKD-Epi Creatinine Equation ():  
eGFR = 142 X min(SCr/?, 1)? X max(SCr /?, 1)-1.200 X 0.9938Age X   
1.012 [if female]  
Abbreviations/Units:  
eGFR (estimated glomerular filtration rate) = mL/min/1.73 m2  
SCr (standardized serum creatinine) = mg/dL  
? = 0.7 (females) or 0.9 (males)  
? = -0.241 (females) or -0.302 (males)  
min = indicates the minimum of SCr/? or 1  
max = indicates the maximum of SCr/? or 1  
Age = years   
   
                                                            Performed By: #### E  
GFR ####  
[facility]  
[address] [place] [address]   
   
                                                    CBC w/ Diffon 10-   
   
                                                    Erythrocyte   
distribution width   
(RBC) [Ratio]   13.6 %          Normal          11.6-14.8       [facility]  
   
                                        Comment on above:   Performed By: #### C  
BC ####[address] [address]   
   
                                                    Hematocrit (Bld)   
[Volume fraction] 36.5 %          Normal          36.0-46.0       [facility]  
   
                                        Comment on above:   Performed By: #### C  
BC ####[address] [address]   
   
                                                    Hemoglobin (Bld)   
[Mass/Vol]      12.1 g/dL       Normal          12.0-16.0       [facility]  
   
                                        Comment on above:   Performed By: #### C  
BC ####[address] [address]   
   
                                                    MCH (RBC) [Entitic   
mass]           28.8 pg         Normal          27.0-35.0       [facility]  
   
                                        Comment on above:   Performed By: #### C  
BC ####[address] [address]   
   
                      MCHC       33.3 %     Normal     31.0-37.0  [facility]  
   
                                        Comment on above:   Performed By: #### C  
BC ####[address] [address]   
   
                                                    MCV (RBC) [Entitic   
vol]            86.6 fL         Normal          80.0-100.0      [facility]  
   
                                        Comment on above:   Performed By: #### C  
BC ####[address] [address]   
   
                      Platelet   177 x10*3/mcL Normal     150-450    [facility]  
   
                                        Comment on above:   Performed By: #### C  
BC ####[address] [address]   
   
                                                    Platelet mean volume   
(Bld) [Entitic vol] 9.8 fL          Normal          6.7-10.6        [facility]  
   
                                        Comment on above:   Performed By: #### C  
BC ####[address] [address]   
   
                      RBC        4.22 x10*6/mcL Normal     3.80-5.20  [facility]  
   
                                        Comment on above:   Performed By: #### C  
BC ####[address] [place] [address]   
   
                      WBC        15.6 x10*3/mcL High       4.5-11.0   [facility]  
   
                                        Comment on above:   Performed By: #### C  
BC ####[address] [address]   
   
                                                    CMPon 10-   
   
                      Albumin [Mass/Vol] 3.6 g/dL   Normal     3.2-4.9    [facility]  
   
                                        Comment on above:   Performed By: #### M  
G ####  
[address] [address]   
   
                                                    Albumin/Globulin   
[Mass ratio]    1.1 {ratio}     Normal          1.1-2.2         [facility]  
   
                                        Comment on above:   Performed By: #### M  
G ####  
[address] [address]   
   
                      Alk Phos   61 IU/L    Normal     32-91      [facility]  
   
                                        Comment on above:   Performed By: #### M  
G ####  
[address] [address]   
   
                                                    ALT [Catalytic   
activity/Vol]   26 U/L          Normal          14-54           [facility]  
   
                                        Comment on above:   Performed By: #### M  
G ####  
[address] [address]   
   
                      Anion gap [Moles/Vol] 12 mmol/L  Normal     7-17       [facility]  
   
                                        Comment on above:   Performed By: #### M  
G ####  
[address] [address]   
   
                                                    AST [Catalytic   
activity/Vol]   24 U/L          Normal          15-41           [facility]  
   
                                        Comment on above:   Performed By: #### M  
G ####  
[address] [address]   
   
                      Bili Total 0.4 mg/dL  Normal     0.3-1.2    [facility]  
   
                                        Comment on above:   Performed By: #### M  
G ####  
[address] [address]   
   
                      Calcium [Mass/Vol] 9.1 mg/dL  Normal     8.5-10.3   [facility]  
   
                                        Comment on above:   Performed By: #### M  
G ####  
[address] [address]   
   
                      Chloride [Moles/Vol] 107 mmol/L Normal          [facility]  
   
                                        Comment on above:   Performed By: #### M  
G ####  
[address] [address]   
   
                      CO2 [Moles/Vol] 23 mmol/L  Normal     22-32      [facility]  
   
                                        Comment on above:   Performed By: #### M  
G ####  
[address] [address]   
   
                      Creatinine [Mass/Vol] 0.72 mg/dL Normal     0.44-1.03  [facility]  
   
                                        Comment on above:   Performed By: #### M  
G ####  
[address] [address]   
   
                      Glucose [Mass/Vol] 135 mg/dL  High       70-99      [facility]  
   
                                        Comment on above:   Performed By: #### M  
G ####  
[address] [address]   
   
                      Potassium [Moles/Vol] 4.1 mmol/L Normal     3.4-4.8    [facility]  
   
                                        Comment on above:   Performed By: #### M  
G ####  
[address] [address]   
   
                      Protein [Mass/Vol] 6.9 g/dL   Normal     6.5-8.1    [facility]  
   
                                        Comment on above:   Performed By: #### M  
G ####  
[address] [address]   
   
                      Sodium [Moles/Vol] 138 mmol/L Normal     133-142    [facility]  
   
                                        Comment on above:   Performed By: #### M  
G ####  
[address] [address]   
   
                                                    Urea nitrogen   
[Mass/Vol]      21 mg/dL        Normal          8-26            [facility]  
   
                                        Comment on above:   Performed By: #### M  
G ####  
[address] [address]   
   
                                                    Urea   
nitrogen/Creatinine   
[Mass ratio]    29.2 mg/mg      High            10.0-20.0       [facility]  
   
                                        Comment on above:   Performed By: #### M  
G ####  
[address] [address]   
   
                                                    Diff Autoon 10-   
   
                      Baso Absolute 0.1 x10*3/mcL Normal     0.0-0.2    [facility]  
   
                                        Comment on above:   Performed By: #### C  
OMP ####  
[address] [address]   
   
                                                    Basophils/100 WBC   
(Bld)           0.4 %           Normal          0.0-1.5         [facility]  
   
                                        Comment on above:   Performed By: #### C  
OMP ####  
[address] [address]   
   
                      Eos Absolute 0.0 x10*3/mcL Normal     0.0-0.4    [facility]  
   
                                        Comment on above:   Performed By: #### C  
OMP ####  
[address] [address]   
   
                                                    Eosinophils/100 WBC   
(Bld)           0.0 %           Normal          0.0-5.4         [facility]  
   
                                        Comment on above:   Performed By: #### C  
OMP ####  
[address] [address]   
   
                      Lymph Absolute 1.2 x10*3/mcL Normal     1.0-4.8    [facility]  
   
                                        Comment on above:   Performed By: #### C  
OMP ####  
[address] [address]   
   
                                                    Lymphocytes/100 WBC   
(Bld)           7.7 %           Low             27.2-40.8       [facility]  
   
                                        Comment on above:   Performed By: #### C  
OMP ####  
[address] [address]   
   
                      Mono Absolute 0.1 x10*3/mcL Normal     0.1-1.1    [facility]  
   
                                        Comment on above:   Performed By: #### C  
OMP ####  
[address] [address]   
   
                                                    Monocytes/100 WBC   
(Bld)           0.6 %           Low             3.7-11.9        [facility]  
   
                                        Comment on above:   Performed By: #### C  
OMP ####  
[address] [address]   
   
                      Neutro Absolute 14.2 x10*3/mcL High       1.8-7.7    [facility]  
   
                                        Comment on above:   Performed By: #### C  
OMP ####  
[address] [address]   
   
                      Neutro Auto 91.3 %     High       47.2-70.8  [facility]  
   
                                        Comment on above:   Performed By: #### C  
OMP ####  
[address] [address]   
   
                                                    Diff Ana 10-   
   
                                                    Band form   
neutrophils/100 WBC   
(Bld)           1 %             Normal          0-5             [facility]  
   
                                        Comment on above:   Performed By: #### C  
OMP ####  
[address] [address]   
   
                                                    Basophils/100 WBC   
(Bld)           0 %             Normal          0-3             [facility]  
   
                                        Comment on above:   Performed By: #### C  
OMP ####  
[address] [address]   
   
                                                    Eosinophils/100 WBC   
(Bld)           0 %             Normal          0-7             [facility]  
   
                                        Comment on above:   Performed By: #### C  
OMP ####  
[address] [address]   
   
                                                    Lymphocytes/100 WBC   
(Bld)           13 %            Low             14-42           [facility]  
   
                                        Comment on above:   Performed By: #### C  
OMP ####  
[address] [address]   
   
                                                    Monocytes/100 WBC   
(Bld)           1 %             Normal          1-11            [facility]  
   
                                        Comment on above:   Performed By: #### C  
OMP ####  
[address] [address]   
   
                      Platelet estimate Adequate   Normal                [facility]  
   
                                        Comment on above:   Performed By: #### C  
OMP ####  
[address] [address]   
   
                                                    RBC morphology   
finding Nom (Bld) Normal          Normal                          [facility]  
   
                                        Comment on above:   Performed By: #### C  
OMP ####  
[facility]  
[address] [place] [address]   
   
                      Segs Man   85 %       High       49-79      [facility]  
   
                                        Comment on above:   Performed By: #### C  
OMP ####  
[facility]  
[address] [place] [address]   
   
                                                    Neurology Progress Noteon 10  
-   
   
                                                    Neurology Progress   
Note                                    Subjective  
No new problems still   
has her pain but not   
as severe still not   
moving her left leg   
very well she does   
have 2 more days of IV   
Solu-Medrol  
Review of Systems  
Constitutional: [No   
fevers, chills,   
sweats]  
Eye: [No recent visual   
problems]  
ENMT: [No ear pain,   
nasal congestion, sore   
throat]  
Respiratory: [No   
shortness of breath,   
cough]  
Cardiovascular: [No   
Chest pain,   
palpitations, syncope]  
Gastrointestinal: [No   
nausea, vomiting,   
diarrhea]  
Genitourinary: [No   
hematuria]  
Objective  
Vitals & Measurements  
T: 36.5 ?C (Oral) HR:   
74 (Peripheral) RR: 16   
BP: 145/98 SpO2: 95%  
HT: 169 cm WT: 71.6 kg   
BMI: 24.26  
Additional Vitals  
No qualifying data   
available.  
Lab Results  
Test Name Test Result   
Date/Time  
WBC 15.6 x10  
Hgb 12.1 g/dL   
10/14/2023 06:21 EDT  
Hct 36.5 % 10/14/2023   
06:21 EDT  
Platelet 177 x10  
Sodium Lvl 138 mmol/L   
10/14/2023 06:21 EDT  
Potassium Lvl 4.1   
mmol/L 10/14/2023   
06:21 EDT  
Chloride 107 mmol/L   
10/14/2023 06:21 EDT  
CO2 23 mmol/L   
10/14/2023 06:21 EDT  
Glucose Lvl 135 mg/dL   
(High) 10/14/2023   
06:21 EDT  
BUN 21 mg/dL   
10/14/2023 06:21 EDT  
Creatinine Lvl 0.72   
mg/dL 10/14/2023 06:21   
EDT  
Bili Total 0.4 mg/dL   
10/14/2023 06:21 EDT  
Alk Phos 61 IU/L   
10/14/2023 06:21 EDT  
AST 24 IU/L 10/14/2023   
06:21 EDT  
ALT 26 IU/L 10/14/2023   
06:21 EDT  
Microbiology - Current   
Encounter  
No qualifying data   
available.  
Diagnostic Results  
Diagnostic Radiology  
XR Neck Soft Tissue  
10/12/23 16:15:44  
IMPRESSION:  
No radiopaque foreign   
body.  
Signed By: Umesh Villafana MD  
**********************  
**********************  
******  
XR Chest 1 View  
10/12/23 16:14:21  
IMPRESSION:  
No acute   
cardiopulmonary   
process.  
Signed By: Umesh Villafana MD  
Physical Exam  
Alert oriented x3   
cranial nerves II   
through XII are   
symmetric motor exam   
shows giveaway   
strength in the right   
and left leg. She has   
difficulties raising   
her left leg off the   
bed strength in both   
arms is normal no new   
deficits  
Medications  
Inpatient  
acetaminophen, 650 mg,   
Oral, q6hr, PRN  
acetaminophen, 650 mg,   
Oral, q6hr, PRN  
amLODIPine, 10 mg,   
Oral, qAM  
Ativan, 1 mg= 0.5 mL,   
IV Push, q8hr  
Ativan, 0.5 mg= 0.25   
mL, IV Push, BID, PRN  
Benadryl, 25 mg= 0.5   
mL, IV Push, q4hr, PRN  
Dilaudid, 1 mg= 1 mL,   
IV Push, q4hr, PRN  
enoxaparin, 40 mg= 0.4   
mL, Subcutaneous,   
Daily  
Lyrica, 150 mg, Oral,   
TID  
methylPREDNISolone  
naloxone, 0.4 mg= 1   
mL, IV Push, q2min,   
PRN  
nicotine 10 mg   
inhalation device, 10   
mg= 1 inh, Inhale,   
q1hr, PRN  
Normal Saline Flush   
0.9% injectable   
solution, 10 mL, IV   
Push, As Indicated,   
PRN  
Normal Saline Flush   
0.9% injectable   
solution, 10 mL, IV   
Push, BID  
oxyCODONE-acetaminophe  
n 10 mg-325 mg oral   
tablet, 1 tabs, Oral,   
QID, PRN  
ozanimod (Zeposia)   
0.92mg caps, 0.92 mg=   
1 caps, Oral, Daily  
Premarin, 0.625 mg,   
Oral, qAM  
traMADol, 50 mg, Oral,   
QID, PRN  
Assessment/Plan  
1. Multiple sclerosis   
exacerbation  
Day #4 of IV   
Solu-Medrol later this   
evening  
Orders:  
Up ad Eri  
Electronically signed   
by  
______________________  
______________________  
_____  
Henry Mccain MD 10/14/23 13:09   
EDT                 Normal                                  [facility]  
   
                                                    .eGFRon 10-   
   
                                                    GFR/1.73 sq   
M.predicted MDRD   
(S/P/Bld) [Vol   
rate/Area]      mL/min/{1.73_m2} Normal          >=60            [facility]  
   
                                        Comment on above:   Result Comment: [facility]  
 Laboratories have implemented the eGFR   
calculation approach that does not have a coefficient for race   
and that conforms to the NKF-ASN Task Force Recommendations.  
Stages of Chronic Kidney Disease GFR  
Stage 3a Mild to moderate loss of kidney function 59 to 45  
Stage 3b Moderate to severe loss of kidney function 44 to 33  
Stage 4 Severe loss of kidney function 29 to 15  
Stage 5 Kidney failure Less than 15  
GFR calculated using the CKD-Epi Creatinine Equation ():  
eGFR = 142 X min(SCr/?, 1)? X max(SCr /?, 1)-1.200 X 0.9938Age X   
1.012 [if female]  
Abbreviations/Units:  
eGFR (estimated glomerular filtration rate) = mL/min/1.73 m2  
SCr (standardized serum creatinine) = mg/dL  
? = 0.7 (females) or 0.9 (males)  
? = -0.241 (females) or -0.302 (males)  
min = indicates the minimum of SCr/? or 1  
max = indicates the maximum of SCr/? or 1  
Age = years   
   
                                                            Performed By: #### M  
G ####  
[facility]  
[address] [place] [address]   
   
                                                    CBC w/ Diffon 10-   
   
                                                    Erythrocyte   
distribution width   
(RBC) [Ratio]   13.6 %          Normal          11.6-14.8       [facility]  
   
                                        Comment on above:   Performed By: #### C  
BCI ####  
[facility]  
[address] [place] [address]   
   
                                                    Hematocrit (Bld)   
[Volume fraction] 38.3 %          Normal          36.0-46.0       [facility]  
   
                                        Comment on above:   Performed By: #### C  
BCI ####  
[place] [id number]   
   
                                                    Hemoglobin (Bld)   
[Mass/Vol]      12.9 g/dL       Normal          12.0-16.0       [facility]  
   
                                        Comment on above:   Performed By: #### C  
BCI ####  
[place] [id number]   
   
                                                    MCH (RBC) [Entitic   
mass]           29.0 pg         Normal          27.0-35.0       [facility]  
   
                                        Comment on above:   Performed By: #### C  
BCI ####  
[place] [id number]   
   
                      MCHC       33.6 %     Normal     31.0-37.0  [facility]  
   
                                        Comment on above:   Performed By: #### C  
BCI ####  
[place] [id number]   
   
                                                    MCV (RBC) [Entitic   
vol]            86.2 fL         Normal          80.0-100.0      [facility]  
   
                                        Comment on above:   Performed By: #### C  
BCI ####  
[address] [address]   
   
                      Platelet   222 x10*3/mcL Normal     150-450    [facility]  
   
                                        Comment on above:   Performed By: #### C  
BCI ####  
[address] [address]   
   
                                                    Platelet mean volume   
(Bld) [Entitic vol] 9.1 fL          Normal          6.7-10.6        [facility]  
   
                                        Comment on above:   Performed By: #### C  
BCI ####  
[address] [address]   
   
                      RBC        4.45 x10*6/mcL Normal     3.80-5.20  [facility]  
   
                                        Comment on above:   Performed By: #### C  
BCI ####  
[address] [address]   
   
                      WBC        15.6 x10*3/mcL High       4.5-11.0   [facility]  
   
                                        Comment on above:   Performed By: #### C  
BCI ####  
[address] [place] [address]   
   
                                                    CMPon 10-   
   
                      Albumin [Mass/Vol] 3.5 g/dL   Normal     3.2-4.9    [facility]  
   
                                        Comment on above:   Performed By: #### C  
OMP ####  
[address] [address]   
   
                                                    Albumin/Globulin   
[Mass ratio]    1.0 {ratio}     Low             1.1-2.2         [facility]  
   
                                        Comment on above:   Performed By: #### C  
OMP ####  
[address] [address]   
   
                      Alk Phos   65 IU/L    Normal     32-91      [facility]  
   
                                        Comment on above:   Performed By: #### C  
OMP ####  
[address] [address]   
   
                                                    ALT [Catalytic   
activity/Vol]   21 U/L          Normal          14-54           [facility]  
   
                                        Comment on above:   Performed By: #### C  
OMP ####  
[address] [address]   
   
                      Anion gap [Moles/Vol] 12 mmol/L  Normal     7-17       [facility]  
   
                                        Comment on above:   Performed By: #### C  
OMP ####  
[address] [address]   
   
                                                    AST [Catalytic   
activity/Vol]   24 U/L          Normal          15-41           [facility]  
   
                                        Comment on above:   Performed By: #### C  
OMP ####  
[address] [address]   
   
                      Bili Total 0.4 mg/dL  Normal     0.3-1.2    [facility]  
   
                                        Comment on above:   Performed By: #### C  
OMP ####  
[address] [address]   
   
                      Calcium [Mass/Vol] 9.0 mg/dL  Normal     8.5-10.3   [facility]  
   
                                        Comment on above:   Performed By: #### C  
OMP ####  
[address] [address]   
   
                      Chloride [Moles/Vol] 110 mmol/L Normal          [facility]  
   
                                        Comment on above:   Performed By: #### C  
OMP ####  
[address] [address]   
   
                      CO2 [Moles/Vol] 21 mmol/L  Low        22-32      [facility]  
   
                                        Comment on above:   Performed By: #### C  
OMP ####  
[address] [address]   
   
                      Creatinine [Mass/Vol] 0.48 mg/dL Normal     0.44-1.03  [facility]  
   
                                        Comment on above:   Performed By: #### C  
OMP ####  
[address] [address]   
   
                      Glucose [Mass/Vol] 159 mg/dL  High       70-99      [facility]  
   
                                        Comment on above:   Performed By: #### C  
OMP ####  
[address] [address]   
   
                      Potassium [Moles/Vol] 4.0 mmol/L Normal     3.4-4.8    [facility]  
   
                                        Comment on above:   Performed By: #### C  
OMP ####  
[address] [address]   
   
                      Protein [Mass/Vol] 6.9 g/dL   Normal     6.5-8.1    [facility]  
   
                                        Comment on above:   Performed By: #### C  
OMP ####  
[address] [address]   
   
                      Sodium [Moles/Vol] 139 mmol/L Normal     133-142    [facility]  
   
                                        Comment on above:   Performed By: #### C  
OMP ####  
[address] [address]   
   
                                                    Urea nitrogen   
[Mass/Vol]      16 mg/dL        Normal          8-26            [facility]  
   
                                        Comment on above:   Performed By: #### C  
OMP ####  
[address] [address]   
   
                                                    Urea   
nitrogen/Creatinine   
[Mass ratio]    33.3 mg/mg      High            10.0-20.0       [facility]  
   
                                        Comment on above:   Performed By: #### C  
OMP ####  
[address] [address]   
   
                                                    Diff Autoon 10-   
   
                      Baso Absolute 0.0 x10*3/mcL Normal     0.0-0.2    [facility]  
   
                                        Comment on above:   Performed By: #### E  
GFR ####  
[address] [address]   
   
                                                    Basophils/100 WBC   
(Bld)           0.2 %           Normal          0.0-1.5         [facility]  
   
                                        Comment on above:   Performed By: #### E  
GFR ####  
[address] [address]   
   
                      Eos Absolute 0.0 x10*3/mcL Normal     0.0-0.4    [facility]  
   
                                        Comment on above:   Performed By: #### E  
GFR ####  
[address] [address]   
   
                                                    Eosinophils/100 WBC   
(Bld)           0.0 %           Normal          0.0-5.4         [facility]  
   
                                        Comment on above:   Performed By: #### E  
GFR ####  
[address] [address]   
   
                      Lymph Absolute 1.3 x10*3/mcL Normal     1.0-4.8    [facility]  
   
                                        Comment on above:   Performed By: #### E  
GFR ####  
[address] [address]   
   
                                                    Lymphocytes/100 WBC   
(Bld)           8.0 %           Low             27.2-40.8       [facility]  
   
                                        Comment on above:   Performed By: #### E  
GFR ####  
[address] [address]   
   
                      Mono Absolute 0.2 x10*3/mcL Normal     0.1-1.1    [facility]  
   
                                        Comment on above:   Performed By: #### E  
GFR ####  
[address] [address]   
   
                                                    Monocytes/100 WBC   
(Bld)           1.2 %           Low             3.7-11.9        [facility]  
   
                                        Comment on above:   Performed By: #### E  
GFR ####  
[address] [address]   
   
                      Neutro Absolute 14.1 x10*3/mcL High       1.8-7.7    [facility]  
   
                                        Comment on above:   Performed By: #### E  
GFR ####  
[facility]  
[address] [place] [address]   
   
                      Neutro Auto 90.6 %     High       47.2-70.8  [facility]  
   
                                        Comment on above:   Performed By: #### E  
GFR ####  
[place] [id number] [place] [id number]   
   
                                                    Neurology Progress Noteon 10  
-   
   
                                                    Neurology Progress   
Note                                    Subjective  
Beginning to walk   
facial pain better   
since increasing   
Lyrica to start day 3   
of IV Solu-Medrol  
Review of Systems  
Constitutional: [No   
fevers, chills,   
sweats]  
Eye: [No recent visual   
problems]  
ENMT: [No ear pain,   
nasal congestion, sore   
throat]  
Respiratory: [No   
shortness of breath,   
cough]  
Cardiovascular: [No   
Chest pain,   
palpitations, syncope]  
Gastrointestinal: [No   
nausea, vomiting,   
diarrhea]  
Genitourinary: [No   
hematuria]  
Objective  
Vitals & Measurements  
T: 36.7 ?C (Oral) HR:   
89 (Peripheral) RR: 14   
BP: 128/83 SpO2: 94%  
HT: 169 cm WT: 69 kg   
BMI: 24.26  
Additional Vitals  
No qualifying data   
available.  
Lab Results  
Test Name Test Result   
Date/Time  
WBC 15.6 x10  
Hgb 12.9 g/dL   
10/13/2023 05:44 EDT  
Hct 38.3 % 10/13/2023   
05:44 EDT  
Platelet 222 x10  
Sodium Lvl 139 mmol/L   
10/13/2023 05:44 EDT  
Potassium Lvl 4.0   
mmol/L 10/13/2023   
05:44 EDT  
Chloride 110 mmol/L   
10/13/2023 05:44 EDT  
CO2 21 mmol/L (Low)   
10/13/2023 05:44 EDT  
Glucose Lvl 159 mg/dL   
(High) 10/13/2023   
05:44 EDT  
BUN 16 mg/dL   
10/13/2023 05:44 EDT  
Creatinine Lvl 0.48   
mg/dL 10/13/2023 05:44   
EDT  
Bili Total 0.4 mg/dL   
10/13/2023 05:44 EDT  
Alk Phos 65 IU/L   
10/13/2023 05:44 EDT  
AST 24 IU/L 10/13/2023   
05:44 EDT  
ALT 21 IU/L 10/13/2023   
05:44 EDT  
Microbiology - Current   
Encounter  
No qualifying data   
available.  
Diagnostic Results  
Diagnostic Radiology  
XR Neck Soft Tissue  
10/12/23 16:15:44  
IMPRESSION:  
No radiopaque foreign   
body.  
Signed By: Umesh Villafana MD  
**********************  
**********************  
******  
XR Chest 1 View  
10/12/23 16:14:21  
IMPRESSION:  
No acute   
cardiopulmonary   
process.  
Signed By: Umesh Villafana MD  
Physical Exam  
Alert oriented x3   
cranial nerves II   
through XII are   
symmetric motor exam   
shows normal strength   
in the upper   
extremities right leg   
approximately 4 MRC   
left leg 2 MRC however   
testing for weakness   
only has fair effort  
Medications  
Inpatient  
acetaminophen, 650 mg,   
Oral, q6hr, PRN  
acetaminophen, 650 mg,   
Oral, q6hr, PRN  
amLODIPine, 10 mg,   
Oral, qAM  
Ativan, 1 mg= 0.5 mL,   
IV Push, q8hr  
Ativan, 0.5 mg= 0.25   
mL, IV Push, BID, PRN  
Benadryl, 25 mg= 0.5   
mL, IV Push, q4hr, PRN  
Dilaudid, 1 mg= 1 mL,   
IV Push, q4hr, PRN  
enoxaparin, 40 mg= 0.4   
mL, Subcutaneous,   
Daily  
Lyrica, 150 mg, Oral,   
TID  
methylPREDNISolone  
naloxone, 0.4 mg= 1   
mL, IV Push, q2min,   
PRN  
nicotine 10 mg   
inhalation device, 10   
mg= 1 inh, Inhale,   
q1hr, PRN  
Normal Saline Flush   
0.9% injectable   
solution, 10 mL, IV   
Push, As Indicated,   
PRN  
Normal Saline Flush   
0.9% injectable   
solution, 10 mL, IV   
Push, BID  
oxyCODONE-acetaminophe  
n 10 mg-325 mg oral   
tablet, 1 tabs, Oral,   
QID, PRN  
Premarin, 0.625 mg,   
Oral, qAM  
traMADol, 50 mg, Oral,   
QID, PRN  
Assessment/Plan  
1. Multiple sclerosis   
exacerbation  
Day #3 of IV   
Solu-Medrol  
Orders:  
methylPREDNISolone,   
1,000 mg, IV   
Piggyback, Soln-IV,   
q24hr for 4 doses,   
infuse over 1 hr,   
First Dose: 10/12/23   
18:00:00 EDT, Stop   
Date: 10/16/23   
17:59:00 EDT, Dispense   
[facility]   
[facility]   
10/12/23 18:00:00 EDT  
pregabalin, 150 mg,   
Oral, Cap, TID, First   
Dose: 10/12/23   
14:00:00 EDT, Dispense   
From Location:   
[id number],   
10/12/23 13:17:00 EDT  
Electronically signed   
by  
______________________  
______________________  
_____  
Henry Mccain MD 10/13/23 13:06   
EDT                 Normal                                  [facility]  
   
                                                    .UA Microscp Aon 10-   
   
                      UA Mucus   Present    Abnormal   Absent     [facility]  
   
                                        Comment on above:   Performed By: #### .  
Automated Diff ####  
[place] [id number]   
   
                      UA RBC Quant 0 /HPF     Normal     0-5        [facility]  
   
                                        Comment on above:   Performed By: #### .  
Automated Diff ####  
[place] [id number]   
   
                      UA Squepi Cells Quant 4 /HPF     Normal     0-29       [facility]  
   
                                        Comment on above:   Performed By: #### .  
Automated Diff ####  
[place] [id number]   
   
                      UA WBC Quant 1 /HPF     Normal     0-5        [facility]  
   
                                        Comment on above:   Performed By: #### .  
Automated Diff ####  
[place] [id number]   
   
                                                    .eGFRon 10-   
   
                                                    GFR/1.73 sq   
M.predicted MDRD   
(S/P/Bld) [Vol   
rate/Area]      mL/min/{1.73_m2} Normal          >=60            [facility]  
   
                                        Comment on above:   Result Comment: [facility]  
 Laboratories have implemented the eGFR   
calculation approach that does not have a coefficient for race   
and that conforms to the NKF-ASN Task Force Recommendations.  
Stages of Chronic Kidney Disease GFR  
Stage 3a Mild to moderate loss of kidney function 59 to 45  
Stage 3b Moderate to severe loss of kidney function 44 to 33  
Stage 4 Severe loss of kidney function 29 to 15  
Stage 5 Kidney failure Less than 15  
GFR calculated using the CKD-Epi Creatinine Equation ():  
eGFR = 142 X min(SCr/?, 1)? X max(SCr /?, 1)-1.200 X 0.9938Age X   
1.012 [if female]  
Abbreviations/Units:  
eGFR (estimated glomerular filtration rate) = mL/min/1.73 m2  
SCr (standardized serum creatinine) = mg/dL  
? = 0.7 (females) or 0.9 (males)  
? = -0.241 (females) or -0.302 (males)  
min = indicates the minimum of SCr/? or 1  
max = indicates the maximum of SCr/? or 1  
Age = years   
   
                                                            Performed By: #### E  
GFR ####  
[address] [address]   
   
                                                    CMPon 10-   
   
                      Albumin [Mass/Vol] 3.7 g/dL   Normal     3.2-4.9    [facility]  
   
                                        Comment on above:   Performed By: #### C  
OMP ####[address] [address]   
   
                                                    Albumin/Globulin   
[Mass ratio]    1.2 {ratio}     Normal          1.1-2.2         [facility]  
   
                                        Comment on above:   Performed By: #### C  
OMP ####[address] [address]   
   
                      Alk Phos   74 IU/L    Normal     32-91      [facility]  
   
                                        Comment on above:   Performed By: #### C  
OMP ####[address] [address]   
   
                                                    ALT [Catalytic   
activity/Vol]   22 U/L          Normal          14-54           [facility]  
   
                                        Comment on above:   Performed By: #### C  
OMP ####[address] [address]   
   
                      Anion gap [Moles/Vol] 13 mmol/L  Normal     7-17       [facility]  
   
                                        Comment on above:   Performed By: #### C  
OMP ####[address] [address]   
   
                                                    AST [Catalytic   
activity/Vol]   27 U/L          Normal          15-41           [facility]  
   
                                        Comment on above:   Performed By: #### C  
OMP ####[address] [address]   
   
                      Bili Total 0.7 mg/dL  Normal     0.3-1.2    [facility]  
   
                                        Comment on above:   Performed By: #### C  
OMP ####[address] [address]   
   
                      Calcium [Mass/Vol] 8.9 mg/dL  Normal     8.5-10.3   [facility]  
   
                                        Comment on above:   Performed By: #### C  
OMP ####[address] [address]   
   
                      Chloride [Moles/Vol] 107 mmol/L Normal          [facility]  
   
                                        Comment on above:   Performed By: #### C  
OMP ####[address] [address]   
   
                      CO2 [Moles/Vol] 21 mmol/L  Low        22-32      [facility]  
   
                                        Comment on above:   Performed By: #### C  
OMP ####[address] [address]   
   
                      Creatinine [Mass/Vol] 0.58 mg/dL Normal     0.44-1.03  [facility]  
   
                                        Comment on above:   Performed By: #### C  
OMP ####[address] [place] [address]   
   
                      Glucose [Mass/Vol] 93 mg/dL   Normal     70-99      [facility]  
   
                                        Comment on above:   Performed By: #### C  
OMP ####[address] [address]   
   
                      Potassium [Moles/Vol] 3.9 mmol/L Normal     3.4-4.8    [facility]  
   
                                        Comment on above:   Performed By: #### C  
OMP ####[address] [place] [address]   
   
                      Protein [Mass/Vol] 6.8 g/dL   Normal     6.5-8.1    [facility]  
   
                                        Comment on above:   Performed By: #### C  
OMP ####[place] [id number]   
   
                      Sodium [Moles/Vol] 137 mmol/L Normal     133-142    [facility]  
   
                                        Comment on above:   Performed By: #### C  
OMP ####[place] [address]   
   
                                                    Urea nitrogen   
[Mass/Vol]      16 mg/dL        Normal          8-26            [facility]  
   
                                        Comment on above:   Performed By: #### C  
OMP ####[place] [address]   
   
                                                    Urea   
nitrogen/Creatinine   
[Mass ratio]    27.6 mg/mg      High            10.0-20.0       [facility]  
   
                                        Comment on above:   Performed By: #### C  
OMP ####[place] [id number]   
   
                                                    COV19 Rapidon 10-   
   
                                                    LAB ONLY Result   
Called?         No              Normal                          [facility]  
   
                                        Comment on above:   Performed By: #### M  
G ####  
[place] [id number]   
   
                      Reason for Rapid Test Inpatient  Normal                [facility]  
   
                                        Comment on above:   Performed By: #### M  
G ####  
[place] [id number]   
   
                                                    SARS-CoV-2 (COVID-19)   
RNA TEMO+probe Ql   
(Unsp spec)     Negative        Normal          Negative        [facility]  
   
                                        Comment on above:   Result Comment: The   
2019 novel coronavirus SARS-CoV-2 target   
nucleic acids are not detected.  
This test is for the detection of SARS-CoV-2 RNA. Positive   
results are indicative of active infection with SARS-CoV-2.   
Positive results do not rule out bacterial infection or   
co-infection with other viruses. Negative results should be   
treated as presumptive and, if inconsistent with clinical signs   
and symptoms or necessary for patient management, should be   
tested with an alternative molecular assay.Negative results do   
not preclude SARS-CoV-2 infection and should not be used as the   
sole basis for treatment or other patient management decisions.   
Clinical correlation with patient history and other diagnostic   
information is necessary to determine patient infection status.  
ADDITIONAL INFORMATION:  
Testing was performed using the ID NOW COVID-19 test by [employer], which has received Emergency Use Authorization   
(EUA) by the U.S. Food and Drug Administration.  
The [employer] ID NOW COVID-19 test performs best when patients are   
tested within the first 7 days of symptom onset. Results should   
be interpreted with caution for asymptomatic patients or those   
tested outside the 7 day target. Refer to CDC guidelines for   
further testing algorithms.  
Fact sheets for this Emergency Use Authorization (EUA) assay can   
be found at the following links:  
Fact Sheet for HealthCare Providers:   
https://www.[employer].gov/media/613627/download  
Fact Sheet for Patients:   
https://www.fda.gov/media/369445/download   
   
                                                            Performed By: #### M  
G ####  
[place] [id number]   
   
                                                    CRPon 10-   
   
                      CRP        0.22 mg/dL Normal     0.00-0.75  [facility]  
   
                                        Comment on above:   Result Comment: CRP   
measurement is useful for assessment of   
non-specific  
INFLAMMATORY RESPONSE to infection or injury AND is a  
sensitive MARKER of ACUTE INFLAMMATION including CARDIAC  
RISK ASSESSMENT.  
CARDIAC patients with elevated CRP are POTENTIALLY at a  
HIGHER RISK OF FUTURE CARDIAC EVENTS.   
   
                                                            Performed By: #### C  
OMP ####  
[place] [id number]   
   
                                                    ED Clinical Summaryon 10-12-  
2023   
   
                                        ED Clinical Summary (Inserted Image.   
Unable to display)   
[place] [id number] [phone]  
ED Clinical Summary  
Person Information  
Name: Cantu, Nichole Lynn  
[place] Age:   
48 Years : 1975  
Sex: Female PCP:   
Luis Alberto Castellon MD  
Marital Status:  
 Phone:  
Race:  
White Ethnicity:  
Not  or    
Language:  
English  
MRN: [id number] FIN:   
[id number]  
Visit Reason:  
Generalized pain; MS   
flare-up Acuity: 3  
Enc Type: Inpatient   
Med Service: Emergency   
Medicine  
Arrival:  
10/11/2023 19:54:00   
Discharge: LOS: 000   
04:39  
Checkin:  
10/11/2023 19:54:00   
Checkout: 10/12/2023   
00:33:52 Dispo Type:  
Address:  
[address]   
[place] [address]  
Provider Notes:  
History of Present   
Illness  
Patient is a   
48-year-old female   
with a history of   
MS,?avascular   
necrosis,?and DVTs   
presenting for?3 days   
of?left facial pain,   
left?eye blurriness,   
and left leg?weakness.   
?She states the pain   
started at her   
hairline?of the left   
side of her scalp and   
progressed on the left   
side of her face and   
has since?proceeded?to   
cause slight   
blurriness in her left   
eye.? Today she also   
started   
losing?function of her   
left leg saying that   
she has to? lug?it   
around ?to be able to   
walk. ?She follows   
closely?with   
 for her   
MS and has been   
repeatedly admitted to   
the hospital for MS   
flares?including optic   
neuritis.? She denies   
slurred speech or   
facial droop. ?Denies   
saddle paresthesias   
or?urinary/bowel   
incontinence.  
Review of Systems  
As reviewed in the   
HPI. All other systems   
reviewed are negative   
or normal.  
Physical Exam  
CONSTITUTIONAL: [well   
appearing in no acute   
distress]  
SKIN: [Warm, dry, and   
intact without rash]  
EYES: [extraocular   
movements are grossly   
intact, clear   
conjunctiva]  
HENT: [Normocephalic,   
atraumatic, moist   
mucus membranes]  
NECK: [no obvious   
swelling, normal range   
of motion]  
PULMONARY: [normal   
chest rise and fall,   
no respiratory   
distress or stridor  
CARDIOVASCULAR:   
[regular rate, distal   
extremities are warm   
and well perfused]  
GASTROINSTESTINAL:   
[nondistended,   
non-tender]  
GENITOURINARY:   
[deferred]  
NEUROLOGIC: [normal   
speech, unable to   
move?left leg. ?No   
facial droop, palsy,   
aphasia]  
MUSCULOSKELETAL: [no   
gross deformities,   
atraumatic]  
PSYCHIATRIC: [normal   
mood and affect]  
Reexamination/Reevalua  
tion  
Patient rested   
comfortably in bed   
during handoff, vital   
stable.  
Diagnosis:  
1:Multiple sclerosis   
exacerbation  
Problems  
No Problems Documented  
Smoking Status:  
Smoking Status  
10 or more cigarettes   
(1/2 pack or more)/day   
in last 30 days  
Functional Status:  
Sensory Deficits:  
History of Falls:  
Mobility Assistance   
Prior to Admission:  
ADLs:  
Current Level of   
Assistance for   
Self-Care/Mobility:  
Cognitive Status:  
Allergies  
fentaNYL (Headache)  
Toradol (Hives)  
Zofran (Hives)  
Protonix (Hives)   
(itchy)  
penicillin (Hives)  
iodinated   
radiocontrast dyes   
(Hives)  
Laboratory or Other   
Results This Visit   
(last charted value   
for your 10/11/2023   
visit)  
Hematology  
10/11/2023 9:44 PM  
WBC: 6.2 x10  
RBC: 4.22 x10  
Neutro Auto: 49.9 % --   
Normal range between (   
47.2 and 70.8 )  
Lymph Auto: 37.8 % --   
Normal range between (   
27.2 and 40.8 )  
Mono Auto: 9.3 % --   
Normal range between (   
3.7 and 11.9 )  
Eos Auto: 1.9 % --   
Normal range between (   
0.0 and 5.4 )  
Basophil Auto: 1.1 %   
-- Normal range   
between ( 0.0 and 1.5   
)  
Baso Absolute: 0.1 x10  
MCV: 87.6 fL -- Normal   
range between ( 80.0   
and 100.0 )  
MCHC: 34.2 % -- Normal   
range between ( 31.0   
and 37.0 )  
Lymph Absolute: 2.4   
x10  
Hct: 37.0 % -- Normal   
range between ( 36.0   
and 46.0 )  
Mono Absolute: 0.6 x10  
MCH: 30.0 pg -- Normal   
range between ( 27.0   
and 35.0 )  
Neutro Absolute: 3.1   
x10  
Hgb: 12.7 g/dL --   
Normal range between (   
12.0 and 16.0 )  
Mean Platelet Volume:   
8.3 fL -- Normal range   
between ( 6.7 and 10.6   
)  
Platelet: 220 x10  
Eos Absolute: 0.1 x10  
RDW: 13.9 % -- Normal   
range between ( 11.6   
and 14.8 )  
Urinalysis  
10/11/2023 10:32 PM  
UA Color: Colorless  
UA Urobilinogen:   
Normal mg/dL  
UA Bili: Negative  
UA Ketones: Negative   
mg/dL  
UA Leukocyte Esterase:   
Negative  
UA Nitrite: Negative  
UA Glucose: Normal   
mg/dL  
UA Protein: Negative   
mg/dL  
UA Blood: Negative  
UA Spec Grav: 1.016 --   
Normal range between (   
1.003 and 1.035 )  
UA pH: 6.5  
UA Clarity: Clear  
UA Source: Clean Catch  
UA Mucus: Present /LPF  
UA WBC Quant: 1 /HPF   
-- Normal range   
between ( 0 and 5 )  
UA RBC Quant: 0 /HPF   
-- Normal range   
between ( 0 and 5 )  
UA Squepi Cells Quant:   
4 /HPF -- Normal range   
between ( 0 and 29 )  
Chemistry  
10/11/2023 9:44 PM  
Creatinine Lvl: 0.58   
mg/dL -- Normal range   
between ( 0.44 and   
1.03 )  
BUN: 16 mg/dL --   
Normal range between (   
8 and 26 )  
Glucose Lvl: 93 mg/dL   
-- Normal range   
between ( 70 and 99 )  
Potassium Lvl: 3.9   
mmol/L -- Normal range   
between ( 3.4 and 4.8   
)  
CRP: 0.22 mg/dL --   
Normal range between (   
0.00 and 0.75 )  
AST: 27 IU/L -- Normal   
range between ( 15 and   
41 )  
ALT: 22 IU/L -- Normal   
range between ( 14 and   
54 )  
Sodium Lvl: 137 (more   
content not   
included)...        Normal                                  [facility]  
   
                                                    Magnesiumon 10-   
   
                      Magnesium [Mass/Vol] 2.0 mg/dL  Normal     1.7-2.4    [facility]  
   
                                        Comment on above:   Performed By: #### M  
G ####  
[place] [id number]   
   
                                                    Phosphoruson 10-   
   
                      Phosphate [Mass/Vol] 3.6 mg/dL  Normal     2.5-4.6    [facility]  
   
                                        Comment on above:   Performed By: #### C  
OMP ####  
[place] [id number]   
   
                                                    UA w Culture if Indon 10-12-  
2023   
   
                      Color (U)  Colorless  Normal                [facility]  
   
                                        Comment on above:   Performed By: #### .  
Automated Diff ####  
[place] [id number]   
   
                      Ketones Ql (U) Negative   Normal     Negative   [facility]  
   
                                        Comment on above:   Performed By: #### .  
Automated Diff ####  
[place] [id number]   
   
                      UA Blood   Negative   Normal     Negative   [facility]  
   
                                        Comment on above:   Performed By: #### .  
Automated Diff ####  
[place] [id number]   
   
                      UA Clarity Clear      Normal                [facility]  
   
                                        Comment on above:   Performed By: #### .  
Automated Diff ####  
[address] [address]   
   
                      UA Glucose Normal     Normal     Negative   [facility]  
   
                                        Comment on above:   Performed By: #### .  
Automated Diff ####  
[address] [address]   
   
                      UA Leukocyte Esterase Negative   Normal     Negative   [facility]  
   
                                        Comment on above:   Performed By: #### .  
Automated Diff ####  
[address] [address]   
   
                      UA Nitrite Negative   Normal     Negative   [facility]  
   
                                        Comment on above:   Performed By: #### .  
Automated Diff ####  
[place] [id number]   
   
                      UA pH      6.5        Normal     4.5 - 7.8  [facility]  
   
                                        Comment on above:   Performed By: #### .  
Automated Diff ####  
[place] [id number]   
   
                      UA Protein Negative   Normal     Negative   [facility]  
   
                                        Comment on above:   Performed By: #### .  
Automated Diff ####  
[place] [id number]   
   
                      UA Source  Clean Catch Normal                [facility]  
   
                                        Comment on above:   Performed By: #### .  
Automated Diff ####  
[place] [id number]   
   
                      UA Spec Grav 1.016      Normal     1.003-1.035 [facility]  
   
                                        Comment on above:   Performed By: #### .  
Automated Diff ####  
[address] [address]   
   
                      UA Urobilinogen Normal     Normal     0.2 - 1.0  [facility]  
   
                                        Comment on above:   Performed By: #### .  
Automated Diff ####  
[place] [id number]   
   
                                                    Urobilinogen (U)   
[Mass/Vol]      Negative        Normal          Negative        [facility]  
   
                                        Comment on above:   Performed By: #### .  
Automated Diff ####  
[place] [id number]   
   
                                                    XR Chest 1 Viewon 10-  
   
   
                                        XR Chest 1 View     CLINICAL HISTORY:   
Feels like meat is a   
stuck in chest.  
EXAMINATION:  
Upright frontal image   
of the chest was   
obtained and compared   
to an examination   
dated 2023.  
FINDINGS:  
Cardiac silhouette is   
normal in size. The   
aorta is mildly   
tortuous. There is no   
focal consolidation,   
pleural effusion or   
pneumothorax. There is   
no radiopaque foreign   
body. There is fusion   
hardware in the   
cervical spine. An IVC   
filter is partially   
imaged. There are   
clips from previous   
cholecystectomy.  
IMPRESSION:  
No acute   
cardiopulmonary   
process.  
***** Final *****  
Dictated by: Umesh Villafana MD  
Dictated DT/TM:   
10/12/2023 4:12 pm  
Signed by: Umesh Villafana MD  
Signed (Electronic   
Signature): 10/12/2023   
4:14 pm  
(If Report Is Signed,   
Electronically Signed   
in Other Vendor   
System)             Normal                                  [facility]  
   
                                                    XR Neck Soft Tissueon 10-12-  
2023   
   
                                        XR Neck Soft Tissue CLINICAL HISTORY:   
Feels like a chunk of   
meat is in esophagus.  
EXAMINATION: Frontal   
and lateral images of   
the neck were obtained   
with attention to the   
soft tissues.  
FINDINGS:  
There is fusion at   
C5-C6. Surgical   
hardware is intact.   
Prevertebral soft   
tissues are   
unremarkable.   
Epiglottis is normal.   
There is no radiopaque   
foreign body. Imaged   
lung apices are clear.  
IMPRESSION:  
No radiopaque foreign   
body.  
***** Final *****  
Dictated by: Umesh Villafana MD  
Dictated DT/TM:   
10/12/2023 4:14 pm  
Signed by: Umesh Villafana MD  
Signed (Electronic   
Signature): 10/12/2023   
4:15 pm  
(If Report Is Signed,   
Electronically Signed   
in Other Vendor   
System)             Normal                                  [facility]  
   
                                                    CBC w/ Diffon 10-   
   
                                                    Erythrocyte   
distribution width   
(RBC) [Ratio]   13.9 %          Normal          11.6-14.8       [facility]  
   
                                        Comment on above:   Performed By: #### .  
Automated Diff ####  
[facility]  
[address] [place] [address]   
   
                                                    Hematocrit (Bld)   
[Volume fraction] 37.0 %          Normal          36.0-46.0       [facility]  
   
                                        Comment on above:   Performed By: #### .  
Automated Diff ####  
[address] [address]   
   
                                                    Hemoglobin (Bld)   
[Mass/Vol]      12.7 g/dL       Normal          12.0-16.0       [facility]  
   
                                        Comment on above:   Performed By: #### .  
Automated Diff ####  
[address] [address]   
   
                                                    MCH (RBC) [Entitic   
mass]           30.0 pg         Normal          27.0-35.0       [facility]  
   
                                        Comment on above:   Performed By: #### .  
Automated Diff ####  
[address] [address]   
   
                      MCHC       34.2 %     Normal     31.0-37.0  [facility]  
   
                                        Comment on above:   Performed By: #### .  
Automated Diff ####  
[address] [address]   
   
                                                    MCV (RBC) [Entitic   
vol]            87.6 fL         Normal          80.0-100.0      [facility]  
   
                                        Comment on above:   Performed By: #### .  
Automated Diff ####  
[address] [address]   
   
                      Platelet   220 x10*3/mcL Normal     150-450    [facility]  
   
                                        Comment on above:   Performed By: #### .  
Automated Diff ####  
[address] [address]   
   
                                                    Platelet mean volume   
(Bld) [Entitic vol] 8.3 fL          Normal          6.7-10.6        [facility]  
   
                                        Comment on above:   Performed By: #### .  
Automated Diff ####  
[address] [address]   
   
                      RBC        4.22 x10*6/mcL Normal     3.80-5.20  [facility]  
   
                                        Comment on above:   Performed By: #### .  
Automated Diff ####  
[address] [address]   
   
                      WBC        6.2 x10*3/mcL Normal     4.5-11.0   [facility]  
   
                                        Comment on above:   Performed By: #### .  
Automated Diff ####  
[address] [place] [address]   
   
                                                    Diff Autoon 10-   
   
                      Baso Absolute 0.1 x10*3/mcL Normal     0.0-0.2    [facility]  
   
                                        Comment on above:   Performed By: #### .  
Automated Diff ####  
[address] [address]   
   
                                                    Basophils/100 WBC   
(Bld)           1.1 %           Normal          0.0-1.5         [facility]  
   
                                        Comment on above:   Performed By: #### .  
Automated Diff ####  
[address] [address]   
   
                      Eos Absolute 0.1 x10*3/mcL Normal     0.0-0.4    [facility]  
   
                                        Comment on above:   Performed By: #### .  
Automated Diff ####  
[address] [address]   
   
                                                    Eosinophils/100 WBC   
(Bld)           1.9 %           Normal          0.0-5.4         [facility]  
   
                                        Comment on above:   Performed By: #### .  
Automated Diff ####  
[address] [address]   
   
                      Lymph Absolute 2.4 x10*3/mcL Normal     1.0-4.8    [facility]  
   
                                        Comment on above:   Performed By: #### .  
Automated Diff ####  
[address] [address]   
   
                                                    Lymphocytes/100 WBC   
(Bld)           37.8 %          Normal          27.2-40.8       [facility]  
   
                                        Comment on above:   Performed By: #### .  
Automated Diff ####  
[address] [address]   
   
                      Mono Absolute 0.6 x10*3/mcL Normal     0.1-1.1    [facility]  
   
                                        Comment on above:   Performed By: #### .  
Automated Diff ####  
[address] [address]   
   
                                                    Monocytes/100 WBC   
(Bld)           9.3 %           Normal          3.7-11.9        [facility]  
   
                                        Comment on above:   Performed By: #### .  
Automated Diff ####  
[address] [address]   
   
                      Neutro Absolute 3.1 x10*3/mcL Normal     1.8-7.7    [facility]  
   
                                        Comment on above:   Performed By: #### .  
Automated Diff ####  
[facility]  
[address] [place] [address]   
   
                      Neutro Auto 49.9 %     Normal     47.2-70.8  [facility]  
   
                                        Comment on above:   Performed By: #### .  
Automated Diff ####  
[facility]  
[address] [place] [address]   
   
                                                    ED Note-Physicianon 10-11-20  
23   
   
                                        ED Note-Physician   Chief Complaint  
PAtient states she is   
having MS flare up   
that has been solomon on   
for three days  
History of Present   
Illness  
Patient is a   
48-year-old female   
with a history of MS,   
avascular necrosis,   
and DVTs presenting   
for 3 days of left   
facial pain, left eye   
blurriness, and left   
leg weakness. She   
states the pain   
started at her   
hairline of the left   
side of her scalp and   
progressed on the left   
side of her face and   
has since proceeded to   
cause slight   
blurriness in her left   
eye. Today she also   
started losing   
function of her left   
leg saying that she   
has to  lug it around    
to be able to walk.   
She follows closely   
with Dr. Castro for   
her MS and has been   
repeatedly admitted to   
the hospital for MS   
flares including optic   
neuritis. She denies   
slurred speech or   
facial droop. Denies   
saddle paresthesias or   
urinary/bowel   
incontinence.  
Review of Systems  
As reviewed in the   
HPI. All other systems   
reviewed are negative   
or normal.  
Physical Exam  
CONSTITUTIONAL: [well   
appearing in no acute   
distress]  
SKIN: [Warm, dry, and   
intact without rash]  
EYES: [extraocular   
movements are grossly   
intact, clear   
conjunctiva]  
HENT: [Normocephalic,   
atraumatic, moist   
mucus membranes]  
NECK: [no obvious   
swelling, normal range   
of motion]  
PULMONARY: [normal   
chest rise and fall,   
no respiratory   
distress or stridor  
CARDIOVASCULAR:   
[regular rate, distal   
extremities are warm   
and well perfused]  
GASTROINSTESTINAL:   
[nondistended,   
non-tender]  
GENITOURINARY:   
[deferred]  
NEUROLOGIC: [normal   
speech, unable to move   
left leg. No facial   
droop, palsy, aphasia]  
MUSCULOSKELETAL: [no   
gross deformities,   
atraumatic]  
PSYCHIATRIC: [normal   
mood and affect]  
Vitals & Measurements  
T: 36.4 ?C (Oral) HR:   
71 (Peripheral) RR: 16   
BP: 130/87 SpO2: 94%  
Medical Decision   
Making  
This report has been   
created using voice   
recognition software.   
It may contain minor   
errors which are   
inherent in voice   
recognition   
technology.  
History of multiple   
sclerosis with an   
episode of optic   
neuritis, DVT,   
avascular necrosis of   
the hip. Followed   
closely by Dr. Mccain. She has had   
multiple admits for   
multiple sclerosis   
flareups. Jessica with   
left facial pain, left   
eye blurriness, and   
left leg weakness or   
known symptoms of her   
MS. Exam shows no   
facial droop, slurred   
speech, neurological   
deficits. Has received   
multiple MRIs in the   
past. CBC, CMP, CRP   
and UA ordered.  
Consulted Dr. Mccain at 2030 as   
he is familiar with   
her case and has   
recommended admit for   
her multiple times. He   
recommends admit once   
again today as she   
will need IV steroids   
as in the past she has   
had bad reactions to   
IVIG. He states he   
knows the patient is   
difficult to get a   
line in and if we are   
able to establish a   
line in the ED to   
start 1 g of IV   
Solu-Medrol and admit   
to medicine for a   
inpatient treatment of   
steroids for MS flare.   
I appreciate Dr. Mccain time.  
Patient states she is   
allergic to   
Solu-Medrol but can   
take as long as she   
receives Benadryl   
before hand. IV   
Benadryl administered   
before 1 g of   
Solu-Medrol was begun   
in the ED.  
Dr. Narinder Selby with   
the hospitalist group   
consulted at 2300 for   
admit for IV   
Solu-Medrol and   
observation of her MS   
exacerbation. After   
reviewing the case Dr. Paz was agreeable to   
admit. I appreciate   
Dr. Aquino's time.  
Patient informed of   
admit to which she was   
agreeable to and   
voiced understanding   
of.  
I handed off this   
patient to Dr. Acevedo   
for the night with   
admit orders in and   
admit accepted as well   
as IV Benadryl and   
Solu-Medrol started.  
Differential   
Diagnosis:  
Multiple sclerosis   
exacerbation  
CVA  
Facial palsy  
Data and analysis:  
? Patients chart   
reviewed historically   
as needed  
? Independent   
Historian:  
? XR:  
? Labs: CRP 0.22  
ED Course / Patient   
Re-evaluation:  
? Time:  
The results of   
pertinent diagnostic   
studies and exam   
findings were   
discussed. The   
patient?s provisional   
diagnosis and plan of   
care were discussed   
with the patient and   
present family. The   
patient and/or present   
family expressed   
understanding of the   
diagnosis and plan.   
The nurse was   
instructed to provide   
written instructions   
and appropriate   
follow-up information.   
The patient   
understands their need   
and responsibility to   
obtain additional   
follow-up as   
instructed. The risks   
of medications   
administered and   
prescribed were   
discussed with the   
patient and family   
present.  
Reexamination/Reevalua  
tion  
Patient rested   
comfortably in bed   
during handoff, vital   
stable.  
Assessment/Plan  
1. Multiple sclerosis   
exacerbation  
IV Benadryl and   
Solu-Medrol  
Admit  
Orders:  
C-Reactive Protein  
Complete Blood Count   
w/ Differential  
Comprehensive   
Metabolic Panel  
Consult to Neurology  
Urinalysis with   
Culture, if indicated  
Refresh vitals and   
sections below:  
Problem List/Past   
Medical History  
Ongoing  
Avascular necrosis of   
bone of left hip  
Chronic pain  
Deep vein thrombosis   
(DVT) of right upper   
extremity  
Degenerative tear of   
acetabular labrum of   
right hip  
Drug-seeking behavior  
H/O factitious   
disorder  
Hypertension  
Intentional   
self-exposure to   
bleach  
Lakesha-W (more   
content not   
included)...        Normal                                  [facility]  
   
                                                    Insurance Correspondence Off  
2023   
   
                                                    Insurance   
Correspondence Office                   [phone]  
[id number].  
[id number]            Normal                                  [facility]  
   
                                                    Insurance Correspondence Off  
ice2023   
   
                                                    Insurance   
Correspondence Office                   [phone]  
[id number]#1.  
[id number]            Normal                                  [facility]  
   
                                                    .eGFRon 2023   
   
                                                    GFR/1.73 sq   
M.predicted MDRD   
(S/P/Bld) [Vol   
rate/Area]      mL/min/{1.73_m2} Normal          >=60            [facility]  
   
                                        Comment on above:   Result Comment: [facility]  
 Laboratories have implemented the eGFR   
calculation approach that does not have a coefficient for race   
and that conforms to the NKF-ASN Task Force Recommendations.  
Stages of Chronic Kidney Disease GFR  
Stage 3a Mild to moderate loss of kidney function 59 to 45  
Stage 3b Moderate to severe loss of kidney function 44 to 33  
Stage 4 Severe loss of kidney function 29 to 15  
Stage 5 Kidney failure Less than 15  
GFR calculated using the CKD-Epi Creatinine Equation ():  
eGFR = 142 X min(SCr/?, 1)? X max(SCr /?, 1)-1.200 X 0.9938Age X   
1.012 [if female]  
Abbreviations/Units:  
eGFR (estimated glomerular filtration rate) = mL/min/1.73 m2  
SCr (standardized serum creatinine) = mg/dL  
? = 0.7 (females) or 0.9 (males)  
? = -0.241 (females) or -0.302 (males)  
min = indicates the minimum of SCr/? or 1  
max = indicates the maximum of SCr/? or 1  
Age = years   
   
                                                            Performed By: #### E  
GFR ####[address] [address]   
   
                                                    Basic Metabolic Profileon    
   
                      Anion gap [Moles/Vol] 11 mmol/L  Normal     7-17       [facility]  
   
                                        Comment on above:   Performed By: #### .  
Manual Diff ####  
[address] [address]   
   
                      Calcium [Mass/Vol] 8.9 mg/dL  Normal     8.5-10.3   [facility]  
   
                                        Comment on above:   Performed By: #### .  
Manual Diff ####  
[address] [address]   
   
                      Chloride [Moles/Vol] 100 mmol/L Normal          [facility]  
   
                                        Comment on above:   Performed By: #### .  
Manual Diff ####  
[address] [address]   
   
                      CO2 [Moles/Vol] 26 mmol/L  Normal     22-32      [facility]  
   
                                        Comment on above:   Performed By: #### .  
Manual Diff ####  
[address] [address]   
   
                      Creatinine [Mass/Vol] 0.60 mg/dL Normal     0.44-1.03  [facility]  
   
                                        Comment on above:   Performed By: #### .  
Manual Diff ####  
[address] [address]   
   
                      Glucose [Mass/Vol] 130 mg/dL  High       70-99      [facility]  
   
                                        Comment on above:   Performed By: #### .  
Manual Diff ####  
[address] [address]   
   
                      Potassium [Moles/Vol] 4.1 mmol/L Normal     3.4-4.8    [facility]  
   
                                        Comment on above:   Performed By: #### .  
Manual Diff ####  
[address] [address]   
   
                      Sodium [Moles/Vol] 133 mmol/L Normal     133-142    [facility]  
   
                                        Comment on above:   Performed By: #### .  
Manual Diff ####  
[address] [address]   
   
                                                    Urea nitrogen   
[Mass/Vol]      14 mg/dL        Normal          8-26            [facility]  
   
                                        Comment on above:   Performed By: #### .  
Manual Diff ####  
[address] [address]   
   
                                                    Urea   
nitrogen/Creatinine   
[Mass ratio]    23.3 mg/mg      High            10.0-20.0       [facility]  
   
                                        Comment on above:   Performed By: #### .  
Manual Diff ####  
[place] [id number]   
   
                                                    CBCon 2023   
   
                                                    Erythrocyte   
distribution width   
(RBC) [Ratio]   13.6 %          Normal          11.6-14.8       [facility]  
   
                                        Comment on above:   Performed By: #### .  
Automated Diff ####  
[address] [address]   
   
                                                    Hematocrit (Bld)   
[Volume fraction] 39.9 %          Normal          36.0-46.0       [facility]  
   
                                        Comment on above:   Performed By: #### .  
Automated Diff ####  
[address] [address]   
   
                                                    Hemoglobin (Bld)   
[Mass/Vol]      13.6 g/dL       Normal          12.0-16.0       [facility]  
   
                                        Comment on above:   Performed By: #### .  
Automated Diff ####  
[address] [address]   
   
                                                    MCH (RBC) [Entitic   
mass]           28.7 pg         Normal          27.0-35.0       [facility]  
   
                                        Comment on above:   Performed By: #### .  
Automated Diff ####  
[address] [address]   
   
                      MCHC       34.1 %     Normal     31.0-37.0  [facility]  
   
                                        Comment on above:   Performed By: #### .  
Automated Diff ####  
[place] [id number]   
   
                                                    MCV (RBC) [Entitic   
vol]            84.2 fL         Normal          80.0-100.0      [facility]  
   
                                        Comment on above:   Performed By: #### .  
Automated Diff ####  
[place] [id number]   
   
                      Platelet   303 x10*3/mcL Normal     150-450    [facility]  
   
                                        Comment on above:   Performed By: #### .  
Automated Diff ####  
[place] [id number]   
   
                                                    Platelet mean volume   
(Bld) [Entitic vol] 8.0 fL          Normal          6.7-10.6        [facility]  
   
                                        Comment on above:   Performed By: #### .  
Automated Diff ####  
[place] [id number]   
   
                      RBC        4.73 x10*6/mcL Normal     3.80-5.20  [facility]  
   
                                        Comment on above:   Performed By: #### .  
Automated Diff ####  
[place] [id number]   
   
                      WBC        10.1 x10*3/mcL Normal     4.5-11.0   [facility]  
   
                                        Comment on above:   Performed By: #### .  
Automated Diff ####  
[place] [id number]   
   
                                                    Inpatient Clinical Summaryon  
 2023   
   
                                                    Inpatient Clinical   
Summary                                 [place] [address]  
(Inserted Image.   
Unable to display)   
[place] [address]  
Clinical Summary  
Person Information  
Name: Cantu, Nichole Lynn Age: 47 Years   
: 1975  
Sex: Female PCP:   
Luis Alberto Castellon MD  
Marital Status:  
 Phone: PCP:   
[phone]  
Race:  
White Ethnicity:  
Not  or    
Language:  
English  
MRN: [id number] Visit   
Id: FIN: [id number]  
Visit Reason:  
Blurred Vision   
Speciality: Acuity:  
Enc Type: Inpatient   
Med Service: Emergency   
Medicine  
Arrival:  
2023 08:30:57   
Discharge: Dispo Type:  
Address:  
[address]   
[place] [address]  
Diagnosis: 1:Optic   
neuritis, left;   
2:Multiple sclerosis   
exacerbation; 3:Fall;   
4:Hip pain; 5:Left   
wrist pain; 6:Left   
ankle pain; Oral   
thrush  
Discharged To:  
Home Treatments:  
Devices/Equipment:  
Professional Skilled   
Services:  
Special Services and   
Community Resources:  
Mode of Discharge   
Transportation:  
Discharge Orders  
Allergies  
fentaNYL (Headache)  
Toradol (Hives)  
Zofran (Hives)  
Protonix (Hives)   
(itchy)  
penicillin (Hives)  
iodinated   
radiocontrast dyes   
(Hives)  
Functional Status:  
Sensory Deficits: None  
History of Falls: None  
Mobility Assistance   
Prior to Admission:  
ADLs: Independent  
Gait: Unable to assess  
Ambulation Assist:  
Assistive Device: Gait   
belt, Walker  
Special Orthopedic   
Devices:  
Current Level of   
Assistance for   
Self-Care/Mobility:  
Cognitive Status:  
Orientation:  
Orientation Assessment   
Oriented x 4  
Level of   
Consciousness: Alert  
Characteristics of   
Speech: Clear  
Aspiration Risk: None  
Affect/Behavior:   
Appropriate  
Laboratory or Other   
Results This Visit   
(last charted value   
for your 2023   
visit)  
Hematology  
2023 5:46 AM  
WBC: 10.1 x10  
RBC: 4.73 x10  
MCV: 84.2 fL -- Normal   
range between ( 80.0   
and 100.0 )  
MCHC: 34.1 % -- Normal   
range between ( 31.0   
and 37.0 )  
Hct: 39.9 % -- Normal   
range between ( 36.0   
and 46.0 )  
MCH: 28.7 pg -- Normal   
range between ( 27.0   
and 35.0 )  
Hgb: 13.6 g/dL --   
Normal range between (   
12.0 and 16.0 )  
Mean Platelet Volume:   
8.0 fL -- Normal range   
between ( 6.7 and 10.6   
)  
Platelet: 303 x10  
RDW: 13.6 % -- Normal   
range between ( 11.6   
and 14.8 )  
2023 9:35 AM  
Neutro Auto: 48.7 % --   
Normal range between (   
47.2 and 70.8 )  
Lymph Auto: 40.1 % --   
Normal range between (   
27.2 and 40.8 )  
Mono Auto: 7.9 % --   
Normal range between (   
3.7 and 11.9 )  
Eos Auto: 2.2 % --   
Normal range between (   
0.0 and 5.4 )  
Basophil Auto: 1.1 %   
-- Normal range   
between ( 0.0 and 1.5   
)  
Baso Absolute: 0.1 x10  
Lymph Absolute: 2.5   
x10  
Mono Absolute: 0.5 x10  
Neutro Absolute: 3.0   
x10  
Eos Absolute: 0.1 x10  
Sed Rate: 11 mm/hr --   
Normal range between (   
0 and 23 )  
Chemistry  
2023 5:46 AM  
Creatinine Lvl: 0.60   
mg/dL -- Normal range   
between ( 0.44 and   
1.03 )  
BUN: 14 mg/dL --   
Normal range between (   
8 and 26 )  
Glucose Lvl: 130 mg/dL   
-- Normal range   
between ( 70 and 99 )  
Potassium Lvl: 4.1   
mmol/L -- Normal range   
between ( 3.4 and 4.8   
)  
Sodium Lvl: 133 mmol/L   
-- Normal range   
between ( 133 and 142   
)  
Calcium Lvl: 8.9 mg/dL   
-- Normal range   
between ( 8.5 and 10.3   
)  
Chloride: 100 mmol/L   
-- Normal range   
between ( 98 and 110 )  
CO2: 26 mmol/L --   
Normal range between (   
22 and 32 )  
Anion Gap: 11 --   
Normal range between (   
7 and 17 )  
Estimated GFR: >60   
mL/min/1.73m?  
BUN Crea Ratio: 23.3   
-- Normal range   
between ( 10.0 and   
20.0 )  
07/15/2023 9:33 AM  
Phosphorus: 3.0 mg/dL   
-- Normal range   
between ( 2.5 and 4.6   
)  
Magnesium Lvl: 1.9   
mg/dL -- Normal range   
between ( 1.7 and 2.4   
)  
2023 9:35 AM  
CRP: 0.13 mg/dL --   
Normal range between (   
0.00 and 0.75 )  
Computed Tomography  
07/15/2023 2:18 PM  
CT Hip w/o Contrast   
Left: CT Hip w/o   
Contrast Left  
Diagnostic Radiology  
07/15/2023 1:26 PM  
XR Ankle 2 Views Left:   
XR Ankle 2 Views Left  
07/15/2023 8:18 AM  
XR Hip 2-3 Views Left:   
XR Hip 2-3 Views Left  
07/15/2023 8:17 AM  
XR Wrist 3 or More   
Views Left: XR Wrist 3   
or More Views Left  
Magnetic Resonance   
Imaging  
2023 2:48 PM  
MRI Pelvis w/o   
Contrast: MRI Pelvis   
w/o Contrast  
2023 10:45 AM  
MRI Orbits w/ + w/o   
Contrast: MRI Orbits   
w/ + w/o Contrast  
Measurements:  
Height:  
Weight:  
Blood Pressure: 137   
mmHg /  
BMI:  
Respiratory:  
Respirations:   
Unlabored  
Respiratory Symptoms:   
None  
Cardiovascular:  
Heart Sounds:  
Heart Rhythm: Regular  
Gastrointestinal:  
GI Symptoms:  
Bowel Sounds: Present  
Vital Signs:  
Temp Axillary:  
Temp Temporal Artery:  
Temp Oral: 36.5 degC  
Temp Rectal:  
Apical Heart Rate:  
Peripheral Pulse Rate:   
98 bpm  
Heart Rate: 78 bpm  
Respiratory Rate: 18   
br/min  
Diet  
Diet:  
Feeding Tolerance:  
Appetite: Fair  
Fab Assessment: 21  
Procedures  
No Procedures   
Documented  
Immunizations  
No Immunizations   
Documented This Visit  
HERE ARE THE   
MEDICATION CHANGES   
THAT OCCURRED DURING   
YOUR HOSPITAL STAY  
New Medications  
[id number], [address]   
[place], [place]   
[id number], [phone]  
nystat (more content   
not included)...    Normal                                  [facility]  
   
                                                    Neurology Progress Noteon    
   
                                                    Neurology Progress   
Note                                    Subjective  
Patient is scheduled   
to complete her last   
day of IV Solu-Medrol   
later this afternoon   
and then can be   
discharged. Her left   
leg is moving much   
better. Her IV   
Dilaudid was   
discontinued but she   
does have Percocet at   
home which she takes   
on a regular basis  
Review of Systems  
Constitutional: [No   
fevers, chills,   
sweats]  
Eye: [No recent visual   
problems]  
ENMT: [No ear pain,   
nasal congestion, sore   
throat]  
Respiratory: [No   
shortness of breath,   
cough]  
Cardiovascular: [No   
Chest pain,   
palpitations, syncope]  
Gastrointestinal: [No   
nausea, vomiting,   
diarrhea]  
Genitourinary: [No   
hematuria]  
Objective  
Vitals & Measurements  
T: 36.5 ?C (Oral) HR:   
98 (Peripheral) RR: 18   
BP: 137/86 SpO2: 99%  
HT: 170.2 cm WT: 66.5   
kg (Dosing) BMI: 22.09  
Additional Vitals  
No qualifying data   
available.  
Lab Results  
Test Name Test Result   
Date/Time  
WBC 10.1 x10  
Hgb 13.6 g/dL   
2023 05:46 EDT  
Hct 39.9 % 2023   
05:46 EDT  
Platelet 303 x10  
Sodium Lvl 133 mmol/L   
2023 05:46 EDT  
Potassium Lvl 4.1   
mmol/L 2023   
05:46 EDT  
Chloride 100 mmol/L   
2023 05:46 EDT  
CO2 26 mmol/L   
2023 05:46 EDT  
Glucose Lvl 130 mg/dL   
(High) 2023   
05:46 EDT  
BUN 14 mg/dL   
2023 05:46 EDT  
Creatinine Lvl 0.60   
mg/dL 2023 05:46   
EDT  
Microbiology - Current   
Encounter  
No qualifying data   
available.  
Diagnostic Results  
Magnetic Resonance   
Imaging  
MRI Pelvis w/o   
Contrast  
23 15:27:48  
IMPRESSION:  
1. Subtle signal   
abnormalities in the   
subchondral marrow at   
the superior aspect of   
the left femoral head,   
nonspecific but   
possible early changes   
of avascular necrosis.   
Consider follow-up   
with CT or MRI as   
clinically indicated.  
2. Normal signal   
intensity of the right   
femoral head.  
3. Likely   
postoperative atrophy   
with concomitant   
signal abnormality in   
the lower paraspinal   
muscles.  
Signed By: Alden Randhawa MD  
Physical Exam  
Alert oriented x3   
normal speech cranial   
nerves II through XII   
are symmetric motor   
exam shows that she is   
moving her left leg   
much better  
Medications  
Inpatient  
acetaminophen, 650 mg,   
Oral, q6hr, PRN  
acetaminophen, 650 mg,   
Oral, q6hr, PRN  
amLODIPine, 10 mg,   
Oral, qAM  
amoxicillin, 875 mg,   
Oral, BID  
cyclobenzaprine, 5 mg,   
Oral, TID  
diphenhydrAMINE, 50   
mg= 1 mL, IV Push,   
q6hr, PRN  
docusate-senna 50   
mg-8.6 mg oral tablet,   
2 tabs, Oral, BID, PRN  
enoxaparin, 40 mg= 0.4   
mL, Subcutaneous,   
Daily  
LORazepam, 1 mg, Oral,   
TID  
Lyrica, 100 mg, Oral,   
TID  
magnesium oxide, 400   
mg, Oral, qAM  
methylPREDNISolone +   
Sodium Chloride 0.9%   
intravenous solution   
250 mL  
midazolam, 1 mg= 1 mL,   
IV Push, q15min, PRN  
naloxone, 0.4 mg= 1   
mL, IV Push, q2min,   
PRN  
nicotine 21 mg/24 hr   
transdermal film,   
extended release, 1   
patches, TD, Daily,   
PRN  
Normal Saline Flush   
0.9% injectable   
solution, 10 mL, IV   
Push, As Indicated,   
PRN  
Normal Saline Flush   
0.9% injectable   
solution, 10 mL, IV   
Push, BID  
nystatin, 619855   
units= 5 mL, Oral, QID  
oxyCODONE-acetaminophe  
n 10 mg-325 mg oral   
tablet, 1 tabs, Oral,   
q4hr, PRN  
Pepcid, 20 mg= 2 mL,   
IV Push,   
q12hr-Standard Times  
Phenergan, 25 mg,   
Oral, q4hr, PRN  
Premarin, 0.9 mg,   
Oral, qAM  
prochlorperazine, 5   
mg= 1 mL, IV Push,   
q4hr, PRN  
Zeposia (ozanimod)   
capsules, 1 caps,   
Oral, Daily  
Assessment/Plan  
1. Optic neuritis,   
left  
2. Multiple sclerosis   
exacerbation  
Completes IV   
Solu-Medrol today was   
not able to tolerate   
IVIG on a positive   
note MRI of the left   
hip looks much better   
with less avascular   
necrosis  
3. Fall  
4. Hip pain  
5. Left wrist pain  
6. Left ankle pain  
Electronically signed   
by  
______________________  
______________________  
_____  
Henry Mccain MD 23 12:24   
EDT                 Normal                                  [facility]  
   
                                                    Insurance Correspondence Off  
ice2023   
   
                                                    Insurance   
Correspondence Office                   [phone]  
[id number].  
[id number]            Normal                                  [facility]  
   
                                                    Neurology Progress Noteon    
   
                                                    Neurology Progress   
Note                                    Subjective  
Patient is scheduled   
to finish IV   
Solu-Medrol on . She did have to   
miss a day when she   
had no IV access. She   
was not a candidate   
for a PICC line but   
she did have another   
line placed which   
appears to be working.   
There have been no   
changes in her   
medication regimen and   
affect is brighter   
today she is   
encouraged that she is   
moving her left leg   
better  
Review of Systems  
Constitutional: [No   
fevers, chills,   
sweats]  
Eye: [No recent visual   
problems]  
ENMT: [No ear pain,   
nasal congestion, sore   
throat]  
Respiratory: [No   
shortness of breath,   
cough]  
Cardiovascular: [No   
Chest pain,   
palpitations, syncope]  
Gastrointestinal: [No   
nausea, vomiting,   
diarrhea]  
Genitourinary: [No   
hematuria]  
Objective  
Vitals & Measurements  
T: 36.6 ?C (Oral) HR:   
81 (Peripheral) RR: 18   
BP: 129/84 SpO2: 97%  
HT: 170.2 cm WT: 66.4   
kg (Dosing) BMI: 22.09  
Additional Vitals  
No qualifying data   
available.  
Lab Results  
Microbiology - Current   
Encounter  
No qualifying data   
available.  
Diagnostic Results  
Magnetic Resonance   
Imaging  
MRI Pelvis w/o   
Contrast  
23 15:27:48  
IMPRESSION:  
1. Subtle signal   
abnormalities in the   
subchondral marrow at   
the superior aspect of   
the left femoral head,   
nonspecific but   
possible early changes   
of avascular necrosis.   
Consider follow-up   
with CT or MRI as   
clinically indicated.  
2. Normal signal   
intensity of the right   
femoral head.  
3. Likely   
postoperative atrophy   
with concomitant   
signal abnormality in   
the lower paraspinal   
muscles.  
Signed By: Edis SMITH,   
Alden Rodriguez  
Physical Exam  
Alert oriented x3   
cranial nerves II   
through XII are   
symmetric motor exam   
shows some persistent   
weakness of the left   
leg but she is able to   
lift her left leg off   
the bed some flexion   
and extension of the   
left leg. No dysmetria   
normal strength both   
arms and right leg  
Medications  
Inpatient  
acetaminophen, 650 mg,   
Oral, q6hr, PRN  
acetaminophen, 650 mg,   
Oral, q6hr, PRN  
amLODIPine, 10 mg,   
Oral, qAM  
amoxicillin, 875 mg,   
Oral, BID  
cyclobenzaprine, 5 mg,   
Oral, TID  
Dilaudid, 1 mg= 1 mL,   
IV Push, q2hr, PRN  
diphenhydrAMINE, 50   
mg= 1 mL, IV Push,   
q6hr, PRN  
docusate-senna 50   
mg-8.6 mg oral tablet,   
2 tabs, Oral, BID, PRN  
enoxaparin, 40 mg= 0.4   
mL, Subcutaneous,   
Daily  
LORazepam, 1 mg, Oral,   
TID  
Lyrica, 100 mg, Oral,   
TID  
magnesium oxide, 400   
mg, Oral, qAM  
methylPREDNISolone +   
Sodium Chloride 0.9%   
intravenous solution   
250 mL  
midazolam, 1 mg= 1 mL,   
IV Push, q15min, PRN  
naloxone, 0.4 mg= 1   
mL, IV Push, q2min,   
PRN  
nicotine 21 mg/24 hr   
transdermal film,   
extended release, 1   
patches, TD, Daily,   
PRN  
Normal Saline Flush   
0.9% injectable   
solution, 10 mL, IV   
Push, As Indicated,   
PRN  
Normal Saline Flush   
0.9% injectable   
solution, 10 mL, IV   
Push, BID  
nystatin, 001928   
units= 5 mL, Oral, QID  
oxyCODONE-acetaminophe  
n 10 mg-325 mg oral   
tablet, 1 tabs, Oral,   
q4hr, PRN  
Pepcid, 20 mg= 2 mL,   
IV Push,   
q12hr-Standard Times  
Phenergan, 25 mg,   
Oral, q4hr, PRN  
Premarin, 0.9 mg,   
Oral, qAM  
prochlorperazine, 5   
mg= 1 mL, IV Push,   
q4hr, PRN  
Zeposia (ozanimod)   
capsules, 1 caps,   
Oral, Daily  
Assessment/Plan  
1. Optic neuritis,   
left  
2. Multiple sclerosis   
exacerbation  
Scheduled to complete   
IVIG on   
3. Fall  
4. Hip pain  
5. Left wrist pain  
6. Left ankle pain  
Electronically signed   
by  
______________________  
______________________  
_____  
Kalyn SMITH, Henry Reyes 23 19:56   
EDT                 Normal                                  [facility]  
   
                                                    Neurology Progress Noteon    
   
                                                    Neurology Progress   
Note                                    Subjective  
Patient had a very   
emotional day with   
talking to relatives   
i.e. mother and   
daughter which   
apparently did not go   
well her IV   
infiltrated and she   
will need a PICC line   
which I will order for   
tomorrow on a positive   
note her MRIs of the   
hip look much better   
there was essentially   
no avascular necrosis   
of the right femoral   
head and the left   
femoral head was much   
improved however she   
has been off steroids   
for over a year and a   
half. I did tell her   
to notify her   
orthopedic surgeon   
that her studies have   
been done. We will   
work on getting a PICC   
line for tomorrow by   
radiology  
Review of Systems  
Constitutional: [No   
fevers, chills,   
sweats]  
Eye: [No recent visual   
problems]  
ENMT: [No ear pain,   
nasal congestion, sore   
throat]  
Respiratory: [No   
shortness of breath,   
cough]  
Cardiovascular: [No   
Chest pain,   
palpitations, syncope]  
Gastrointestinal: [No   
nausea, vomiting,   
diarrhea]  
Genitourinary: [No   
hematuria]  
Objective  
Vitals & Measurements  
T: 36.7 ?C (Oral) HR:   
90 (Peripheral) RR: 18   
BP: 160/95 SpO2: 95%  
HT: 170.2 cm WT: 62.2   
kg BMI: 22.09  
Additional Vitals  
No qualifying data   
available.  
Lab Results  
Test Name Test Result   
Date/Time  
WBC 12.4 x10  
Hgb 13.1 g/dL   
2023 05:21 EDT  
Hct 38.3 % 2023   
05:21 EDT  
Platelet 272 x10  
Sodium Lvl 134 mmol/L   
2023 05:21 EDT  
Potassium Lvl 4.7   
mmol/L 2023   
05:21 EDT  
Chloride 103 mmol/L   
2023 05:21 EDT  
CO2 24 mmol/L   
2023 05:21 EDT  
Glucose Lvl 133 mg/dL   
(High) 2023   
05:21 EDT  
BUN 21 mg/dL   
2023 05:21 EDT  
Creatinine Lvl 0.60   
mg/dL 2023 05:21   
EDT  
Microbiology - Current   
Encounter  
No qualifying data   
available.  
Diagnostic Results  
Magnetic Resonance   
Imaging  
MRI Pelvis w/o   
Contrast  
23 15:27:48  
IMPRESSION:  
1. Subtle signal   
abnormalities in the   
subchondral marrow at   
the superior aspect of   
the left femoral head,   
nonspecific but   
possible early changes   
of avascular necrosis.   
Consider follow-up   
with CT or MRI as   
clinically indicated.  
2. Normal signal   
intensity of the right   
femoral head.  
3. Likely   
postoperative atrophy   
with concomitant   
signal abnormality in   
the lower paraspinal   
muscles.  
Signed By: Alden Randhawa MD  
Physical Exam  
Alert oriented x3   
normal speech able to   
lift her left leg off   
the bed and move it   
laterally normal   
strength of right side   
and left arm no new   
issues  
Medications  
Inpatient  
acetaminophen, 650 mg,   
Oral, q6hr, PRN  
acetaminophen, 650 mg,   
Oral, q6hr, PRN  
amLODIPine, 10 mg,   
Oral, qAM  
amoxicillin, 875 mg,   
Oral, BID  
cyclobenzaprine, 5 mg,   
Oral, TID  
Dilaudid, 1 mg= 1 mL,   
IV Push, q2hr, PRN  
diphenhydrAMINE, 50   
mg= 1 mL, IV Push,   
q6hr, PRN  
docusate-senna 50   
mg-8.6 mg oral tablet,   
2 tabs, Oral, BID, PRN  
enoxaparin, 40 mg= 0.4   
mL, Subcutaneous,   
Daily  
LORazepam, 1 mg, Oral,   
TID  
Lyrica, 100 mg, Oral,   
TID  
magnesium oxide, 400   
mg, Oral, qAM  
methylPREDNISolone +   
Sodium Chloride 0.9%   
intravenous solution   
250 mL  
midazolam, 1 mg= 1 mL,   
IV Push, q15min, PRN  
naloxone, 0.4 mg= 1   
mL, IV Push, q2min,   
PRN  
nicotine 21 mg/24 hr   
transdermal film,   
extended release, 1   
patches, TD, Daily,   
PRN  
Normal Saline Flush   
0.9% injectable   
solution, 10 mL, IV   
Push, As Indicated,   
PRN  
Normal Saline Flush   
0.9% injectable   
solution, 10 mL, IV   
Push, BID  
oxyCODONE-acetaminophe  
n 10 mg-325 mg oral   
tablet, 1 tabs, Oral,   
q4hr, PRN  
Pepcid, 20 mg= 2 mL,   
IV Push,   
q12hr-Standard Times  
Phenergan, 25 mg,   
Oral, q4hr, PRN  
Premarin, 0.9 mg,   
Oral, qAM  
prochlorperazine, 5   
mg= 1 mL, IV Push,   
q4hr, PRN  
Zeposia (ozanimod)   
capsules, 1 caps,   
Oral, Daily  
Assessment/Plan  
1. Optic neuritis,   
left  
2. Multiple sclerosis   
exacerbation  
Will place order for   
PICC line  
3. Fall  
4. Hip pain  
5. Left wrist pain  
6. Left ankle pain  
Electronically signed   
by  
______________________  
______________________  
_____  
Henry Mccain MD 23 18:40   
EDT                 Normal                                  [facility]  
   
                                                    .eGFRon 2023   
   
                                                    GFR/1.73 sq   
M.predicted MDRD   
(S/P/Bld) [Vol   
rate/Area]      mL/min/{1.73_m2} Normal          >=60            [facility]  
   
                                        Comment on above:   Result Comment: [facility]  
 Laboratories have implemented the eGFR   
calculation approach that does not have a coefficient for race   
and that conforms to the NKF-ASN Task Force Recommendations.  
Stages of Chronic Kidney Disease GFR  
Stage 3a Mild to moderate loss of kidney function 59 to 45  
Stage 3b Moderate to severe loss of kidney function 44 to 33  
Stage 4 Severe loss of kidney function 29 to 15  
Stage 5 Kidney failure Less than 15  
GFR calculated using the CKD-Epi Creatinine Equation ():  
eGFR = 142 X min(SCr/?, 1)? X max(SCr /?, 1)-1.200 X 0.9938Age X   
1.012 [if female]  
Abbreviations/Units:  
eGFR (estimated glomerular filtration rate) = mL/min/1.73 m2  
SCr (standardized serum creatinine) = mg/dL  
? = 0.7 (females) or 0.9 (males)  
? = -0.241 (females) or -0.302 (males)  
min = indicates the minimum of SCr/? or 1  
max = indicates the maximum of SCr/? or 1  
Age = years   
   
                                                            Performed By: #### C  
[id number] ####  
[address] [address]   
   
                                                    Basic Metabolic Profileon    
   
                      Anion gap [Moles/Vol] 12 mmol/L  Normal     7-17       [facility]  
   
                                        Comment on above:   Performed By: #### E  
GFR ####  
[address] [address]   
   
                      Calcium [Mass/Vol] 9.0 mg/dL  Normal     8.5-10.3   [facility]  
   
                                        Comment on above:   Performed By: #### E  
GFR ####  
[address] [address]   
   
                      Chloride [Moles/Vol] 103 mmol/L Normal          [facility]  
   
                                        Comment on above:   Performed By: #### E  
GFR ####  
[address] [address]   
   
                      CO2 [Moles/Vol] 24 mmol/L  Normal     22-32      [facility]  
   
                                        Comment on above:   Performed By: #### E  
GFR ####  
[address] [address]   
   
                      Creatinine [Mass/Vol] 0.60 mg/dL Normal     0.44-1.03  [facility]  
   
                                        Comment on above:   Performed By: #### E  
GFR ####  
[address] [address]   
   
                      Glucose [Mass/Vol] 133 mg/dL  High       70-99      [facility]  
   
                                        Comment on above:   Performed By: #### E  
GFR ####  
[address] [address]   
   
                      Potassium [Moles/Vol] 4.7 mmol/L Normal     3.4-4.8    [facility]  
   
                                        Comment on above:   Performed By: #### E  
GFR ####  
[address] [address]   
   
                      Sodium [Moles/Vol] 134 mmol/L Normal     133-142    [facility]  
   
                                        Comment on above:   Performed By: #### E  
GFR ####  
[address] [address]   
   
                                                    Urea nitrogen   
[Mass/Vol]      21 mg/dL        Normal          8-26            [facility]  
   
                                        Comment on above:   Performed By: #### E  
GFR ####  
[address] [address]   
   
                                                    Urea   
nitrogen/Creatinine   
[Mass ratio]    35.0 mg/mg      High            10.0-20.0       [facility]  
   
                                        Comment on above:   Performed By: #### E  
GFR ####  
[address] [address]   
   
                                                    CBCon 2023   
   
                                                    Erythrocyte   
distribution width   
(RBC) [Ratio]   13.9 %          Normal          11.6-14.8       [facility]  
   
                                        Comment on above:   Performed By: #### C  
BCI ####  
[address] [address]   
   
                                                    Hematocrit (Bld)   
[Volume fraction] 38.3 %          Normal          36.0-46.0       [facility]  
   
                                        Comment on above:   Performed By: #### C  
BCI ####  
[address] [address]   
   
                                                    Hemoglobin (Bld)   
[Mass/Vol]      13.1 g/dL       Normal          12.0-16.0       [facility]  
   
                                        Comment on above:   Performed By: #### C  
BCI ####  
[address] [address]   
   
                                                    MCH (RBC) [Entitic   
mass]           28.9 pg         Normal          27.0-35.0       [facility]  
   
                                        Comment on above:   Performed By: #### C  
BCI ####  
[place] [id number]   
   
                      MCHC       34.3 %     Normal     31.0-37.0  [facility]  
   
                                        Comment on above:   Performed By: #### C  
BCI ####  
[place] [id number]   
   
                                                    MCV (RBC) [Entitic   
vol]            84.4 fL         Normal          80.0-100.0      [facility]  
   
                                        Comment on above:   Performed By: #### C  
BCI ####  
[place] [id number]   
   
                      Platelet   272 x10*3/mcL Normal     150-450    [facility]  
   
                                        Comment on above:   Performed By: #### C  
BCI ####  
[address] [address]   
   
                                                    Platelet mean volume   
(Bld) [Entitic vol] 8.4 fL          Normal          6.7-10.6        [facility]  
   
                                        Comment on above:   Performed By: #### C  
BCI ####  
[place] [id number]   
   
                      RBC        4.53 x10*6/mcL Normal     3.80-5.20  [facility]  
   
                                        Comment on above:   Performed By: #### C  
BCI ####  
[place] [id number]   
   
                      WBC        12.4 x10*3/mcL High       4.5-11.0   [facility]  
   
                                        Comment on above:   Performed By: #### C  
BCI ####  
[address] [address]   
   
                                                    Neurology Progress Noteon    
   
                                                    Neurology Progress   
Note                                    Subjective  
Moving left leg better   
able to lift off bed   
and move it laterally   
in both directions.   
Doing better with IV   
Solu-Medrol. Patient   
did talk to   
orthopedics they want   
her to have repeat   
MRIs of both hips   
without contrast to   
look for her avascular   
necrosis evaluate   
severity. Subsequently   
those tests were   
ordered she will   
require Versed 1 mg IV   
before MRI may repeat   
x2  
Review of Systems  
Constitutional: [No   
fevers, chills,   
sweats]  
Eye: [No recent visual   
problems]  
ENMT: [No ear pain,   
nasal congestion, sore   
throat]  
Respiratory: [No   
shortness of breath,   
cough]  
Cardiovascular: [No   
Chest pain,   
palpitations, syncope]  
Gastrointestinal: [No   
nausea, vomiting,   
diarrhea]  
Genitourinary: [No   
hematuria]  
Objective  
Vitals & Measurements  
T: 36.6 ?C (Oral) HR:   
80 (Monitored) RR: 16   
BP: 124/78 SpO2: 96%  
HT: 170.2 cm WT: 64 kg   
BMI: 22.09  
Additional Vitals  
No qualifying data   
available.  
Lab Results  
Test Name Test Result   
Date/Time  
WBC 12.4 x10  
Hgb 13.1 g/dL   
2023 05:21 EDT  
Hct 38.3 % 2023   
05:21 EDT  
Platelet 272 x10  
Sodium Lvl 134 mmol/L   
2023 05:21 EDT  
Potassium Lvl 4.7   
mmol/L 2023   
05:21 EDT  
Chloride 103 mmol/L   
2023 05:21 EDT  
CO2 24 mmol/L   
2023 05:21 EDT  
Glucose Lvl 133 mg/dL   
(High) 2023   
05:21 EDT  
BUN 21 mg/dL   
2023 05:21 EDT  
Creatinine Lvl 0.60   
mg/dL 2023 05:21   
EDT  
Microbiology - Current   
Encounter  
No qualifying data   
available.  
Physical Exam  
Alert oriented x3   
normal speech cranial   
nerves II through XII   
are symmetric motor   
strength is 5 MRC for   
both upper extremities   
and the right leg she   
is able to lift her   
left leg off the bed   
and move it laterally   
strength up to   
approximately 3+  
Medications  
Inpatient  
acetaminophen, 650 mg,   
Oral, q6hr, PRN  
acetaminophen, 650 mg,   
Oral, q6hr, PRN  
amLODIPine, 10 mg,   
Oral, qAM  
amoxicillin, 875 mg,   
Oral, BID  
cyclobenzaprine, 5 mg,   
Oral, TID  
Dilaudid, 1 mg= 1 mL,   
IV Push, q2hr, PRN  
diphenhydrAMINE, 50   
mg= 1 mL, IV Push,   
q6hr, PRN  
docusate-senna 50   
mg-8.6 mg oral tablet,   
2 tabs, Oral, BID, PRN  
enoxaparin, 40 mg= 0.4   
mL, Subcutaneous,   
Daily  
Gammagard Liquid, 30   
g= 300 mL, IV   
Piggyback, q24hr  
LORazepam, 1 mg, Oral,   
TID  
Lyrica, 100 mg, Oral,   
TID  
magnesium oxide, 400   
mg, Oral, qAM  
methylPREDNISolone +   
Sodium Chloride 0.9%   
intravenous solution   
250 mL  
midazolam, 1 mg= 1 mL,   
IV Push, q15min, PRN  
naloxone, 0.4 mg= 1   
mL, IV Push, q2min,   
PRN  
nicotine 21 mg/24 hr   
transdermal film,   
extended release, 1   
patches, TD, Daily,   
PRN  
Normal Saline Flush   
0.9% injectable   
solution, 10 mL, IV   
Push, As Indicated,   
PRN  
Normal Saline Flush   
0.9% injectable   
solution, 10 mL, IV   
Push, BID  
oxyCODONE-acetaminophe  
n 10 mg-325 mg oral   
tablet, 1 tabs, Oral,   
q4hr, PRN  
Pepcid, 20 mg= 2 mL,   
IV Push,   
q12hr-Standard Times  
Phenergan, 25 mg,   
Oral, q4hr, PRN  
Premarin, 0.9 mg,   
Oral, qAM  
prochlorperazine, 5   
mg= 1 mL, IV Push,   
q4hr, PRN  
Zeposia (ozanimod)   
capsules, 1 caps,   
Oral, Daily  
Assessment/Plan  
1. Optic neuritis,   
left  
2. Multiple sclerosis   
exacerbation  
3. Fall  
4. Hip pain  
5. Left wrist pain  
6. Left ankle pain  
7. History of   
avascular necrosis of   
both hips orthopedics   
i.e. Located within [facility]   
Orthopedics is   
requesting bilateral   
repeat MRIs of both   
hips  
Orders:  
midazolam, 1 mg, IV   
Push, Injection,   
q15min, PRN anxiety,   
First Dose: 23   
13:19:00 EDT, Dispense   
From Location:   
[id number],   
23 13:19:00 EDT  
MRI Hip w/o Contrast   
Left  
MRI Hip w/o Contrast   
Right  
Electronically signed   
by  
______________________  
______________________  
_____  
Henry Mccain MD 23 13:23   
EDT                 Normal                                  [facility]  
   
                                                    .eGFRon 2023   
   
                                                    GFR/1.73 sq   
M.predicted MDRD   
(S/P/Bld) [Vol   
rate/Area]      mL/min/{1.73_m2} Normal          >=60            [facility]  
   
                                        Comment on above:   Result Comment: [facility]  
 Laboratories have implemented the eGFR   
calculation approach that does not have a coefficient for race   
and that conforms to the NKF-ASN Task Force Recommendations.  
Stages of Chronic Kidney Disease GFR  
Stage 3a Mild to moderate loss of kidney function 59 to 45  
Stage 3b Moderate to severe loss of kidney function 44 to 33  
Stage 4 Severe loss of kidney function 29 to 15  
Stage 5 Kidney failure Less than 15  
GFR calculated using the CKD-Epi Creatinine Equation ():  
eGFR = 142 X min(SCr/?, 1)? X max(SCr /?, 1)-1.200 X 0.9938Age X   
1.012 [if female]  
Abbreviations/Units:  
eGFR (estimated glomerular filtration rate) = mL/min/1.73 m2  
SCr (standardized serum creatinine) = mg/dL  
? = 0.7 (females) or 0.9 (males)  
? = -0.241 (females) or -0.302 (males)  
min = indicates the minimum of SCr/? or 1  
max = indicates the maximum of SCr/? or 1  
Age = years   
   
                                                            Performed By: #### M  
G ####  
[address] [address]   
   
                                                    Basic Metabolic Profileon    
   
                      Anion gap [Moles/Vol] 16 mmol/L  Normal     7-17       [facility]  
   
                                        Comment on above:   Performed By: #### .  
Automated Diff ####  
[address] [address]   
   
                      Calcium [Mass/Vol] 9.3 mg/dL  Normal     8.5-10.3   [facility]  
   
                                        Comment on above:   Performed By: #### .  
Automated Diff ####  
[address] [address]   
   
                      Chloride [Moles/Vol] 98 mmol/L  Normal          [facility]  
   
                                        Comment on above:   Performed By: #### .  
Automated Diff ####  
[address] [address]   
   
                      CO2 [Moles/Vol] 20 mmol/L  Low        22-32      [facility]  
   
                                        Comment on above:   Performed By: #### .  
Automated Diff ####  
[address] [address]   
   
                      Creatinine [Mass/Vol] 1.02 mg/dL Normal     0.44-1.03  [facility]  
   
                                        Comment on above:   Performed By: #### .  
Automated Diff ####  
[address] [address]   
   
                      Glucose [Mass/Vol] 153 mg/dL  High       70-99      [facility]  
   
                                        Comment on above:   Performed By: #### .  
Automated Diff ####  
[address] [address]   
   
                      Potassium [Moles/Vol] 4.5 mmol/L Normal     3.4-4.8    [facility]  
   
                                        Comment on above:   Performed By: #### .  
Automated Diff ####  
[address] [address]   
   
                      Sodium [Moles/Vol] 130 mmol/L Low        133-142    [facility]  
   
                                        Comment on above:   Performed By: #### .  
Automated Diff ####  
[address] [address]   
   
                                                    Urea nitrogen   
[Mass/Vol]      27 mg/dL        High            8-26            [facility]  
   
                                        Comment on above:   Performed By: #### .  
Automated Diff ####  
[address] [address]   
   
                                                    Urea   
nitrogen/Creatinine   
[Mass ratio]    26.5 mg/mg      High            10.0-20.0       [facility]  
   
                                        Comment on above:   Performed By: #### .  
Automated Diff ####  
[address] [address]   
   
                                                    CBCon 2023   
   
                                                    Erythrocyte   
distribution width   
(RBC) [Ratio]   14.1 %          Normal          11.6-14.8       [facility]  
   
                                        Comment on above:   Performed By: #### M  
G ####  
[address] [address]   
   
                                                    Hematocrit (Bld)   
[Volume fraction] 40.9 %          Normal          36.0-46.0       [facility]  
   
                                        Comment on above:   Performed By: #### M  
G ####  
[address] [address]   
   
                                                    Hemoglobin (Bld)   
[Mass/Vol]      14.4 g/dL       Normal          12.0-16.0       [facility]  
   
                                        Comment on above:   Performed By: #### M  
G ####  
[address] [address]   
   
                                                    MCH (RBC) [Entitic   
mass]           29.8 pg         Normal          27.0-35.0       [facility]  
   
                                        Comment on above:   Performed By: #### M  
G ####  
[address] [address]   
   
                      MCHC       35.2 %     Normal     31.0-37.0  [facility]  
   
                                        Comment on above:   Performed By: #### M  
G ####  
[address] [address]   
   
                                                    MCV (RBC) [Entitic   
vol]            84.5 fL         Normal          80.0-100.0      [facility]  
   
                                        Comment on above:   Performed By: #### M  
G ####  
[address] [address]   
   
                      Platelet   312 x10*3/mcL Normal     150-450    [facility]  
   
                                        Comment on above:   Performed By: #### M  
G ####  
[address] [address]   
   
                                                    Platelet mean volume   
(Bld) [Entitic vol] 7.8 fL          Normal          6.7-10.6        [facility]  
   
                                        Comment on above:   Performed By: #### M  
G ####  
[address] [address]   
   
                      RBC        4.84 x10*6/mcL Normal     3.80-5.20  [facility]  
   
                                        Comment on above:   Performed By: #### M  
G ####  
[address] [address]   
   
                      WBC        4.5 x10*3/mcL Normal     4.5-11.0   [facility]  
   
                                        Comment on above:   Performed By: #### M  
G ####  
[address] [place] [address]   
   
                                                    Insurance Correspondence Off  
iceon 2023   
   
                                                    Insurance   
Correspondence Office                   [phone]  
[id number].  
[id number]            Normal                                  [facility]  
   
                                                    Neurology Progress Noteon    
   
                                                    Neurology Progress   
Note                                    Subjective  
He does want to do the   
full 5-day course of   
IV Solu-Medrol. She is   
aware to contact the   
orthopedic surgeon   
regarding fixing her   
left avascular   
necrosis of the hip   
given that she will   
need to do IV   
Solu-Medrol for   
exacerbation she could   
not tolerate the IVIG.   
On a positive note we   
were able to obtain   
approval for her   
Zeposia  
Review of Systems  
Constitutional: [No   
fevers, chills,   
sweats]  
Eye: [No recent visual   
problems]  
ENMT: [No ear pain,   
nasal congestion, sore   
throat]  
Respiratory: [No   
shortness of breath,   
cough]  
Cardiovascular: [No   
Chest pain,   
palpitations, syncope]  
Gastrointestinal: [No   
nausea, vomiting,   
diarrhea]  
Genitourinary: [No   
hematuria]  
Objective  
Vitals & Measurements  
T: 36.7 ?C (Oral) HR:   
82 (Peripheral) RR: 16   
BP: 117/81 SpO2: 96%  
HT: 170.2 cm WT: 64 kg   
BMI: 22.09  
Additional Vitals  
No qualifying data   
available.  
Lab Results  
Test Name Test Result   
Date/Time  
WBC 4.5 x10  
Hgb 14.4 g/dL   
2023 05:36 EDT  
Hct 40.9 % 2023   
05:36 EDT  
Platelet 312 x10  
Sodium Lvl 130 mmol/L   
(Low) 2023 05:00   
EDT  
Potassium Lvl 4.5   
mmol/L 2023   
05:00 EDT  
Chloride 98 mmol/L   
2023 05:00 EDT  
CO2 20 mmol/L (Low)   
2023 05:00 EDT  
Glucose Lvl 153 mg/dL   
(High) 2023   
05:00 EDT  
BUN 27 mg/dL (High)   
2023 05:00 EDT  
Creatinine Lvl 1.02   
mg/dL 2023 05:00   
EDT  
Microbiology - Current   
Encounter  
No qualifying data   
available.  
Diagnostic Results  
Computed Tomography  
CT Hip w/o Contrast   
Left  
07/15/23 14:34:20  
IMPRESSION:  
1. Normal CT of the   
left hip.  
Signed By: Dong Chaudhry MD  
Physical Exam  
Alert oriented x3   
cranial nerves II   
through XII are   
symmetric motor exam   
shows persistent   
weakness of her left   
leg she is able to   
lift it slightly off   
the bed effort for   
testing sometimes   
marginal  
Medications  
Inpatient  
acetaminophen, 650 mg,   
Oral, q6hr, PRN  
acetaminophen, 650 mg,   
Oral, q6hr, PRN  
amLODIPine, 10 mg,   
Oral, qAM  
amoxicillin, 875 mg,   
Oral, BID  
cyclobenzaprine, 5 mg,   
Oral, TID  
Dilaudid, 1 mg= 1 mL,   
IV Push, q2hr, PRN  
diphenhydrAMINE, 50   
mg= 1 mL, IV Push,   
q6hr, PRN  
docusate-senna 50   
mg-8.6 mg oral tablet,   
2 tabs, Oral, BID, PRN  
enoxaparin, 40 mg= 0.4   
mL, Subcutaneous,   
Daily  
Gammagard Liquid, 30   
g= 300 mL, IV   
Piggyback, q24hr  
LORazepam, 1 mg, Oral,   
TID  
Lyrica, 100 mg, Oral,   
TID  
magnesium oxide, 400   
mg, Oral, qAM  
methylPREDNISolone +   
Sodium Chloride 0.9%   
intravenous solution   
250 mL  
naloxone, 0.4 mg= 1   
mL, IV Push, q2min,   
PRN  
nicotine 21 mg/24 hr   
transdermal film,   
extended release, 1   
patches, TD, Daily,   
PRN  
Normal Saline Flush   
0.9% injectable   
solution, 10 mL, IV   
Push, As Indicated,   
PRN  
Normal Saline Flush   
0.9% injectable   
solution, 10 mL, IV   
Push, BID  
oxyCODONE-acetaminophe  
n 10 mg-325 mg oral   
tablet, 1 tabs, Oral,   
q4hr, PRN  
Pepcid, 20 mg= 2 mL,   
IV Push,   
q12hr-Standard Times  
Phenergan, 25 mg,   
Oral, q4hr, PRN  
Premarin, 0.9 mg,   
Oral, qAM  
prochlorperazine, 5   
mg= 1 mL, IV Push,   
q4hr, PRN  
Zeposia (ozanimod)   
capsules, 1 caps,   
Oral, Daily  
Assessment/Plan  
1. Optic neuritis,   
left  
2. Multiple sclerosis   
exacerbation  
To his IV Solu-Medrol   
she does want a full 5   
days IVIG cannot be   
tolerated  
3. Fall  
4. Hip pain  
5. Left wrist pain  
6. Left ankle pain  
Orders:  
cyclobenzaprine, 5 mg,   
Oral, Tab, TID, First   
Dose: 23   
14:00:00 EDT, Dispense   
From Location:   
[id number],   
23 13:42:00 EDT  
methylPREDNISolone +   
Sodium Chloride 0.9%   
intravenous solution   
250 mL, 1,000 mg, IV   
Piggyback, Soln-IV,   
q24hr for 5 days,   
infuse over 1 hr,   
First Dose: 23   
15:00:00 EDT, Stop   
Date: 23   
14:59:00 EDT, Dispense   
From Location:   
[facility],   
23 15:00:00 EDT  
Electronically signed   
by  
______________________  
______________________  
_____  
Henry Mccain MD 23 13:39   
EDT                 Normal                                  [facility]  
   
                                                    .eGFRon 2023   
   
                                                    GFR/1.73 sq   
M.predicted MDRD   
(S/P/Bld) [Vol   
rate/Area]      mL/min/{1.73_m2} Normal          >=60            [facility]  
   
                                        Comment on above:   Order Comment: Order  
 added by Discern rule   
   
                                                            Result Comment: [facility]  
 Laboratories have implemented the eGFR   
calculation approach that does not have a coefficient for race   
and that conforms to the NKF-ASN Task Force Recommendations.  
Stages of Chronic Kidney Disease GFR  
Stage 3a Mild to moderate loss of kidney function 59 to 45  
Stage 3b Moderate to severe loss of kidney function 44 to 33  
Stage 4 Severe loss of kidney function 29 to 15  
Stage 5 Kidney failure Less than 15  
GFR calculated using the CKD-Epi Creatinine Equation ():  
eGFR = 142 X min(SCr/?, 1)? X max(SCr /?, 1)-1.200 X 0.9938Age X   
1.012 [if female]  
Abbreviations/Units:  
eGFR (estimated glomerular filtration rate) = mL/min/1.73 m2  
SCr (standardized serum creatinine) = mg/dL  
? = 0.7 (females) or 0.9 (males)  
? = -0.241 (females) or -0.302 (males)  
min = indicates the minimum of SCr/? or 1  
max = indicates the maximum of SCr/? or 1  
Age = years   
   
                                                            Performed By: #### .  
Automated Diff ####  
[address] [address]   
   
                                                    Basic Metabolic Profileon    
   
                      Anion gap [Moles/Vol] 11 mmol/L  Normal     7-17       [facility]  
   
                                        Comment on above:   Performed By: #### C  
[id number] ####[address] [address]   
   
                      Calcium [Mass/Vol] 9.1 mg/dL  Normal     8.5-10.3   [facility]  
   
                                        Comment on above:   Performed By: #### C  
[id number] ####[address] [address]   
   
                      Chloride [Moles/Vol] 99 mmol/L  Normal          [facility]  
   
                                        Comment on above:   Performed By: #### C  
[id number] ####[address] [address]   
   
                      CO2 [Moles/Vol] 26 mmol/L  Normal     22-32      [facility]  
   
                                        Comment on above:   Performed By: #### C  
[id number] ####[address] [address]   
   
                      Creatinine [Mass/Vol] 0.92 mg/dL Normal     0.44-1.03  [facility]  
   
                                        Comment on above:   Performed By: #### C  
[id number] ####[address] [address]   
   
                      Glucose [Mass/Vol] 119 mg/dL  High       70-99      [facility]  
   
                                        Comment on above:   Performed By: #### C  
[id number] ####[address] [address]   
   
                      Potassium [Moles/Vol] 3.8 mmol/L Normal     3.4-4.8    [facility]  
   
                                        Comment on above:   Performed By: #### C  
[id number] ####[address] [address]   
   
                      Sodium [Moles/Vol] 132 mmol/L Low        133-142    [facility]  
   
                                        Comment on above:   Performed By: #### C  
[id number] ####[address] [address]   
   
                                                    Urea nitrogen   
[Mass/Vol]      14 mg/dL        Normal          8-26            [facility]  
   
                                        Comment on above:   Performed By: #### C  
[id number] ####[address] [address]   
   
                                                    Urea   
nitrogen/Creatinine   
[Mass ratio]    15.2 mg/mg      Normal          10.0-20.0       [facility]  
   
                                        Comment on above:   Performed By: #### C  
[id number] ####[address] [address]   
   
                                                    CBCon 2023   
   
                                                    Erythrocyte   
distribution width   
(RBC) [Ratio]   14.1 %          Normal          11.6-14.8       [facility]  
   
                                        Comment on above:   Performed By: #### .  
Manual Diff ####  
[address] [address]   
   
                                                    Hematocrit (Bld)   
[Volume fraction] 40.2 %          Normal          36.0-46.0       [facility]  
   
                                        Comment on above:   Performed By: #### .  
Manual Diff ####  
[address] [address]   
   
                                                    Hemoglobin (Bld)   
[Mass/Vol]      14.0 g/dL       Normal          12.0-16.0       [facility]  
   
                                        Comment on above:   Performed By: #### .  
Manual Diff ####  
[address] [address]   
   
                                                    MCH (RBC) [Entitic   
mass]           29.5 pg         Normal          27.0-35.0       [facility]  
   
                                        Comment on above:   Performed By: #### .  
Manual Diff ####  
[address] [address]   
   
                      MCHC       34.9 %     Normal     31.0-37.0  [facility]  
   
                                        Comment on above:   Performed By: #### .  
Manual Diff ####  
[place] [id number]   
   
                                                    MCV (RBC) [Entitic   
vol]            84.6 fL         Normal          80.0-100.0      [facility]  
   
                                        Comment on above:   Performed By: #### .  
Manual Diff ####  
[place] [id number]   
   
                      Platelet   318 x10*3/mcL Normal     150-450    [facility]  
   
                                        Comment on above:   Performed By: #### .  
Manual Diff ####  
[place] [id number]   
   
                                                    Platelet mean volume   
(Bld) [Entitic vol] 8.4 fL          Normal          6.7-10.6        [facility]  
   
                                        Comment on above:   Performed By: #### .  
Manual Diff ####  
[place] [id number]   
   
                      RBC        4.75 x10*6/mcL Normal     3.80-5.20  [facility]  
   
                                        Comment on above:   Performed By: #### .  
Manual Diff ####  
[place] [id number]   
   
                      WBC        10.9 x10*3/mcL Normal     4.5-11.0   [facility]  
   
                                        Comment on above:   Performed By: #### .  
Manual Diff ####  
[place] [id number]   
   
                                                    Neurology Progress Noteon    
   
                                                    Neurology Progress   
Note                                    Subjective  
Patient was unable to   
tolerate IV Ig   
subsequently she will   
be started on IV   
Solu-Medrol today   
however she has had a   
favorable response she   
is already able to   
lift her left leg up   
some off of the bed   
she is having more   
spasticity and spasms   
and was agreeable to   
low-dose Flexeril  
Review of Systems  
Constitutional: [No   
fevers, chills,   
sweats]  
Eye: [No recent visual   
problems]  
ENMT: [No ear pain,   
nasal congestion, sore   
throat]  
Respiratory: [No   
shortness of breath,   
cough]  
Cardiovascular: [No   
Chest pain,   
palpitations, syncope]  
Gastrointestinal: [No   
nausea, vomiting,   
diarrhea]  
Genitourinary: [No   
hematuria]  
Objective  
Vitals & Measurements  
T: 37.4 ?C (Oral) HR:   
87 (Peripheral) RR: 18   
BP: 125/84 SpO2: 98%  
HT: 170.2 cm WT: 64 kg   
BMI: 22.09  
Additional Vitals  
No qualifying data   
available.  
Lab Results  
Test Name Test Result   
Date/Time  
WBC 10.9 x10  
Hgb 14.0 g/dL   
2023 05:11 EDT  
Hct 40.2 % 2023   
05:11 EDT  
Platelet 318 x10  
Sodium Lvl 132 mmol/L   
(Low) 2023 05:11   
EDT  
Potassium Lvl 3.8   
mmol/L 2023   
05:11 EDT  
Chloride 99 mmol/L   
2023 05:11 EDT  
CO2 26 mmol/L   
2023 05:11 EDT  
Glucose Lvl 119 mg/dL   
(High) 2023   
05:11 EDT  
BUN 14 mg/dL   
2023 05:11 EDT  
Creatinine Lvl 0.92   
mg/dL 2023 05:11   
EDT  
Microbiology - Current   
Encounter  
No qualifying data   
available.  
Diagnostic Results  
Diagnostic Radiology  
XR Ankle 2 Views Left  
07/15/23 13:42:30  
IMPRESSION: Mild soft   
tissue swelling. No   
acute osseous   
abnormality.  
Signed By: Alden Randhawa MD  
**********************  
**********************  
******  
XR Wrist 3 or More   
Views Left  
07/15/23 11:57:10  
IMPRESSION: Soft   
tissue swelling. No   
acute osseous   
abnormality.  
Signed By: Alden Randhawa MD  
**********************  
**********************  
******  
XR Hip 2-3 Views Left  
07/15/23 11:04:44  
IMPRESSION: No acute   
abnormality of the   
pelvis or left hip.  
Signed By: Alden Randhawa MD  
Computed Tomography  
CT Hip w/o Contrast   
Left  
07/15/23 14:34:20  
IMPRESSION:  
1. Normal CT of the   
left hip.  
Signed By: Dong Chaudhry MD  
Physical Exam  
Alert oriented x3   
cranial nerves II   
through XII are   
symmetric motor exam   
shows increased   
movement of the left   
leg she can lift it   
off the bed  
Medications  
Inpatient  
acetaminophen, 650 mg,   
Oral, q6hr, PRN  
acetaminophen, 650 mg,   
Oral, q6hr, PRN  
amLODIPine, 10 mg,   
Oral, qAM  
amoxicillin, 875 mg,   
Oral, BID  
cyclobenzaprine, 5 mg,   
Oral, TID  
Dilaudid, 1 mg= 1 mL,   
IV Push, q2hr, PRN  
diphenhydrAMINE, 25   
mg= 0.5 mL, IV Push,   
q6hr, PRN  
enoxaparin, 40 mg= 0.4   
mL, Subcutaneous,   
Daily  
Gammagard Liquid, 30   
g= 300 mL, IV   
Piggyback, q24hr  
LORazepam, 1 mg, Oral,   
TID  
Lyrica, 100 mg, Oral,   
TID  
magnesium oxide, 400   
mg, Oral, qAM  
methylPREDNISolone,   
1000 mg= 25 mL, IV   
Push, Daily  
naloxone, 0.4 mg= 1   
mL, IV Push, q2min,   
PRN  
nicotine 21 mg/24 hr   
transdermal film,   
extended release, 1   
patches, TD, Daily,   
PRN  
Normal Saline Flush   
0.9% injectable   
solution, 10 mL, IV   
Push, As Indicated,   
PRN  
Normal Saline Flush   
0.9% injectable   
solution, 10 mL, IV   
Push, BID  
oxyCODONE-acetaminophe  
n 10 mg-325 mg oral   
tablet, 1 tabs, Oral,   
q4hr, PRN  
Pepcid, 20 mg= 2 mL,   
IV Push,   
q12hr-Standard Times  
Phenergan, 25 mg,   
Oral, q4hr, PRN  
Premarin, 0.9 mg,   
Oral, qAM  
prochlorperazine, 5   
mg= 1 mL, IV Push,   
q4hr, PRN  
Zeposia (ozanimod)   
capsules, 1 caps,   
Oral, Daily  
Assessment/Plan  
1. Optic neuritis,   
left  
2. Multiple sclerosis   
exacerbation  
Could only tolerate 3   
days of IV Ig   
secondary to nausea   
and vomiting was   
switched over to   
Solu-Medrol 1 g daily   
for 3 to 5 days I did   
add Flexeril 5 mg 3   
times daily  
3. Fall  
4. Hip pain  
5. Left wrist pain  
6. Left ankle pain  
Orders:  
cyclobenzaprine, 5 mg,   
Oral, Tab, TID, First   
Dose: 23   
14:00:00 EDT, Dispense   
From Location:   
[id number],   
23 13:42:00 EDT  
methylPREDNISolone,   
1,000 mg, IV Push,   
Injection, Daily for 5   
days, First Dose:   
23 15:00:00 EDT,   
Stop Date: 23   
8:59:00 EDT, Dispense   
From Location:   
[facility],   
23 15:00:00 EDT  
promethazine, 25 mg,   
Oral, Tab, q4hr, PRN   
nausea/vomiting, First   
Dose: 23   
18:41:00 EDT, Dispense   
From Location:   
[address],   
23 18:41:00 EDT  
Electronically signed   
by  
______________________  
______________________  
_____  
Henry Mccain MD 23 13:44   
EDT                 Normal                                  [facility]  
   
                                                    .eGFRon 2023   
   
                                                    GFR/1.73 sq   
M.predicted MDRD   
(S/P/Bld) [Vol   
rate/Area]      mL/min/{1.73_m2} Normal          >=60            [facility]  
   
                                        Comment on above:   Order Comment: Order  
 added by Discern rule   
   
                                                            Result Comment: [facility]  
 Laboratories have implemented the eGFR   
calculation approach that does not have a coefficient for race   
and that conforms to the NKF-ASN Task Force Recommendations.  
Stages of Chronic Kidney Disease GFR  
Stage 3a Mild to moderate loss of kidney function 59 to 45  
Stage 3b Moderate to severe loss of kidney function 44 to 33  
Stage 4 Severe loss of kidney function 29 to 15  
Stage 5 Kidney failure Less than 15  
GFR calculated using the CKD-Epi Creatinine Equation ():  
eGFR = 142 X min(SCr/?, 1)? X max(SCr /?, 1)-1.200 X 0.9938Age X   
1.012 [if female]  
Abbreviations/Units:  
eGFR (estimated glomerular filtration rate) = mL/min/1.73 m2  
SCr (standardized serum creatinine) = mg/dL  
? = 0.7 (females) or 0.9 (males)  
? = -0.241 (females) or -0.302 (males)  
min = indicates the minimum of SCr/? or 1  
max = indicates the maximum of SCr/? or 1  
Age = years   
   
                                                            Performed By: #### E  
GFR ####[address] [address]   
   
                                                    Basic Metabolic Profileon    
   
                      Anion gap [Moles/Vol] 12 mmol/L  Normal     7-17       [facility]  
   
                                        Comment on above:   Performed By: #### C  
[id number] ####  
[address] [address]   
   
                      Calcium [Mass/Vol] 8.7 mg/dL  Normal     8.5-10.3   [facility]  
   
                                        Comment on above:   Performed By: #### C  
[id number] ####  
[address] [address]   
   
                      Chloride [Moles/Vol] 110 mmol/L Normal          [facility]  
   
                                        Comment on above:   Performed By: #### C  
[id number] ####  
[address] [address]   
   
                      CO2 [Moles/Vol] 22 mmol/L  Normal     22-32      [facility]  
   
                                        Comment on above:   Performed By: #### C  
[id number] ####  
[address] [address]   
   
                      Creatinine [Mass/Vol] 0.80 mg/dL Normal     0.44-1.03  [facility]  
   
                                        Comment on above:   Performed By: #### C  
[id number] ####  
[address] [address]   
   
                      Glucose [Mass/Vol] 89 mg/dL   Normal     70-99      [facility]  
   
                                        Comment on above:   Performed By: #### C  
[id number] ####  
[address] [address]   
   
                      Potassium [Moles/Vol] 4.5 mmol/L Normal     3.4-4.8    [facility]  
   
                                        Comment on above:   Performed By: #### C  
[id number] ####  
[address] [address]   
   
                      Sodium [Moles/Vol] 140 mmol/L Normal     133-142    [facility]  
   
                                        Comment on above:   Performed By: #### C  
[id number] ####  
[place] [address]   
   
                                                    Urea nitrogen   
[Mass/Vol]      20 mg/dL        Normal          8-26            [facility]  
   
                                        Comment on above:   Performed By: #### C  
[id number] ####  
[place] [id number]   
   
                                                    Urea   
nitrogen/Creatinine   
[Mass ratio]    25.0 mg/mg      High            10.0-20.0       [facility]  
   
                                        Comment on above:   Performed By: #### C  
[id number] ####  
[place] [address]   
   
                                                    CBCon 2023   
   
                                                    Erythrocyte   
distribution width   
(RBC) [Ratio]   14.3 %          Normal          11.6-14.8       [facility]  
   
                                        Comment on above:   Performed By: #### C  
OMP ####  
[place] [id number]   
   
                                                    Hematocrit (Bld)   
[Volume fraction] 36.1 %          Normal          36.0-46.0       [facility]  
   
                                        Comment on above:   Performed By: #### C  
OMP ####  
[place] [id number]   
   
                                                    Hemoglobin (Bld)   
[Mass/Vol]      12.4 g/dL       Normal          12.0-16.0       [facility]  
   
                                        Comment on above:   Performed By: #### C  
OMP ####  
[place] [id number]   
   
                                                    MCH (RBC) [Entitic   
mass]           29.9 pg         Normal          27.0-35.0       [facility]  
   
                                        Comment on above:   Performed By: #### C  
OMP ####  
[place] [id number]   
   
                      MCHC       34.3 %     Normal     31.0-37.0  [facility]  
   
                                        Comment on above:   Performed By: #### C  
OMP ####  
[place] [id number]   
   
                                                    MCV (RBC) [Entitic   
vol]            87.0 fL         Normal          80.0-100.0      [facility]  
   
                                        Comment on above:   Performed By: #### C  
OMP ####  
[facility]  
[address] [place] [address]   
   
                      Platelet   256 x10*3/mcL Normal     150-450    [facility]  
   
                                        Comment on above:   Performed By: #### C  
OMP ####  
[address] [address]   
   
                                                    Platelet mean volume   
(Bld) [Entitic vol] 8.2 fL          Normal          6.7-10.6        [facility]  
   
                                        Comment on above:   Performed By: #### C  
OMP ####  
[facility]  
[address] [place] [address]   
   
                      RBC        4.14 x10*6/mcL Normal     3.80-5.20  [facility]  
   
                                        Comment on above:   Performed By: #### C  
OMP ####  
[address] [address]   
   
                      WBC        5.1 x10*3/mcL Normal     4.5-11.0   [facility]  
   
                                        Comment on above:   Performed By: #### C  
OMP ####  
[address] [address]   
   
                                                    Neurology Progress Noteon    
   
                                                    Neurology Progress   
Note                                    Subjective  
We will start day 3 of   
IVIG this afternoon no   
significant   
improvement in left   
leg strength other   
than she can flex her   
foot up somewhat. Did   
discuss diagnostic   
studies CT of the left   
hip shows no fracture.   
I did tell her however   
avascular necrosis is   
diagnosed by MRI and   
not CT and the CT was   
looking for fracture  
Review of Systems  
Constitutional: [No   
fevers, chills,   
sweats]  
Eye: [No recent visual   
problems]  
ENMT: [No ear pain,   
nasal congestion, sore   
throat]  
Respiratory: [No   
shortness of breath,   
cough]  
Cardiovascular: [No   
Chest pain,   
palpitations, syncope]  
Gastrointestinal: [No   
nausea, vomiting,   
diarrhea]  
Genitourinary: [No   
hematuria]  
Objective  
Vitals & Measurements  
T: 36.6 ?C (Oral) HR:   
76 (Peripheral) RR: 20   
BP: 129/76 SpO2: 95%  
HT: 170.2 cm WT: 67 kg   
BMI: 22.09  
Additional Vitals  
No qualifying data   
available.  
Lab Results  
Test Name Test Result   
Date/Time  
WBC 5.1 x10  
Hgb 12.4 g/dL   
2023 06:03 EDT  
Hct 36.1 % 2023   
06:03 EDT  
Platelet 256 x10  
Sodium Lvl 140 mmol/L   
2023 05:51 EDT  
Potassium Lvl 4.5   
mmol/L 2023   
05:51 EDT  
Chloride 110 mmol/L   
2023 05:51 EDT  
CO2 22 mmol/L   
2023 05:51 EDT  
Glucose Lvl 89 mg/dL   
2023 05:51 EDT  
BUN 20 mg/dL   
2023 05:51 EDT  
Creatinine Lvl 0.80   
mg/dL 2023 05:51   
EDT  
Microbiology - Current   
Encounter  
No qualifying data   
available.  
Diagnostic Results  
Diagnostic Radiology  
XR Ankle 2 Views Left  
07/15/23 13:42:30  
IMPRESSION: Mild soft   
tissue swelling. No   
acute osseous   
abnormality.  
Signed By: Alden Randhawa MD  
**********************  
**********************  
******  
XR Wrist 3 or More   
Views Left  
07/15/23 11:57:10  
IMPRESSION: Soft   
tissue swelling. No   
acute osseous   
abnormality.  
Signed By: Alden Randhawa MD  
**********************  
**********************  
******  
XR Hip 2-3 Views Left  
07/15/23 11:04:44  
IMPRESSION: No acute   
abnormality of the   
pelvis or left hip.  
Signed By: Alden Randhawa MD  
Computed Tomography  
CT Hip w/o Contrast   
Left  
07/15/23 14:34:20  
IMPRESSION:  
1. Normal CT of the   
left hip.  
Signed By: Dong Chaudhry MD  
Magnetic Resonance   
Imaging  
MRI Orbits w/ + w/o   
Contrast  
23 11:49:29  
IMPRESSION:  
1. Questionable mild   
enhancement involving   
the left-sided optic   
nerve. Given the   
patient?s history of   
multiple sclerosis and   
left visual changes,   
optic neuritis can?t   
be excluded..  
2. Stable white matter   
changes.  
Signed By: Cherry Dill MD  
Physical Exam  
Awake conversive   
cranial nerves II   
through XII are   
symmetric motor exam   
shows decreased range   
of motion of the left   
leg although effort at   
times for testing is   
not the best deep   
tendon reflexes are 2+   
throughout toes are   
downgoing there is no   
significant dysmetria  
Medications  
Inpatient  
acetaminophen, 650 mg,   
Oral, q6hr, PRN  
acetaminophen, 650 mg,   
Oral, q6hr, PRN  
amLODIPine, 10 mg,   
Oral, qAM  
amoxicillin, 875 mg,   
Oral, BID  
Dilaudid, 1 mg= 1 mL,   
IV Push, q2hr, PRN  
diphenhydrAMINE, 25   
mg= 0.5 mL, IV Push,   
q6hr, PRN  
enoxaparin, 40 mg= 0.4   
mL, Subcutaneous,   
Daily  
Gammagard Liquid, 30   
g= 300 mL, IV   
Piggyback, q24hr  
LORazepam, 1 mg, Oral,   
TID  
Lyrica, 100 mg, Oral,   
TID  
magnesium oxide, 400   
mg, Oral, qAM  
naloxone, 0.4 mg= 1   
mL, IV Push, q2min,   
PRN  
nicotine 21 mg/24 hr   
transdermal film,   
extended release, 1   
patches, TD, Daily,   
PRN  
Normal Saline Flush   
0.9% injectable   
solution, 10 mL, IV   
Push, As Indicated,   
PRN  
Normal Saline Flush   
0.9% injectable   
solution, 10 mL, IV   
Push, BID  
oxyCODONE-acetaminophe  
n 10 mg-325 mg oral   
tablet, 1 tabs, Oral,   
q4hr, PRN  
Pepcid, 20 mg= 2 mL,   
IV Push,   
q12hr-Standard Times  
Premarin, 0.9 mg,   
Oral, qAM  
Zeposia (ozanimod)   
capsules, 1 caps,   
Oral, Daily  
Assessment/Plan  
1. Optic neuritis,   
left  
2. Multiple sclerosis   
exacerbation  
Day #3 of IVIG  
3. Fall  
4. Hip pain  
5. Left wrist pain  
6. Left ankle pain  
Electronically signed   
by  
______________________  
______________________  
_____  
Henry Mccain MD 07/16/23 12:05   
EDT                 Normal                                  [facility]  
   
                                                    Progress Note-Nurseon 2023   
   
                                        Progress Note-Nurse Pt reused all evenin  
g   
medications including   
IVIG. I asked the   
patient if the new   
order of oral   
Phenergan had worked   
and she stated it had   
not worked at all. I   
offered the patient IV   
Compazine since she   
was continuing to have   
nausea and she refused   
that as well. The pt   
also refused oral and   
IV pain medications.  
Electronically signed   
by  
______________________  
______________________  
_____  
Liam Porter 23   
23:43 EDT           Normal                                  [facility]  
   
                                                    .eGFRon 07-   
   
                                                    GFR/1.73 sq   
M.predicted MDRD   
(S/P/Bld) [Vol   
rate/Area]      mL/min/{1.73_m2} Normal          >=60            [facility]  
   
                                        Comment on above:   Result Comment: [facility]  
 Laboratories have implemented the eGFR   
calculation approach that does not have a coefficient for race   
and that conforms to the NKF-ASN Task Force Recommendations.  
Stages of Chronic Kidney Disease GFR  
Stage 3a Mild to moderate loss of kidney function 59 to 45  
Stage 3b Moderate to severe loss of kidney function 44 to 33  
Stage 4 Severe loss of kidney function 29 to 15  
Stage 5 Kidney failure Less than 15  
GFR calculated using the CKD-Epi Creatinine Equation ():  
eGFR = 142 X min(SCr/?, 1)? X max(SCr /?, 1)-1.200 X 0.9938Age X   
1.012 [if female]  
Abbreviations/Units:  
eGFR (estimated glomerular filtration rate) = mL/min/1.73 m2  
SCr (standardized serum creatinine) = mg/dL  
? = 0.7 (females) or 0.9 (males)  
? = -0.241 (females) or -0.302 (males)  
min = indicates the minimum of SCr/? or 1  
max = indicates the maximum of SCr/? or 1  
Age = years   
   
                                                            Performed By: #### E  
GFR ####[address] [address]   
   
                                                    Basic Metabolic Profileon 07  
-   
   
                      Anion gap [Moles/Vol] 11 mmol/L  Normal     7-17       [facility]  
   
                                        Comment on above:   Order Comment: harpreet austin pt asked us to come back around 8:00   
7/15/2023 06:54:55 EDT tj   
   
                                                            Performed By: #### C  
[id number] ####[address] [address]   
   
                      Calcium [Mass/Vol] 8.3 mg/dL  Low        8.5-10.3   [facility]  
   
                                        Comment on above:   Order Comment: harpreet mosley x2 pt asked us to come back around 8:00   
7/15/2023 06:54:55 EDT tj   
   
                                                            Performed By: #### C  
[id number] ####[address] [address]   
   
                      Chloride [Moles/Vol] 107 mmol/L Normal          [facility]  
   
                                        Comment on above:   Order Comment: harpreet austin pt asked us to come back around 8:00   
7/15/2023 06:54:55 EDT tj   
   
                                                            Performed By: #### C  
[id number] ####[address] [address]   
   
                      CO2 [Moles/Vol] 22 mmol/L  Normal     22-32      [facility]  
   
                                        Comment on above:   Order Comment: harpreet austin pt asked us to come back around 8:00   
7/15/2023 06:54:55 EDT tj   
   
                                                            Performed By: #### C  
[id number] ####[address] [address]   
   
                      Creatinine [Mass/Vol] 0.69 mg/dL Normal     0.44-1.03  [facility]  
   
                                        Comment on above:   Order Comment: harpreet mosley x2 pt asked us to come back around 8:00   
7/15/2023 06:54:55 EDT tj   
   
                                                            Performed By: #### C  
[id number] ####[address] [address]   
   
                      Glucose [Mass/Vol] 98 mg/dL   Normal     70-99      [facility]  
   
                                        Comment on above:   Order Comment: harpreet mosley x2 pt asked us to come back around 8:00   
7/15/2023 06:54:55 EDT tj   
   
                                                            Performed By: #### C  
[id number] ####[address] [address]   
   
                      Potassium [Moles/Vol] 3.4 mmol/L Normal     3.4-4.8    [facility]  
   
                                        Comment on above:   Order Comment: harpreet austin pt asked us to come back around 8:00   
7/15/2023 06:54:55 EDT tj   
   
                                                            Performed By: #### C  
[id number] ####[address] [address]   
   
                      Sodium [Moles/Vol] 137 mmol/L Normal     133-142    [facility]  
   
                                        Comment on above:   Order Comment: harpreet mosley x2 pt asked us to come back around 8:00   
7/15/2023 06:54:55 EDT tj   
   
                                                            Performed By: #### C  
[id number] ####[address] [address]   
   
                                                    Urea nitrogen   
[Mass/Vol]      12 mg/dL        Normal          8-26            [facility]  
   
                                        Comment on above:   Order Comment: harpreet mosley x2 pt asked us to come back around 8:00   
7/15/2023 06:54:55 EDT tj   
   
                                                            Performed By: #### C  
[id number] ####[address] [address]   
   
                                                    Urea   
nitrogen/Creatinine   
[Mass ratio]    17.4 mg/mg      Normal          10.0-20.0       [facility]  
   
                                        Comment on above:   Order Comment: harpreet austin pt asked us to come back around 8:00   
7/15/2023 06:54:55 EDT tj   
   
                                                            Performed By: #### C  
[id number] ####[address] [address]   
   
                                                    CBCon 07-   
   
                                                    Erythrocyte   
distribution width   
(RBC) [Ratio]   14.0 %          Normal          11.6-14.8       [facility]  
   
                                        Comment on above:   Performed By: #### E  
GFR ####  
[address] [address]   
   
                                                    Hematocrit (Bld)   
[Volume fraction] 33.8 %          Low             36.0-46.0       [facility]  
   
                                        Comment on above:   Performed By: #### E  
GFR ####  
[place] [id number]   
   
                                                    Hemoglobin (Bld)   
[Mass/Vol]      11.7 g/dL       Low             12.0-16.0       [facility]  
   
                                        Comment on above:   Performed By: #### E  
GFR ####  
[place] [id number]   
   
                                                    MCH (RBC) [Entitic   
mass]           29.8 pg         Normal          27.0-35.0       [facility]  
   
                                        Comment on above:   Performed By: #### E  
GFR ####  
[place] [id number]   
   
                      MCHC       34.6 %     Normal     31.0-37.0  [facility]  
   
                                        Comment on above:   Performed By: #### E  
GFR ####  
[place] [id number]   
   
                                                    MCV (RBC) [Entitic   
vol]            86.2 fL         Normal          80.0-100.0      [facility]  
   
                                        Comment on above:   Performed By: #### E  
GFR ####  
[place] [id number]   
   
                      Platelet   266 x10*3/mcL Normal     150-450    [facility]  
   
                                        Comment on above:   Performed By: #### E  
GFR ####  
[place] [id number]   
   
                                                    Platelet mean volume   
(Bld) [Entitic vol] 8.1 fL          Normal          6.7-10.6        [facility]  
   
                                        Comment on above:   Performed By: #### E  
GFR ####  
[place] [id number]   
   
                      RBC        3.92 x10*6/mcL Normal     3.80-5.20  [facility]  
   
                                        Comment on above:   Performed By: #### E  
GFR ####  
[address] [address]   
   
                      WBC        7.7 x10*3/mcL Normal     4.5-11.0   [facility]  
   
                                        Comment on above:   Performed By: #### E  
GFR ####  
[address] [address]   
   
                                                    CT Hip w/o Contrast Lefton 0  
7-   
   
                                                    CT Hip w/o Contrast   
Left                                    CT Hip w/o Contrast   
Left, 7/15/2023 2:14   
PM EDT  
INDICATION: Injury.   
Left hip pain.  
COMPARISON: Plain   
radiographs of the   
left hip dated July   
15, 2023.  
TECHNIQUE: Axial   
images were obtained   
through the left hip   
without intravenous   
contrast   
administration.   
Additional images were   
reconstructed in   
coronal and sagittal   
planes.  
DOSE OPTIMIZATION:   
This facility uses   
dose optimization   
techniques as   
appropriate to perform   
exams, including at   
least one of the   
following techniques:  
1. Automated exposure   
control.  
2. Adjustment of the   
mA and/or kV according   
to patient size (this   
includes techniques or   
standardized protocols   
for targeted exams   
where dose is matched   
to the   
indication/reason for   
exam, i.e. extremities   
or head).  
3. Use of iterative   
reconstructive   
technique.  
FINDINGS:  
There is no acute   
fracture or   
dislocation.  
There is no bony   
destructive process.  
The left hip joint is   
normal and intact.   
There are no   
appreciable arthritic   
changes.  
There is no soft   
tissue mass or   
drainable fluid   
collection.  
IMPRESSION:  
1. Normal CT of the   
left hip.  
Radiation Dose   
Estimate:  
CTDI(mGy):0.626992 / /   
/ kVp:120.495399 /   
mAs:0.048051 / / /   
DLP(mGy-cm):2.030299Ym  
dy Part: Pelvis  
CTDI(mGy):17.400359 /   
/ / kVp:100.960302 /   
mAs:313.064101 / / /   
DLP(mGy-cm):346.094824  
Body Part: Pelvis  
***** Final *****  
Dictated by: Dong Chaudhry MD  
Dictated DT/TM:   
07.15.2023 2:31 pm  
Signed by: Dong Chaudhry MD  
Signed (Electronic   
Signature): 07.15.2023   
2:34 pm  
(If Report Is Signed,   
Electronically Signed   
in Other Vendor   
System)             Normal                                  [facility]  
   
                                                    Magnesiumon 07-   
   
                      Magnesium [Mass/Vol] 1.9 mg/dL  Normal     1.7-2.4    [facility]  
   
                                        Comment on above:   Performed By: #### .  
Manual Diff ####  
[address] [place] [address]   
   
                                                    Neurology Progress Noteon 07  
-   
   
                                                    Neurology Progress   
Note                                    Subjective  
Patient had a fall   
today landed on her   
left hip and side   
x-rays were negative   
for fracture but she   
is can have a CT of   
the left hip given her   
history of avascular   
necrosis. Remains weak   
in the left leg new IV   
placed she did have a   
previous port but it   
became infected within   
72 hours under unusual   
circumstances  
Review of Systems  
Constitutional: [No   
fevers, chills,   
sweats] x-rays of the   
left ankle, hip, and   
leg were unremarkable   
for fracture  
Eye: [No recent visual   
problems]  
ENMT: [No ear pain,   
nasal congestion, sore   
throat]  
Respiratory: [No   
shortness of breath,   
cough]  
Cardiovascular: [No   
Chest pain,   
palpitations, syncope]  
Gastrointestinal: [No   
nausea, vomiting,   
diarrhea]  
Genitourinary: [No   
hematuria]  
Objective  
Vitals & Measurements  
T: 36.4 ?C (Oral) HR:   
92 (Monitored) RR: 20   
BP: 168/89 SpO2: 94%  
HT: 170.2 cm WT: 64 kg   
(Dosing) WT: 64.00 kg   
BMI: 22.09  
Additional Vitals  
No qualifying data   
available.  
Lab Results  
Test Name Test Result   
Date/Time  
WBC 7.7 x10  
Hgb 11.7 g/dL (Low)   
07/15/2023 09:33 EDT  
Hct 33.8 % (Low)   
07/15/2023 09:33 EDT  
Platelet 266 x10  
Sodium Lvl 137 mmol/L   
07/15/2023 09:33 EDT  
Potassium Lvl 3.4   
mmol/L 07/15/2023   
09:33 EDT  
Chloride 107 mmol/L   
07/15/2023 09:33 EDT  
CO2 22 mmol/L   
07/15/2023 09:33 EDT  
Glucose Lvl 98 mg/dL   
07/15/2023 09:33 EDT  
BUN 12 mg/dL   
07/15/2023 09:33 EDT  
Creatinine Lvl 0.69   
mg/dL 07/15/2023 09:33   
EDT  
Microbiology - Current   
Encounter  
No qualifying data   
available.  
Diagnostic Results  
Diagnostic Radiology  
XR Ankle 2 Views Left  
07/15/23 13:42:30  
IMPRESSION: Mild soft   
tissue swelling. No   
acute osseous   
abnormality.  
Signed By: Edis SMITH,   
Alden Rodriguez  
**********************  
**********************  
******  
XR Wrist 3 or More   
Views Left  
07/15/23 11:57:10  
IMPRESSION: Soft   
tissue swelling. No   
acute osseous   
abnormality.  
Signed By: Alden Randhawa MD  
**********************  
**********************  
******  
XR Hip 2-3 Views Left  
07/15/23 11:04:44  
IMPRESSION: No acute   
abnormality of the   
pelvis or left hip.  
Signed By: Alden Randhawa MD  
Magnetic Resonance   
Imaging  
MRI Orbits w/ + w/o   
Contrast  
23 11:49:29  
IMPRESSION:  
1. Questionable mild   
enhancement involving   
the left-sided optic   
nerve. Given the   
patient?s history of   
multiple sclerosis and   
left visual changes,   
optic neuritis can?t   
be excluded..  
2. Stable white matter   
changes.  
Signed By: Fuentes SMITH,   
Cherry Mabry  
Physical Exam  
Alert oriented x3   
emotionally labile   
some swelling of her   
left ankle from where   
she fell cranial   
nerves II through XII   
are symmetric she does   
have persistent   
weakness of the left   
arm and leg with the   
leg more involved.  
Medications  
Inpatient  
acetaminophen, 650 mg,   
Oral, q6hr, PRN  
acetaminophen, 650 mg,   
Oral, q6hr, PRN  
amLODIPine, 10 mg,   
Oral, qAM  
amoxicillin, 875 mg,   
Oral, BID  
Dilaudid, 1 mg= 1 mL,   
IV Push, q2hr, PRN  
diphenhydrAMINE, 25   
mg= 0.5 mL, IV Push,   
q6hr, PRN  
enoxaparin, 40 mg= 0.4   
mL, Subcutaneous,   
Daily  
Gammagard Liquid, 30   
g= 300 mL, IV   
Piggyback, q24hr  
LORazepam, 1 mg, Oral,   
TID  
Lyrica, 100 mg, Oral,   
TID  
magnesium oxide, 400   
mg, Oral, qAM  
naloxone, 0.4 mg= 1   
mL, IV Push, q2min,   
PRN  
nicotine 21 mg/24 hr   
transdermal film,   
extended release, 1   
patches, TD, Daily,   
PRN  
Normal Saline Flush   
0.9% injectable   
solution, 10 mL, IV   
Push, As Indicated,   
PRN  
Normal Saline Flush   
0.9% injectable   
solution, 10 mL, IV   
Push, BID  
oxyCODONE-acetaminophe  
n 10 mg-325 mg oral   
tablet, 1 tabs, Oral,   
q4hr, PRN  
Pepcid, 20 mg= 2 mL,   
IV Push,   
q12hr-Standard Times  
Premarin, 0.9 mg,   
Oral, qAM  
Zeposia (ozanimod)   
capsules, 1 caps,   
Oral, Daily  
Assessment/Plan  
1. Optic neuritis,   
left  
2. Multiple sclerosis   
exacerbation  
2 of IVIG more left   
hip pain following her   
fall. CT of the left   
hip has already been   
ordered  
3. Fall  
4. Hip pain  
5. Left wrist pain  
6. Left ankle pain  
Orders:  
EKG  
Electronically signed   
by  
______________________  
______________________  
_____  
Henry Mccain MD 07/15/23 14:11   
EDT                 Normal                                  [facility]  
   
                                                    Phosphoruson 07-   
   
                      Phosphate [Mass/Vol] 3.0 mg/dL  Normal     2.5-4.6    [facility]  
   
                                        Comment on above:   Performed By: #### M  
G ####  
[place] [id number]   
   
                                                    .eGFRon 2023   
   
                                                    GFR/1.73 sq   
M.predicted MDRD   
(S/P/Bld) [Vol   
rate/Area]      mL/min/{1.73_m2} Normal          >=60            [facility]  
   
                                        Comment on above:   Result Comment: [facility]  
 Laboratories have implemented the eGFR   
calculation approach that does not have a coefficient for race   
and that conforms to the NKF-ASN Task Force Recommendations.  
Stages of Chronic Kidney Disease GFR  
Stage 3a Mild to moderate loss of kidney function 59 to 45  
Stage 3b Moderate to severe loss of kidney function 44 to 33  
Stage 4 Severe loss of kidney function 29 to 15  
Stage 5 Kidney failure Less than 15  
GFR calculated using the CKD-Epi Creatinine Equation ():  
eGFR = 142 X min(SCr/?, 1)? X max(SCr /?, 1)-1.200 X 0.9938Age X   
1.012 [if female]  
Abbreviations/Units:  
eGFR (estimated glomerular filtration rate) = mL/min/1.73 m2  
SCr (standardized serum creatinine) = mg/dL  
? = 0.7 (females) or 0.9 (males)  
? = -0.241 (females) or -0.302 (males)  
min = indicates the minimum of SCr/? or 1  
max = indicates the maximum of SCr/? or 1  
Age = years   
   
                                                            Performed By: #### C  
OMP ####  
[address] [address]   
   
                                                    Basic Metabolic Profileon    
   
                      Anion gap [Moles/Vol] 11 mmol/L  Normal     7-17       [facility]  
   
                                        Comment on above:   Performed By: #### M  
G ####  
[address] [address]   
   
                      Calcium [Mass/Vol] 9.0 mg/dL  Normal     8.5-10.3   [facility]  
   
                                        Comment on above:   Performed By: #### M  
G ####  
[address] [address]   
   
                      Chloride [Moles/Vol] 106 mmol/L Normal          [facility]  
   
                                        Comment on above:   Performed By: #### M  
G ####  
[address] [address]   
   
                      CO2 [Moles/Vol] 25 mmol/L  Normal     22-32      [facility]  
   
                                        Comment on above:   Performed By: #### M  
G ####  
[address] [address]   
   
                      Creatinine [Mass/Vol] 0.71 mg/dL Normal     0.44-1.03  [facility]  
   
                                        Comment on above:   Performed By: #### M  
G ####  
[address] [address]   
   
                      Glucose [Mass/Vol] 88 mg/dL   Normal     70-99      [facility]  
   
                                        Comment on above:   Performed By: #### M  
G ####  
[address] [address]   
   
                      Potassium [Moles/Vol] 4.1 mmol/L Normal     3.4-4.8    [facility]  
   
                                        Comment on above:   Performed By: #### M  
G ####  
[address] [address]   
   
                      Sodium [Moles/Vol] 138 mmol/L Normal     133-142    [facility]  
   
                                        Comment on above:   Performed By: #### M  
G ####  
[address] [address]   
   
                                                    Urea nitrogen   
[Mass/Vol]      18 mg/dL        Normal          8-26            [facility]  
   
                                        Comment on above:   Performed By: #### M  
G ####  
[address] [address]   
   
                                                    Urea   
nitrogen/Creatinine   
[Mass ratio]    25.4 mg/mg      High            10.0-20.0       [facility]  
   
                                        Comment on above:   Performed By: #### M  
G ####  
[address] [address]   
   
                                                    CBC w/ Diffon 2023   
   
                                                    Erythrocyte   
distribution width   
(RBC) [Ratio]   13.8 %          Normal          11.6-14.8       [facility]  
   
                                        Comment on above:   Performed By: #### C  
OMP ####  
[address] [address]   
   
                                                    Hematocrit (Bld)   
[Volume fraction] 36.7 %          Normal          36.0-46.0       [facility]  
   
                                        Comment on above:   Performed By: #### C  
OMP ####  
[address] [address]   
   
                                                    Hemoglobin (Bld)   
[Mass/Vol]      12.7 g/dL       Normal          12.0-16.0       [facility]  
   
                                        Comment on above:   Performed By: #### C  
OMP ####  
[address] [address]   
   
                                                    MCH (RBC) [Entitic   
mass]           30.1 pg         Normal          27.0-35.0       [facility]  
   
                                        Comment on above:   Performed By: #### C  
OMP ####  
[address] [address]   
   
                      MCHC       34.6 %     Normal     31.0-37.0  [facility]  
   
                                        Comment on above:   Performed By: #### C  
OMP ####  
[address] [address]   
   
                                                    MCV (RBC) [Entitic   
vol]            86.8 fL         Normal          80.0-100.0      [facility]  
   
                                        Comment on above:   Performed By: #### C  
OMP ####  
[address] [address]   
   
                      Platelet   271 x10*3/mcL Normal     150-450    [facility]  
   
                                        Comment on above:   Performed By: #### C  
OMP ####  
[address] [address]   
   
                                                    Platelet mean volume   
(Bld) [Entitic vol] 8.4 fL          Normal          6.7-10.6        [facility]  
   
                                        Comment on above:   Performed By: #### C  
OMP ####  
[address] [address]   
   
                      RBC        4.23 x10*6/mcL Normal     3.80-5.20  [facility]  
   
                                        Comment on above:   Performed By: #### C  
OMP ####  
[address] [address]   
   
                      WBC        6.2 x10*3/mcL Normal     4.5-11.0   [facility]  
   
                                        Comment on above:   Performed By: #### C  
OMP ####  
[address] [address]   
   
                                                    CRPon 2023   
   
                      CRP        0.13 mg/dL Normal     0.00-0.75  [facility]  
   
                                        Comment on above:   Result Comment: CRP   
measurement is useful for assessment of   
non-specific  
INFLAMMATORY RESPONSE to infection or injury AND is a  
sensitive MARKER of ACUTE INFLAMMATION including CARDIAC  
RISK ASSESSMENT.  
CARDIAC patients with elevated CRP are POTENTIALLY at a  
HIGHER RISK OF FUTURE CARDIAC EVENTS.   
   
                                                            Performed By: #### .  
Manual Diff ####  
[address] [address]   
   
                                                    Diff Autoon 2023   
   
                      Baso Absolute 0.1 x10*3/mcL Normal     0.0-0.2    [facility]  
   
                                        Comment on above:   Performed By: #### .  
Automated Diff ####[address] [address]   
   
                                                    Basophils/100 WBC   
(Bld)           1.1 %           Normal          0.0-1.5         [facility]  
   
                                        Comment on above:   Performed By: #### .  
Automated Diff ####[address] [address]   
   
                      Eos Absolute 0.1 x10*3/mcL Normal     0.0-0.4    [facility]  
   
                                        Comment on above:   Performed By: #### .  
Automated Diff ####[address] [address]   
   
                                                    Eosinophils/100 WBC   
(Bld)           2.2 %           Normal          0.0-5.4         [facility]  
   
                                        Comment on above:   Performed By: #### .  
Automated Diff ####[address] [address]   
   
                      Lymph Absolute 2.5 x10*3/mcL Normal     1.0-4.8    [facility]  
   
                                        Comment on above:   Performed By: #### .  
Automated Diff ####[address] [address]   
   
                                                    Lymphocytes/100 WBC   
(Bld)           40.1 %          Normal          27.2-40.8       [facility]  
   
                                        Comment on above:   Performed By: #### .  
Automated Diff ####[address] [address]   
   
                      Mono Absolute 0.5 x10*3/mcL Normal     0.1-1.1    [facility]  
   
                                        Comment on above:   Performed By: #### .  
Automated Diff ####[address] [address]   
   
                                                    Monocytes/100 WBC   
(Bld)           7.9 %           Normal          3.7-11.9        [facility]  
   
                                        Comment on above:   Performed By: #### .  
Automated Diff ####[address] [address]   
   
                      Neutro Absolute 3.0 x10*3/mcL Normal     1.8-7.7    [facility]  
   
                                        Comment on above:   Performed By: #### .  
Automated Diff ####[address] [address]   
   
                      Neutro Auto 48.7 %     Normal     47.2-70.8  [facility]  
   
                                        Comment on above:   Performed By: #### .  
Automated Diff ####[facility] [place] [address]   
   
                                                    ED Clinical Summaryon 2023   
   
                                        ED Clinical Summary (Inserted Image.   
Unable to display)   
[facility]  
[address], [place] [id number] [phone]  
ED Clinical Summary  
Person Information  
Name: Cantu, Nichole Lynn  
Cat/New_Girard Age:   
47 Years : 1975  
Sex: Female PCP:   
Luis Alberto Castellon MD  
Marital Status:  
 Phone:  
Race:  
White Ethnicity:  
Not  or    
Language:  
English  
MRN: [id number] FIN:   
[id number]  
Visit Reason:  
Pain in eye; Lower leg   
pain-swelling; Blurred   
Vision Acuity: 3  
Enc Type: Inpatient   
Med Service: Emergency   
Medicine  
Arrival:  
2023 08:30:57   
Discharge: LOS: 000   
05:59  
Checkin:  
2023 08:30:57   
Checkout: 2023   
14:29:04 Dispo Type:  
Address:  
[address] [place] [address] [facility] [id number]  
Provider Notes:  
History of Present   
Illness  
47-year-old female who   
is presenting with a   
left sided eye pain?as   
chief complaint.   
?Patient has a history   
of multiple   
sclerosis.? She has   
had several flareups   
in the   
past?specifically   
targeting her eyes.   
?States that around   
Tuesday?at 4 AM she   
woke up and started to   
have left eye pain   
specific to eye   
movements?no redness   
or erythema?no   
swelling?when she does   
feel that her eye has   
been tearful she also   
complains that the   
pain is behind her   
left eye is definitely   
is worse   
with?extraocular   
movements. ?She also   
endorsed some muscle   
cramping that she has   
been having.? She has   
been trying to   
supplement with   
potassium and   
magnesium for her   
muscle cramping.? She   
had an office visit   
with Dr. Mccain?who   
suggested that she be   
admitted for IVIG   
therapy for 5 days.   
?Patient does endorse   
several allergies   
including iodine?and   
steroids she states   
that she needs to be   
pretreated with   
Benadryl before   
receiving these   
medications and then   
she tolerates it.? She   
endorses blurry vision   
of the left eye but   
no?signs of?color   
change?no?scotoma   
no?signs of actual   
vision loss.  
Physical Exam  
Eye Exam  
Method of inspection:   
manual  
Visual acuity: as   
measured in chart  
Lashes intact without   
discharge  
Lids without abrasion,   
edema, erythema or   
ptosis  
Conjunctiva bilateral   
without chemosis,   
erythema, hemorrhage  
Cornea clear  
Sclera clear without   
icterus, hemorrhage,   
erythema  
Icare pressure OS   
12.5mmHg  
?  
Diagnosis:  
1:Optic neuritis,   
left; 2:Multiple   
sclerosis exacerbation  
Problems  
No Problems Documented  
Smoking Status:  
Smoking Status  
10 or more cigarettes   
(1/2 pack or more)/day   
in last 30 days  
Functional Status:  
Sensory Deficits:  
History of Falls:  
Mobility Assistance   
Prior to Admission:  
ADLs:  
Current Level of   
Assistance for   
Self-Care/Mobility:  
Cognitive Status:  
Allergies  
fentaNYL (severe   
Headache)  
Toradol (Hives)  
Zofran (Hives)  
Protonix (Hives)   
(itchy)  
penicillin (Hives)  
iodinated   
radiocontrast dyes   
(Hives)  
Laboratory or Other   
Results This Visit   
(last charted value   
for your 2023   
visit)  
Hematology  
2023 9:35 AM  
WBC: 6.2 x10  
RBC: 4.23 x10  
Neutro Auto: 48.7 % --   
Normal range between (   
47.2 and 70.8 )  
Lymph Auto: 40.1 % --   
Normal range between (   
27.2 and 40.8 )  
Mono Auto: 7.9 % --   
Normal range between (   
3.7 and 11.9 )  
Eos Auto: 2.2 % --   
Normal range between (   
0.0 and 5.4 )  
Basophil Auto: 1.1 %   
-- Normal range   
between ( 0.0 and 1.5   
)  
Baso Absolute: 0.1 x10  
MCV: 86.8 fL -- Normal   
range between ( 80.0   
and 100.0 )  
MCHC: 34.6 % -- Normal   
range between ( 31.0   
and 37.0 )  
Lymph Absolute: 2.5   
x10  
Hct: 36.7 % -- Normal   
range between ( 36.0   
and 46.0 )  
Mono Absolute: 0.5 x10  
MCH: 30.1 pg -- Normal   
range between ( 27.0   
and 35.0 )  
Neutro Absolute: 3.0   
x10  
Hgb: 12.7 g/dL --   
Normal range between (   
12.0 and 16.0 )  
Mean Platelet Volume:   
8.4 fL -- Normal range   
between ( 6.7 and 10.6   
)  
Platelet: 271 x10  
Eos Absolute: 0.1 x10  
RDW: 13.8 % -- Normal   
range between ( 11.6   
and 14.8 )  
Sed Rate: 11 mm/hr --   
Normal range between (   
0 and 23 )  
Chemistry  
2023 9:35 AM  
Creatinine Lvl: 0.71   
mg/dL -- Normal range   
between ( 0.44 and   
1.03 )  
BUN: 18 mg/dL --   
Normal range between (   
8 and 26 )  
Glucose Lvl: 88 mg/dL   
-- Normal range   
between ( 70 and 99 )  
Potassium Lvl: 4.1   
mmol/L -- Normal range   
between ( 3.4 and 4.8   
)  
CRP: 0.13 mg/dL --   
Normal range between (   
0.00 and 0.75 )  
Sodium Lvl: 138 mmol/L   
-- Normal range   
between ( 133 and 142   
)  
Calcium Lvl: 9.0 mg/dL   
-- Normal range   
between ( 8.5 and 10.3   
)  
Chloride: 106 mmol/L   
-- Normal range   
between ( 98 and 110 )  
CO2: 25 mmol/L --   
Normal range between (   
22 and 32 )  
Anion Gap: 11 --   
Normal range between (   
7 and 17 )  
Estimated GFR: >60   
mL/min/1.73m?  
BUN Crea Ratio: 25.4   
-- Normal range   
between ( 10.0 and   
20.0 )  
Magnetic Resonance   
Imaging  
2023 10:45 AM  
MRI Orbits w/ + w/o   
Contrast: MRI Orbits   
w/ + w/o Contrast  
Measurements:  
Height:  
Weight: 64 kg  
Blood Pressure: /87   
mmHg  
BMI:  
Procedures  
No Procedures   
Documented  
Immunizations  
No Immunizations   
Documented This Visit  
Final Med List:  
Medications that have   
not changed  
Other Medications  
amLODIPine (amLODIPine   
10 mg oral tablet) 1   
Tabs Oral (gi (more   
content not   
included)...        Normal                                  [facility]  
   
                                                    ED Note-Nursingon 2023  
   
   
                                        ED Note-Nursing     Patient was medicate  
d   
as prescribed prior to   
MRI. MRI tech called   
nurse, patient was   
requesting more   
medication for   
sedation. Provider   
notified. 2mg of   
versed given as   
prescribed. Patient   
attached to pulse   
machine and 2liters of   
o2 applied.  
Electronically signed   
by  
______________________  
______________________  
_____  
Mika 23 10:19 EDT  Normal                                  [facility]  
   
                                                    ED Note-Physicianon 20   
   
                                        ED Note-Physician   Chief Complaint  
Pt states that she was   
Seen in Dr. Castro   
yesterday and that he   
would like the patient   
to be evaluated in the   
ER for admission of MS   
exacerbation, and for   
a 5 day IVIG   
treatment. Pt   
complains of Blurred   
vision and pain in   
extemites.  
History of Present   
Illness  
47-year-old female who   
is presenting with a   
left sided eye pain as   
chief complaint.   
Patient has a history   
of multiple sclerosis.   
She has had several   
flareups in the past   
specifically targeting   
her eyes. States that   
around Tuesday at 4 AM   
she woke up and   
started to have left   
eye pain specific to   
eye movements no   
redness or erythema no   
swelling when she does   
feel that her eye has   
been tearful she also   
complains that the   
pain is behind her   
left eye is definitely   
is worse with   
extraocular movements.   
She also endorsed some   
muscle cramping that   
she has been having.   
She has been trying to   
supplement with   
potassium and   
magnesium for her   
muscle cramping. She   
had an office visit   
with Dr. Mccain who   
suggested that she be   
admitted for IVIG   
therapy for 5 days.   
Patient does endorse   
several allergies   
including iodine and   
steroids she states   
that she needs to be   
pretreated with   
Benadryl before   
receiving these   
medications and then   
she tolerates it. She   
endorses blurry vision   
of the left eye but no   
signs of color change   
no scotoma no signs of   
actual vision loss.  
Physical Exam  
Eye Exam  
Method of inspection:   
manual  
Visual acuity: as   
measured in chart  
Lashes intact without   
discharge  
Lids without abrasion,   
edema, erythema or   
ptosis  
Conjunctiva bilateral   
without chemosis,   
erythema, hemorrhage  
Cornea clear  
Sclera clear without   
icterus, hemorrhage,   
erythema  
Icare pressure OS   
12.5mmHg  
Vitals & Measurements  
T: 36.7 ?C (Oral) HR:   
71 (Peripheral) RR: 15   
BP: 131/87 SpO2: 99%  
HT: 170.2 cm WT: 64 kg   
(Dosing)  
Additional Vitals  
No qualifying data   
available.  
Procedure  
No qualifying data   
available.  
ASA Documentation  
Medical Decision   
Making  
47-year-old female who   
is having a left eye   
complaint in the   
setting of MS  
I saw the patient took   
a history and physical   
examination ensuring   
to try to look for   
other differential   
causes besides her   
multiple sclerosis   
considered glaucoma I   
checked intraocular   
pressure of the left   
eye at 12.5 mmHg there   
is no obvious signs of   
glaucoma  
She may have optic   
neuritis which is the   
most likely diagnosis   
today we will obtain   
an MRI of the orbits   
to evaluate  
Patient does have pain   
with extraocular eye   
movements but no   
vision loss  
She was sent in by her   
neurologist to be   
admitted for IVIG   
therapy  
I will start the   
patient on an   
dexamethasone 10 mg   
pretreating with   
Benadryl due to her   
allergy  
Patient will require   
admission at this time   
for presumed MS flare   
optic neuritis being   
the chief differential   
diagnosis  
There is no obvious   
signs of infectious   
causes that I could   
appreciate on her   
physical examination  
Patient's MRI of the   
orbits shows that   
there is signs of   
possible optic   
neuritis  
Patient remains   
hemodynamically well   
she was given pain   
medication in the MRI   
machine she required 2   
mg of Versed  
Called and spoke with   
Dr. Mccain who   
recommended admission   
to the hospital  
Spoke with hospitalist   
who also agreed to   
admit the plan was to   
start IVIG I will have   
the hospitalist start   
this medication  
Assessment/Plan  
Lower leg   
pain-swelling   
(Complaint of)  
Pain in eye (Complaint   
of)  
Orders:  
dexamethasone, 10 mg,   
IV Push, Injection,   
Once, First Dose:   
23 9:09:00 EDT,   
Stop Date: 23   
9:09:00 EDT, STAT,   
Dispense From   
Location:   
[facility],   
23 9:09:00 EDT  
diphenhydrAMINE, 50   
mg, IV Push,   
Injection, Once, First   
Dose: 23 9:09:00   
EDT, Stop Date:   
23 9:09:00 EDT,   
STAT, Dispense From   
Location:   
[facility],   
23 9:09:00 EDT  
morphine, 4 mg, IV   
Push, Injection, Once,   
PRN pain, First Dose:   
23 9:12:00 EDT,   
Dispense From   
Location:   
[facility],   
23 9:12:00 EDT  
tetracaine ophthalmic,   
2 drops, Eye-Left,   
Soln-Ophth, Once,   
First Dose: 23   
8:55:00 EDT, Stop   
Date: 23 8:55:00   
EDT, STAT, Dispense   
From Location:   
[facility],   
23 8:55:00 EDT  
Basic Metabolic   
Profile  
C-Reactive Protein  
Complete Blood Count   
w/ Differential  
Erythrocyte   
Sedimentation Rate  
MRI Brain w/ + w/o   
Contrast  
MRI Orbits w/ Contrast  
Refresh vitals and   
sections below:  
Problem List/Past   
Medical History  
Ongoing  
Avascular necrosis of   
bone of left hip  
Chronic pain  
Deep vein thrombosis   
(DVT) of right upper   
extremity  
Degenerative tear of   
acetabular labrum of   
right hip  
Drug-seeking behavior  
H/O factitious   
disorder  
Hypertension  
Intentional   
self-exposure to   
bleach  
Lakesha-Skinner tear  
Multiple gastric   
ulcers  
Multiple sclerosis  
Munchausen's syndrome  
Historical  
No qualifying data  
Procedure/Surgical   
History  
Cholecystectomy  
Cervical spinal fusion   
(2000)  
Esophagogastroduodenos  
copy (2018)  
Esophagogastroduodenos  
copy (2020)  
Insertion Implantable   
Venous Access Port   
(Left) (2020)  
Fusion Spine L (more   
content not   
included)...        Normal                                  [facility]  
   
                                                    ESRon 2023   
   
                      Sed Rate   11 mm/hr   Normal     0-23       [facility]  
   
                                        Comment on above:   Performed By: #### M  
G ####  
[facility]  
[address] [place] [address]   
   
                                                    MRI Orbits w/ + w/o Contrast  
on 2023   
   
                                                    MRI Orbits w/ + w/o   
Contrast                                MR Orbits with and   
without contrast on   
2023  
Provided History:   
Multiple sclerosis.   
Visual changes in the   
left eye..  
Comparison: MRI brain   
on 10/18/2021.  
Technical: Multiplanar   
and multi sequential   
MRI images of the   
orbits were obtained   
with and without   
contrast.  
Findings:  
Unremarkable bilateral   
eye globes,   
extraocular muscles   
and retrobulbar fat.   
No masses or abnormal   
enhancement.   
Unremarkable optic   
chiasm. The bilateral   
cavernous sinuses are   
symmetric. Normal   
signal voids are seen   
in the bilateral   
cavernous internal   
carotid arteries. The   
bilateral lacrimal   
glands are   
unremarkable.   
Questionable   
ill-defined   
enhancement involving   
the left-sided optic   
nerve. An Overall   
stable foci of signal   
abnormality in the   
centrum semiovale,   
subcortical and   
periventricular white   
matter.  
IMPRESSION:  
1. Questionable mild   
enhancement involving   
the left-sided optic   
nerve. Given the   
patient's history of   
multiple sclerosis and   
left visual changes,   
optic neuritis can't   
be excluded..  
2. Stable white matter   
changes.  
***** Final *****  
Dictated by: Cherry Dill MD  
Dictated DT/TM:   
2023 11:34 am  
Signed by: Cherry Dill MD  
Signed (Electronic   
Signature): 2023   
11:49 am  
(If Report Is Signed,   
Electronically Signed   
in Other Vendor   
System)             Normal                                  [facility]  
   
                                                    Neurology Consultationon    
   
                                                    Neurology   
Consultation                            Chief Complaint  
Pt states that she was   
Seen in Dr. Castro   
yesterday and that he   
would like the patient   
to be evaluated in the   
ER for admission of MS   
exacerbation, and for   
a 5 day IVIG   
treatment. Pt   
complains of Blurred   
vision and pain in   
extemites.  
Reason for   
Consultation  
Evaluate for worsening   
multiple sclerosis  
History of Present   
Illness  
Is a 47-year-old   
female very well-known   
to me who has had a   
history of multiple   
sclerosis dating back   
to  with a history   
of bilateral aseptic   
necrosis involving the   
hips who did not want   
to pursue surgery and   
is on no immune   
mediated therapy until   
yesterday when she was   
initiated on Zeposia   
titration given her   
her worsening symptoms   
for approximately a   
week she has had some   
blurred vision   
affecting the left   
eye, problems with   
balance, and   
difficulties with   
coordination due to   
left leg weakness. She   
did come to the ER   
today work-up included   
a brain MRI without   
and with contrast as   
well as an orbital MRI   
she had stable white   
matter disease of a   
mild to moderate load   
and a questionable   
demyelination of her   
left optic nerve.   
Patient is being   
admitted for IVIG she   
is really not a   
candidate for IV   
Solu-Medrol anymore   
due to her aseptic   
necrosis. Patient has   
a longstanding history   
of multiple   
hospitalizations but   
has not been at   
[facility] since  at time she   
can be a little   
drug-seeking and   
histrionic in her   
presentation. She does   
suffer from a chronic   
pain syndrome and she   
currently is on a   
combination of Ativan   
1 mg 3 times a day,   
Cymbalta 60 mg 2   
capsules daily,   
Percocet 10 mg 4 times   
a day pregabalin 100   
mg 3 times a day and   
tramadol 50 mg 4 times   
a day however upon   
admission she was   
started on as needed   
IV Dilaudid and Norco   
and this would be the   
medications that she   
will be making for   
now. She was already   
restarted on her   
Lyrica at 100 mg 3   
times a day she is   
complaining of cramps.   
Electrolytes upon   
admission were normal   
however phosphorus and   
magnesium were not   
checked and they will   
be checked tomorrow   
pertinent labs sed   
rate 11 CT reactive   
protein 0.3 calcium 9   
GFR greater than 60  
Review of Systems  
Constitutional: [No   
fevers, chills,   
sweats]  
Eye: [Blurred vision   
left eye for greater   
than a week  
ENMT: [No ear pain,   
nasal congestion, sore   
throat]  
Respiratory: [No   
shortness of breath,   
cough]  
Cardiovascular: [No   
Chest pain,   
palpitations, syncope]  
Gastrointestinal: [No   
nausea, vomiting,   
diarrhea]  
Genitourinary: [No   
hematuria]  
Hema/Lymph: [Negative   
for bruising tendency,   
swollen lymph glands]  
Endocrine: [Negative   
for excessive thirst,   
excessive hunger]  
Musculoskeletal: [No   
back pain, neck pain,   
joint pain, muscle   
pain, decreased range   
of motion]  
Integumentary: [No   
rash, pruritus,   
abrasions]  
Neurologic: [Alert &   
oriented X 4] creased   
difficulties with gait   
due to left leg   
weakness  
Psychiatric: [No   
anxiety  
Physical Exam  
Vitals & Measurements  
T: 36.4 ?C (Oral)   
TMIN: 36.4 ?C (Oral)   
TMAX: 36.7 ?C (Oral)   
HR: 60 (Monitored) RR:   
16 BP: 130/86 SpO2:   
95% HT: 170.2 cm WT:   
64.00 kg WT: 64 kg   
(Dosing) BMI: 22.09  
47-year-old white   
female well-developed   
well-nourished alert   
oriented x3 friendly   
cooperative for exam   
HEENT shows the head   
is normocephalic   
atraumatic pupils   
react to light neck is   
supple cranial nerves   
II through XII show   
full extraocular   
movements jaw and   
facial strength are   
normal hearing is   
intact tongue   
trapezius and   
sternocleidomastoid   
strength are normal   
motor exam shows a   
mild to moderate left   
leg weakness   
characterized by   
decreased foot flexion   
and extension as well   
as spasticity of the   
left leg when she   
walks she does have   
good strength of her   
upper extremities in   
the right leg deep   
tendon reflexes are   
2-3+ throughout toes   
are downgoing sensory   
exam is intact there   
is no gross dysmetria  
Additional Vitals  
No qualifying data   
available.  
Assessment/Plan  
1. Optic neuritis,   
left  
MRI has questionable   
left optic neuritis   
she does have a flare   
of her MS. She will be   
on IVIG 30 g daily for   
5 days we did add a   
magnesium and   
phosphorus level to   
her work-up. Time   
showed patient chart   
electronic records 50   
minutes  
2. Multiple sclerosis   
exacerbation  
Orders:  
EKG  
Magnesium Level  
Phosphorus Level  
Problem List/Past   
Medical History  
Ongoing  
Avascular necrosis of   
bone of left hip  
Chronic pain  
Deep vein thrombosis   
(DVT) of right upper   
extremity  
Degenerative tear of   
acetabular labrum of   
right hip  
Drug-seeking behavior  
H/O factitious   
disorder  
Hypertension  
Intentional   
self-exposure to   
bleach  
Lakesha-Skinner tear  
Multiple gastric   
ulcers  
Multiple sclerosis  
Munchausen's syndrome  
Historical  
No qualifying data  
Procedure/Surgical   
History  
Cholecystectomy  
Cervical spinal fusion   
(2000)  
Esophagogastroduodenos  
copy (2018)  
Esophagogastroduodenos  
copy (2020)  
Insertion Implantable   
Venous Access Port   
(Left) (2020)  
Fusion Spine Lumbar   
Posterior with   
Navigation   
(2021)  
Medications  
Inpatient  
acetaminophen, 650 mg,   
Oral, q6hr, PRN  
ace (more content not   
included)...        [facility]  
   
                                                    [id number]   
   
                                        36                  Per Dr. Triana pt to   
come in this   
afternoon, appt   
scheduled for today   
3:30pm              [facility]  
   
                                        36                  I reached out to Dr. Triana, waiting on an   
update              [facility]  
   
                                        36                  Patient stated that   
she fell on the hand   
that she jsut had   
surgery on and went  
to the urgent care   
last night and found   
out shes broken 2   
bones in that hand.  
She wants an appt   
asap.               [facility]  
   
                                                    Follow-Upon 2023   
   
                                        Follow-Up           [id number] Cantu,Nicho le Lynn 1975 F  
Date Provider   
Department Center  
2023 [id number]   
[facility]  
No family history on   
file  
Level of Service:47162   
[place] OFFICE/OUTPATIENT   
ESTABLISHED LOW MDM   
20-29 MIN  
Reason for Visit and   
Comments:  
Follow-up [[id number]] -   
Pt fell last night  [facility]  
   
                                                    36on 2023   
   
                                        36                  User informed patien  
t   
of Dr. Triana message   
that was noted on   
duplicate encounter  
for refill request for   
pain meds. Patient was   
informed that Dr. Triana   
would like  
for her to follow up   
with the physician who   
she have the pain   
contract with.  
Patient was very upset   
user informed patient   
by law once the pain   
contract is  
signed that she cannot   
not go outside of her   
contract and get   
narcotics  
prescribed by another   
physician and that   
it's Dr. Triana   
discretion, patient   
was  
upset and hung up the   
phone.  
No further action   
needed at this time. [facility]  
   
                                        36                  Per Dr. Triana pt is t  
o   
follow up with pain   
management where she   
has a contract  
through. I called pt   
to notify her pt VM   
was full.           [facility]  
   
                                                    36on 2023   
   
                                        36                  The pharmacist from   
[facility] pharmacy   
called and stated that   
the patient have an  
exsiting contract with   
Dr. Henry Mccain   
for percocet 10/325mg   
qty 120 for a  
30 supply and   
tramadol. He wanted to   
check and see if the   
office is aware of  
this due to patient   
having a pain contract   
agreement with this   
doctor.  
[phone].       [facility]  
   
                                        36                  Script was fixed and  
   
sent to correct   
pharmacy, pt was   
notified            [facility]  
   
                                        36                  Patient stated that   
the oncall dr called   
in the wrong script   
last night. Shes  
supposed to get   
percocet 5-325 not   
percocet 10         [facility]  
   
                                                    Orders Onlyon 2023   
   
                                        Orders Only         [id number] Cantu,Nicho le Lynn 1975 F  
  
  
Date Provider   
Department Center  
2023 [facility]  
  
  
  
No family history on   
file                [facility]  
   
                                                    Telephoneon 2023   
   
                                        Telephone           [id number] Cantu,Nicho le Lynn 1975 F  
Date Provider   
Department Center  
2023 [facility]   
[facility]  
No family history on   
file  
Reason for Visit and   
Comments:  
assisting pain   
contract [Other]    [facility]  
   
                                                    Office Visiton 2023   
   
                                        Follow-up visit     [id number] Cantu,Nicho le Lynn 1975 F  
Date Provider   
Department Center  
2023 DEEP SHARMA MP ORTHO   
MPORTHO  
No family history on   
file  
Level of Service:48602   
[place] POSTOP FOLLOW UP   
VISIT RELATED TO   
ORIGINAL PX  
Reason for Visit and   
Comments:  
Post-op [483]       [facility]  
   
                                                    36on 2023   
   
                      36         Pt notified [facility]  
   
                                        36                  I spoke with pt, she  
   
requested to have   
something called in   
for muscle spasms   Normal                                  [facility]  
   
                                                    Orders Onlyon 2023   
   
                                        Orders Only         [id number] Cantu,Nicho le Lynn 1975 F  
  
  
Date Provider   
Department Center  
2023   
ANDREW JAQUEZ MP   
ORTHO MPORTHO  
  
  
  
No family history on   
file                [facility]  
   
                                                    36on 2023   
   
                                        36                  Pt called stating th  
at   
she had surgery last   
week, and states that   
she is having  
muscle spasms in that   
area and pain. She   
wants to know if this   
is normal, or if  
she needs something   
called in to help with   
this issue. Please   
give pt a call  
back regarding this. Normal                                  [facility]  
   
                                                    XR WRIST LT MIN 3 Von 2023   
   
                                        XR WRIST LT MIN 3 V EXAM: XR WRIST LT MI  
N   
3 V  
HISTORY: pain  
COMPARISON: Left wrist   
radiographs dated   
2022.  
TECHNIQUE: 3 views of   
the left wrist were   
obtained.  
FINDINGS: A plaster   
splint is seen at the   
radial aspect of the   
left wrist. No  
acute fracture or   
dislocation is seen.   
The joint spaces are   
preserved.  
IMPRESSION:  
1. No acute osseous   
abnormality of the   
left wrist is seen.  
Electronically   
authenticated by: SATURNINO CASTRO Date:   
2023 00:38    Normal                                  [facility]  
   
                                                    Orders Onlyon 2023   
   
                                        Orders Only         [id number] Cantu,Nicho le Lynn 1975  
  
  
Date Provider   
Department Center  
2023 [id number] COLIN MP ORTHO MPORTHO  
  
  
  
No family history on   
file                [facility]  
   
                                                    HPon 2023   
   
                                        HP                  H&P reviewed. The   
patient was examined   
and there are no   
changes to the H&P.  
She is scheduled for a   
left Burnette   
arthroplasty today. [facility]  
   
                                                    OPNOTEon 2023   
   
                                        OPNOTE              Operative Note  
Patient: Nichole Lynn Cantu Date of Surgery:   
2023  
: 1975  
MRN: [id number]  
Pre-operative   
Diagnosis:   
Carpometacarpal joint   
arthritis of the left   
thumb  
Post-operative   
Diagnosis: same  
Operation: Ligament   
reconstruction and   
tendon interposition   
arthroplasty of the  
left thumb (91769),   
Using flexor carpi   
radialis tendon graft   
harvested through a  
separate incision   
(42284)  
Surgeon: Deep Triana MD  
Assistant: Phill Ag MD  
Staff: Circulator:   
Tamica Dyer RN  
Relief Circulator:   
Andrew Parson  
Relief Scrub: Re Cartagena CST  
Scrub Person: Sandrita Lester  
Anesthesia Type:   
Regional  
Indications: Nichole Lynn Cantu is a 47   
y.o. female with   
worsening pain at the  
basilar joint of the   
left thumb. This has   
been a worsening   
problem over time  
and has been treated   
nonoperatively. The   
symptoms are to a   
point where it is  
interfering with   
required daily   
activities. It was   
felt that a soft   
tissue  
arthroplasty is   
indicated for   
treatment at this   
time. The risks and   
benefits of  
the procedure were   
explained, and with   
good understanding it   
is agreed to  
proceed.  
Procedure: The patient   
is brought to the   
operating room and   
placed on the table  
in a supine position.   
An axillary block had   
been administered per   
the  
anesthesia service in   
the holding area. A   
tourniquet is placed   
around the  
proximal left arm.   
Preoperative   
antibiotics were   
given. The upper   
extremity is  
then prepped and   
draped out in a   
sterile fashion. To   
begin the procedure,   
after  
a standard timeout,   
the arm is   
exsanguinated with an   
Esmarch bandage and   
the  
tourniquet is inflated   
to 250 mmHg. Using a   
15 blade, a   
curvilinear incision   
is  
made along the lateral   
aspect of the base of   
the thumb. Blunt   
dissection is  
carried out through   
the subcutaneous   
tissue. Care was taken   
to identify  
branches of the   
superficial radial   
nerve.Two Rachel rakes   
were used to protect   
the  
soft tissues.The APL   
and EPB tendons were   
identified. The EPB   
tendon was  
dissected free and   
retracted dorsally.   
Using a Bovie, we use   
the plane between  
the 2 tendons to   
expose the base of the   
thumb metacarpal, the   
CMC joint and the  
trapezium. Once we had   
adequate exposure, and   
the anatomic landmarks   
were  
clearly defined, the   
trapezium was excised   
in a piecemeal fashion   
using a small  
rongeur. The CMC joint   
had significant   
degenerative changes.   
Care was taken  
toavoid the FCR tendon   
as we resected the   
volar portion of the   
bone. Once the  
trapezium was   
resected, we looked at   
the STT joint. There   
was no arthritis seen  
We then made sure that   
there were no residual   
osteophytes between   
the base of  
the thumb and index   
metacarpals.  
Next, we grasped the   
FCR tendon with a   
Ragnell retractor. By   
placing tension on  
the tendon it is   
easily palpable at the   
junction of the middle   
and distal thirds  
of the forearm. The   
tendons it is exposed   
through a transverse   
incision at that  
level. The tendon is   
dissected free,   
isolated, and then   
transected using a  
Bovie. We use the   
Ragnell retractor to   
pull the tendon into   
the wound at the  
base of the thumb. The   
tendon is split in   
half longitudinally,   
creating two  
slips of tendon.  
We then turned our   
attention to the base   
of the thumb   
metacarpal. An oblique  
drill hole is made   
that is perpendicular   
with the thumb nail   
plate and exits  
right near the FCR   
tendon insertion.   
Using a [employer] suture   
passer, one slip of  
the FCR tendon was   
pulled through our   
drill hole. The tendon   
was looped around  
the base of the   
metacarpal and then   
tied together with,   
and sutured to, the  
other slip of tendon.   
The wound is irrigated   
thoroughly with normal   
saline  
solution. The   
remaining tendon from   
both slips is rolled   
up onto a forceps and  
sutured together into   
a ball. That tendon   
ball is used to fill   
the space from  
where the trapezium   
was excised. The   
capsule is closed with   
figure-of-eight  
sutures of 3-0 Vicryl.   
The skin is closed on   
both incisions using   
5-0 Novafil  
suture.  
A sterile dressing of   
Xeroflo gauze, 4 x 4   
fluffs, web roll and a   
short arm  
thumb spica plaster   
splint is applied. The   
drapes were removed   
and the  
tourniquet is   
released. The arm was   
placed into a sling   
because of the   
regional  
block. All sponge and   
needle counts were   
correct at the time of   
closure. She  
was then brought to   
the recovery room in   
stable condition,   
having tolerated the  
procedure well.  
Estimate Blood Loss:   
Minimal  
Specimens: No   
specimens collected  
Implants:  
Complications: None;   
patient tolerated the   
procedure well.  
Deep Triana MD     Normal                                  [facility]  
   
                                                    POCT GLUCOSE METER UNSOLICIT  
ED RESULTSon 2023   
   
                      Glucose [Mass/Vol] 97 mg/dL   Normal          [facility]  
   
                                        Comment on above:   Result Comment: ngro  
argenis   
   
                                                            Performed By: #### L  
[id number] ####[facility]   
[place] [address]   
   
                                                    [id number]   
   
                                        [id number]             Additional   
Instructions:  
NPO AFTER MIDNIGHT.  
MUST HAVE A  TO   
TAKE YOU HOME AND   
SOMEONE TO STAY FOR 24   
HOURS AFTER  
SURGERY.  
NO JEWELRY OR   
VALUABLES.  
HOLD THE MEDS WE SPOKE   
ABOUT: NONE.  
TAKE THE MEDS WE SPOKE   
ABOUT WITH A SIP OF   
WATER DOS:NORVASC,   
PREMARIN. PAIN  
MEDS IF NEEDED.  
BRING INSURANCE CARD   
AND PHOTO ID AND MED   
LIST.               Normal                                  [facility]  
   
                                                    HPon 2023   
   
                                                          Orthopedic Surgery  
Subjective  
Chief complaint:  
Chief Complaint  
Patient presents with  
Left Thumb - New   
Patient  
Nichole Lynn Cantu is   
a 47 y.o. year old   
female presenting for   
evaluation of  
pain at the basilar   
area of the left   
thumb. She states that   
this has been  
present for a little   
over a year. It is   
significantly getting   
worse fairly  
rapidly and starting   
to limit her ability   
to do her daily   
activities. She has  
tried a CMC wrap which   
helps a little bit she   
also had a   
corticosteroid  
injection which helped   
for about a month or   
so. She is done some   
research on  
this and feels as   
though she would like   
to try a soft tissue   
arthroplasty.  
Previous Treatments:   
Splinting and   
cortisone injection  
Patient History  
No past surgical   
history on file.  
Past Medical History:  
Diagnosis Date  
Diabetes mellitus   
(CMS/[facility])  
Hypertension  
Objective  
General:  
There is no height or   
weight on file to   
calculate BMI.  
No acute distress,   
comfortable  
Respiratory: Unlabored   
breathing with normal   
rate, no cough  
Cardiovascular: Warm   
well perfused   
extremities  
Psych: Appropriate   
mood behavior  
Hand/Wrist   
Musculoskeletal Exam  
Inspection  
Left  
Left hand/wrist   
inspection is normal.  
Palpation  
Left  
Left hand palpation is   
normal.  
Thumb tenderness to   
palpation:   
carpometacarpal joint  
Dorsal hand - 1st   
metacarpal tenderness   
to palpation: CMC  
Range of Motion  
Left Hand  
Left hand range of   
motion is normal.  
Strength  
Left Hand  
Left hand strength is   
normal.  
Neurovascular  
Left  
Left neurovascular   
exam is normal.  
Special Tests  
Left  
Finkelstein's test:   
negative  
CMC grind test:   
positive  
Tinel's - carpal   
tunnel: negative  
Imaging: Imaging from   
outside showed some   
mild arthritic changes   
with some  
subluxation of the   
joint.  
Assessment/Plan  
Nichole Lynn Cantu is   
a 47 y.o. year old   
female with Arthritis   
of  
carpometacarpal (CMC)   
joint of left thumb  
Encounter for   
pre-operative   
laboratory testing  
PLAN: We discussed   
options for treatment.   
She is a little bit on   
the younger  
side for this but her   
physical examination   
is clearly consistent   
with CMC joint  
arthritis. She would   
very much like to   
proceed with surgical   
treatment I think  
that is appropriate.  
We will set her up for   
a Burnette arthroplasty   
of her left thumb. The   
surgical  
procedure along with   
the risks and benefits   
were discussed today   
in the clinic.  
Consent for the   
surgery was reviewed   
and signed. We will   
see them back in  
clinic following the   
procedure.          [facility]  
   
                                                    Office Visiton 2023   
   
                                        Follow-up visit     [id number] Cantu,Nicho le Lynn 1975 F  
Date Provider   
Department Center  
5/3/2023 DEEP SHARMA [facility]  
No family history on   
file  
Level of Service:65999   
[place] OFFICE/OUTPATIENT   
NEW LOW MDM 30-44   
MINUTES  
Reason for Visit and   
Comments:  
New Patient [632]   [facility]  
   
                                                    Coding Summary.on 2023  
   
   
                                        Coding Summary.     CD:[id number]  
Ww  
+PGhlYWQ+WG5JFPPnH96gf  
FCllJ3cY5QDXMgHCdzlRIT  
VMTsZLjXcckBuMT8zgXFaQ  
XJu  
IC8+AA2jLAYoIroqxOKmh6  
H3uJQ7S92jtn3rHOrxkEZ7  
YDUzNoUvaybdx1kuyRs1NV  
cuNmluOyBt  
ESAyuG83ECZ6fD52Jb06rJ  
FtyMUxr5wotId1BrJbDBOd  
IBJ4uPadBVzex3ImFUWwI9  
0yvANpc9P0  
XFWbrHmthHIwFkKaiND6mJ  
1zGFguxngth0agfcspVyf5  
fn03eQFgi6F5tEF8S8Whdq  
Z1IXGayDDg  
GqjosKGVmI2xwkazn0qxxs  
zgKkMeBSCyBYt4ZTx4QBTu  
wXsmQnPsBG39VUR0JWKwib  
RtE5LjNIJx  
jDsqRoC5w6T3Ph5LO3CHNf  
ikS8MWPCQGGDeniZB+PC90  
ks76L1OhVisoRdk0EKCxAJ  
H0lPV7oN2h  
CITnFUurn1O2zQH9P6Quqr  
Wvej7xo9mpLNGjVKwiQ22h  
yMIlv7W9DYPhuDT9KMEirM  
liFtKipZ37  
Oyc+YJLsqGods9FpAtydu8  
fum7gypVm7PjaoYAVvuhCy  
mBnqRIL2r1SlAc7vQTJtoR  
U5fHA6wT5h  
CcAyUwS2WVwjK422GcHqbK  
UpJbfiN97cQ3QgvDA+PHRy  
Yio1RBLnkRhiVD8cG9TdQW  
RpbmctbGVm  
iPggAL5sVQIbvkvdAWSisB  
8bBYPkE1y6GyZkQeJ8JXli  
B0QsBLEncgfmVy78bQ5xXi  
SpIgP1HPsp  
Y9InrnT3AYZsoSOuWKcgCE  
Q2B87al3V3TWFsHLJfMKU8  
gYG8hR1kpAzefsilcKTzbR  
sgdmVydGlj  
IKcoUPnsQ633JAUanNjoBe  
NvZGluZyBEYXRlOiAgMDQv  
MjEvMjAyMzwvdGQ+PHRkIH  
M6qKfxEXJp  
wDHgTPjuNs1kuGgzxGmaCM  
2rNAFqpihpOOQqkQ1xGENp  
iDZaxAmyTO6uBWQlffgge6  
64XwFwQPE2  
KDGxoNVmR8IdkL3lAiPxFB  
SuEMBhN4HlwASwBXvwY265  
DZaiTlS0TIGdzlUhX5WqIY  
FsaWduOiB0  
b1Z7Ad2Pz6ZsfoneC7LqdP  
UsYtIcGfgpUUk8D3NgUphh  
dHI+YF45LMRxLA40WYl6FH  
F8tKcqFFsj  
MKGcG8HrmM5zPpWiODUgOH  
RkOyc+PHRhYmxlIHdpZHRo  
QTxoKCLgOtXbsXppSE0iKb  
9yZGVyLWNv  
qGvvaMYaQkImz3glZNNwRT  
wmSX9tsCckP3HwbSV2EUOl  
d7a8Rl75C09pW2VqdSW+PG  
QlfGT4bMU9  
zE8bAiXzCgB0XAqrT696Cb  
GueGMzNfbhx8ara5iypFu3  
FwM6CLLnexNdjJrjWJT2j1  
DpLq54L16j  
IHdpZHRoPSIxNSUiIHZhbG  
zpem0nvO5iVc8+PGNvbCB3  
mUN3eX6wLbJcGkY9IXpsP0  
49InRvcCIv  
Ygahh1vit0dbxVo3GyMaFQ  
RoblHzgTwlDPK6s8DoBn74  
Y6YihVrwh8DuBpz4ri01wN  
Ozr0D8jEQ5  
V5TfAEHigtukaURynBwnAR  
9eQUCjsxkdCFXzqQ7uCCIh  
E3p9NoSpCmI6GSnpW0Pkup  
L4FWZbpTMk  
VRUljBPWoN3xcjnaj5rlak  
oeUgDvSIXxNWo5KOh6RUHk  
uDvtFnTpUSR4KpG8WTZ1sX  
DoqZ8ruCyi  
bibdvI9bGym+YAX1bZVhpL  
XGUT9vRzxvbZK+PHRkIHN0  
iNxuZFulOZFqcC8tDOBeS1  
g9OhFhHxH8  
DBhbI7TbtrT9MQTriJAoCJ  
SuqMPKbQ0vqwsaa8uvltmq  
QqHnHDVpWQe6CBa9TKTdoO  
duOiBsZWZ0  
CzJ7DBY9kMJqsF2ppDmifc  
outT2wGdy+QmlydGggRGF0  
TDm4E2YrNxa1ZCGwtQmiDG  
0ncGFkZGlu  
Fl1dzOqyiNbvAC6zUIYubo  
kkt401CfQfu1ymMPHruOSv  
RFheDTW5O71mv6R0TEHwEZ  
PnXMP7wSN9  
kB6xaFpapkqwwNAnkJvxer  
JzcBpkKXwhXXadZ089ESGs  
eYrxCpMrYNn1O7BwVnn5ND  
EebYatSH6g  
gKSrCIahLh0hvRhazBafZC  
0eBAIfclhih259TmOdj2dz  
BBSyhLVnNAvcZFQ1O97zy8  
I3XRCjOXJs  
JXQ3jRP0yL4jfFdykoffeZ  
VmdDsgdmVydGljYWwtYWxp  
H071BKYjkNaxOmMlfLx4O9  
UtLpy4HIFp  
sUgzJK2erEPnPSglAa8dpZ  
juoUlnQJ0cPAWorzimq485  
GhMjs7spQCVjvDZgKHcrEM  
E3F09cq5V3  
OXQjOPXpQUO5pDW9hB4emC  
lnbjogbGVmdDsgdmVydGlj  
FJraNKafZ472HIHryGytGq  
BhdGllbnQg  
OTbvCAf0R3CpHjqptVY+PC  
71ZUPcEF76lLQgfKMdm7cs  
dUs1GhJqCQIdOLD0cWmjSP  
bso5VvCKJe  
N56idSVvd1O3VWPgpQyvbR  
CjUjQwkLU2sT6hGItwrayq  
x9tnbxzsPasxu3alvq27pM  
59C59qIQea  
ZHRoPSIzMCUiIHZhbGlnbj  
5zoQ0aOa4+SLFgcVK4nNV8  
kS8oFQAyAcB3VOfyJ680Ss  
RvcCIvPjxj  
t0rlo9nmoVw6DjX4BOMdoy  
KadVnoLWF8i7EtCt26W67g  
IHdpZHRoPSIyMCUiIHZhbG  
jdrp4lcY1v  
Ii8+XEWafKR0aIH9dJ0oQq  
SyYxB7EZsdL754KsLdhVLp  
CyozW35xY4GteMG+PHRyPj  
x7IDMpsZge  
SS3lvTHiUEqwIs7wBLW3Bh  
SfFoQeLEkpS3AdSQQzkvoy  
lxlxzZR9PHKbSKSrsL43Rm  
9udDogMTBw  
uKMUjF1yserkx5krgzldXq  
BxROCnGHy0AYe5PPUhhQyd  
DlZvUHS6QyJ0ETT6oDQtfM  
1hbGlnbjog  
rV6hK3RbQOHpsruyTq21tS  
0jEjPrMkE3EBpiNun+Q0FO  
MJXgVI3JW4qVTISvAEvmqN  
Q+PHRkIHN0  
bCvbGAtjBXBafF0oCGQtH4  
j3AnAuSgV4MCdnX9IzFGRr  
bebdAd91aU6vCkSiZfG6EV  
usA8IgnwJ7  
TJSicDYrCNnvSQL6I57vu4  
V3LWRaRXSbXMT4dAK6dO4u  
bGlnbjogbGVmdDsgdmVydG  
ljYWwtYWxp  
R333PKOdxLzzCuF5EaW0Gg  
U9CoP4B8NiYsw0RSGmmFao  
EQ5cyQZjMWscCq9ydQaqpE  
fwRY6aRECo  
ywmdXVIkwD7vCXMmcSNdmD  
irJT4wZEWblkdfr074IaMk  
YWD5XXZpbIOgD3QfuL2lVw  
AjMDAwMDAw  
N6UkkVSuCNebI361AGzdBw  
H1FSFfcjIfH7OyILZkhXhm  
ChK0f9O1Nw94NTCWPJGhtx  
wvdGQ+PHRk  
GGU0rYxcJZgcJHLjiT8mGU  
GkX6w2YmEfYlQ6OAnaL2Lo  
CVHhrksbYa87bQ1bVvEqZb  
M6KPedZ8Kg  
igF5WGPngRYuYXqvLVP3X9  
0qg4K2FYGcFAGbIRB6wAZ9  
yB2xwIbhaovizXWhvVlwce  
VydGljYWwt  
YZlwG407EEUdfDouDxGdiI  
FsZTwvdGQ+EDMoMAT4dOhp  
RTjhVSNpaY2rXALaK6i3Za  
GwKfT8SWxx  
L2DxQBOurxqdYn44dW5aYg  
NzWoE6TLvgB6VmnwZ7NTPf  
nKHnSZvhCDN2C02uf5Y9GH  
MwMDAwMDA7  
cNN2qC2miXewvhvevFDmcL  
iqsnRbqDseYWikWSpiA036  
JBDukQeuSgUkVESqCG1roK  
wvdGQ+PC90  
sl50W6VcBpdwCbg5TVWdPM  
U1uTK0cW4hGCEtWOpzi0C5  
iIM0T1VuhhUyuf4zn7wyND  
OgLIkvY28x  
fUUgg1R3GAYusCH5IUTujQ  
lcLqOosS46Ahd+PGNvbGdy  
t4RjXxkiw6jtr5pklAl0Fn  
MwJSIgdmFs  
lYztIDR0f8MhNx82L53fAV  
dpZHRoPSIzMCUiIHZhbGln  
hi9rtO2jKr4+HZAogNI8rC  
P2tE3rTdKp  
AnY0HXroJ333XbBxwZZvDc  
eip6syz2xnnMm2XxWxIFGt  
kwNrdWkqZUP8r0OuXa03H7  
YzzQoae1Tx  
Rdm0ap92kJSnn2D4wNT5Y1  
LtTYSqpvpsbUTfaLhtSX1l  
PFNomuugVYUeeF1bPQTkF8  
u7TgFqGlG7  
QTuaS5MenhM2KSWzeVVnGY  
IxjTBVfW8ilhfyr7vlzmei  
XtGrWOOyIQu1OZa2TJEmfV  
duOiBsZWZ0  
MkY8KJU5fYJdiX8goWcbek  
tusL0nFun+IKg4t5kyqOAv  
IA9kzZF4AZ07FR14lDEeq9  
G5oAB4S0Oz  
NQTcmpomqhauwNL4GRCnLH  
IxpG14Zp7htSluVf9hLUOi  
RLE7DJKnbJBcF8HnzW9sBw  
AjMDAwMDAw  
J0FhhZNvGCkxO449PPtgLc  
O5EXDnneLaH1KyEWOroGdo  
DqH8h2D1Zg6IDM12FQ04RE  
57wKYdi0K3  
kFA9X1SgEJEszbqjtmtccX  
U4UYHvJBXrmX27Op9uiXwa  
Fk1fQQCdMZN6ZKHbgDHpR3  
VzmC9sIdTt  
AECgCFHkL5YtoXAuEUbeA7  
10HWqeGtO8MVHcysIkP3Yf  
WPZluNbhYnK7t0X6Ju1CAu  
16BD01BY05  
nMAan6I3hLY3W4LnNVMmwd  
iqjkvicOI9ZBMtASOrxS61  
Yh4mbNwrWq6wXSHuXOL9KQ  
KslBRwB4Ze  
gP5dQgVeRWStNDSaQ5XlxH  
NuUVyyI786IRnzPgX2MLZq  
ahLjW7XpPWCdwKzdZiW8d8  
F3Bs1GLKuw  
wdi5H3LvYbsrsJF+PC90YW  
HqVQ55oGWpuABpo7tzsVr0  
BdSkKZUiQXC5aQkyCHbkr0  
KeLTFaM04e  
zADde8O2 (more content   
not included)...    Normal                                  [facility]  
   
                                                    CTA Cheston 2023   
   
                                        CTA Chest           Exam Date/Time:  
2023 21:06 EDT  
Reason for Exam:  
Pulmonary embolism   
(PE) suspected, high   
prob;Other (please   
specify)  
Report  
IMPRESSION: The study   
is negative for   
pulmonary emboli,   
aortic dissection, or  
aneurysm.  
There are no acute   
infiltrates or   
effusions.  
EXAM: CTA Chest  
History: Chest pain,   
SOB shortness of   
breath, difficulty   
breathing, chest pain  
Technique: Multiple   
contiguous axial   
images were obtained   
of the thorax from the  
thoracic inlet through   
the upper abdomen   
during dynamic   
administration of IV  
contrast. Sagittal and   
coronal reformats as   
well as 3-D MIP   
projection   
reformations  
have been obtained.  
All CT scans at this   
facility use dose   
modulation, iterative   
reconstruction, and/or  
weight based dosing   
when appropriate to   
reduce radiation dose   
to as low as   
reasonably  
achievable.  
Comparison:  
Findings:  
Visualized portion of   
the thyroid gland is   
within normal limits.  
There is a   
three-vessel aortic   
arch. No thoracic   
aortic aneurysm.  
Heart size is within   
normal limits. No   
significant   
pericardial effusion.  
There are no   
persistent filling   
defects within the   
pulmonary arteries.  
No axillary,   
mediastinal, or hilar   
lymphadenopathy.  
Esophagus is within   
normal limits.  
No pulmonary nodule or   
mass. No   
consolidation, pleural   
effusion, or   
pneumothorax.  
There is minimal   
dependent atelectasis   
in the posterior   
portions of the lungs.  
Visualized upper   
abdominal viscera   
appear within normal   
limits.  
There are no acute   
osseous changes.  
  
Report  
Ordering Provider:   
Oneil Leach  
***** FINAL REPORT   
*****  
  
Dictated: 2023   
9:38 am Buzz Marks MD  
  
Signed (Electronic   
Signature): 2023   
9:38 am  
Signed by: Buzz Marks MD  
Transcribed by: MIKHAIL   
Technologist: VANDANA  
Technical Comments  
GFR (mL/min/1/73m2)   
>60  
Contrast: Isovue 370  
Contrast amount in   
ml's: 71            Normal                                  [facility]  
   
                                                    Discharge Instructionson    
   
                                                    Discharge   
Instructions                            [id number]  
[id number].  
[id number]            Normal                                  [facility]  
   
                                                    ED Clinical Summaryon 2023   
   
                                        ED Clinical Summary (Inserted Image.   
Unable to display)   
[place] [id number] [phone]  
ED Clinical Summary  
Person Information  
Name: CANTU, NICHOLE L   
[place] Age:   
47 Years : 1975  
Sex: Female Language:   
English PCP: CANDE SMITH, DAVE MOSLEY  
Marital Status:   
 Phone:   
[phone]  
MRN: [id number]   
Id: [id number]  
Visit Reason: Chest   
pain; CHEST PAIN   
Speciality: Acuity: 2  
Enc Type: Emergency   
Med Service: Emergency  
Arrival: 2023   
15:57:45 Discharge:   
2023 23:37:03   
LOS: 000 07:40  
Checkin: 2023   
15:57:45 Checkout:   
2023 23:37:03   
Dispo Type: Home   
(Routine DC)  
  
EVENTS:  
Event Name Event   
Status Request   
Date/Time Start   
Date/Time Complete   
Date/Time  
Arrive Complete   
2023 15:57:45   
Document Home Meds   
Request 2023   
15:57:45  
Triage Complete   
2023 15:57:45   
2023 16:08:19   
2023 16:08:19  
Bed Assign Complete   
2023 16:01:09   
Dr Exam Complete   
2023 16:01:09   
2023 16:04:03   
2023 16:04:03  
RN Exam Complete   
2023 16:01:09   
2023 16:38:57   
2023 16:38:57  
Registration Complete   
2023 16:04:03   
2023 16:43:49   
2023 16:43:49  
EKG Complete 2023   
16:04:10 2023   
16:07:07  
Dr Exam Complete   
2023 16:05:38   
Pending Labs Request   
2023 16:10:30  
Lab Complete 2023   
16:10:30 2023   
17:29:37  
Meds Admin Complete   
2023 16:10:30   
2023 16:53:07  
Patient Care Request   
2023 16:10:30  
RT Request 2023   
16:10:30  
CT Complete 2023   
16:10:31 2023   
17:53:50 2023   
21:06:35  
Pending Labs Cancel   
2023 16:24:56   
2023 17:06:52  
Lab Cancel 2023   
16:24:56 2023   
17:06:52  
Reg Complete Request   
2023 16:43:49  
Reg Bed Request   
Complete 2023   
16:43:49 2023   
16:43:49 2023   
16:43:49  
Pending Labs Complete   
2023 16:55:32   
2023 16:55:32   
2023 17:20:53  
Lab Complete 2023   
16:55:32 2023   
16:55:32 2023   
17:20:53  
Pending Labs Complete   
2023 17:07:24   
2023 17:07:24   
2023 17:25:15  
Lab Complete 2023   
17:07:24 2023   
17:07:24 2023   
17:25:15  
Meds Admin Complete   
2023 17:26:01   
2023 17:35:30  
Pending Labs Complete   
2023 17:29:37   
2023 17:29:37   
2023 17:29:47  
Lab Complete 2023   
17:29:37 2023   
17:29:37 2023   
17:29:47  
Meds Admin Complete   
2023 17:57:03   
2023 18:00:48  
Meds Admin Complete   
2023 19:06:30   
2023 19:15:55  
Dr Exam Complete   
2023 19:08:03   
Registration Request   
2023 19:08:03  
Meds Admin Complete   
2023 20:16:28   
2023 20:27:21  
Meds Admin Complete   
2023 20:21:55   
2023 20:27:22  
Meds Admin Complete   
2023 20:30:09   
2023 20:39:11  
Discharge Complete   
2023 23:25:26   
2023 23:37:08   
2023 23:37:08  
Transfer Complete   
2023 23:37:08   
ADDRESS:  
[address]   
[place] [phone]  
[facility] DOC NOTES:  
MEDICAL INFORMATION:  
Prescriptions Given:  
Medications to   
Continue with No   
Changes  
Other Medications  
amlodipine (Norvasc 5   
mg Tab) 1 Tablets By   
Mouth every day.  
conjugated estrogens   
(Premarin 0.9 mg Tab)   
1 Tablets By Mouth   
every day.  
HYDROmorphone   
(Dilaudid 4 mg Tab) 1   
Tablets By Mouth every   
4 hours as needed for   
pain. or as needed.  
ibuprofen (ibuprofen   
200 mg Tab) 2 Tablets   
By Mouth as needed   
Pain/Fever.  
lorazepam (LORazepam 1   
mg Tab) 1 Tablets By   
Mouth 3 times a day.  
pregabalin (pregabalin   
100 mg Cap) 1 Capsules   
By Mouth 3 times a   
day.  
tramadol (tramadol 50   
mg oral tablet) 1   
Tablets By Mouth 4   
times a day as needed   
for pain.  
PATIENT EDUCATION   
INFORMATION:  
Instructions:  
Nonspecific Chest   
Pain, Adult  
  
Follow up:  
With: Address: When:  
[address] [address]   
[place] [id number] [phone]   
Business (1Ufree In 3 days   
2023  
Comments:  
Return to the   
emergency room if your   
pain gets worse or any   
new symptoms.  
DIAGNOSIS:  
1:Chest pain        Normal                                  [facility]  
   
                                                    ED Note-Nursingon 2023  
   
   
                                        ED Note-Nursing     Patient has been   
seeking for pain   
medicine even after   
multiple doses of   
Morphine and a dose of   
Dilaudid. Informed Dr. Ramirez about this   
matter.             Normal                                  [facility]  
   
                                                    ED Note-Physicianon 20   
   
                                        ED Note-Physician   Basic Information  
Time Seen:  
Oneil Leach PA-C   
2023 16:04  
Chief Complaint  
pt reports cp the past   
4 days, seen by her   
family doc and told to   
come to ed. pt has hx   
of pe's.  
History of Present   
Illness  
47-year-old female   
comes to the ED for   
evaluation of chest   
pain. For last 4 days   
she has had midsternal   
chest pain. She   
describes it as a   
sharp and a heaviness.   
No cough, congestion,   
fever, chills. No   
nausea or vomiting or   
shortness of breath.   
No concerns for PE,   
she states she has a   
history of DVT/PE. She   
is not currently   
anticoagulated but   
does have IVC filter   
in place. No   
aggravating   
alleviating factors.   
No prior treatments.   
She denies any history   
of CAD.  
Review of Systems  
A 10 point review of   
systems is negative   
except as noted above.  
Medical and Surgical   
History: Reviewed and   
noted  
Social history: Lives   
at home  
Tobacco: Denies  
Physical Exam  
Vitals & Measurements  
T: 37 ?C(Oral) HR:   
89(Peripheral) RR: 20   
BP: 165/103 SpO2: 100%  
HT: 170 cm WT: 61.9 kg   
BMI: 21.42  
Nurses notes and vital   
signs reviewed and   
patient is not   
hypoxic.  
General: Patient   
appears anxious but   
not in distress  
Skin: Warm, dry.  
Head: Atraumatic.  
Neck: No JVD.  
Eye: Normal   
conjunctiva.  
Ears, Nose, Mouth, and   
Throat: Moist mucous   
membranes.  
Cardiovascular: Strong   
distal pulses.  
Chest wall:  
Respiratory:   
Respirations are   
nonlabored.  
Back: Normal range of   
motion.  
Musculoskeletal:   
Normal ROM with no   
gross deformity.  
Gastrointestinal: Soft   
and nontender  
Urological:  
Neurological: Awake   
and alert. No focal   
deficits. Follows   
commands.  
Psychiatric:   
Cooperative.  
Medical Decision   
Making  
Laboratory studies   
reviewed and noted.   
EKG shows no ischemic   
changes. The patient   
presents with chest   
pain with a history of   
PE and this is her   
concern. I D-dimer was   
obtained and did   
return elevated. She   
reports a history of   
contrast dye, but   
states that she simply   
needs Benadryl prior   
to injection and then   
she does not have any   
adverse reactions. At   
this time her CTA scan   
is pending and case   
discussed with the   
oncoming physician for   
follow-up and   
disposition.  
The patient is seen   
with the PA. She has   
presented with   
left-sided chest pain.   
Her pain has been   
present for the past 4   
days. Unclear etiology   
of her chest pain.   
Less likely of cardiac   
origin. The EKG shows   
no acute ischemic   
changes. 3 sets of   
troponins are   
negative. Less likely   
PE. Her D-dimer was   
slightly positive. CT   
angiography is   
negative for pulmonary   
embolism. Negative for   
acute intrathoracic   
process. The CT   
findings were   
discussed with   
radiologist. There is   
no pericardial   
effusion nor fluid.   
Her pain improved   
after the Dilaudid.   
Will discharge patient   
home follow-up with   
her primary care. She   
is instructed to   
return to the   
emergency room if her   
pain recurs or any new   
symptoms.  
Assessment/Plan  
1. Chest pain (R07.9:   
Chest pain,   
unspecified)  
Orders:  
diphenhydrAMINE, 50 mg   
= 1 mL, Injection, IV   
Push, Once PRN   
Itching, STAT, Start   
date 23 17:25:00   
EDT, 23 17:25:00   
EDT  
morphine, 4 mg = 2 mL,   
Injection, IV Push,   
Once, Stop date   
23 16:10:00 EDT,   
STAT, Start date   
23 16:10:00 EDT,   
23 16:10:00 EDT  
morphine, 4 mg = 2 mL,   
Injection, IV Push,   
Once, Stop date   
23 17:56:00 EDT,   
STAT, Start date   
23 17:56:00 EDT,   
23 17:56:00 EDT  
Automated Diff  
Basic Metabolic Panel  
CBC w/ Auto Diff  
CTA Chest  
D-Dimer  
ED Cardiac Monitoring  
eGFR  
Hepatic Function Panel  
Lipase Level  
Oxygen Saturation  
Oxygen Therapy  
PT & PTT  
Saline Lock Insert  
Troponin 0 Hr.  
Troponin 3 Hr.  
Troponin 6 Hr.  
Troponin 9 Hr.  
Disposition Plan  
Patient Discharge   
Condition  
Stable, improved  
Discharge Disposition  
Discharged home  
Discharge Prescription   
List  
Prescriptions  
No active prescription   
medications  
Follow-up  
With When Contact   
Information  
ALI CANDE In 3 days   
2023 EDT [address]   
[place] [id number] [phone] Business (1)  
Additional   
Instructions: Return   
to the emergency room   
if your pain gets   
worse or any new   
symptoms.  
Patient Education  
Nonspecific Chest   
Pain, Adult  
Problem List/Past   
Medical History  
Ongoing  
Anemia  
Anxiety  
Anxiety  
Chronic pain  
GERD without   
esophagitis  
History of DVT in   
adulthood  
Multiple sclerosis  
PTSD (post-traumatic   
stress disorder)  
Smoker..  
Historical  
Degenerative disc   
disease  
Gastrointestinal bleed  
MS (multiple   
sclerosis)  
Procedure/Surgical   
History  
MRI (2021), MRI   
(2020), MRI   
arthrography   
(2019), Back   
fusion, Carpal tunnel   
release, Bella   
filter, Hysterectomy.  
Medications  
Inpatient  
HYDROmorphone 2 mg/mL   
Inj, 1 mg= 0.5 mL, IV   
Push, Once  
morphine 2 mg/mL Inj,   
4 mg= 2 mL, IV Push,   
Once  
Home  
Dilaudid 4 mg Tab, 4   
mg= 1 tab(s), Oral,   
q4hr, PRN  
ibuprofen 200 mg Tab,   
400 mg= 2 tab(s),   
Oral, PRN  
LORazepam 1 mg Tab, 1   
mg= 1 tab(s), Oral,   
TID  
Norvasc 5 mg Tab, 5   
mg= 1 tab(s), Oral,   
Daily  
pregabalin 100 mg Cap,   
100 mg= 1 cap(s),   
Oral, TID  
Premarin 0.9 mg Tab,   
0.9 mg= 1 tab(s),   
(more content not   
included)...        Normal                                  [facility]  
   
                                        Comment on above:   Result Comment: Elec  
tronically Signed By: Oneil Leach PA-C\.br\Date and Time Signed: 23 18:56   
EDT\.br\Electronically Co-Signed By: Tara Ramirez M.D.\.br\Date and Time Co-Signed: 23 04:17   
EDT\.br\Electronically Co-Signed By: Ned Henyr DO\.br\Date   
and Time Co-Signed: 23 08:13 EDT   
   
                                                    ED Patient Education Noteon   
2023   
   
                                                    ED Patient Education   
Note                                    Pulmonary Medicine  
Nonspecific Chest   
Pain, Adult  
Chest pain is an   
uncomfortable, tight,   
or painful feeling in   
the chest. The pain   
can feel like a   
crushing, aching, or   
squeezing pressure. A   
person can feel a   
burning or tingling   
sensation. Chest pain   
can also be felt in   
your back, neck, jaw,   
shoulder, or arm. This   
pain can be worse when   
you move, sneeze, or   
take a deep breath.  
Chest pain can be   
caused by a condition   
that is   
life-threatening. This   
must be treated right   
away. It can also be   
caused by something   
that is not   
life-threatening. If   
you have chest pain,   
it can be hard to know   
the difference, so it   
is important to get   
help right away to   
make sure that you do   
not have a serious   
condition.  
Some life-threatening   
causes of chest pain   
include:  
? Heart attack.  
? A tear in the body's   
main blood vessel   
(aortic dissection).  
? Inflammation around   
your heart   
(pericarditis).  
? A problem in the   
lungs, such as a blood   
clot (pulmonary   
embolism) or a   
collapsed lung   
(pneumothorax).  
Some non   
life-threatening   
causes of chest pain   
include:  
? Heartburn.  
? Anxiety or stress.  
? Damage to the bones,   
muscles, and cartilage   
that make up your   
chest wall.  
? Pneumonia or   
bronchitis.  
? Shingles infection   
(varicella-zoster   
virus).  
Your chest pain may   
come and go. It may   
also be constant. Your   
health care provider   
will do tests and   
other studies to find   
the cause of your   
pain. Treatment will   
depend on the cause of   
your chest pain.  
Follow these   
instructions at home:  
Medicines  
? Take   
over-the-counter and   
prescription medicines   
only as told by your   
health care provider.  
? If you were   
prescribed an   
antibiotic medicine,   
take it as told by   
your health care   
provider. Do not stop   
taking the antibiotic   
even if you start to   
feel better.  
Activity  
? Avoid any activities   
that cause chest pain.  
? Do not lift anything   
that is heavier than   
10 lb (4.5 kg), or the   
limit that you are   
told, until your   
health care provider   
says that it is safe.  
? Rest as directed by   
your health care   
provider.  
? Return to your   
normal activities only   
as told by your health   
care provider. Ask   
your health care   
provider what   
activities are safe   
for you.  
Lifestyle  
(Inserted Image.   
Unable to display)  
(Inserted Image.   
Unable to display)  
? Do not use any   
products that contain   
nicotine or tobacco,   
such as cigarettes,   
e-cigarettes, and   
chewing tobacco. If   
you need help   
quitting, ask your   
health care provider.  
? Do not drink   
alcohol.  
? Make healthy   
lifestyle changes as   
recommended. These may   
include:  
? Getting regular   
exercise. Ask your   
health care provider   
to suggest some   
exercises that are   
safe for you.  
? Eating a   
heart-healthy diet.   
This includes plenty   
of fresh fruits and   
vegetables, whole   
grains, low-fat (lean)   
protein, and low-fat   
dairy products. A   
dietitian can help you   
find healthy eating   
options.  
? Maintaining a   
healthy weight.  
? Managing any other   
health conditions you   
may have, such as high   
blood pressure   
(hypertension) or   
diabetes.  
? Reducing stress,   
such as with yoga or   
relaxation techniques.  
General instructions  
? Pay attention to any   
changes in your   
symptoms.  
? It is up to you to   
get the results of any   
tests that were done.   
Ask your health care   
provider, or the   
department that is   
doing the tests, when   
your results will be   
ready.  
? Keep all follow-up   
visits as told by your   
health care provider.   
This is important.  
? You may be asked to   
go for further testing   
if your chest pain   
does not go away.  
Contact a health care   
provider if:  
? Your chest pain does   
not go away.  
? You feel depressed.  
? You have a fever.  
? You notice changes   
in your symptoms or   
develop new symptoms.  
Get help right away   
if:  
? Your chest pain gets   
worse.  
? You have a cough   
that gets worse, or   
you cough up blood.  
? You have severe pain   
in your abdomen.  
? You faint.  
? You have sudden,   
unexplained chest   
discomfort.  
? You have sudden,   
unexplained discomfort   
in your arms, back,   
neck, or jaw.  
? You have shortness   
of breath at any time.  
? You suddenly start   
to sweat, or your skin   
gets clammy.  
? You feel nausea or   
you vomit.  
? You suddenly feel   
lightheaded or dizzy.  
? You have severe   
weakness, or   
unexplained weakness   
or fatigue.  
? Your heart begins to   
beat quickly, or it   
feels like it is   
skipping beats.  
These symptoms may   
represent a serious   
problem that is an   
emergency. Do not wait   
to see if the symptoms   
will go away. Get   
medical help right   
away. Call your local   
emergency services   
(911 in the [place] Do   
not drive yourself to   
the hospital.  
Summary  
? Chest pain can be   
caused by a condition   
that is serious and   
requires urgent   
treatment. It may also   
be caused by something   
that is not   
life-threatening.  
? Your health care   
provider may do lab   
tests and other   
studies to find the   
cause of your pain.  
? Follow your health   
care provider's   
instructions on taking   
medicines, making   
lifestyle changes, and   
getting emergency   
treatment if symptoms   
become worse.  
? Keep all follow-up   
visits as told by your   
(more content not   
included)...        Normal                                  [facility]  
   
                                                    ED Patient Summaryon   
023   
   
                                        ED Patient Summary  (Inserted Image.   
Unable to display)   
[place] [id number] [phone]  
  
Patient Discharge   
Instructions  
  
Person Information  
Name: CANTU, NICHOLE L   
Age: 47 Years  
MRN: [id number] FIN:   
[id number]  
Arrival Date:   
2023 15:57:45  
Discharge Diagnosis:   
1:Chest pain  
Primary Care   
Physician: DAVE CASTELLON MD  
  
Provider Information  
Primary Provider:  
Ned Henyr DO  
Advanced Practice   
Clinician:Oneil Leach PA-C  
The exam and treatment   
you received in the   
Emergency Department   
were for an urgent   
problem and are not   
intended as complete   
care. It is important   
that you follow up   
with a doctor, nurse   
practitioner, or   
physician?s assistant   
for ongoing care. If   
your symptoms become   
worse or you do not   
improve as expected   
and you are unable to   
reach your usual   
health care provider,   
you should return to   
the Emergency   
Department. We are   
available 24 hours a   
day.  
  
CANTU, NICHOLE L has   
been given the   
following list of   
patient education   
materials,   
prescriptions and   
follow-up   
instructions:  
  
Follow-up   
Instructions:  
With: Address: When:  
[address] [address]   
[place] [address] [phone]   
Business (1) In 3 days   
2023  
Comments:  
Return to the   
emergency room if your   
pain gets worse or any   
new symptoms.  
  
In the event that this   
physician does not   
participate in your   
insurance network,   
please consult with   
your insurance company   
to find a nearby   
participating   
provider.  
  
Patient Education   
Materials:  
Nonspecific Chest   
Pain, Adult  
  
A MESSAGE TO ALL   
PATIENTS REGARDING   
OPIOIDS  
  
PRESCRIPTION OPIOIDS:   
WHAT YOU NEED TO KNOW  
  
Prescription opioids   
can be used to help   
relieve   
moderate-to-severe   
pain and are often   
prescribed following a   
surgery or injury, or   
for certain health   
conditions. These   
medications can be an   
important part of the   
treatment but also   
come with serious   
risks. It is important   
to work with your   
healthcare provider to   
make sure you are   
getting the safest,   
most effective care.  
  
WHAT ARE THE RISKS AND   
SIDE EFFECTS OF OPIOID   
USE?  
Prescription opioids   
carry serious risks of   
addiction and   
overdose, especially   
with prolonged use. An   
opioid overdose, often   
marked by slowed   
breathing, can cause   
sudden death. The use   
of prescription   
opioids can have a   
number of side effects   
as well, even when   
taken as directed:  
? Tolerance?meaning   
you might need to take   
more of the medication   
for the same pain   
relief  
? Physical   
dependence?meaning you   
have symptoms of   
withdrawal when a   
medication is stopped  
? Increased   
sensitivity to pain  
? Constipation  
? Nausea, vomiting,   
and dry mouth  
? Sleepiness and   
dizziness  
? Confusion  
? Depression  
? Low levels of   
testosterone that can   
result in lower sex   
drive, energy, and   
strength  
? Itching and sweating  
  
RISKS ARE GREATER   
WITH:  
? History of drug   
misuse, substance use   
disorder, or overdose  
? Mental health   
conditions (such as   
depression or anxiety)  
? Sleep apnea  
? Older age (65 years   
and older)  
? Pregnancy  
  
Avoid alcohol while   
taking prescription   
opioids. Also, unless   
specifically advised   
by your health care   
provider, medications   
to avoid include:  
? Benzodiazepines   
(such as Xanax or   
Valium)  
? Muscle relaxants   
(such as Soma or   
Flexeril)  
? Hypnotics (such as   
Ambien or Lunesta)  
? Other prescription   
opioids  
  
KNOW YOUR OPTIONS  
Talk to your health   
care provider about   
ways to manage your   
pain that don?t   
involve prescription   
opioids. Some of these   
options may actually   
work better and have   
fewer risks and side   
effects. Options may   
include:  
? Pain relievers such   
as acetaminophen,   
ibuprofen, and   
naproxen  
? Some medication that   
are also used for   
depression or seizures  
? Physical therapy and   
exercise  
? Cognitive behavioral   
therapy, a   
psychological,   
goal-directed   
approach, in which   
patients learn how to   
modify physical,   
behavioral, and   
emotional triggers of   
pain and stress.  
  
IF YOU ARE PRESCRIBED   
OPIOIDS FOR PAIN:  
? Never take opioids   
in greater amounts or   
more often than   
prescribed.  
? Follow up with your   
primary health care   
provider.  
o Work together to   
create a plan on how   
to manage your pain.  
o Talk about ways to   
help manage your pain   
that don?t involve   
prescription opioids.  
o Talk about any and   
all concerns and side   
effects.  
? Help prevent misuse   
and abuse  
o Never sell or share   
prescription opioids.  
o Never use another   
person?s prescription   
opioids.  
? Store prescription   
opioids in a secure   
place and out of reach   
of others (this may   
include visitors,   
children, friends, and   
family).  
? Safely dispose of   
unused prescription   
opioids: Find your   
community drug   
take-back program or   
your pharmacy   
mail-back program, or   
flush them down the   
toilet, following   
guidance from the Food   
and Drug   
Administration   
(www.fda.gov/Drugs/Res  
ourcesForYou).  
? Visit   
www.cdc.gov/drugoverdo  
se to learn about the   
risks of opioids abuse   
and overdose.  
? If you believe you   
may be struggling with   
addiction, tell your   
health care   
professional and ask   
for guid (more content   
not included)...    Normal                                  [facility]  
   
                                                    Monitor Recordon 2023   
   
                                        Monitor Record      [id number]  
40  
[id number].  
00CD:127            Normal                                  [facility]  
   
                                                    RAD - Preliminary Cat Scan R  
eporton 2023   
   
                                                    RAD - Preliminary Cat   
Scan Report                             [id number]  
[id number].  
00CD:127            Normal                                  [facility]  
   
                                                    Troponin 6 Hr.on 2023   
   
                                                    Troponin I.cardiac   
[Mass/Vol]      3.70 pg/mL      Low             10.10-27.10     [facility]  
   
                                        Comment on above:   Result Comment: The   
95% CI (Confidence Interval) PPV (Positive  
   
Predictive Value) for myocardial infarction in females is 38   
pg/mL, in males 51 pg/mL. The results should be used in   
conjunction with clinical conditions of myocardial infarction.  
(Access High Sensitivity Troponin I Instructions For Use,   
Sabi [place], 2018)   
   
                                                            Performed By: #### 1  
[id number] ####[facility]   
[id number] [place] [address]   
   
                                                    Auto Diffon 2023   
   
                                                    Basophils/100 WBC   
(Bld)           0.8 %           Normal          0.0-2.0         [facility]  
   
                                        Comment on above:   Order Comment: Order  
 Added by Discern Expert.   
   
                                                            Performed By: #### 2  
[id number], [id number], [id number], 5757551, [id number],   
[id number], [id number], [id number], [id number] ####[facility] [id number] [place] [address]   
   
                                                    Basophils/Leukocytes   
Auto (Bld) [Pure #   
fraction]       0.1 E9/L        Normal          0.0-0.2         [facility]  
   
                                        Comment on above:   Order Comment: Order  
 Added by Discern Expert.   
   
                                                            Performed By: #### 2  
[id number], [id number], [id number], 9235281, [id number],   
71810743, [id number], 3703630, [id number] ####[place] [address] [place] [address]   
   
                                                    Eosinophils/100 WBC   
(Bld)           1.2 %           Normal          0.0-8.0         [facility]  
   
                                        Comment on above:   Order Comment: Order  
 Added by Discern Expert.   
   
                                                            Performed By: #### 2  
[id number], 7222779, 9800485, 2041116, [id number],   
[id number], [id number], 5500207, 7702713 ####[address] [address]   
   
                                                    Eosinophils/Leukocyte  
s Auto (Bld) [Pure #   
fraction]       0.1 E9/L        Normal          0.0-0.5         [facility]  
   
                                        Comment on above:   Order Comment: Order  
 Added by Discern Expert.   
   
                                                            Performed By: #### 2  
740562, 6397969, 9826431, 5942848, 07110875,   
94737222, 46118076, 8417836, 4665620 ####[place] [address] [place] [address]   
   
                                                    Lymphocytes/100 WBC   
(Bld)           37.1 %          Normal          14.0-50.0       [facility]  
   
                                        Comment on above:   Order Comment: Order  
 Added by Jeannie Expert.   
   
                                                            Performed By: #### 2  
883571, 4525652, 3817584, 4019174, 34053871,   
46348806, 83007836, 8029425, 8727223 ####[place] [address] [place] [address]   
   
                                                    Lymphocytes/Leukocyte  
s Auto (Bld) [Pure #   
fraction]       2.6 E9/L        Normal          1.0-4.0         [facility]  
   
                                        Comment on above:   Order Comment: Order  
 Added by Discern Expert.   
   
                                                            Performed By: #### 2  
060151, 3774123, 2969127, 5423123, 44434123,   
86421280, 89926166, 5979358, 7275819 ####[place] [address] [place] [address]   
   
                                                    Monocytes/100 WBC   
(Bld)           4.7 %           Normal          4.0-14.0        [facility]  
   
                                        Comment on above:   Order Comment: Order  
 Added by Discern Expert.   
   
                                                            Performed By: #### 2  
968154, 5499905, 1385488, 9174429, 93507215,   
60485835, 34389076, 2976285, 9690220 ####[address] [address]   
   
                                                    Monocytes/Leukocytes   
Auto (Bld) [Pure #   
fraction]       0.3 E9/L        Normal          0.2-1.0         [facility]  
   
                                        Comment on above:   Order Comment: Order  
 Added by Discern Expert.   
   
                                                            Performed By: #### 2  
176689, 4463675, 7876755, 3553412, 14677595,   
19333251, 51299667, 9132050, 5526169 ####[address] [place] [address]   
   
                                                    Neutrophils/100 WBC   
(Bld)           56.2 %          Normal          36.0-75.0       [facility]  
   
                                        Comment on above:   Order Comment: Order  
 Added by Discern Expert.   
   
                                                            Performed By: #### 2  
608296, 7491058, 1409476, 8530803, 84142614,   
18078258, 55409891, 9125223, 0429932 ####[address] [place] [address]   
   
                                                    Neutrophils/Leukocyte  
s Auto (Bld) [Pure #   
fraction]       4.0 E9/L        Normal          2.0-7.5         [facility]  
   
                                        Comment on above:   Order Comment: Order  
 Added by Discern Expert.   
   
                                                            Performed By: #### 2  
221711, 8996896, 4867139, 8820675, 90072487,   
13622659, 77208449, 9179503, 5758163 ####[facility] [address] [place] [address]   
   
                                                    BMPon 2023   
   
                      Creatinine [Mass/Vol] 0.7 mg/dL  Normal     0.5-1.3    [facility]  
   
                                        Comment on above:   Performed By: #### 2  
384907, 4679760, 8444481, 8517490,   
91501857,   
95933468, 32980504, 9305506, 8921634 ####[facility] [address] [place] [address]   
   
                                                    Urea nitrogen   
[Mass/Vol]      9 mg/dL         Normal          5-21            [facility]  
   
                                        Comment on above:   Performed By: #### 2  
136361, 0854353, 4643287, 9400016,   
39427727,   
67226641, 74640346, 2542590, 5215809 ####[facility] [address] [place] [address]   
   
                                                    Urea   
nitrogen/Creatinine   
[Mass ratio]    13 No Units     Normal          10-20           [facility]  
   
                                        Comment on above:   Performed By: #### 2  
230000, 0800757, 8316054, 7660756,   
72520132,   
55193294, 27997354, 7511153, 0551495 ####[facility] [address] [place] [address]   
   
                      Anion gap [Moles/Vol] 15 mmol/L  Normal     6-16       [facility]  
   
                                        Comment on above:   Performed By: #### 2  
803351, 6004958, 6867962, 9665804,   
40670208,   
49716722, 75591836, 4227449, 7147243 ####[facility] [address] [place] [address]   
   
                      Calcium [Mass/Vol] 9.3 mg/dL  Normal     8.9-11.1   [facility]  
   
                                        Comment on above:   Performed By: #### 2  
804545, 6570169, 4492806, 9613740,   
01686639,   
82441881, 38615166, 5893095, 9915306 ####[facility] [address] [place] [address]   
   
                      Chloride [Moles/Vol] 101 mmol/L Normal     101-111    [facility]  
   
                                        Comment on above:   Performed By: #### 2  
500188, 4687676, 7361112, 9854828,   
62449314,   
03702287, 61219573, 6025801, 7933794 ####[facility] [address] [place] [address]   
   
                      CO2 [Moles/Vol] 23 mmol/L  Normal     21-31      [facility]  
   
                                        Comment on above:   Performed By: #### 2  
995562, 3813915, 4914119, 2435979,   
56312895,   
86464156, 91918178, 0370693, 4904417 ####[facility] [address] [place] [address]   
   
                      Glucose [Mass/Vol] 89 mg/dL   Normal          [facility]  
   
                                        Comment on above:   Result Comment: If t  
his glucose result represents a fasting   
glucose, interpretation should refer to the following reference   
range: 55-99 mg/dL   
   
                                                            Performed By: #### 2  
816854, 6352030, 5895764, 1517494, 86169369,   
23857343, 14098211, 3690662, 7840405 ####[facility] [address] [place] [address]   
   
                      Potassium [Moles/Vol] 3.5 mmol/L Normal     3.5-5.3    [facility]  
   
                                        Comment on above:   Performed By: #### 2  
710942, 1331906, 9446038, 2645914,   
13828395,   
37055650, 74881894, 5907199, 4496163 ####[facility] [address] [place] [address]   
   
                      Sodium [Moles/Vol] 135 mmol/L Normal     135-145    [facility]  
   
                                        Comment on above:   Performed By: #### 2  
384985, 4177196, 9489374, 8377453,   
31720946,   
68712528, 29062436, 2624860, 7213976 ####[facility] [address] [place] [address]   
   
                                                    CBC w/ Auto Diffon 04-  
3   
   
                                                    Erythrocyte   
distribution width   
(RBC) [Ratio]   13.8 %          Normal          10.9-14.2       [facility]  
   
                                        Comment on above:   Performed By: #### 2  
483349, 9063498, 7291419, 6339851,   
24963490,   
87263693, 15456210, 5582506, 3180632 ####[address] [place] [address]   
   
                                                    Hematocrit (Bld)   
[Volume fraction] 42.8 %          Normal          34.0-46.0       [facility]  
   
                                        Comment on above:   Performed By: #### 2  
493052, 7379332, 1228512, 2330684,   
47010777,   
76195335, 29145755, 6494928, 9673776 ####[facility] [address] [place] [address]   
   
                                                    Hemoglobin (Bld)   
[Mass/Vol]      14.6 g/dL       Normal          12.0-16.0       [facility]  
   
                                        Comment on above:   Performed By: #### 2  
795131, 5713009, 0520820, 9378231,   
04671070,   
67886933, 09361144, 2561794, 8243981 ####[facility] [address] [place] [address]   
   
                                                    MCH (RBC) [Entitic   
mass]           28.7 pg         Normal          27.0-34.0       [facility]  
   
                                        Comment on above:   Performed By: #### 2  
612013, 4650014, 5450571, 3395253,   
97471844,   
12626512, 58938981, 1046194, 4328794 ####[facility] [address] [place] [address]   
   
                      MCHC (RBC) [Mass/Vol] 34.1 g/dL  Normal     31.4-36.0  [facility]  
   
                                        Comment on above:   Performed By: #### 2  
704095, 4494064, 8423406, 3178802,   
27948020,   
35387704, 37039288, 7770062, 2152040 ####[facility] [address] [place] [address]   
   
                                                    MCV (RBC) [Entitic   
vol]            84.4 fL         Normal          80.0-100.0      [facility]  
   
                                        Comment on above:   Performed By: #### 2  
629347, 5598854, 5546417, 4105464,   
98961837,   
11517614, 31060184, 6552070, 8325908 ####[address] [place] [address]   
   
                                                    Platelet mean volume   
(Bld) [Entitic vol] 8.0 fL          Normal          6.4-10.8        [facility]  
   
                                        Comment on above:   Performed By: #### 2  
736410, 3551716, 1077370, 1216063,   
57563921,   
71265510, 49478302, 1213485, 8025637 ####[facility] [address] [place] [address]   
   
                                                    Platelets (Bld)   
[#/Vol]         68.0 E9/L       Low             150.0-500.0     [facility]  
   
                                        Comment on above:   Result Comment: Slid  
e reviewed by MARK.  
Platelet count verified using smear estimate   
   
                                                            Performed By: #### 2  
073238, 5601604, 8191826, 4792104, 98370514,   
99597963, 03003520, 8778332, 0123394 ####[facility] [address] [place] [address]   
   
                      RBC (Bld) [#/Vol] 5.1 E12/L  Normal     4.3-5.9    [facility]  
   
                                        Comment on above:   Performed By: #### 2  
088044, 1734149, 1769431, 3850289,   
77095597,   
06576548, 38974355, 7741192, 2594394 ####[facility] [address] [place] [address]   
   
                                                    WBC corrected for   
nucl RBC Auto (Bld)   
[#/Vol]         7.1 E9/L        Normal          4.0-11.0        [facility]  
   
                                        Comment on above:   Performed By: #### 2  
073518, 3975044, 2713962, 2037496,   
63283263,   
81624291, 46580879, 3531425, 6200332 ####[facility] [address] [place] [address]   
   
                                                    CHEMISTRYOrdered By: SYSTEM   
SYSTEM on 2023   
   
                                                    Troponin I.cardiac   
[Mass/Vol]          3.70 pg/mL          Low                 10.10 -   
27.10 pg/mL                             [facility]  
   
                          Albumin [Mass/Vol] 4.3 g/dL     Normal       3.3 - 5.0  
   
gm/dL                                   [facility]  
   
                                                    Albumin/Globulin   
[Mass ratio]    1.3 {ratio}     Normal          1.1 - 2.2       [facility]  
   
                                                    ALP [Catalytic   
activity/Vol]       122 [iU]/d          High                21 - 98   
Int._Unit/L                             [facility]  
   
                                                    ALT No additional   
P-5'-P [Catalytic   
activity/Vol]       46 [iU]/d           Normal              6 - 46   
Int._Unit/L                             [facility]  
   
                      Anion gap [Moles/Vol] 15 mmol/L  Normal     6 - 16 mEq/L F  
[facility]  
   
                                                    AST [Catalytic   
activity/Vol]       51 [iU]/d           High                5 - 43   
Int._Unit/L                             [facility]  
   
                          Bilirubin [Mass/Vol] 0.6 mg/dL    Normal       0.0 - 1  
.1   
mg/dL                                   [facility]  
   
                                                    Bilirubin.direct   
[Mass/Vol]          0.2 mg/dL           Normal              0.1 - 0.4   
mg/dL                                   [facility]  
   
                                                    Bilirubin.indirect   
[Mass or moles/Vol] 0.4 mg/dL           Normal              0.1 - 0.9   
mg/dL                                   [facility]  
   
                          Calcium [Mass/Vol] 9.3 mg/dL    Normal       8.9 - 11.  
1   
mg/dL                                   [facility]  
   
                          Chloride [Moles/Vol] 101 mmol/L   Normal       101 - 1  
11   
mmol/L                                  [facility]  
   
                          CO2 [Moles/Vol] 23 mmol/L    Normal       21 - 31   
mmol/L                                  [facility]  
   
                          Creatinine [Mass/Vol] 0.7 mg/dL    Normal       0.5 -   
1.3   
mg/dL                                   [facility]  
   
                                                    GFR/1.73 sq   
M.predicted among   
blacks MDRD (S/P/Bld)   
[Vol rate/Area]     mL/min/1.73 m2      Normal              >=59mL/min/1  
.73 m2                                  [facility]  
   
                                                    GFR/1.73 sq   
M.predicted among   
non-blacks MDRD   
(S/P/Bld) [Vol   
rate/Area]          mL/min/1.73 m2      Normal              >=59mL/min/1  
.73 m2                                  [facility]  
   
                                                    Globulin (S)   
[Mass/Vol]          3.4 g/dL            Normal              1.4 - 4.0   
gm/dL                                   [facility]  
   
                          Glucose [Mass/Vol] 89 mg/dL     Normal       55 - 199   
mg/dL                                   [facility]  
   
                                                    Lipase [Catalytic   
activity/Vol]       28 U/L              Normal              13 - 58   
unit/L                                  [facility]  
   
                          Potassium [Moles/Vol] 3.5 mmol/L   Normal       3.5 -   
5.3   
mmol/L                                  [facility]  
   
                          Protein [Mass/Vol] 7.7 g/dL     Normal       6.0 - 7.8  
   
gm/dL                                   [facility]  
   
                          Sodium [Moles/Vol] 135 mmol/L   Normal       135 - 145  
   
mmol/L                                  [facility]  
   
                                                    Troponin I.cardiac   
[Mass/Vol]          4.00 pg/mL          Low                 10.10 -   
27.10 pg/mL                             [facility]  
   
                                                    Urea nitrogen   
[Mass/Vol]      9 mg/dL         Normal          5 - 21 mg/dL    [facility]  
   
                                                    Urea   
nitrogen/Creatinine   
[Mass ratio]    13 mg/mg        Normal          10 - 20         [facility]  
   
                                                    COAGULATIONOrdered By: Herminia Sanz on 2023   
   
                                                    aPTT Coag (PPP)   
[Time]              25.5 s              Normal              25.1 - 36.5   
second(s)                               [facility]   
Coag  
   
                                                    Fibrin D-dimer FEU   
(PPP) [Mass/Vol]          652 ng/mL FEU             Invalid   
Interpretation   
Code                                    215 - 500   
ng/mL FEU                               [facility]   
Coag  
   
                                        Comment on above:   Result Comment: Resu  
lts Verified By Repeat Analysis  
Results Called To Oneil Leach/FREDY By clemencia And Read Back For   
Confirmation On 2023 17:25:11 EDT   
.   
   
                                                    INR Coag (PPP)   
[Relative time]           1.0 {INR}                 Invalid   
Interpretation   
Code                                                [facility] Auto   
Coag  
   
                          PT Coag (PPP) [Time] 11.1 s       Normal       9.4 - 1  
2.5   
second(s)                               [facility] Auto   
Coag  
   
                                                    Consent for Treatmenton    
   
                                        Consent for Treatment [phone]  
3040  
[id number]#1.0  
0CD:127             Normal                                  [facility]  
   
                                                    D-Dimeron 2023   
   
                                                    Fibrin D-dimer FEU   
(PPP) [Mass/Vol] [id number] Abnormal        215-500         [facility]  
   
                                        Comment on above:   Result Comment: Resu  
lts Verified By Repeat Analysis  
Results Called To Oneil Leach/FREDY By dc And Read Back For   
Confirmation On 2023 17:25:11 EDT  
.  
This assay is intended for use as an aid in the diagnosis of DVT   
or PE. These conditions cannot be excluded with certainty solely   
on the basis of a D-dimer concentration being within the   
reference range  
This D-Dimer assay may be used in conjunction with a non-high   
clinical pretest probability assessment to exclude deep-vein   
thrombosis(DVT). For exclusion of venous thrombosis or pulmonary   
embolism the analyte D-Dimer should not be used as an aid in   
patients with:  
Therapeutic dose anticoagulant therapy for >24 hours  
Fibrinolytic therapy within previous 7 days  
Trauma or surgery within previous 4 weeks  
Disseminated malignacies  
Aortic aneurysm  
Sepsis, severe infections, pneumonia, severe skin infections  
Liver cirrhosis  
Pregnancy   
   
                                                            Performed By: #### 2  
211521, 3352585, 7864547, 8037421, 08029800,   
44376295, 85141665, 3241556, 1108462 ####[facility] [address]   
   
                                                    ECG 12 lead ECGon 2023  
   
   
                                        ECG 12 lead ECG     [facility] [place] [address]  
  
Electrocardiograph   
Report  
Signed  
  
Patient: Cantu,Nichole L MR#: [id number]  
719  
: 1975   
Acct:[id number]  
  
Age/Sex: 47 / F ADM   
Date: 23  
  
Loc: ER Room: Type:   
[facility] ER  
Attending Dr:  
  
Ordering Provider:   
Christian Cruz DO  
Date of Service:   
23  
Accession #:   
([id number]) ECG/ECG   
12 lead ECG: Chest   
Pain  
  
  
Copies to:  
  
  
Test Reason :  
Blood Pressure :   
133/087 mmHG  
Vent. Rate : 076 BPM   
Atrial Rate : 076 BPM  
P-R Int : 132 ms QRS   
Dur : 084 ms  
QT Int : 366 ms P-R-T   
Axes : 066 000 037   
degrees  
QTc Int : 411 ms  
  
Normal sinus rhythm  
Normal ECG  
When compared with ECG   
of 05-SEP-2020 15:33,  
No significant change   
was found  
Confirmed by   
LEORA ARCHER MD   
([id number]) on 2023   
8:47:30 PM  
  
Referred By:   
Electronically Signed   
By:LEORA ARCHER MD  
  
  
  
Transcribed By: MUS  
Signed By Leora Archer MD 0  
23        Normal                                  [facility]  
   
                                                    ED Note-Nursingon 2023  
   
   
                                        ED Note-Nursing     Pt states she is itc  
hy   
after morphine, states   
her skin is red, this   
rn did not nte any   
redness to pt. pt   
airway patent and   
vitals signs wnl.   
Oneil NEVAREZ aware.  Normal                                  [facility]  
   
                                                    HEMATOLOGYOrdered By: SYSTEM  
 SYSTEM on 2023   
   
                                                    Basophils/100 WBC   
(Bld)           0.8 %           Normal          0.0 - 2.0 %     [facility]   
[facility]  
   
                                                    Basophils/Leukocytes   
Auto (Bld) [Pure #   
fraction]           0.1 E9/L            Normal              0.0 - 0.2   
E9/L                                    [facility]   
[facility]  
   
                                                    Eosinophils/100 WBC   
(Bld)           1.2 %           Normal          0.0 - 8.0 %     [facility]   
[facility]  
   
                                                    Eosinophils/Leukocyte  
s Auto (Bld) [Pure #   
fraction]           0.1 E9/L            Normal              0.0 - 0.5   
E9/L                                    [facility]   
[facility]  
   
                                                    Lymphocytes/100 WBC   
(Bld)               37.1 %              Normal              14.0 - 50.0   
%                                       [facility]   
[facility]  
   
                                                    Lymphocytes/Leukocyte  
s Auto (Bld) [Pure #   
fraction]           2.6 E9/L            Normal              1.0 - 4.0   
E9/L                                    [facility]   
[facility]  
   
                                                    Monocytes/100 WBC   
(Bld)           4.7 %           Normal          4.0 - 14.0 %    [facility]   
[facility]  
   
                                                    Monocytes/Leukocytes   
Auto (Bld) [Pure #   
fraction]           0.3 E9/L            Normal              0.2 - 1.0   
E9/L                                    [facility]   
[facility]  
   
                                                    Neutrophils/100 WBC   
(Bld)               56.2 %              Normal              36.0 - 75.0   
%                                       [facility]   
[facility]  
   
                                                    Neutrophils/Leukocyte  
s Auto (Bld) [Pure #   
fraction]           4.0 E9/L            Normal              2.0 - 7.5   
E9/L                                    [facility]   
[facility]  
   
                                                    HEMATOLOGYOrdered By: Emelia Lopez on 2023   
   
                                                    Erythrocyte   
distribution width   
(RBC) [Ratio]       13.8 %              Normal              10.9 - 14.2   
%                                       [facility]   
[facility]  
   
                                                    Hematocrit (Bld)   
[Volume fraction]   42.8 %              Normal              34.0 - 46.0   
%                                       [facility]   
[facility]  
   
                                                    Hemoglobin (Bld)   
[Mass/Vol]          14.6 g/dL           Normal              12.0 - 16.0   
gm/dL                                   [facility]   
[facility]  
   
                                                    MCH (RBC) [Entitic   
mass]               28.7 pg             Normal              27.0 - 34.0   
pg                                      [facility]   
[facility]  
   
                          MCHC (RBC) [Mass/Vol] 34.1 g/dL    Normal       31.4 -  
 36.0   
gm/dL                                   [facility]   
[facility]  
   
                                                    MCV (RBC) [Entitic   
vol]                84.4 fL             Normal              80.0 - 100.0   
fL                                      [facility]   
[facility]  
   
                                                    Platelet mean volume   
(Bld) [Entitic vol] 8.0 fL              Normal              6.4 - 10.8   
fL                                      [facility]   
[facility]  
   
                                                    Platelets (Bld)   
[#/Vol]             68.0 E9/L           Low                 150.0 -   
500.0 E9/L                              [facility]   
[facility]  
   
                                        Comment on above:   Result Comment: Slid  
e reviewed by RC.  
Platelet count verified using smear estimate   
   
                          RBC (Bld) [#/Vol] 5.1 E12/L    Normal       4.3 - 5.9   
E12/L                                   [facility]   
[facility]  
   
                                                    WBC corrected for   
nucl RBC Auto (Bld)   
[#/Vol]             7.1 E9/L            Normal              4.0 - 11.0   
E9/L                                    [facility]   
[facility]  
   
                                                    Hep Func Panelon 2023   
   
                      Albumin [Mass/Vol] 4.3 g/dL   Normal     3.3-5.0    [facility]  
   
                                        Comment on above:   Performed By: #### 2  
147023, 7805102, 2217031, 9085245,   
73860230,   
74991103, 76567046, 1469299, 3478721 ####[address] [place] [address]   
   
                                                    Albumin/Globulin (S)   
[Mass conc ratio] 1.3             Normal          1.1-2.2         [facility]  
   
                                        Comment on above:   Performed By: #### 2  
246910, 3278865, 2822133, 9123842,   
66777574,   
88062386, 62314837, 4400022, 8519555 ####[address] [place] [address]   
   
                                                    ALP [Catalytic   
activity/Vol]   122 Int._Unit/L High            21-98           [facility]  
   
                                        Comment on above:   Performed By: #### 2  
023019, 0674495, 2416218, 6878598,   
01105227,   
04495501, 84913738, 1302045, 4465564 ####[address] [address]   
   
                                                    ALT No additional   
P-5'-P [Catalytic   
activity/Vol]   46 Int._Unit/L  Normal          6-46            [facility]  
   
                                        Comment on above:   Performed By: #### 2  
975752, 8198652, 0497091, 7454218,   
22322921,   
82339371, 99964790, 0815168, 3741277 ####[address] [address]   
   
                                                    AST [Catalytic   
activity/Vol]   51 Int._Unit/L  High            5-43            [facility]  
   
                                        Comment on above:   Performed By: #### 2  
938797, 2122967, 5294817, 3071370,   
81597855,   
18692147, 95016933, 9661796, 8061688 ####[address] [place] [address]   
   
                      Bilirubin [Mass/Vol] 0.6 mg/dL  Normal     0.0-1.1    [facility]  
   
                                        Comment on above:   Performed By: #### 2  
434894, 7116288, 5644698, 8455686,   
04465370,   
26083503, 15553304, 5782978, 6599912 ####[address] [place] [address]   
   
                                                    Bilirubin.direct   
[Mass/Vol]      0.2 mg/dL       Normal          0.1-0.4         [facility]  
   
                                        Comment on above:   Performed By: #### 2  
973095, 6541089, 3024222, 5119272,   
85072759,   
60866099, 64146345, 1357387, 7250847 ####[facility] [address] [place] [address]   
   
                                                    Bilirubin.indirect   
[Mass or moles/Vol] 0.4 mg/dL       Normal          0.1-0.9         [facility]  
   
                                        Comment on above:   Performed By: #### 2  
591778, 6889462, 6435938, 6945026,   
29077069,   
70536291, 82216147, 6775520, 8639211 ####[address] [place] [address]   
   
                                                    Globulin (S)   
[Mass/Vol]      3.4 g/dL        Normal          1.4-4.0         [facility]  
   
                                        Comment on above:   Performed By: #### 2  
085012, 2928407, 0344763, 8842813,   
38613817,   
88217765, 22934880, 2673616, 0490329 ####[address] [place] [address]   
   
                      Protein [Mass/Vol] 7.7 g/dL   Normal     6.0-7.8    [facility]  
   
                                        Comment on above:   Performed By: #### 2  
328851, 4532723, 9305031, 2380980,   
69804434,   
79709074, 02811417, 7372779, 4099967 ####[address] [place] [address]   
   
                                                    Lipase Levelon 2023   
   
                                                    Lipase [Catalytic   
activity/Vol]   28 U/L          Normal          13-58           [facility]  
   
                                        Comment on above:   Performed By: #### 2  
294601, 9352794, 4893878, 2512964,   
57601971,   
76994954, 90827245, 9322556, 3650311 ####[facility] [address] [place] [address]   
   
                                                    PT & PTTon 2023   
   
                                                    aPTT Coag (PPP)   
[Time]          25.5 second(s)  Normal          25.1-36.5       [facility]  
   
                                        Comment on above:   Result Comment: Para  
meter 15 days - 4 weeks 1 - 5 months 6 -   
11   
months 1 - 5 years 6 - 10 years 11 - 17 years  
PTT Mean: 35.4 (27.6-45.6) Mean: 33.5 (24.8-40.7) Mean: 32.4   
(25.1-40.7) Mean: 31.6 (24.0-39.2) Mean: 31.6 (26.9-38.7) Mean:   
31.0 (24.6-38.4)  
  
Pediatric Reference ranges were obtained from a study by Shailesh Del Rosario et al. prepared from 1437 samples obtained at 7   
different centers using the same coagulation reagent and   
instrumentation as [facility]. Currently there are no coagulation   
studies available worldwide for children birth to 14 days, and   
no normal ranges.  
Heparin therapeutic range (represented by Anti-Factor Xa   
activity of 0.2 - 0.4  
U/mL) corresponds to PTT of 56.6 - 109.0 sec.   
   
                                                            Performed By: #### 2  
398927, 3164901, 6203788, 4917206, 09613447,   
84185690, 36160776, 8620037, 1037213 ####[facility] [address] [place] [address]   
   
                                                    INR Coag (PPP)   
[Relative time]           1.0 {INR}                 Invalid   
Interpretation   
Code                                                [facility]  
   
                                        Comment on above:   Result Comment: INR   
results are specifically intended to   
assess   
patients stabilized on long-term  
Anticoagulation therapy suggested INR?s  
?Less Intensive Anticoagulation? 2.0 ? 3.0  
Conventional Range 3.0 ? 4.5   
   
                                                            Performed By: #### 2  
801668, 0859025, 6671371, 1028282, 30384517,   
44149587, 78903495, 5609483, 7900036 ####[facility] [address] [place] [address]   
   
                      PT Coag (PPP) [Time] 11.1 second(s) Normal     9.4-12.5     
[facility]  
   
                                        Comment on above:   Result Comment: 15 d  
ays - 4 weeks 1 - 5 months 6 -11 months 1-  
5   
years 6-10 years 11 -17 years  
Mean: 11.2 (9.5-12.6) Mean: 11.0 (9.7-12.8) Mean: 11.0   
(9.8-13.0) Mean: 11.3 (9.9-13.4) Mean: 11.7 (10.0-14.6) Mean:   
11.8 (10.0 - 14.1)  
Pediatric Reference ranges were obtained from a study by Shailesh Del Rosario et al. prepared from 1437 samples obtained at 7   
different centers using the same coagulation reagent and   
instrumentation as [facility]. Currently there are no coagulation   
studies available worldwide for children birth to 14 days, and   
no normal ranges.   
   
                                                            Performed By: #### 2  
978931, 3969668, 4825012, 2873969, 19622429,   
82792765, 99081778, 0083600, 6452630 ####[facility] [address] [place] [address]   
   
                                                    Troponin 0 Hr.on 2023   
   
                                                    Troponin I.cardiac   
[Mass/Vol]      4.00 pg/mL      Low             10.10-27.10     [facility]  
   
                                        Comment on above:   Result Comment: The   
95% CI (Confidence Interval) PPV (Positive  
   
Predictive Value) for myocardial infarction in females is 38   
pg/mL, in males 51 pg/mL. The results should be used in   
conjunction with clinical conditions of myocardial infarction.  
(Access High Sensitivity Troponin I Instructions For Use,   
Sabi Adelita, 2018)   
   
                                                            Performed By: #### 2  
091892, 4204798, 3528967, 5103830, 28978070,   
27457590, 00338937, 8178226, 4115246 ####[facility] [address] [place] [address]   
   
                                                    Troponin 3 Hr.Ordered By: SY  
STEM SYSTEM on 2023   
   
                                                    Troponin I.cardiac   
[Mass/Vol]      3.10 pg/mL      Low             10.10-27.10     [facility]  
   
                                        Comment on above:   Result Comment: The   
95% CI (Confidence Interval) PPV (Positive  
   
Predictive Value) for myocardial infarction in females is 38   
pg/mL, in males 51 pg/mL. The results should be used in   
conjunction with clinical conditions of myocardial infarction.  
(Access High Sensitivity Troponin I Instructions For Use,   
Sabi Hamburg, 2018)   
   
                                                            Performed By: #### 1  
[id number] ####[facility]   
[address] [place] [address]   
   
                                                    eGFRon 2023   
   
                                                    GFR/1.73 sq   
M.predicted among   
blacks MDRD (S/P/Bld)   
[Vol rate/Area] mL/min/{1.73_m2} Normal          >=59            [facility]  
   
                                        Comment on above:   Order Comment: Order  
 added by Discern Expert.   
   
                                                            Result Comment: eGFR  
 is race adjusted. AA=.   
   
                                                            Performed By: #### 2  
782428, 1520415, 5590400, 0003521, 91080563,   
30866058, 95601157, 7243587, 4945474 ####[facility] [address] [place] [address]   
   
                                                    GFR/1.73 sq   
M.predicted among   
non-blacks MDRD   
(S/P/Bld) [Vol   
rate/Area]      mL/min/{1.73_m2} Normal          >=59            [facility]  
   
                                        Comment on above:   Order Comment: Order  
 added by Discern Expert.   
   
                                                            Result Comment:   
cynthia kidney disease could be indicated at   
eGFR's of less than 60 mL/min/1.73m2. Kidney failure is   
indicated at less than 15 mL/min/1.73m2.   
   
                                                            Performed By: #### 2  
227587, 2993762, 7808076, 1387397, 58316452,   
30848787, 06397190, 1317034, 5560892 ####[facility] [address] [place] [address]   
   
                                                    XR Chest 2 Viewson   
3   
   
                                        XR Chest 2 Views    CLINICAL HISTORY: Pa  
in  
EXAMINATION:  
Frontal and lateral   
images of the chest   
were obtained.   
Comparison is made to   
an examination dated   
2021.  
FINDINGS:  
The cardiomediastinal   
silhouette is   
unremarkable. The   
pulmonary vasculature   
is normal. There is no   
focal consolidation,   
pleural effusion or   
pneumothorax. No acute   
osseous abnormality is   
seen. There is fusion   
hardware in the   
cervical spine. An IVC   
filter is partially   
imaged.  
IMPRESSION:  
No acute   
cardiopulmonary   
process.  
***** Final *****  
Dictated by: Umesh Villafana MD  
Dictated DT/TM:   
2023 1:27 pm  
Signed by: Umesh Villafana MD  
Signed (Electronic   
Signature): 2023   
1:28 pm  
(If Report Is Signed,   
Electronically Signed   
in Other Vendor   
System)             Normal                                  [facility]  
   
                                                    Coding Summary.on 2023  
   
   
                                        Coding Summary.     CD:[id number]  
Gh  
0bWw+PGhlYWQ+CZ0EEIQxO  
08zoJUdyQ1VB9yFSY7CNKO  
ONFDSBT7MKJ1gsHB8LEftC  
2VybiAv  
ZdqizQVyRX87LDc0ENU7eM  
rjUCwvwM8sbDFcM5s5FeFl  
OC91fE92CXdtKOCoCiO4Wl  
ZpbjsgbWFy  
S1ssByOucHKaIzm+PHRhYm  
xlIHdpZHRoPScxMDAlJyBz  
bAmmUT7uLp8jFYMdLCGobQ  
xhcHNlOiBj  
e9vbWLPaNQbkLT0prRwsT8  
PhrWQ5CEHkh8b7Pc63sHY+  
DQYgNML7wNcaBYxrz471Me  
Dze6stWDR4  
sZUaVLxmDSL5U85vk6C4UQ  
AyXXTfEXG6fER1qI7bsBvq  
vxwtI2ZsqSWyVzO8DMM4xC  
OckO1huHmr  
uoiqpZ5eKid+R08WJL9XDB  
BJEG8RVkq5Q1NqLnjruEP+  
AG10PIWpXD06oAMioJXya0  
ojvQj9VkSy  
OQZrOFE0jJtjFXzgi9UkUD  
PoQ91opKFss6N3FWYhvIhi  
hEDxUeHuaMU7dF7vDKuehu  
std0hvkahq  
Ersak1ltzs77gL17H91sDW  
mvPNUnAAD0KUFcOBGuvGjj  
jg6aqG0qGf3+JQaie2vut6  
mheKe2UiXy  
RONyubZftYomMZS1l2ZfGt  
12U0CwyBpil2AsKhr2qf64  
eSQij3R0sIU8QIjaVAZmyP  
9lLBiuJkU1  
WHWhOrHxgY16xIQpGVbyOi  
3olGvisLooNZ8lSWLobxpj  
XYMarT8xOHXzqUVusKxdCF  
4wNTBpbjtm  
l528IlUqIWE1WUYtcHTlA4  
YveU3zUxVaFWEzWKKsB6Su  
yIXqBDpyF917IYdmMiP8GV  
DumgFcJ7Ru  
WOQsiZchWmB9z0K0Yb0Fp5  
NccbcuFYY9HWwwBISqAtUl  
QoNlXjC8W8ZgIzj9RKPhnU  
ehQG7mI8Jv  
ZMQewdbiereisEX3DMTvIE  
TlzI52wIWcKMwoYo4gb8Y2  
q114QSSjKVOifH48Di8zkC  
ogMTBwdCBU  
qG8vbwpqt7fuzangQkYfIY  
KaZQv0EDr9ULTnoWgmEaOj  
QSJ7JzT0PHN8jPOcvB4dlP  
ergfhcpD3c  
Oyc+T32jbF4tKWW4GHH2gr  
pgJNDvorCgYG61EL44S2Wx  
PjwvdGFibGU+PGRpdiBzdH  
akJR1wQhXs  
m2uac6PiSBanU1YrZCRnMI  
zeGyy1XEMaSYH0mLD4jB2v  
MOTiMFltn8I5tSQ3R1Umep  
Hfzy2re0xs  
QGRiMUkfV09urEPmi5R4XL  
CjmVL6RZNsvDkrEnZxdN18  
Oyc+KJKcpDuho3SkOlvoi3  
zqh7dthNy4  
HpDnCKAjvdDviQwfVQY9a4  
FpZm09S07jYOnhUAEmTQBb  
PTGuFLFwcXnuof0doY0zUe  
8+PGNvbCB3  
aSG1iA7iGTAdUdF1GRexO7  
82ZcUciYRmXfmpc5isd6lm  
zKa0PvPtOQEkbmOmzKlbVS  
V9m2IxVe90  
B89gKUbbZNFyXBOyINEfSZ  
QqbNlumz4zbH8pQy8+PC9j  
m2hraa94wA80eOP+PHRkIH  
M5kJguXPyi  
IWAgnU0mLOynYwS8KKAiUm  
SibC79xZDwCUmwDg0aaUpn  
qYuuVN4aHNHvjubeu650Gb  
Tln9qbFBVu  
hDIsBWmiKED7C91py2O2NC  
JoWWRvLEO5dFG4tI4kbCye  
bjogbGVmdDsgdmVydGljYW  
rlQWlzH006  
IHRvcDsnPlBhdGllbnQgTm  
DaVTl3P5WcMry0APCynRnw  
LP0vzBBrCLpvXr7jnBwryQ  
ftDA2sQFUl  
znuou013YlNrk2kgFLBuuK  
LlPAyvTGO5J36jc2Y8HPEm  
CKZfZFQ1tLR2iZ7zePkfia  
ogbGVmdDsg  
ksZdnHhoFTdpSVjfF668TN  
RvcDsnPkJpcnRoIERhdGU6  
FT09XP19tGPmm7U2yQV8X5  
BhZGRpbmct  
fvdxzTR2UHYwWXAbbS72Oc  
1odUnxSf8eUBKxAMF5UOUg  
gDIyP6YsaM8hPaLcLPBhIF  
HnC0DuzFDo  
PNdyH536MCkzQhL8EPVxtu  
GcL3SkJFPlxDqmZnP8z2D3  
Vg4MG2H5OV13PA87kDMcc7  
U2aXF3W4Rs  
NCKptmrctpfbnXQ4ZFLiMP  
EvpW59Xo4lnNinUd3hOCMc  
KOP7DCVwzKWzG4HddW7aTc  
AjMDAwMDAw  
G2QidCLjAFvpF844ICsyRl  
Y8LYBbnqQpE9ZnPUJjqJpr  
FqG5s6B3Uj1MSVm9WV47MY  
13tXJad5N2  
bKQ4A0ZnYAPzcgupsumwfP  
X6AKPbFFIlnZ55Qp6leYdb  
Ou4lPLTxCCM6NJPivBIzM2  
DbmG0aTtHl  
XWWfRNUeA9FckDVwDMmsD8  
21CAvlViG0VGGacnCoU2Tk  
HKXqjGtgZoX6l7X2Fe0TXP  
SqZA11JXJ5  
vZX8HK86GH48L5EtEcqkiP  
FibGU+PHRhYmxlIHdpZHRo  
TDmeZIIzYsNjmFoxWI5tEr  
9yZGVyLWNv  
tIdwqBCqGeMzx9rpTZEgLI  
hzQK8beFibM2RzoZQ9UTKl  
c5x3Dp19C90gO2QvaMM+PG  
KwcAE6eWB8  
uE0eHgSoMkW1EQgrE179Bo  
EujUEjMhgok4kii3wmfXz6  
XcC1YAAonrTbgQsqICQ0m8  
ZoSo32L07a  
IHdpZHRoPSIxNSUiIHZhbG  
jnem0mdE6dEm7+PGNvbCB3  
xAG7wF7tAfAfAdU6TVgqD1  
49InRvcCIv  
Vnbgx1hck0tndBi2GaNtYK  
ZsclSrlIpdULY9z9PoNg96  
P0CjgMrww4LzZuh6bl61yS  
Bsq1H0gRM2  
M1IsQUAdwwdmgHPpvDxlFS  
9cOFFnrxcrOOTweW8jSHUf  
M1b0BkYbGcX3CNcgG7Vgsj  
Y2AGQenPEh  
BKnzVHQ0Y45xc3Z4AJUjJW  
BnXJR5rHT8rN3gdKovrxpg  
bGVmdDsgdmVydGljYWwtYW  
kbB471GTDx  
zDikIVVgdF2mGVVhgFJazC  
kiDI2vHKRxjmrbGyPTVsOE  
INIVKNWWY1lDSWf3S9AgPt  
d6EMRypRvl  
JN5pqMCeGPvbGr4fzYdqvC  
smOO1ySRPdrjbnCRGwtC9p  
GMYekTIptCprKQ9yYDByfo  
pqk637UpXj  
WVH1VCVaoBIvE7LdsA1iWr  
JfODGhDVUjE3YorAWxHOye  
X660PLftExZ1CGQdmeZtW8  
FsLWFsaWdu  
HzF7x2B1Uy0zNX5xJe5wCD  
ujMT74RM24iWOmy0G0xNP5  
C7EpGXOqaarlboitlTI4EB  
YvHRYggH07  
aXSgWUaxDd8fs7V3m843JB  
MoDWDxyN46Kl7fzEhwALPh  
vLSHvJ6aikszr2yigktbFi  
AwMDAwMDt0  
BNl1XHSdmWyfPmUzGKS3Yy  
M3VWM4oRRuuN5pfCvdahqt  
lN1pJwz+ZElmSGZgdyA2K5  
DxWyp6BKGm  
nXvfWY7feZMlNOntAm9syN  
cujGsfYG9eEKMgknkfBFWp  
kA2uNKIckGFeaNnzNE8rKB  
Ikntkdu814  
LgVyWSO3WTMheZSoJ5HmfF  
9xVaEpJWCyHTKlL3RweGZq  
SWttV637HXbpXtS7POJsaj  
SyH4UeYQWv  
pKqqMzD8j0T0Hk4XDD0yrB  
I6F1UtNyz5DMGppFvgEI9y  
zRBlMOwfHx3unXjkuNbaDR  
4wNTBpbjtw  
FAGsyE2gBFPjdVLgtYxnYP  
4mDGJpzhkrn368ZrTzHTS7  
APSfpPFcN8TiwX3mMbYmEL  
ZlSEBiM3Du  
lRIvHKvlS362KEnnNyP9JP  
FwnhBxV0AyZOUwlWkhGyG2  
g3T4Oz0WtaGkqJgiprD5T0  
RkPjwvdHI+  
QL56TQEfSK69hWTgxSBpx8  
zmfRx2SzXbWFXvYEL3lJcg  
GDyqv4UtCQPpM79sfGYmj5  
D9WRWsuAoo  
hNAkDxFfvBG6vR4uTVzqdz  
tam5bepppfAmbbz9alob00  
cL45P26eXOipVZKfCGMlFY  
UiIHZhbGln  
kh8amU1fMa8+VHOfsFG8pI  
X6xJ2oOmSbYaU3PSccY170  
QqRcvUMtKgrgd9pcp6qpjS  
i1WwRfVEFf  
yzBwkZsxSOQ2c5AlLb56G4  
9sIHdpZHRoPSIyMCUiIHZh  
rMnwdw8trL5yJj4+PC9jb2  
hbfj79lZ96  
dHI+GFZdUPO2fQemENutGS  
IlwY7qASixReE0IMBfXmJn  
kX24zNXlIGzbQm3ogDeigF  
yjCD1uDIKn  
rullo050XaNas6lkOSHurN  
NoFWqqMSH7R88dp9B0IJYg  
WRWtYWN7fKB5yO2cnSyekn  
ogbGVmdDsg  
orZzzPhjVWgrWNjcH995QM  
AvyVthElWeoZFoV7gslwUL  
OS1iNhdoeOT+NVFeUFK5lC  
xlPSdwYWRk  
qO9nMQNvF2b3EoVeBfE1CE  
vnY0VjolM3MQXbuUJjOYFp  
yDDOqL8dtjhdp6ssjsizKh  
AwMDAwMDt0  
VIx3JVGieQumAgLhLSF6Df  
R6XJO9vRNykQ8jbMcxhtmt  
qI9kCvw+RklOOjwvdGQ+PH  
UpMBD2aEqh  
TGdnTVJwnD2lFCMyG0c3Ni  
XtZsE7BEkgD5HagzC5XTYt  
tNXvTTExxSVGpM5hpbsic8  
xvcjogIzAw  
DJZmHQn7VUa1FDIssHwcWh  
PcITX2NoE0OUD0dGQgzY2s  
oTmrjbdeuR6xXor+TVJOOj  
wvdGQ+PHRk  
QVS7dRkfTLrzRYBymO5fFR  
RwT2v8SeXeFoZ1SKmiH5Vo  
rqI7VCTszXXvMKWnxYJDpH  
3faochw0zr  
xswtNlMaRUFsWFl4YEh7NO  
TlrUxgWjBmFWD1EmN3BWF4  
oPDprW8hgCztuzzawK1dSp  
c+WCB6EEH2  
UX43DE77P6MwCxaggRNruV  
U+PHRhYmxlIHdpZHRoPScx  
ZPUrGnJpvAhnNI0gNv8wEN  
VyLWNvbGxh  
cHNl (more content not   
included)...        Normal                                  [facility]  
   
                                                    Discharge Instructionson    
   
                                                    Discharge   
Instructions                            [id number]  
[id number]#1.0  
[id number]:[id number]             Normal                                  [facility]  
   
                                                    MRA Head w/o Contraston    
   
                                        MRA Head w/o Contrast Exam Date/Time:  
1/10/2023 15:59 EST  
Reason for Exam:  
Stroke, follow up  
Report  
IMPRESSION: SHORT   
SEGMENT FINDING OF THE   
BASILAR ARTERY,   
POSSIBLY A LIMITED  
FENESTRATED SEGMENT OF   
THE BASILAR ARTERY   
(DEVELOPMENTAL   
VARIANT), WITH LIMITED  
PLAQUE ALSO A   
CONSIDERATION. IF   
CLINICALLY WARRANTED,   
CTA COULD BE   
CONSIDERED FOR  
FURTHER EVALUATION.  
OTHERWISE NEGATIVE MRA   
STUDY OF THE HEAD.  
CLINICAL HISTORY:   
Stroke, follow up.   
Left-sided numbness.   
History of MS.  
COMMENT: Unenhanced   
images were obtained.  
The intracranial   
distal vertebral   
arteries are   
unremarkable. The   
basilar artery is a  
moderate size vessel.   
At the junction of the   
proximal and mid   
thirds of the basilar  
artery, there is a   
thin linear   
septation-like   
structure within the   
lumen of a short  
segment of the artery.   
This may represent a   
limited fenestrated   
segment of the  
basilar artery,   
developmental variant.   
Limited plaque is also   
a consideration, but  
there is no plaque   
elsewhere is evident   
on this MRA study.   
This does not appear   
to  
result in significant   
stenosis. The   
remainder of the   
basilar is   
unremarkable.  
The distal internal   
carotid arteries   
bilaterally, the   
vessels of the [place]   
of  
Jacob, and visualized   
central portions of   
the anterior, middle,   
and posterior  
cerebral arteries are   
unremarkable, without   
evidence of   
significant stenosis   
nor  
occlusion.  
No aneurysm of   
intracranial arteries   
is evident.  
***** FINAL REPORT   
*****  
  
Dictated: 2023   
9:02 am Jude Calhoun M.D.  
  
Signed (Electronic   
Signature): 2023   
9:02 am  
Signed by: Jude Calhoun M.D.  
Transcribed by: MIKHAIL   
Technologist: JAG  
Technical Comments  
None                Normal                                  [facility]  
   
                                                    Pre-Certification Formon    
   
                                                    Pre-Certification   
Form                                    [id number]  
[id number].  
[id number]            Normal                                  [facility]  
   
                                                    Auto Diffon 01-   
   
                                                    Basophils/100 WBC   
(Bld)           1.2 %           Normal          0.0-2.0         [facility]  
   
                                        Comment on above:   Order Comment: Order  
 Added by Discern Expert.   
   
                                                            Performed By: #### 2  
[id number], [id number], [id number], [id number],   
[id number], 0371459, 8080401, 35208941, 2564990, [id number] ####  
[facility]  
[address] [place] [address]   
   
                                                    Basophils/Leukocytes   
Auto (Bld) [Pure #   
fraction]       0.1 E9/L        Normal          0.0-0.2         [facility]  
   
                                        Comment on above:   Order Comment: Order  
 Added by Discern Expert.   
   
                                                            Performed By: #### 2  
[id number], [id number], [id number], 08253891,   
[id number], 9082597, 2676572, 22658883, 0150890, [id number] ####  
[facility]  
[address] [place] [address]   
   
                                                    Eosinophils/100 WBC   
(Bld)           2.0 %           Normal          0.0-8.0         [facility]  
   
                                        Comment on above:   Order Comment: Order  
 Added by Discern Expert.   
   
                                                            Performed By: #### 2  
[id number], [id number], 66103038, 71729800,   
[id number], 0338338, 6352949, 85241804, 8246039, 0314800 ####  
[facility]  
[address] [place] [address]   
   
                                                    Eosinophils/Leukocyte  
s Auto (Bld) [Pure #   
fraction]       0.2 E9/L        Normal          0.0-0.5         [facility]  
   
                                        Comment on above:   Order Comment: Order  
 Added by Discern Expert.   
   
                                                            Performed By: #### 2  
[id number], 11512367, 16922737, 00458839,   
820665544, 3695044, 6058165, 49945225, 8279178, 8359240 ####  
[facility]  
[address] [place] [address]   
   
                                                    Lymphocytes/100 WBC   
(Bld)           29.7 %          Normal          14.0-50.0       [facility]  
   
                                        Comment on above:   Order Comment: Order  
 Added by Discern Expert.   
   
                                                            Performed By: #### 2  
[id number], 75816179, 71058899, 83554470,   
496436148, 2032842, 5201162, 84799184, 7354099, 4595697 ####  
[facility]  
[address] [place] [address]   
   
                                                    Lymphocytes/Leukocyte  
s Auto (Bld) [Pure #   
fraction]       2.6 E9/L        Normal          1.0-4.0         [facility]  
   
                                        Comment on above:   Order Comment: Order  
 Added by Discern Expert.   
   
                                                            Performed By: #### 2  
[id number], 27476160, 05726075, 03538271,   
042402592, 4186011, 1275285, 72527779, 6109583, 5797095 ####  
[facility]  
[address] [place] [address]   
   
                                                    Monocytes/100 WBC   
(Bld)           5.4 %           Normal          4.0-14.0        [facility]  
   
                                        Comment on above:   Order Comment: Order  
 Added by Discern Expert.   
   
                                                            Performed By: #### 2  
891767, 79478362, 21733472, 36481897,   
[id number], 0275666, 2841560, 87460621, 7401869, 2588308 ####  
[facility]  
[address] [place] [address]   
   
                                                    Monocytes/Leukocytes   
Auto (Bld) [Pure #   
fraction]       0.5 E9/L        Normal          0.2-1.0         [facility]  
   
                                        Comment on above:   Order Comment: Order  
 Added by Discern Expert.   
   
                                                            Performed By: #### 2  
240955, 11759362, 87739688, 79739040,   
996805332, 7070762, 5336646, 21451562, 0326883, 0728850 ####  
[facility]  
[address] [place] [address]   
   
                                                    Neutrophils/100 WBC   
(Bld)           61.7 %          Normal          36.0-75.0       [facility]  
   
                                        Comment on above:   Order Comment: Order  
 Added by Discern Expert.   
   
                                                            Performed By: #### 2  
795790, 27275321, 39940997, 35463186,   
702434708, 5882087, 6825598, 76488044, 5503139, 5877151 ####  
[facility]  
[address] [place] [address]   
   
                                                    Neutrophils/Leukocyte  
s Auto (Bld) [Pure #   
fraction]       5.4 E9/L        Normal          2.0-7.5         [facility]  
   
                                        Comment on above:   Order Comment: Order  
 Added by Discern Expert.   
   
                                                            Performed By: #### 2  
976692, 19251822, 38897286, 83153963,   
345789312, 3219339, 0595741, 05157156, 4248345, 7187525 ####  
[facility]  
[address] [place] [address]   
   
                                                    BMPon 01-   
   
                      Creatinine [Mass/Vol] 0.6 mg/dL  Normal     0.5-1.3    [facility]  
   
                                        Comment on above:   Performed By: #### 2  
088023, 16644956, 70789013, 70682208,   
582721636, 7819394, 4588942, 36569600, 9112140, 4403475 ####  
[facility]  
[address] [place] [address]   
   
                                                    Urea nitrogen   
[Mass/Vol]      9 mg/dL         Normal          5-21            [facility]  
   
                                        Comment on above:   Performed By: #### 2  
860603, 55609349, 37768757, 61393914,   
923839079, 4832704, 1310300, 76644315, 0866829, 2801157 ####  
[facility]  
[address] [place] [address]   
   
                                                    Urea   
nitrogen/Creatinine   
[Mass ratio]    15 No Units     Normal          10-20           [facility]  
   
                                        Comment on above:   Performed By: #### 2  
895211, 45225145, 76927031, 58273797,   
233311222, 2674971, 7649195, 41428735, 8946173, 8648123 ####  
[facility]  
[address] [place] [address]   
   
                      Anion gap [Moles/Vol] 12 mmol/L  Normal     6-16       [facility]  
   
                                        Comment on above:   Performed By: #### 2  
386393, 13415401, 51680141, 77948556,   
085598675, 8827580, 0916134, 04896603, 0839786, 4934505 ####  
[facility]  
[address] [place] [address]   
   
                      Calcium [Mass/Vol] 8.9 mg/dL  Normal     8.9-11.1   [facility]  
   
                                        Comment on above:   Performed By: #### 2  
330429, 48029888, 79518656, 86154269,   
426542300, 7425559, 0387970, 42398716, 0670187, 1690131 ####  
[facility]  
[address] [place] [address]   
   
                      Chloride [Moles/Vol] 105 mmol/L Normal     101-111    [facility]  
   
                                        Comment on above:   Performed By: #### 2  
754947, 23063510, 98463589, 43712052,   
533525393, 3722990, 3800691, 90140671, 9818816, 6234888 ####  
[facility]  
[address] [place] [address]   
   
                      CO2 [Moles/Vol] 25 mmol/L  Normal     21-31      [facility]  
   
                                        Comment on above:   Performed By: #### 2  
967377, 97692208, 37248817, 54706108,   
862332317, 8991583, 2670602, 16035343, 6053150, 6121343 ####  
[facility]  
[address] [place] [address]   
   
                      Glucose [Mass/Vol] 83 mg/dL   Normal          [facility]  
   
                                        Comment on above:   Result Comment: If t  
his glucose result represents a fasting   
glucose, interpretation should refer to the following reference   
range: 55-99 mg/dL   
   
                                                            Performed By: #### 2  
685701, 08814493, 62246079, 82129847,   
297681945, 0033367, 9992579, 12005067, 5378447, 5288873 ####  
[facility]  
[address] [place] [address]   
   
                      Potassium [Moles/Vol] 3.7 mmol/L Normal     3.5-5.3    [facility]  
   
                                        Comment on above:   Performed By: #### 2  
272437, 60358719, 86282638, 04740388,   
272555660, 6960115, 9932555, 41970662, 8171535, 9179055 ####  
[facility]  
[address] [place] [address]   
   
                      Sodium [Moles/Vol] 138 mmol/L Normal     135-145    [facility]  
   
                                        Comment on above:   Performed By: #### 2  
143089, 56248512, 29006281, 80075604,   
910305654, 3466879, 8612275, 97030918, 6558140, 4445298 ####  
[facility]  
[address] [place] [address]   
   
                                                    CBC w/ Auto Diffon 01-  
3   
   
                                                    Erythrocyte   
distribution width   
(RBC) [Ratio]   13.4 %          Normal          10.9-14.2       [facility]  
   
                                        Comment on above:   Performed By: #### 2  
619340, 56902001, 12782769, 72587926,   
078100014, 0555404, 0024301, 42692172, 5340511, 4237381 ####  
[facility]  
[address] [place] [address]   
   
                                                    Hematocrit (Bld)   
[Volume fraction] 41.9 %          Normal          34.0-46.0       [facility]  
   
                                        Comment on above:   Performed By: #### 2  
634893, 20673174, 90905402, 73046349,   
962417686, 3141552, 2450454, 87036951, 7930335, 6656476 ####  
[facility]  
[address] [place] [address]   
   
                                                    Hemoglobin (Bld)   
[Mass/Vol]      13.9 g/dL       Normal          12.0-16.0       [facility]  
   
                                        Comment on above:   Performed By: #### 2  
464055, 72160600, 76747869, 90008021,   
915023919, 3508343, 3447167, 37672682, 5553390, 7533324 ####  
[facility]  
[address] [place] [address]   
   
                                                    MCH (RBC) [Entitic   
mass]           29.1 pg         Normal          27.0-34.0       [facility]  
   
                                        Comment on above:   Performed By: #### 2  
578424, 19800411, 21632270, 42112907,   
246082393, 2874151, 7961466, 13761368, 9605066, 1116212 ####  
[facility]  
[address] [place] [address]   
   
                      MCHC (RBC) [Mass/Vol] 33.2 g/dL  Normal     31.4-36.0  [facility]  
   
                                        Comment on above:   Performed By: #### 2  
237220, 90744637, 84983281, 10478566,   
857365702, 4279813, 1985458, 88357649, 9284580, 9605095 ####  
[facility]  
[address] [place] [address]   
   
                                                    MCV (RBC) [Entitic   
vol]            87.8 fL         Normal          80.0-100.0      [facility]  
   
                                        Comment on above:   Performed By: #### 2  
455918, 19916149, 99178605, 04950319,   
847131051, 5517198, 8578851, 59191590, 5598927, 6770881 ####  
[facility]  
[address] [place] [address]   
   
                                                    Platelet mean volume   
(Bld) [Entitic vol] 7.4 fL          Normal          6.4-10.8        [facility]  
   
                                        Comment on above:   Performed By: #### 2  
839426, 54099379, 82652935, 54820122,   
762518636, 5943845, 6362684, 27539491, 6670591, 2605411 ####  
[facility]  
[address] [place] [address]   
   
                                                    Platelets (Bld)   
[#/Vol]         354.0 E9/L      Normal          150.0-500.0     [facility]  
   
                                        Comment on above:   Performed By: #### 2  
666260, 66395676, 70085630, 76448728,   
618764965, 3677130, 4251873, 69984184, 1530731, 0997018 ####  
[facility]  
[address] [place] [address]   
   
                      RBC (Bld) [#/Vol] 4.8 E12/L  Normal     4.3-5.9    [facility]  
   
                                        Comment on above:   Performed By: #### 2  
520717, 62284344, 52337668, 84498576,   
836611474, 1755524, 2581729, 05887163, 9006128, 9234847 ####  
[facility]  
[address] [place] [address]   
   
                                                    WBC corrected for   
nucl RBC Auto (Bld)   
[#/Vol]         8.8 E9/L        Normal          4.0-11.0        [facility]  
   
                                        Comment on above:   Performed By: #### 2  
429696, 59537840, 84927013, 56314495,   
741768647, 5348522, 2005790, 69001490, 9246813, 0019435 ####  
[facility]  
[address] [place] [address]   
   
                                                    CRPon 01-   
   
                      CRP [Mass/Vol] 0.5 mg/dL  Normal     <=1.9      [facility]  
   
                                        Comment on above:   Performed By: #### 2  
472820, 86217949, 65087176, 29697382,   
317451310, 2172594, 9996879, 67558521, 4564383, 8203075 ####  
[facility]  
[address] [place]  
[place] [address]   
   
                                                    CT Head or Brain w/o Contras  
ton 01-   
   
                                                    CT Head or Brain w/o   
Contrast                                Exam Date/Time:  
2023 23:01 EST  
Reason for Exam:  
Neuro deficit, acute,   
stroke suspected;Other   
(please specify)  
Report  
IMPRESSION: NO   
EVIDENCE OF ACUTE   
INTRACRANIAL   
HEMORRHAGE. VERY   
SUBTLE WHITE MATTER  
FINDINGS. NO   
SIGNIFICANT CHANGE   
WHEN COMPARED TO THE   
PRIOR EXAM.  
CLINICAL HISTORY:   
Neuro deficit, acute,   
stroke suspected.   
Left-sided numbness.  
COMPARISON: 2021.  
COMMENT: Unenhanced   
images were obtained.  
The ventricles and   
basal cisterns and   
cortical sulci appear   
within normal limits.  
There is no mass   
effect nor midline   
shift. There are small   
very subtle   
ill-defined  
areas of slightly   
decreased attenuation   
involving cerebral   
white matter   
bilaterally.  
The findings are   
nonspecific. Small   
vessel ischemic   
changes are a   
possibility.  
However, reportedly   
the patient has a   
history of multiple   
sclerosis, and  
demyelinating disease   
could also account for   
this appearance. There   
is no evidence of  
recent intracranial   
hemorrhage nor   
extra-axial hematoma.   
No mass lesion is   
evident.  
No skull fracture is   
noted.  
All CT scans at this   
facility use dose   
modulation, iterative   
reconstruction, and/or  
weight based dosing   
when appropriate to   
reduce radiation dose   
to as low as   
reasonably  
achievable.  
***** FINAL REPORT   
*****  
  
Dictated: 01/10/2023   
8:07 am Jude Calhoun M.D.  
  
Signed (Electronic   
Signature): 01/10/2023   
8:07 am  
Signed by: Jude Calhoun M.D.  
Transcribed by: MIKHAIL   
Technologist: MMM   Normal                                  [facility]  
   
                                                    Capillary Glucose POCon    
   
                      Glucose [Mass/Vol] 87 mg/dL   Normal     55-99      [facility]  
   
                                        Comment on above:   Result Comment: Chandler patrick RN/MD   
   
                                                            Performed By: #### 2  
[id number] ####[facility]   
[address] [place] [address]   
   
                                                    Consultation Noteon 01-10-20  
23   
   
                                        Consultation Note   Chief Complaint  
MS flareup; left sided   
numbness and tingling  
Reason for   
Consultation  
TIA  
History of Present   
Illness  
47-year-old woman with   
multiple previous   
presentations for   
similar symptoms. She   
says at some point she   
was diagnosed with   
multiple sclerosis   
based on imaging   
studies and CSF. Based   
on the available MRI   
images here, she seems   
to have some white   
matter changes and no   
lesions to her   
cervical cord. Despite   
being worked up   
multiple times for   
optic neuritis and   
multiple sclerosis   
exacerbation, we have   
never discovered any   
enhancing or acute   
demyelinating lesions.   
Elsewhere she has been   
given IVIG because she   
reportedly has an   
allergy to steroids   
but she has been given   
steroids with some   
Benadryl. She says her   
left eye is blurry and   
it feels like she is   
seeing double but at   
the time of my   
encounter what she   
initially called   
double vision was not   
actually diplopia but   
just decreased visual   
acuity of the left   
eye. She reports red   
desaturation, saying   
red things appear   
pink. There is mild   
pain with movement of   
the left eye. She also   
says her left arm is   
weak and her left leg   
is weak to the extent   
she cannot bear   
weight.  
She is currently   
seeing Dr. Henry Mccain MD. an   
office note from   
August 10, 2022 from   
him is available for   
review. It mentions   
that she was diagnosed   
with multiple   
sclerosis in 2015 by   
Dr. Spaulding. It   
mentions she had   
previously been on   
Aubagio. Talks about   
the steroid allergy   
and IVIG courses. She   
apparently had been on   
Xarelto for a right   
arm DVT. Her   
neuropathic pain has   
been attempted to be   
treated with   
gabapentin and Lyrica.   
She has a history of   
seeking care at   
multiple different   
[facility] hospitals.   
Physicians at times   
find her exam   
inconsistent and   
unreliable. She has   
prior history of   
surgery on her   
cervical spine and her   
lumbar spine, in her   
20s. It looks like   
they are thinking   
about starting her on   
Kesimpta.  
Review of Systems  
GEN: No fevers or   
chills.  
CV/PULM: No chest   
pain. No shortness of   
breath. No   
palpitations.  
NEURO: Vision affected   
in left eye. Possible   
double vision. No   
dysphagia. No speech   
changes. Left hemibody   
weakness.  
Physical Exam  
Vitals & Measurements  
T: 36.6 ?C(Oral) TMIN:   
36.5 ?C(Oral) TMAX:   
36.8 ?C(Oral) HR:   
79(Peripheral) RR: 17   
BP: 127/71 SpO2: 97%   
HT: 170.18 cm WT: 61.9   
kg  
GEN: General   
appearance normal.   
Well-kempt. No   
distress. No   
visualized deformities   
or trauma.  
CARDIO/VASC: Limbs   
without significant   
edema and appear   
well-perfused.  
PULM: Normal work of   
breathing.  
SKIN: Visualized skin   
is intact and without   
lesions aside from   
age-related findings.  
MS: Affect is normal.   
Patient is alert and   
generally oriented.   
Normal attention.  
LANG: Speech is fluent   
and non-dysarthric.  
EYES: Pupils are   
equal, reactive, and   
have a consensual   
response. There is no   
afferent pupillary   
defect. She reports   
red objects appear   
more like pink in the   
left eye only. Ocular   
motility full; no   
disconjugate gaze. No   
pathologic nystagmus.  
CN: Hearing acuity   
normal. Face without   
droop and with normal   
motor function.  
MOTOR: Muscle bulk   
normal. Muscle tone   
normal. She has   
immediate limb drift   
without any pronator   
drift in the left   
upper extremity and at   
other times during the   
encounter is using   
that limb without any   
issues whatsoever.   
Positive Cortes sign   
left lower extremity.   
No tremors.  
REFLEXES: Reflexes   
normoactive   
throughout. No   
pathologic reflexes.  
SENSORY: Light touch   
normal. Vibratory   
sensation intact in   
distal extremities.  
CEREBELLAR: No limb   
ataxia.  
Assessment/Plan  
ASSESSMENT:  
1. Chronic cerebral   
white matter changes.   
I remain unconvinced   
that these are   
demyelinating in   
nature. My suspicion   
for her having   
multiple sclerosis is   
low, and my suspicion   
for an acute   
exacerbation of such   
is very low. She has   
had similar   
retro-orbital and   
periorbital pains and   
ocular problems on the   
left that have been   
worked up for optic   
neuritis, at least in   
2021 and in 2019   
and probably other   
times I was unable to   
review, and there has   
never been any imaging   
evidence of optic   
neuritis. We are   
getting updated brain   
MRI images with and   
without contrast to   
ensure there is no   
active lesion and to   
make sure that her   
white matter changes   
look relatively   
unchanged from priors.   
Previous MRI of her   
cervical spine shows   
some postsurgical   
changes but no   
evidence of   
demyelinating lesions   
of the cord.  
2. Left ocular pain   
and visual loss. Doubt   
optic neuritis related   
to demyelinating   
disease but we will   
again attempt to rule   
this out. This has   
been a recurrent issue   
for her. Consider   
other optic   
neuropathies. In terms   
of the painful   
sensation, cranial   
nerve V1 syndromes   
could also be   
considered.  
3. Based on my   
assessment today it   
seems that the left   
hemiparesis is   
functional. She also   
has a longstanding   
history of what is   
believed to be   
functional   
neurological symptoms.   
We are imaging the   
brain regardless.  
PLAN:  
1. MRI brain with and   
without contrast  
2. Further   
recommendations to   
follow, but in general   
if the MRI is without   
any acute findings   
then (more content not   
included)...        Normal                                  [facility]  
   
                                        Comment on above:   Result Comment: Elec  
tronically Signed By: Nicole HARPER, Lorri CAMPBELL\.br\Date and Time Signed: 01/10/23 06:59   
EST\.br\Electronically Co-Signed By: Serge Alexander DO\.br\Date   
and Time Co-Signed: 01/10/23 10:21 EST   
   
                                                    ED Clinical Summaryon 01-10-  
2023   
   
                                        ED Clinical Summary (Inserted Image.   
Unable to display)   
[place] [phone]  
ED Clinical Summary  
Person Information  
Name: CANTU, NICHOLE L   
[place] Age:   
47 Years : 1975  
Sex: Female Language:   
English PCP: CANDE SMITH, DAVE MOSLEY  
Marital Status:   
 Phone:   
[phone]  
MRN: [id number]   
Id: FIN: [id number]  
Visit Reason: Medical   
screening exam;   
Paresthesia; Focal   
motor weakness; Eye   
pain; PT HAS MS - PAIN   
IN LT EYE, LT LEG NUMB   
Speciality: Acuity: 3  
Enc Type: Observation   
Med Service: Emergency  
Arrival: 2023   
21:10:04 Discharge:   
LOS: 000 03:12  
Checkin: 2023   
21:10:04 Checkout:   
1/10/2023 00:22:07   
Dispo Type: Admitted   
as IP to this [facility]  
  
EVENTS:  
Event Name Event   
Status Request   
Date/Time Start   
Date/Time Complete   
Date/Time  
Arrive Complete   
2023 21:10:04   
Document Home Meds   
Request 2023   
21:10:04  
Triage Complete   
2023 21:10:04   
2023 21:25:11   
2023 21:25:11  
Bed Assign Complete   
2023 21:12:44   
Dr Exam Complete   
2023 21:12:44   
2023 21:21:09   
2023 21:21:09  
RN Exam Complete   
2023 21:12:44   
2023 22:42:46   
2023 22:42:46  
Registration Complete   
2023 21:14:50   
Reg Complete Request   
2023 21:14:50  
Reg Bed Request   
Complete 2023   
21:14:50 2023   
21:14:50 2023   
21:14:50  
Registration Complete   
2023 21:21:09   
2023 23:25:40   
2023 23:25:40  
Dr Exam Complete   
2023 21:22:10   
EKG Complete 2023   
21:23:39 2023   
21:29:05  
NPO Request 2023   
22:32:15  
Pending Labs Request   
2023 22:32:15  
Lab Request 2023   
22:32:15  
Urine Collect Request   
2023 22:32:15  
Patient Care Complete   
2023 22:32:15   
2023 22:54:38  
X-Ray Complete   
2023 22:32:15   
2023 23:03:11   
2023 23:07:56  
RT Request 2023   
22:32:15  
CT Complete 2023   
22:32:15 2023   
22:35:36 2023   
23:01:07  
Pending Labs Cancel   
2023 22:33:51   
2023 23:27:13  
Lab Cancel 2023   
22:33:51 2023   
23:27:13  
Fall Risk Request   
2023 22:42:46  
Pending Labs Complete   
2023 22:43:04   
2023 22:43:04   
2023 22:43:05  
Meds Admin Complete   
2023 23:06:20   
2023 23:38:30  
Wet Read Request   
2023 23:07:56  
Meds Admin Cancel   
2023 23:14:33   
2023 23:16:48  
Consult Request   
2023 23:25:12  
Hospitalist Consult   
Request 2023   
23:25:12  
Pending Labs Complete   
2023 23:27:42   
2023 23:27:42   
2023 23:59:52  
Lab Complete 2023   
23:27:42 2023   
23:27:42 2023   
23:57:44  
Patient Care Request   
2023 23:30:16  
Consult Request   
2023 23:30:16  
Pending Labs Request   
2023 23:30:16  
Lab Request 2023   
23:30:16  
Meds Admin Request   
2023 23:30:16  
US Request 2023   
23:30:17  
RT Request 2023   
23:30:17  
Bed Request Request   
2023 23:30:17  
Reg Bed Request   
Request 2023   
23:30:17  
Admit Request 2023   
23:30:17  
MRI Request 2023   
23:30:17  
Meds Admin Request   
2023 23:31:00  
Pending Labs Complete   
2023 23:34:13   
2023 23:34:13   
2023 23:57:45  
Lab Complete 2023   
23:34:13 2023   
23:34:13 2023   
23:57:45  
Pending Labs Complete   
2023 23:38:36   
2023 23:38:36   
2023 23:38:45  
Lab Complete 2023   
23:38:36 2023   
23:38:36 2023   
23:38:45  
Meds Admin Request   
1/10/2023 00:03:37  
Inpatient Bed Ready   
Complete 1/10/2023   
00:22:07 1/10/2023   
00:22:07 1/10/2023   
00:22:07  
ADDRESS:  
[address]   
[place] [phone]  
PHYS DOC NOTES:  
MEDICAL INFORMATION:  
Prescriptions Given:  
Medications to   
Continue with No   
Changes  
Other Medications  
acetaminophen (Tylenol   
325 mg Tab) 2 Tablets   
By Mouth as needed   
Pain/Fever.  
conjugated estrogens   
(Premarin 0.9 mg Tab)   
1 Tablets By Mouth   
every day.  
duloxetine (Cymbalta   
60 mg Cap-DR) 2   
Capsules By Mouth once   
a day (in the   
morning).  
HYDROmorphone   
(Dilaudid 4 mg Tab) 1   
Tablets By Mouth every   
4 hours as needed for   
pain. or as needed.  
ibuprofen (ibuprofen   
200 mg Tab) 2 Tablets   
By Mouth as needed   
Pain/Fever.  
lorazepam (LORazepam 1   
mg Tab) 1 Tablets By   
Mouth 3 times a day.  
phentermine   
(phentermine 37.5 mg   
Tab) 1 Tablets By   
Mouth every day.  
pregabalin (pregabalin   
100 mg Cap) 1 Capsules   
By Mouth 3 times a   
day.  
tramadol (tramadol 50   
mg oral tablet) 1   
Tablets By Mouth 4   
times a day as needed   
for pain.  
PATIENT EDUCATION   
INFORMATION:  
Instructions:  
  
Follow up:  
DIAGNOSIS:  
1:Multiple sclerosis   
exacerbation; 2:Left   
optic neuritis;   
3:History of DVT in   
adulthood; 4:PTSD   
(post-traumatic stress   
disorder); 5:Anxiety;   
6:Chronic pain; 7:No   
contraindication to   
deep vein thrombosis   
(DVT) prophylaxis   Normal                                  [facility]  
   
                                                    ED Note-Nursingon 01-  
   
   
                                        ED Note-Nursing     pt arrived to ed fro  
   
home via private car   
with her  c/o   
left sided numbness   
with increased   
weakness on left side   
starting yesterday. pt   
states she has MS and   
this feels like a   
flare. pt states she   
also has left eye pain   
with blurry vision. pt   
states she is normally   
given steroids to   
help. VSS other than   
slight htn.         Normal                                  [facility]  
   
                                                    ED Note-Physicianon 01-10-20  
23   
   
                                        ED Note-Physician   Basic Information  
Time Seen:  
Ulises Paz DO   
2023 21:21  
Chief Complaint  
left side numbness, MS   
flare up. left eye   
pain. symptoms began   
yesterday morning  
History of Present   
Illness  
Patient is a   
47-year-old female   
presents ED for   
evaluation of a   
presumed MS flare.   
Patient has a history   
of relapsing remitting   
MS. Patient states   
that she had symptoms   
beginning yesterday   
morning (about 36   
hours ago). Patient   
complains of pain of   
her left eye as well   
as blurring of her   
vision and double   
vision. Patient also   
complains of   
paresthesias of her   
left arm as well as   
weakness. Patient   
complains of complete   
anesthesia of her left   
leg as well as near   
total immobility.   
Patient states that   
her symptoms are   
identical with   
previous   
presentations. Patient   
reports that in the   
past she has been   
given IV Solu-Medrol   
as well as Benadryl   
due to an intolerance   
to Solu-Medrol on its   
own with great relief   
of her symptoms.   
Patient also states   
she normally receives   
pain medication.   
Patient also has   
history of chronic   
pain and is on an   
extensive list of pain   
medications at home   
from her neurologist.   
Patient states that   
she took these home   
medications with   
little relief of the   
pain in her left eye.   
Patient spouse is in   
the room with her,   
states that patient   
appearance and   
behavior similar to   
past MS flares.  
Family history:   
Reviewed and   
noncontributory  
Social history:   
Reviewed and   
noncontributory  
Review of Systems  
Full 10 system ROS   
performed. Pt denies   
symptoms except as   
noted above in the   
HPI.  
Physical Exam  
Vitals & Measurements  
T: 36.6 ?C(Oral) HR:   
82(Peripheral) RR: 18   
BP: 153/114 SpO2: 100%  
HT: 170.18 cm WT: 61.9   
kg BMI: 21.37  
General: Pt is in NAD,   
nontoxic appearing  
Skin: Pt skin is warm   
and dry, no rashes or   
lesions appreciated  
HEENT: Atraumatic,   
normocephalic. Clear   
sclera. PERRLA.   
Extraocular movements   
grossly intact. No   
appreciable facial   
droop.  
Pulmonary: Breathing   
normally, no   
respiratory distress,   
no accessory muscle   
usage, lungs CTAB  
Cardiovascular:   
Regular rate and   
rhythm, no   
murmurs/gallops/rubs.   
Distal pulses palpable   
in upper and lower   
extremities   
bilaterally  
Gastrointestinal:   
+BSX4. Abdomen soft,   
nondistended,   
nontender. No   
peritoneal signs   
present.  
Musculoskeletal:   
Patient has weakness   
and reduced sensation   
of her left upper   
extremity. Patient has   
flaccid paralysis of   
left lower extremity   
with no sensation.   
Patient has full   
strength in right   
upper and right lower   
extremities.  
Neurological: Pt is   
alert and oriented.   
Sensation   
abnormalities elicited   
musculoskeletal.  
Lymphatic: No   
lymphadenopathy   
appreciated. No   
peripheral edema   
appreciated  
Psychiatric: Pt is   
cooperative,   
communicative,   
appropriately reactive  
Medical Decision   
Making  
MEDICAL DECISION   
MAKING  
Number and Complexity   
of Problems  
Differential   
Diagnosis: CVA, MS   
flare  
MDM Data  
External documents   
reviewed: [****]  
My EKG interpretation:   
No significant change   
from previous  
My CT interpretation:   
[****]  
My X-ray   
interpretation: [****]  
My Ultrasound   
interpretation: [****]  
Decision rules/scores   
evaluated: [****]  
Discussed with:   
Patient discussed with   
hospitalist who   
reported that he was   
familiar with the   
patient from her past   
admissions.   
Hospitalist agreed to   
admit patient due to   
focal nature of her   
findings for treatment   
for presumed MS flare   
as well as rule out   
CVA.  
Treatment and   
Disposition  
ED Course: Patient   
presents ED for   
evaluation of left   
lower extremity   
weakness, left upper   
extremity weakness,   
and pain and double   
vision of her left   
eye. Patient reports   
the symptoms are   
identical with   
previous MS flares.   
Due to focal nature of   
the findings, a stroke   
work-up was initiated   
in the ED. Patient was   
also ordered a dose of   
methylprednisolone   
which she states she   
is tolerated the past.   
Patient does report   
that she needs to be   
pretreated with   
diphenhydramine which   
was done. While   
awaiting results of   
stroke work-up,   
patient was discussed   
with hospitalist as   
described above.   
Patient was agreeable   
for admission to the   
hospital for further   
management and   
work-up. Patient   
admitted to   
hospitalist.  
Shared decision   
making: [****]  
Code status: [****]  
Assessment/Plan  
1. Multiple sclerosis   
exacerbation (G35:   
Multiple sclerosis)  
Orders:  
diphenhydrAMINE, 12.5   
mg = 0.25 mL,   
Injection, IV Push,   
Once, Stop date   
23 23:06:00 EST,   
STAT, Start date   
23 23:06:00 EST,   
23 23:06:00 EST  
methylPREDNISolone,   
125 mg = 2 mL,   
Injection, IV Push,   
Once, Stop date   
23 23:05:00 EST,   
STAT, Start date   
23 23:05:00 EST,   
23 23:05:00 EST  
Basic Metabolic Panel  
C-Reactive Protein  
CBC w/ Auto Diff  
Continuous Pulse   
Oximetry  
CT Head or Brain w/o   
Contrast  
ED Cardiac Monitoring  
ED Physician consult   
Hospitalist for   
continued care  
eGFR  
Oxygen Therapy  
PT & PTT  
Routine Capillary   
Glucose POC  
Saline Lock Insert  
Sedimentation Rate   
Automated  
Stroke Quality   
Measures  
Troponin 0 Hr.  
UA With Cult Reflex  
XR Chest Single View  
Medications   
Administered  
Given  
dip (more content not   
included)...        Normal                                  [facility]  
   
                                        Comment on above:   Result Comment: Elec  
tronically Signed By: Eddie Raines PA-C\.br\Date and Time Signed: 23 23:41   
EST\.br\Electronically Co-Signed By: Ulises Paz DO\.br\Date and Time Co-Signed: 23 23:52 EST   
   
                                                    ED Patient Education Noteon   
01-   
   
                                                    ED Patient Education   
Note                            Normal                          [facility]  
   
                                                    ED Patient Summaryon 01-10-2  
023   
   
                                        ED Patient Summary  (Inserted Image.   
Unable to display)   
[place] [id number] [phone]  
  
Patient Discharge   
Instructions  
  
Person Information  
Name: CANTU, NICHOLE L   
Age: 47 Years  
MRN: [id number] FIN:   
[id number]  
Arrival Date: 2023   
21:10:04  
Discharge Diagnosis:   
1:Multiple sclerosis   
exacerbation; 2:Left   
optic neuritis;   
3:History of DVT in   
adulthood; 4:PTSD   
(post-traumatic stress   
disorder); 5:Anxiety;   
6:Chronic pain; 7:No   
contraindication to   
deep vein thrombosis   
(DVT) prophylaxis  
Primary Care   
Physician: DAVE CASTELLON MD  
  
Provider Information  
Primary Provider:  
Ulises Paz DO  
Advanced Practice   
Clinician:Eddie Raines PA-C  
The exam and treatment   
you received in the   
Emergency Department   
were for an urgent   
problem and are not   
intended as complete   
care. It is important   
that you follow up   
with a doctor, nurse   
practitioner, or   
physician?s assistant   
for ongoing care. If   
your symptoms become   
worse or you do not   
improve as expected   
and you are unable to   
reach your usual   
health care provider,   
you should return to   
the Emergency   
Department. We are   
available 24 hours a   
day.  
  
CANTU, NICHOLE L has   
been given the   
following list of   
patient education   
materials,   
prescriptions and   
follow-up   
instructions:  
  
Follow-up   
Instructions:  
  
In the event that this   
physician does not   
participate in your   
insurance network,   
please consult with   
your insurance company   
to find a nearby   
participating   
provider.  
  
Patient Education   
Materials:  
  
A MESSAGE TO ALL   
PATIENTS REGARDING   
OPIOIDS  
  
PRESCRIPTION OPIOIDS:   
WHAT YOU NEED TO KNOW  
  
Prescription opioids   
can be used to help   
relieve   
moderate-to-severe   
pain and are often   
prescribed following a   
surgery or injury, or   
for certain health   
conditions. These   
medications can be an   
important part of the   
treatment but also   
come with serious   
risks. It is important   
to work with your   
healthcare provider to   
make sure you are   
getting the safest,   
most effective care.  
  
WHAT ARE THE RISKS AND   
SIDE EFFECTS OF OPIOID   
USE?  
Prescription opioids   
carry serious risks of   
addiction and   
overdose, especially   
with prolonged use. An   
opioid overdose, often   
marked by slowed   
breathing, can cause   
sudden death. The use   
of prescription   
opioids can have a   
number of side effects   
as well, even when   
taken as directed:  
? Tolerance?meaning   
you might need to take   
more of the medication   
for the same pain   
relief  
? Physical   
dependence?meaning you   
have symptoms of   
withdrawal when a   
medication is stopped  
? Increased   
sensitivity to pain  
? Constipation  
? Nausea, vomiting,   
and dry mouth  
? Sleepiness and   
dizziness  
? Confusion  
? Depression  
? Low levels of   
testosterone that can   
result in lower sex   
drive, energy, and   
strength  
? Itching and sweating  
  
RISKS ARE GREATER   
WITH:  
? History of drug   
misuse, substance use   
disorder, or overdose  
? Mental health   
conditions (such as   
depression or anxiety)  
? Sleep apnea  
? Older age (65 years   
and older)  
? Pregnancy  
  
Avoid alcohol while   
taking prescription   
opioids. Also, unless   
specifically advised   
by your health care   
provider, medications   
to avoid include:  
? Benzodiazepines   
(such as Xanax or   
Valium)  
? Muscle relaxants   
(such as Soma or   
Flexeril)  
? Hypnotics (such as   
Ambien or Lunesta)  
? Other prescription   
opioids  
  
KNOW YOUR OPTIONS  
Talk to your health   
care provider about   
ways to manage your   
pain that don?t   
involve prescription   
opioids. Some of these   
options may actually   
work better and have   
fewer risks and side   
effects. Options may   
include:  
? Pain relievers such   
as acetaminophen,   
ibuprofen, and   
naproxen  
? Some medication that   
are also used for   
depression or seizures  
? Physical therapy and   
exercise  
? Cognitive behavioral   
therapy, a   
psychological,   
goal-directed   
approach, in which   
patients learn how to   
modify physical,   
behavioral, and   
emotional triggers of   
pain and stress.  
  
IF YOU ARE PRESCRIBED   
OPIOIDS FOR PAIN:  
? Never take opioids   
in greater amounts or   
more often than   
prescribed.  
? Follow up with your   
primary health care   
provider.  
o Work together to   
create a plan on how   
to manage your pain.  
o Talk about ways to   
help manage your pain   
that don?t involve   
prescription opioids.  
o Talk about any and   
all concerns and side   
effects.  
? Help prevent misuse   
and abuse  
o Never sell or share   
prescription opioids.  
o Never use another   
person?s prescription   
opioids.  
? Store prescription   
opioids in a secure   
place and out of reach   
of others (this may   
include visitors,   
children, friends, and   
family).  
? Safely dispose of   
unused prescription   
opioids: Find your   
community drug   
take-back program or   
your pharmacy   
mail-back program, or   
flush them down the   
toilet, following   
guidance from the Food   
and Drug   
Administration   
(www.fda.gov/Drugs/Res  
ourcesForYou).  
? Visit   
www.cdc.gov/drugoverdo  
se to learn about the   
risks of opioids abuse   
and overdose.  
? If you believe you   
may be struggling with   
addiction, tell your   
health care   
professional and ask   
for guidance or call   
[facility] National He   
(more content not   
included)...        Normal                                  [facility]  
   
                                                    [id number] 01-   
   
                                                    HbA1c (Bld) [Mass   
fraction]       5.6 %           Normal          <=5.9           [facility]  
   
                                        Comment on above:   Performed By: #### 2  
[id number], [id number], [id number], [id number],   
[id number], [id number], [id number], [id number], [id number], [id number]   
####[facility] [address] [place] [address]   
   
                                                    Inpatient Clinical Summaryon  
 01-   
   
                                                    Inpatient Clinical   
Summary                                 (Inserted Image.   
Unable to display)   
[address] [address] [phone]  
Clinical Summary  
  
Person Information:  
Name: CANTU, NICHOLE L   
Age: 47 Years :   
1975  
Sex: Female PCP:   
DAVE CASTELLON MD  
Marital Status:  
 Phone:   
[phone]  
Race:  
White Ethnicity:  
Non- or    
Language:  
English  
MRN: [id number] Visit   
Id: FIN: [id number]  
Visit Reason:  
Medical screening   
exam; Paresthesia;   
Focal motor weakness;   
Eye pain; WEAKNESS,   
PARESTHESIA, MULTIPLE   
SCLEROSIS,   
EXACERBATION   
Speciality: Acuity:  
Enc Type: Inpatient   
Med Service: Medical  
Arrival:  
2023 21:10:04   
Discharge: Dispo Type:   
Admitted as IP to this   
Hosp  
  
Address:  
[address] [place] [phone]  
  
Provider Notes:  
  
  
Diagnosis:  
1:Multiple sclerosis   
exacerbation; 2:Left   
optic neuritis;   
3:History of DVT in   
adulthood; 4:PTSD   
(post-traumatic stress   
disorder); 5:Anxiety;   
6:Chronic pain; 7:No   
contraindication to   
deep vein thrombosis   
(DVT) prophylaxis  
  
Problems  
Active  
History of DVT in   
adulthood  
Anxiety  
Chronic pain  
Anxiety  
Multiple sclerosis  
PTSD (post-traumatic   
stress disorder)  
GERD without   
esophagitis  
Smoker..  
Anemia  
  
Smoking Status:  
Current Every Day   
Smoker  
  
Functional Status:  
Sensory Deficits:   
Other: blurring L eye  
History of Falls:  
Mobility Assistance   
Prior to Admission:   
Independent  
ADLs: Independent  
Current Level of   
Assistance for   
Self-Care/Mobility:  
  
Cognitive Status:  
Oriented x 3  
  
Allergies  
Zofran (Hives)  
Toradol (Hives)  
penicillins (Hives)   
(Anaphylaxis)  
Tape  
Protonix IV (hives)   
(itching)  
Pepcid (Hives)  
Contrast Dye   
(difficulty breathing,   
hives)  
fentaNYL (Hives)  
Protonix () (Hives)  
Nubain (itching)  
methylPREDNISolone   
(hives)  
  
Measurements:  
Height: 170.18 cm  
Weight: 61.9 kg  
Blood Pressure: 129   
mmHg / 87 mmHg  
BMI: 21.37 kg/m2  
  
  
Procedures  
No Procedures   
Documented  
  
Immunizations  
No Immunizations   
Documented This Visit  
  
Final Med List:  
amlodipine (Norvasc 5   
mg Tab) 1 Tablets By   
Mouth every day.  
conjugated estrogens   
(Premarin 0.9 mg Tab)   
1 Tablets By Mouth   
every day.  
HYDROmorphone   
(Dilaudid 4 mg Tab) 1   
Tablets By Mouth every   
4 hours as needed for   
pain. or as needed.   
ibuprofen (ibuprofen   
200 mg Tab) 2 Tablets   
By Mouth as needed   
Pain/Fever.  
lorazepam (LORazepam 1   
mg Tab) 1 Tablets By   
Mouth 3 times a day.  
pregabalin (pregabalin   
100 mg Cap) 1 Capsules   
By Mouth 3 times a   
day.  
tramadol (tramadol 50   
mg oral tablet) 1   
Tablets By Mouth 4   
times a day as needed   
for pain.  
  
Care Team Members:  
Attending Physician:   
Yobani HILL MD  
Consulting Physician:   
De Sanders MD  
Referring Physician:  
  
  
Follow up:  
With: Address: When:  
[address] [id number]   
[place] [address] [phone]   
Business (1)  
Comments:  
Call for followup   
appointment  
With: Address: When:  
De Sanders MD   
[facility] [address] [place] [address]   
Within 2 to 4 weeks  
  
Patient Education   
Information:  
  
Multiple Sclerosis  Normal                                  [facility]  
   
                                                    Inpatient Patient Summaryon   
01-   
   
                                                    Inpatient Patient   
Summary                                 [Image Removed]  
CANTU, NICHOLE L  
:1975  
MRN:[id number]  
Visit Date:2023  
Inpatient Discharge   
Instructions  
Your Care Team  
Admitting Physician -  
Yobani HILL MD  
Consulting Physician -  
De Sanders MD  
Reason for Your Visit  
MS flareup; left sided   
numbness and tingling  
Your Diagnosis  
Multiple sclerosis   
exacerbation  
Left optic neuritis  
History of DVT in   
adulthood  
PTSD (post-traumatic   
stress disorder)  
Anxiety  
Chronic pain  
No contraindication to   
deep vein thrombosis   
(DVT) prophylaxis  
Eye pain  
Focal motor weakness  
Medical screening exam  
Paresthesia  
Tests Performed  
Automated Diff  
BMP  
C-Reactive Protein  
Capillary Glucose POC  
CBC w/ Auto Diff  
eGFR  
HgbA1c  
Lipid Panel  
PT & PTT  
Sedimentation Rate   
Automated  
Troponin 0 Hr.  
Urinalysis with   
Culture Reflex --   
Results Pending --  
Carotid Duplex US   
Bilateral  
CT Head or Brain w/o   
Contrast  
MRA Head w/o Contrast   
-- Results Pending --  
XR Chest Single View  
Please visit your   
patient portal for   
your results or   
contact your primary   
care physician.  
This Is Your   
Medications List  
HYDROmorphone   
(Dilaudid 4 mg Tab)  
amlodipine (Norvasc 5   
mg Tab)  
conjugated estrogens   
(Premarin 0.9 mg Tab)  
ibuprofen (ibuprofen   
200 mg Tab)  
lorazepam (LORazepam 1   
mg Tab)  
pregabalin (pregabalin   
100 mg Cap)  
tramadol (tramadol 50   
mg oral tablet)  
[Image Removed:   
STOP]Stop taking these   
medications  
acetaminophen (Tylenol   
325 mg Tab)  
duloxetine (Cymbalta   
60 mg Cap-DR)  
phentermine   
(phentermine 37.5 mg   
Tab)  
Procedure History  
MRI (2021), MRI   
(2020), MRI   
arthrography   
(2019), Back   
fusion, Carpal tunnel   
release, Bella   
filter, Hysterectomy.  
Discharge Vitals  
Temperature (Axillary)  
36.7 ?C  
Heart Rate (Monitored)  
73  
Respiratory Rate  
17  
Blood Pressure  
129/87  
Height  
170.18 cm  
Weight  
61.9 kg  
BMI  
21.37  
What to do next  
Instructions From Your   
Doctor  
Event Name  
Event Result  
Discharge Activity  
Activity as tolerated  
Discharge Restrictions  
No restrictions  
Discharge Diet(s)  
Fat Modified- Low   
cholesterol  
Pending Diagnostic   
Test Results  
None  
Pharmacy Information  
Other: [facility]  
  
**New Follow Up   
Appointments after   
Discharge**  
Follow Up with DAVE CASTELLON When:  
Comments:  
Call for followup   
appointment  
Where:  
[address] [facility] [address] [facility] [place] [address]-  
[phone]   
Business (1)  
Follow Up with   
Tommy SMITH, LOREN Kc When: Within 2 to   
4 weeks  
Where:  
[address] [place] [address]-  
Medications  
What How Much When   
Instructions Next Dose  
New amlodipine   
(Norvasc 5 mg Tab) 1   
Tablets By Mouth Every   
day   
Unchanged conjugated   
estrogens (Premarin   
0.9 mg Tab) 1 Tablets   
By Mouth Every day   
resume  
Unchanged   
HYDROmorphone   
(Dilaudid 4 mg Tab) 1   
Tablets By Mouth Every   
4 hours as needed for   
for pain or as needed   
 @ 9pm  
Unchanged ibuprofen   
(ibuprofen 200 mg Tab)   
2 Tablets By Mouth As   
needed for Pain/Fever   
resume  
Unchanged lorazepam   
(LORazepam 1 mg Tab) 1   
Tablets By Mouth 3   
times a day 12/10   
@1130 pm  
Unchanged pregabalin   
(pregabalin 100 mg   
Cap) 1 Capsules By   
Mouth 3 times a day   
 @1200 am  
Unchanged tramadol   
(tramadol 50 mg oral   
tablet) 1 Tablets By   
Mouth 4 times a day as   
needed for for pain   
resume  
  
What How Much When   
Comments  
Stop Taking   
acetaminophen (Tylenol   
325 mg Tab) 2 Tablets   
By Mouth As needed for   
Pain/Fever  
Stop Taking duloxetine   
(Cymbalta 60 mg   
Cap-DR) 2 Capsules By   
Mouth Once a day (in   
the morning)  
Stop Taking   
phentermine   
(phentermine 37.5 mg   
Tab) 1 Tablets By   
Mouth Every day  
Test Results  
CBC  
BMP  
WBC: 8.8 E9/L   
(23 23:24:00)  
Glucose Lvl: 83 mg/dL   
(23 23:24:00)  
RBC: 4.8 E12/L   
(23 23:24:00)  
BUN: 9 mg/dL (23   
23:24:00)  
HGB: 13.9 gm/dL   
(23 23:24:00)  
Creatinine: 0.6 mg/dL   
(23 23:24:00)  
Hct: 41.9 % (23   
23:24:00)  
BUN/Creat Ratio: 15   
(23 23:24:00)  
MCV: 87.8 fL (23   
23:24:00)  
Sodium Lvl: 138 mmol/L   
(23 23:24:00)  
MCH: 29.1 pg (23   
23:24:00)  
Potassium Lvl: 3.7   
mmol/L (23:24:00)  
MCHC: 33.2 gm/dL   
(23:24:00)  
Chloride: 105 mmol/L   
(23 23:24:00)  
RDW: 13.4 % (23:24:00)  
CO2: 25 mmol/L   
(23:24:00)  
Platelet: 354 E9/L   
(23 23:24:00)  
AGAP: 12 mEq/L   
(23:24:00)  
MPV: 7.4 fL (23:24:00)  
Calcium Lvl: 8.9 mg/dL   
(23 23:24:00)  
Allergies  
Contrast Dye   
(difficulty breathing,   
hives)  
Protonix (Hives, Rash)  
Nubain (itching)  
Pepcid (Hives)  
Tape  
Toradol (Hives)  
Zofran (Hives)  
fentaNYL (Hives)  
methylPREDNISolone   
(hives)  
penicillins   
(Anaphylaxis, Hives)  
Problems  
Ongoing - Any problem   
that you are currently   
receiving treatment   
for.  
Anemia  
Anxiety  
Anxiety  
Chronic pain  
GERD without   
esophagitis  
History of DVT in   
adulthood  
Multiple sclerosis  
PTSD (post-traumatic   
stress disorder)  
Smoker..  
Historical - Any   
problem that you are   
no longer receiving   
treatment for.  
Degenerative disc   
disease  
Gastrointestinal bl   
(more content not   
included)...        Normal                                  [facility]  
   
                                                    Inpatient Patient   
Summary                                 (Inserted Image.   
Unable to display)   
[place] [address]  
[phone]  
  
Patient Discharge   
Instructions  
  
PERSON INFORMATION  
Name: CANTU, NICHOLE L   
YOB: 1975  
Current Date:   
1/10/2023 16:58:24  
  
PHYSICIANS  
Admitting Physician:   
Yobani HILL MD  
Primary Care   
Physician: DAVE CASTELLON MD  
PCP Phone Number:   
[phone]  
Comment:  
  
Discharge Diagnosis:   
1:Multiple sclerosis   
exacerbation; 2:Left   
optic neuritis;   
3:History of DVT in   
adulthood; 4:PTSD   
(post-traumatic stress   
disorder); 5:Anxiety;   
6:Chronic pain; 7:No   
contraindication to   
deep vein thrombosis   
(DVT) prophylaxis  
Condition at   
Discharge: Stable CANTU, NICHOLE L has   
been given the   
following list of   
follow-up   
instructions,   
prescriptions, and   
patient education   
materials:  
  
PATIENT FOLLOW-UP   
INFORMATION  
Diet: Fat Modified-   
Low cholesterol  
Discharge Activity:   
Activity as tolerated  
Discharge   
Restrictions: No   
restrictions  
Wound Care   
Instructions:  
Remove Your Dressing   
In Days  
Call Your Doctor For:  
  
IF UNABLE TO CONTACT   
YOUR PHYSICIAN AND YOU   
FEEL IT IS AN   
EMERGENCY, GO TO THE   
NEAREST EMERGENCY ROOM   
OR CALL 911  
  
Home Treatment:  
Devices/Equipment:   
None  
Special Services:  
Additional   
Instructions:  
Primary Care Physician   
to provide the   
following pending test   
results: None  
Follow up:  
With: Address: When:  
[address] [address]   
[place] [address] [phone]   
Business (6)  
Comments:  
Call for followup   
appointment  
With: Address: When:  
Tommy SMITH, [facility] [address] [place] [address]   
Within 2 to 4 weeks  
In the event that this   
physician does not   
participate in your   
insurance network,   
please consult with   
your insurance company   
to find a nearby   
participating   
provider.  
  
Comment:  
I, CANTU, NICHOLE L,   
have received the   
attached patient   
education   
materials/instructions   
and have verbalized   
understanding:  
Patient   
Signature_____________  
______________________  
__________   
Date___________  
Clinican/Nurse   
Signature_____________  
______________________  
___ Date___________  
HERE ARE THE   
MEDICATION CHANGES   
THAT OCCURRED DURING   
YOUR HOSPITAL STAY  
  
New Medications  
Other Medications  
amlodipine (Norvasc 5   
mg Tab) 1 Tablets By   
Mouth every day.  
Last   
Dose:_________________  
___Next   
Dose:_________________  
___  
Medications to   
Continue with No   
Changes  
Other Medications  
conjugated estrogens   
(Premarin 0.9 mg Tab)   
1 Tablets By Mouth   
every day.  
Last   
Dose:_________________  
___Next   
Dose:_________________  
___  
HYDROmorphone   
(Dilaudid 4 mg Tab) 1   
Tablets By Mouth every   
4 hours as needed for   
pain. or as needed.  
Last   
Dose:_________________  
___Next   
Dose:_________________  
___  
ibuprofen (ibuprofen   
200 mg Tab) 2 Tablets   
By Mouth as needed   
Pain/Fever.  
Last   
Dose:_________________  
___Next   
Dose:_________________  
___  
lorazepam (LORazepam 1   
mg Tab) 1 Tablets By   
Mouth 3 times a day.  
Last   
Dose:_________________  
___Next   
Dose:_________________  
___  
pregabalin (pregabalin   
100 mg Cap) 1 Capsules   
By Mouth 3 times a   
day.  
Last   
Dose:_________________  
___Next   
Dose:_________________  
___  
tramadol (tramadol 50   
mg oral tablet) 1   
Tablets By Mouth 4   
times a day as needed   
for pain.  
Last   
Dose:_________________  
___Next   
Dose:_________________  
___  
No Longer Take the   
Following Medications  
acetaminophen (Tylenol   
325 mg Tab) 2 Tablets   
By Mouth as needed   
Pain/Fever.  
duloxetine (Cymbalta   
60 mg Cap-DR) 2   
Capsules By Mouth once   
a day (in the   
morning).  
phentermine   
(phentermine 37.5 mg   
Tab) 1 Tablets By   
Mouth every day.  
Comment:  
MEDICATION LIST  
  
PROVIDED FOR YOU IS A   
LIST OF YOUR CURRENT   
MEDICATIONS. PLEASE   
CARRY THIS WITH YOU AT   
ALL TIMES.  
  
amlodipine (Norvasc 5   
mg Tab) 1 Tablets By   
Mouth every day.  
conjugated estrogens   
(Premarin 0.9 mg Tab)   
1 Tablets By Mouth   
every day.  
HYDROmorphone   
(Dilaudid 4 mg Tab) 1   
Tablets By Mouth every   
4 hours as needed for   
pain. or as needed.   
ibuprofen (ibuprofen   
200 mg Tab) 2 Tablets   
By Mouth as needed   
Pain/Fever.  
lorazepam (LORazepam 1   
mg Tab) 1 Tablets By   
Mouth 3 times a day.  
pregabalin (pregabalin   
100 mg Cap) 1 Capsules   
By Mouth 3 times a   
day.  
tramadol (tramadol 50   
mg oral tablet) 1   
Tablets By Mouth 4   
times a day as needed   
for pain.  
Pharmacy Information:   
Other: [facility]  
Comment:  
  
PATIENT EDUCATION   
INFORMATION  
Instructions:  
Multiple Sclerosis  
Multiple sclerosis   
(MS) is a disease of   
the brain, spinal   
cord, and optic nerves   
(central nervous   
system). It causes the   
body's   
disease-fighting   
(immune) system to   
destroy the protective   
covering (myelin   
sheath) around nerves   
in the brain. When   
this happens, signals   
(nerve impulses) going   
to and from the brain   
and spinal cord do not   
get sent properly or   
may not get sent at   
all.  
There are several   
types of MS:  
? Relapsing-remitting   
MS. This is the most   
common type. This   
causes sudden attacks   
of symptoms. After an   
attack, you may   
recover completely   
until the next attack,   
or some symptoms may   
remain permanently.  
? Secondary   
progressive MS. This   
usually (more content   
not included)...    [facility]  
   
                                                    Insurance Correspondence Off  
iceon 01-   
   
                                                    Insurance   
Correspondence Office                   [phone]  
[id number]#1.0  
0CD:127             [facility]  
   
                                                    Insurance   
Correspondence Office                   [phone]  
[id number]#1.0  
0CD:127             [facility]  
   
                                                    Insurance   
Correspondence Office                   [phone]  
[id number]#1.0  
0CD:127             [facility]  
   
                                                    Interdisciplinary Note - Raz  
e Manageron 01-   
   
                                                    Interdisciplinary   
Note -                      Pt is awake and alert   
in bed, previously   
rounded with mare CNP.   
Pt is from home with   
significant other and   
he will transport at   
DC. PT/OT= no needs.   
Declines any concerns   
or DC needs. Awaiting   
MRI, anticiipate DC   
1/10 or . contact   
information provided   
and white board   
updated.            [facility]  
   
                                        Comment on above:   Result Comment: Elec  
tronically Signed By: Irish HARPER,   
Consuelo\.br\Date and Time Signed: 01/10/23 12:24 EST   
   
                                                    Interdisciplinary Note - Barb  
n 01-   
   
                                                    Interdisciplinary   
Note - OT                               1/10/2023: OT screen   
completed this date,   
Pt is independent in   
room and denies any   
therapy needs. No   
further OT needs.   Normal                                  [facility]  
   
                                                    Lipid Panelon 01-   
   
                                                    Cholesterol   
[Mass/Vol]      212 mg/dL       High            120-200         [facility]  
   
                                        Comment on above:   Performed By: #### 2  
[id number], 16571277, 35398092, 67390823,   
908244000, 9489509, 8877817, 88631666, 8452787, 1479043   
####[facility] [address]   
[place] [address]   
   
                                                    Cholesterol in HDL   
[Mass/Vol]                62 mg/dL                  Invalid   
Interpretation   
Code                                                [facility]  
   
                                        Comment on above:   Result Comment: HDL   
> or equal to 60 mg/dL: Low cardiovascular  
   
risk  
HDL < 40 mg/dL : High cardiovascular risk   
   
                                                            Performed By: #### 2  
965879, 74730447, 99490762, 76925254,   
330444969, 1191731, 4751400, 36948751, 9211218, 3310130   
####[facility] [address]   
[place] [address]   
   
                                                    Cholesterol in LDL   
[Mass/Vol]      115 mg/dL       Normal          <=129           [facility]  
   
                                        Comment on above:   Performed By: #### 2  
045000, 93518370, 57331771, 33172434,   
679386026, 7126589, 6150097, 06617659, 5360425, 0574786   
####[facility] [address]   
[place] [address]   
   
                                                    Cholesterol in VLDL   
[Mass/Vol]      36 mg/dL        Normal          7-40            [facility]  
   
                                        Comment on above:   Performed By: #### 2  
923513, 24661136, 71763031, 91126608,   
856214699, 4871155, 9950811, 66351456, 2945961, 5566949   
####[facility] [address]   
[place] [address]   
   
                                                    Triglyceride   
[Mass/Vol]      180 mg/dL       High            <=149           [facility]  
   
                                        Comment on above:   Performed By: #### 2  
611240, 74758857, 54781612, 02313028,   
686683786, 0811824, 1426091, 60791135, 7836431, 3280176   
####[facility] [address] [place] [address]   
   
                                                    Outside Recordson 01-  
   
   
                                        Outside Records     [phone]  
02  
[id number]#1.  
00CD:127            Normal                                  [facility]  
   
                                        Outside Records     [phone]  
02  
[id number]#1.  
00CD:127            Normal                                  [facility]  
   
                                                    PT & PTTon 01-   
   
                                                    aPTT Coag (PPP)   
[Time]          38.5 second(s)  High            25.1-36.5       [facility]  
   
                                        Comment on above:   Result Comment: Para  
meter 15 days - 4 weeks 1 - 5 months 6 -   
11   
months 1 - 5 years 6 - 10 years 11 - 17 years  
PTT Mean: 35.4 (27.6-45.6) Mean: 33.5 (24.8-40.7) Mean: 32.4   
(25.1-40.7) Mean: 31.6 (24.0-39.2) Mean: 31.6 (26.9-38.7) Mean:   
31.0 (24.6-38.4)  
  
Pediatric Reference ranges were obtained from a study by Shailesh Del Rosario et al. prepared from 1437 samples obtained at 7   
different centers using the same coagulation reagent and   
instrumentation as [facility]. Currently there are no coagulation   
studies available worldwide for children birth to 14 days, and   
no normal ranges.  
Heparin therapeutic range (represented by Anti-Factor Xa   
activity of 0.2 - 0.4  
U/mL) corresponds to PTT of 56.6 - 109.0 sec.   
   
                                                            Performed By: #### 2  
878243, 24390882, 70115877, 65529167,   
540570125, 3440659, 3711031, 82137740, 8086317, 8022909   
####[facility] [address] [place] [address]   
   
                                                    INR Coag (PPP)   
[Relative time]           1.0 {INR}                 Invalid   
Interpretation   
Code                                                [facility]  
   
                                        Comment on above:   Result Comment: INR   
results are specifically intended to   
assess   
patients stabilized on long-term  
Anticoagulation therapy suggested INR?s  
?Less Intensive Anticoagulation? 2.0 ? 3.0  
Conventional Range 3.0 ? 4.5   
   
                                                            Performed By: #### 2  
065939, 76816422, 64021126, 84429231,   
963302642, 7069061, 0788398, 13195382, 5439708, 5262240   
####[facility] [address] [place] [address]   
   
                      PT Coag (PPP) [Time] 10.9 second(s) Normal     9.4-12.5     
[facility]  
   
                                        Comment on above:   Result Comment: 15 d  
ays - 4 weeks 1 - 5 months 6 -11 months 1-  
5   
years 6-10 years 11 -17 years  
Mean: 11.2 (9.5-12.6) Mean: 11.0 (9.7-12.8) Mean: 11.0   
(9.8-13.0) Mean: 11.3 (9.9-13.4) Mean: 11.7 (10.0-14.6) Mean:   
11.8 (10.0 - 14.1)  
Pediatric Reference ranges were obtained from a study by Shailesh Del Rosario et al. prepared from 1437 samples obtained at 7   
different centers using the same coagulation reagent and   
instrumentation as [facility]. Currently there are no coagulation   
studies available worldwide for children birth to 14 days, and   
no normal ranges.   
   
                                                            Performed By: #### 2  
883241, 39610239, 60770604, 76242646,   
296049358, 5706485, 3014939, 58352789, 8864718, 4365584   
####[facility] [address] [place] [address]   
   
                                                    Patient Education - Texton 0  
1-   
   
                                                    Patient Education -   
Text                                    Immunology  
Multiple Sclerosis  
Multiple sclerosis   
(MS) is a disease of   
the brain, spinal   
cord, and optic nerves   
(central nervous   
system). It causes the   
body's   
disease-fighting   
(immune) system to   
destroy the protective   
covering (myelin   
sheath) around nerves   
in the brain. When   
this happens, signals   
(nerve impulses) going   
to and from the brain   
and spinal cord do not   
get sent properly or   
may not get sent at   
all.  
There are several   
types of MS:  
? Relapsing-remitting   
MS. This is the most   
common type. This   
causes sudden attacks   
of symptoms. After an   
attack, you may   
recover completely   
until the next attack,   
or some symptoms may   
remain permanently.  
? Secondary   
progressive MS. This   
usually develops after   
the onset of   
relapsing-remitting   
MS. Similar to   
relapsing-remitting   
MS, this type also   
causes sudden attacks   
of symptoms. Attacks   
may be less frequent,   
but symptoms slowly   
get worse (progress)   
over time.  
? Primary progressive   
MS. This causes   
symptoms that steadily   
progress over time.   
This type of MS does   
not cause sudden   
attacks of symptoms.  
The age of onset of MS   
varies, but it often   
develops between 20?40   
years of age. MS is a   
lifelong (chronic)   
condition. There is no   
cure, but treatment   
can help slow down the   
progression of the   
disease.  
What are the causes?  
The cause of this   
condition is not   
known.  
What increases the   
risk?  
You are more likely to   
develop this condition   
if:  
? You are a woman.  
? You have a relative   
with MS. However, the   
condition is not   
passed from parent to   
child (inherited).  
? You have a lack   
(deficiency) of   
vitamin D.  
? You smoke.  
MS is more common in   
the northern [place] than in the   
[place].  
What are the signs or   
symptoms?  
Relapsing-remitting   
and secondary   
progressive MS cause   
symptoms to occur in   
episodes or attacks   
that may last weeks to   
months. There may be   
long periods between   
attacks in which there   
are almost no   
symptoms. Primary   
progressive MS causes   
symptoms to steadily   
progress after they   
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   
gm/dL                                   [facility]   
[facility]  
   
                                                    MCH (RBC) [Entitic   
mass]               29.1 pg             Normal              27.0 - 34.0   
pg                                      [facility]   
[facility]  
   
                          MCHC (RBC) [Mass/Vol] 33.2 g/dL    Normal       31.4 -  
 36.0   
gm/dL                                   FT   
[facility]  
   
                                                    MCV (RBC) [Entitic   
vol]                87.8 fL             Normal              80.0 - 100.0   
fL                                      [facility]   
[facility]  
   
                                                    Platelet mean volume   
(Bld) [Entitic vol] 7.4 fL              Normal              6.4 - 10.8   
fL                                      [facility]   
[facility]  
   
                                                    Platelets (Bld)   
[#/Vol]             354.0 E9/L          Normal              150.0 -   
500.0 E9/L                              [facility]   
[facility]  
   
                          RBC (Bld) [#/Vol] 4.8 E12/L    Normal       4.3 - 5.9   
E12/L                                   [facility]   
[facility]  
   
                      Sed Rate Automated 13 mm/h    Normal     0 - 34 mm/hr [facility]  
   
[facility]  
   
                                                    WBC corrected for   
nucl RBC Auto (Bld)   
[#/Vol]             8.8 E9/L            Normal              4.0 - 11.0   
E9/L                                    [facility]   
[facility]  
   
                                                    CT PELVIS WO CONon   
2   
   
                                        CT PELVIS WO CON    EXAMINATION: CT PELV  
IS   
WO CON  
HISTORY:The patient is   
a 47-year-old female.   
Pain  
COMPARISON: CT scan of   
the abdomen and pelvis   
from 8/3/2020.  
TECHNIQUE: CT images   
were obtained through   
the bony pelvis and   
both hips without  
intravenous contrast   
and reformatted in 2   
dimensions.  
Dose reduction   
techniques were   
achieved by using   
automated exposure   
control  
and/or adjustment of   
mA and/or kV according   
to patient size and/or   
use of  
iterative   
reconstruction   
technique.  
FINDINGS: No fractures   
or cortical   
discontinuities are   
seen within either  
proximal femur or   
elsewhere throughout   
the bony pelvis. The   
widths and alignment  
of both hip joints are   
maintained. The   
sacroiliac joints are   
maintained. The  
pubic symphysis is   
maintained.  
Paired pedicle screws   
and an interbody bone   
cage are seen across   
the L4-L5  
level. There is no   
solid bony fusion   
anteriorly or   
posteriorly. There is  
cortical irregularity   
and endplate sclerosis   
around the bone cage,   
and I cannot  
exclude the   
possibility of   
osteomyelitis/discitis   
at this level. This   
may be  
challenging diagnosis   
to confirm with MRI,   
due to the presence of   
other metallic  
bone cage at this   
site.  
The soft tissue images   
demonstrate no   
evidence of abnormal   
fluid collections or  
soft tissue masses on   
this non-contrast   
enhanced study.  
IMPRESSION:  
1. No fractures or   
dislocations seen.  
2. Cortical endplate   
irregularity at the   
site of the L4-L5   
interbody bone cage,  
raising the concern   
for osteomyelitis.  
Electronically   
authenticated by: NADIA SALAS Date:   
2022 23:22    Normal                                  [facility]  
   
                                                    Basic Metab w/rfx MGon    
   
                      (cont.)               Normal                [facility]  
   
                                        Comment on above:   Result Comment: Aver  
age GFR for 40-49 years old:  
99 mL/min/1.73sq m  
Chronic Kidney Disease:  
<60 mL/min/1.73sq m  
Kidney failure:  
<15 mL/min/1.73sq m  
eGFR calculated using average adult body mass. Additional eGFR   
calculator  
available at:  
http://www.[employer].Kinvey/multiple_crcl_2012.htm   
   
                                                            Performed By: #### C  
DP, BMPX ####  
[facility] [employer]  
[address] [place] [address]  
[phone]  
: Gomez Canales MD   
   
                      Anion gap [Moles/Vol] 10 mmol/L  Normal     9-17       [facility]  
   
                                        Comment on above:   Performed By: #### C  
DP, BMPX ####  
[facility] [employer]  
[address] [place] [address]  
[phone]  
: Gomez Canales MD   
   
                      Calcium [Mass/Vol] 8.7 mg/dL  Normal     8.6-10.4   [facility]  
   
                                        Comment on above:   Performed By: #### C  
DP, BMPX ####  
[facility] [employer]  
[address] [place] [address]  
[phone]  
: Gomez Canales MD   
   
                      Chloride [Moles/Vol] 99 mmol/L  Normal          [facility]  
   
                                        Comment on above:   Performed By: #### C  
DP, BMPX ####  
[facility] [employer]  
[address] [place] [address]  
[phone]  
: Gomez Canales MD   
   
                      CO2 [Moles/Vol] 27 mmol/L  Normal     20-31      [facility]  
   
                                        Comment on above:   Performed By: #### C  
DP, BMPX ####  
[facility] [employer]  
[address] [place] [address]  
[phone]  
: Gomez Canales MD   
   
                      Creatinine [Mass/Vol] 0.51 mg/dL Normal     0.50-0.90  [facility]  
   
                                        Comment on above:   Performed By: #### C  
DP, BMPX ####  
[facility] [employer]  
[address] [place] [address]  
[phone]  
: Gomez Canales MD   
   
                      GFR, Amer >60        Normal     >60        [facility]  
   
                                        Comment on above:   Performed By: #### C  
DP, BMPX ####  
[facility] [employer]  
[address] [place] [address]  
[phone]  
: Gomez Canales MD   
   
                      GFR,non  Amer >60        Normal     >60        [facility]  
   
                                        Comment on above:   Performed By: #### C  
DP, BMPX ####  
[facility]  
[facility] [place] [address]  
[phone]  
: Gomez Canales MD   
   
                      Glucose [Mass/Vol] 100 mg/dL  High       70-99      [facility]  
   
                                        Comment on above:   Performed By: #### C  
DP, BMPX ####  
[facility]  
[address] [place] [address]  
[phone]  
: Gomez Canales MD   
   
                      Potassium [Moles/Vol] 4.1 mmol/L Normal     3.7-5.3    [facility]  
   
                                        Comment on above:   Performed By: #### C  
DP, BMPX ####  
[facility] [employer]  
[address] [place] [address]  
[phone]  
: Gomez Canales MD   
   
                      Sodium [Moles/Vol] 136 mmol/L Normal     135-144    [facility]  
   
                                        Comment on above:   Performed By: #### C  
DP, BMPX ####  
[facility] [employer]  
[address] [place] [address]  
[phone]  
: Gomez Canales MD   
   
                                                    Urea nitrogen   
[Mass/Vol]      12 mg/dL        Normal          6-20            [facility]  
   
                                        Comment on above:   Performed By: #### C  
DP, BMPX ####  
[facility] [employer]  
[address] [place] [address]  
[phone]  
: Gomez Canales MD   
   
                      BUN/CRE Ratio NOT REPORTED Normal     -20       [facility]  
   
                                        Comment on above:   Performed By: #### C  
DP, BMPX ####  
[facility]  
[address] [place] [address]  
[phone]  
: Gomez Canales MD   
   
                      Staging:   NOT REPORTED Normal                [facility]  
   
                                        Comment on above:   Performed By: #### C  
DP, BMPX ####  
[facility]  
[address] [place] [address]  
[phone]  
: Gomez Canales MD   
   
                                                    Basic Metabolic Panel w/ Ref  
rachel to MGon 2020   
   
                          Anion gap [Moles/Vol] 10 mmol/L                 9 - 17  
   
mmol/L                                  [place]  
   
                      Bun/Cre Ratio NOT REPORTED                       [facility]  
[place], [place]  
   
                          Calcium [Mass/Vol] 8.7 mg/dL                 8.6 - 10.  
4   
mg/dL                                   [place]  
   
                          Chloride [Moles/Vol] 99 mmol/L                 98 - 10  
7   
mmol/L                                  [place]  
   
                          CO2 [Moles/Vol] 27 mmol/L                 20 - 31   
mmol/L                                  [place]  
   
                          Creatinine [Mass/Vol] 0.51 mg/dL                0.5 -   
0.9   
mg/dL                                   [place]  
   
                      GFR  >60                   >60 mL/min [place]  
   
                                                    GFR Non-   
American        >60                             >60 mL/min      [place]  
   
                                                    GFR/1.73 sq M   
predicted among   
non-blacks MDRD   
(S/P/Bld) [Vol   
rate/Area]      NOT REPORTED                                    [place]  
   
                                                    GFR/1.73 sq M   
predicted among   
non-blacks MDRD   
(S/P/Bld) [Vol   
rate/Area]                                                      [place]  
   
                                        Comment on above:   Average GFR for 40-4  
9 years old:  
99 mL/min/1.73sq m  
Chronic Kidney Disease:  
<60 mL/min/1.73sq m  
Kidney failure:  
<15 mL/min/1.73sq m  
  
  
eGFR calculated using average adult body mass. Additional eGFR   
calculator available at:  
  
http://www.[employer]/multiple_crcl_2012.htm  
  
  
   
   
                          Glucose [Mass/Vol] 100 mg/dL    High         70 - 99   
mg/dL                                   [place]  
   
                                                    Interpretation and   
review of laboratory   
results         Abnormal                                        [place]  
   
                          Potassium [Moles/Vol] 4.1 mmol/L                3.7 -   
5.3   
mmol/L                                  [place]  
   
                          Sodium [Moles/Vol] 136 mmol/L                135 - 144  
   
mmol/L                                  [place]  
   
                                                    Urea nitrogen   
[Mass/Vol]      12 mg/dL                        6 - 20 mg/dL    [place]  
   
                                                    CBCon 2020   
   
                                                    Erythrocyte   
distribution width   
(RBC) [Ratio]   14.0 %          Normal          11.8-14.4       [facility]  
   
                                        Comment on above:   Performed By: #### C  
DP, BMPX ####  
[address] [address]  
[phone]  
: Gomez Canales MD   
   
                                                    Hematocrit (Bld)   
[Volume fraction] 35.6 %          Low             36.3-47.1       [facility]  
   
                                        Comment on above:   Performed By: #### C  
DP, BMPX ####  
[place] [address]  
[phone]  
: Gomez Canales MD   
   
                                                    Hemoglobin (Bld)   
[Mass/Vol]      11.8 g/dL       Low             11.9-15.1       [facility]  
   
                                        Comment on above:   Performed By: #### C  
DP, BMPX ####  
[place] [address]  
[phone]  
: Gomez Canales MD   
   
                                                    MCH (RBC) [Entitic   
mass]           29.6 pg         Normal          25.2-33.5       [facility]  
   
                                        Comment on above:   Performed By: #### C  
DP, BMPX ####  
[place] [address]  
[phone]  
: Gomez Canales MD   
   
                      MCHC (RBC) [Mass/Vol] 33.1 g/dL  Normal     28.4-34.8  [facility]  
   
                                        Comment on above:   Performed By: #### C  
DP, BMPX ####  
[place] [address]  
[phone]  
: Gomez Canales MD   
   
                                                    MCV (RBC) [Entitic   
vol]            89.4 fL         Normal          82.6-102.9      [facility]  
   
                                        Comment on above:   Performed By: #### C  
DP, BMPX ####  
[address] [address]  
[phone]  
: Gomez Canales MD   
   
                      NRBC Automated 0.0 per 100 WBC Normal     0.0        [facility]  
   
                                        Comment on above:   Performed By: #### C  
DP, BMPX ####  
[facility] [employer]  
[facility] [place] [address]  
[phone]  
: Gomez Canales MD   
   
                                                    Platelet mean volume   
(Bld) [Entitic vol] 9.7 fL          Normal          8.1-13.5        [facility]  
   
                                        Comment on above:   Performed By: #### C  
DP, BMPX ####  
[facility] [employer]  
[address] [place] [address]  
[phone]  
: Gomez Canales MD   
   
                                                    Platelets (Bld)   
[#/Vol]         227 10*3/uL     Normal          138-453         [facility]  
   
                                        Comment on above:   Performed By: #### C  
DP, BMPX ####  
[facility] [employer]  
[address] [place] [address]  
[phone]  
: Gomez Canales MD   
   
                      RBC (Bld) [#/Vol] 3.98 10*6/uL Normal     3.95-5.11  [facility]  
   
                                        Comment on above:   Performed By: #### C  
DP, BMPX ####  
[facility] [employer]  
[address] [place] [address]  
[phone]  
: Gomez Canales MD   
   
                      WBC (Bld) [#/Vol] 8.1 10*3/uL Normal     3.5-11.3   [facility]  
   
                                        Comment on above:   Performed By: #### C  
DP, BMPX ####  
[facility] [employer]  
[address] [place] [address]  
[phone]  
: Gomez Canales MD   
   
                                                    Erythrocyte   
distribution width   
(RBC) [Ratio]       14.0 %                                  11.8 - 14.4   
%                                       [place]  
   
                                                    Hematocrit (Bld)   
[Volume fraction]   35.6 %              Low                 36.3 - 47.1   
%                                       [place]  
   
                                                    Hemoglobin (Bld)   
[Mass/Vol]          11.8 g/dL           Low                 11.9 - 15.1   
g/dL                                    [place]  
   
                                                    Interpretation and   
review of laboratory   
results         Abnormal                                        [place]  
   
                                                    MCH (RBC) [Entitic   
mass]               29.6 pg                                 25.2 - 33.5   
pg                                      [place]  
   
                          MCHC (RBC) [Mass/Vol] 33.1 g/dL                 28.4 -  
 34.8   
g/dL                                    [place]  
   
                                                    MCV (RBC) [Entitic   
vol]                89.4 fL                                 82.6 - 102.9   
fL                                      [place]  
   
                                                    Platelet mean volume   
(Bld) [Entitic vol] 9.7 fL                                  8.1 - 13.5   
fL                                      [place]  
   
                                                    Platelets (Bld)   
[#/Vol]         227 10*3/uL                                     [place]  
   
                          RBC (Bld) [#/Vol] 3.98 10*6/uL              3.95 - 5.1  
1   
m/uL                                    [place]  
   
                          WBC (Bld) [#/Vol] 0.0 10*3/uL               0.0 per 10  
0   
WBC                                     [place]  
   
                      WBC (Bld) [#/Vol] 8.1 10*3/uL                       [place]  
   
                                                    Basic Metab w/rfx MGon    
   
                      Potassium [Moles/Vol] 3.3 mmol/L Low        3.7-5.3    [facility]  
[facility]  
   
                                        Comment on above:   Performed By: #### B  
MPX, CDP, MG ####  
[facility]  
[address] [place] [address] [phone]  
: Gomez Canales MD   
   
                      (cont.)               [facility]  
   
                                        Comment on above:   Result Comment: Aver  
age GFR for 40-49 years old:  
99 mL/min/1.73sq m  
Chronic Kidney Disease:  
<60 mL/min/1.73sq m  
Kidney failure:  
<15 mL/min/1.73sq m  
eGFR calculated using average adult body mass. Additional eGFR   
calculator  
available at:  
http://www.[employer].com/multiple_crcl_2012.htm   
   
                                                            Performed By: #### B  
MPX, CDP, MG ####  
[facility] [facility]  
[address] [place] [address] [phone]  
: Gomez Canales MD   
   
                      Anion gap [Moles/Vol] 15 mmol/L  Normal     9-17       [facility]  
[facility]  
   
                                        Comment on above:   Performed By: #### B  
MPX, CDP, MG ####  
[facility]  
[address] [place] [address]  
[phone]  
: Gomez Canales MD   
   
                      Calcium [Mass/Vol] 7.8 mg/dL  Low        8.6-10.4   [facility]  
   
                                        Comment on above:   Performed By: #### B  
MPX, CDP, MG ####  
[facility]  
[address] [place] [address]  
[phone]  
: Gomez Canales MD   
   
                      Chloride [Moles/Vol] 103 mmol/L Normal          [facility]  
   
                                        Comment on above:   Performed By: #### B  
MPX, CDP, MG ####  
[facility]  
[address] [place] [address]  
[phone]  
: Gomez Canales MD   
   
                      CO2 [Moles/Vol] 24 mmol/L  Normal     20-31      [facility]  
   
                                        Comment on above:   Performed By: #### B  
MPX, CDP, MG ####  
[facility]  
[address] [place] [address]  
[phone]  
: Gomez Canales MD   
   
                      Creatinine [Mass/Vol] 0.47 mg/dL Low        0.50-0.90  [facility]  
   
                                        Comment on above:   Performed By: #### B  
MPX, CDP, MG ####  
[facility]  
[address] [place] [address]  
[phone]  
: Gomez Canales MD   
   
                      GFR, Amer >60        Normal     >60        [facility]  
   
                                        Comment on above:   Performed By: #### B  
MPX, CDP, MG ####  
[facility]  
[address] [place] [address]  
[phone]  
: Gomez Canales MD   
   
                      GFR,non  Amer >60        Normal     >60        [facility]  
   
                                        Comment on above:   Performed By: #### B  
MPX, CDP, MG ####  
[facility]  
[address] [place] [address]  
[phone]  
: Gomez Canales MD   
   
                      Glucose [Mass/Vol] 121 mg/dL  High       70-99      [facility]  
   
                                        Comment on above:   Performed By: #### B  
MPX, CDP, MG ####  
[facility]  
[address] [place] [address]  
[phone]  
: Gomez Canales MD   
   
                      Sodium [Moles/Vol] 142 mmol/L Normal     135-144    [facility]  
   
                                        Comment on above:   Performed By: #### B  
MPX, CDP, MG ####  
[facility]  
[address] [place] [address]  
[phone]  
: Gomez Canales MD   
   
                                                    Urea nitrogen   
[Mass/Vol]      16 mg/dL        Normal          6-20            [facility]  
   
                                        Comment on above:   Performed By: #### B  
MPX, CDP, MG ####  
[facility]  
[address] [place] [address]  
[phone]  
: Gomez Canales MD   
   
                      BUN/CRE Ratio NOT REPORTED Normal     9-20       [facility]  
   
                                        Comment on above:   Performed By: #### B  
MPX, CDP, MG ####  
[facility]  
[address] [place] [address]  
[phone]  
: Gomez Canales MD   
   
                      Staging:   NOT REPORTED Normal                [facility]  
   
                                        Comment on above:   Performed By: #### B  
MPX, CDP, MG ####  
[facility]  
[address] [place] [address]  
[phone]  
: Gomez Canales MD   
   
                                                    Basic Metabolic Panel w/ Ref  
rachel to MGon 2020   
   
                          Anion gap [Moles/Vol] 15 mmol/L                 9 - 17  
   
mmol/L                                  [facility]  
Work Phone:   
[phone]  
1  
   
                      Bun/Cre Ratio NOT REPORTED                       [facility]  
Work Phone:   
[phone]  
1  
   
                          Calcium [Mass/Vol] 7.8 mg/dL    Low          8.6 - 10.  
4   
mg/dL                                   [facility]  
Work Phone:   
[phone]  
1  
   
                          Chloride [Moles/Vol] 103 mmol/L                98 - 10  
7   
mmol/L                                  [email or web address] Phone:   
[phone]  
1  
   
                          CO2 [Moles/Vol] 24 mmol/L                 20 - 31   
mmol/L                                  [email or web address] Phone:   
[phone]  
1  
   
                          Creatinine [Mass/Vol] 0.47 mg/dL   Low          0.5 -   
0.9   
mg/dL                                   [email or web address] Phone:   
[phone]  
1  
   
                      GFR  >60                   >60 mL/min [facility]  
Work Phone:   
[phone]  
1  
   
                                                    GFR Non-   
American        >60                             >60 mL/min      [email or web address] Phone:   
[phone]  
1  
   
                                                    GFR/1.73 sq M   
predicted among   
non-blacks MDRD   
(S/P/Bld) [Vol   
rate/Area]                                                      [email or web address] Phone:   
[phone]  
1  
   
                                        Comment on above:   Average GFR for 40-4  
9 years old:  
99 mL/min/1.73sq m  
Chronic Kidney Disease:  
<60 mL/min/1.73sq m  
Kidney failure:  
<15 mL/min/1.73sq m  
  
  
eGFR calculated using average adult body mass. Additional eGFR   
calculator available at:  
  
http://www.[employer]/multiple_crcl_2012.htm  
  
  
   
   
                                                    GFR/1.73 sq M   
predicted among   
non-blacks MDRD   
(S/P/Bld) [Vol   
rate/Area]      NOT REPORTED                                    [email or web address] Phone:   
[phone]  
1  
   
                          Glucose [Mass/Vol] 121 mg/dL    High         70 - 99   
mg/dL                                   [email or web address] Phone:   
[phone]  
1  
   
                                                    Interpretation and   
review of laboratory   
results         Abnormal                                        [email or web address] Phone:   
[phone]  
1  
   
                          Potassium [Moles/Vol] 3.3 mmol/L   Low          3.7 -   
5.3   
mmol/L                                  [employer] Phone:   
[phone]  
1  
   
                          Sodium [Moles/Vol] 142 mmol/L                135 - 144  
   
mmol/L                                  [email or web address] Phone:   
[phone]  
1  
   
                                                    Urea nitrogen   
[Mass/Vol]      16 mg/dL                        6 - 20 mg/dL    [email or web address] Phone:   
[phone]  
1  
   
                                                    CBC auto differentialon 02-1  
3-2020   
   
                                                    Basophils (Bld)   
[#/Vol]         0.00 10*3/uL                                    [email or web address] Phone:   
[phone]  
1  
   
                                                    Basophils/100 WBC   
(Bld)           0 %                             0 - 2 %         [email or web address] Phone:   
[phone]  
1  
   
                      Differential Type NOT REPORTED                       [employer] Phone:   
[phone]  
1  
   
                                                    Eosinophils (Bld)   
[#/Vol]         0.00 10*3/uL                                    [email or web address] Phone:   
[phone]  
1  
   
                                                    Eosinophils/100 WBC   
(Bld)           0 %             Low             1 - 4 %         [email or web address] Phone:   
[phone]  
1  
   
                                                    Erythrocyte   
distribution width   
(RBC) [Ratio]       12.7 %                                  11.8 - 14.4   
%                                       [email or web address] Phone:   
[phone]  
1  
   
                                                    Hematocrit (Bld)   
[Volume fraction]   37.2 %                                  36.3 - 47.1   
%                                       [email or web address] Phone:   
[phone]  
1  
   
                                                    Hemoglobin (Bld)   
[Mass/Vol]          12.4 g/dL                               11.9 - 15.1   
g/dL                                    [email or web address] Phone:   
[phone]  
1  
   
                                                    Immature granulocytes   
(Bld) [#/Vol]   0.00 10*3/uL                                    [email or web address] Phone:   
[phone]  
1  
   
                                                    Immature granulocytes   
(Bld) [#/Vol]   0 %                             0               [email or web address] Phone:   
[phone]  
1  
   
                                                    Interpretation and   
review of laboratory   
results         Abnormal                                        [email or web address] Phone:   
[phone]  
1  
   
                                                    Lymphocytes (Bld)   
[#/Vol]         0.21 10*3/uL    Low                             [email or web address] Phone:   
[phone]  
1  
   
                                                    Lymphocytes/100 WBC   
(Bld)           2 %             Low             24 - 44 %       [email or web address] Phone:   
[phone]  
1  
   
                                                    MCH (RBC) [Entitic   
mass]               29.3 pg                                 25.2 - 33.5   
pg                                      [email or web address] Phone:   
[phone]  
1  
   
                          MCHC (RBC) [Mass/Vol] 33.3 g/dL                 28.4 -  
 34.8   
g/dL                                    [email or web address] Phone:   
[phone]  
1  
   
                                                    MCV (RBC) [Entitic   
vol]                87.9 fL                                 82.6 - 102.9   
fL                                      [email or web address] Phone:   
[phone]  
1  
   
                                                    Monocytes (Bld)   
[#/Vol]         0.21 10*3/uL                                    [email or web address] Phone:   
[phone]  
1  
   
                                                    Monocytes/100 WBC   
(Bld)           2 %                             1 - 7 %         [email or web address] Phone:   
[phone]  
1  
   
                                                    Morphology Jayce (Bld)   
[Interp]        Normal                                          [facility]  
Work Phone:   
[phone]  
1  
   
                                                    Platelet mean volume   
(Bld) [Entitic vol] 10.1 fL                                 8.1 - 13.5   
fL                                      [email or web address] Phone:   
[phone]  
1  
   
                                                    Platelets (Bld)   
[#/Vol]         NOT REPORTED                                    [employer] Phone:   
[phone]  
1  
   
                                                    Platelets (Bld)   
[#/Vol]         266 10*3/uL                                     [email or web address] Phone:   
[phone]  
1  
   
                          RBC (Bld) [#/Vol] 4.23 10*6/uL              3.95 - 5.1  
1   
m/uL                                    [facility]  
Work Phone:   
[phone]  
1  
   
                                                    RBC morphology   
finding Nom (Bld) NOT REPORTED                                    [email or web address] Phone:   
[phone]  
1  
   
                                                    Segmented   
neutrophils/100 WBC   
(Bld)           96 %            High            36 - 66 %       [facility]  
Work Phone:   
[phone]  
1  
   
                      Segs Absolute 9.88       High                  [facility]  
Work Phone:   
[phone]  
1  
   
                      WBC (Bld) [#/Vol] 10.3 10*3/uL                       [facility]  
Work Phone:   
[phone]  
1  
   
                          WBC (Bld) [#/Vol] 0.0 10*3/uL               0.0 per 10  
0   
WBC                                     [email or web address] Phone:   
[phone]  
1  
   
                      WBC Morphology NOT REPORTED                       [facility]  
[facility]  
Work Phone:   
[phone]  
1  
   
                                                    CBC with Diffon 2020   
   
                      Abs. Basophil 0.00 k/uL  Normal     0.0-0.2    [facility]  
   
                                        Comment on above:   Performed By: #### B  
MPX, CDP, MG ####  
[facility] [employer]  
[address] [place] [address]  
[phone]  
: Gomez Canales MD   
   
                      Abs.Imm.Granulocyte 0.00 k/uL  Normal     0.00-0.30  [facility]  
   
                                        Comment on above:   Performed By: #### B  
MPX, CDP, MG ####  
[facility] [employer]  
[address] [place] [address]  
[phone]  
: Gomez Canales MD   
   
                      Abs.Neutrophil (Seg) 9.88 k/uL  High       1.8-7.7    [facility]  
   
                                        Comment on above:   Performed By: #### B  
MPX, CDP, MG ####  
[facility] [employer]  
[address] [place] [address]  
[phone]  
: Gomez Canales MD   
   
                                                    Basophils/100 WBC   
(Bld)           0 %             Normal          0-2             [facility]  
   
                                        Comment on above:   Performed By: #### B  
MPX, CDP, MG ####  
[facility] [employer]  
[address] [place] [address]  
[phone]  
: Gomez Canales MD   
   
                                                    Eosinophils (Bld)   
[#/Vol]         0.00 10*3/uL    Normal          0.0-0.4         [facility]  
   
                                        Comment on above:   Performed By: #### B  
MPX, CDP, MG ####  
[facility] [employer]  
[address] [place] [address]  
[phone]  
: Gomez Canales MD   
   
                                                    Eosinophils/100 WBC   
(Bld)           0 %             Low             1-4             [facility]  
   
                                        Comment on above:   Performed By: #### B  
MPX, CDP, MG ####  
[facility] [employer]  
[address] [place] [address]  
[phone]  
: Gomez Canales MD   
   
                                                    Immature granulocytes   
(Bld) [#/Vol]   0 %             Normal          0               [facility]  
   
                                        Comment on above:   Performed By: #### B  
MPX, CDP, MG ####  
[facility] [employer]  
[address] [place] [address]  
[phone]  
: Gomez Canales MD   
   
                                                    Lymphocytes (Bld)   
[#/Vol]         0.21 10*3/uL    Low             1.0-4.8         [facility]  
   
                                        Comment on above:   Performed By: #### B  
MPX, CDP, MG ####  
[facility]  
[address] [place] [address]  
[phone]  
: Gomez Canales MD   
   
                                                    Lymphocytes/100 WBC   
(Bld)           2 %             Low             24-44           [facility]  
   
                                        Comment on above:   Performed By: #### B  
MPX, CDP, MG ####  
[address] [address]  
[phone]  
: Gomez Canales MD   
   
                                                    Monocytes (Bld)   
[#/Vol]         0.21 10*3/uL    Normal          0.1-0.8         [facility]  
   
                                        Comment on above:   Performed By: #### B  
MPX, CDP, MG ####  
[address] [address]  
[phone]  
: Gomez Canales MD   
   
                                                    Monocytes/100 WBC   
(Bld)           2 %             Normal          1-7             [facility]  
   
                                        Comment on above:   Performed By: #### B  
MPX, CDP, MG ####  
[facility] [employer]  
[address] [place] [address]  
[phone]  
: Gomez Canales MD   
   
                                                    Morphology Jayce (Bld)   
[Interp]        Normal          Normal                          [facility]  
   
                                        Comment on above:   Performed By: #### B  
MPX, CDP, MG ####  
[facility]  
[address] [place] [address]  
[phone]  
: Gomez Canales MD   
   
                      Neutrophil (Seg) 96 %       High       36-66      [facility]  
   
                                        Comment on above:   Performed By: #### B  
MPX, CDP, MG ####  
[facility]  
[address] [place] [address]  
[phone]  
: Gomez Canales MD   
   
                                                    Erythrocyte   
distribution width   
(RBC) [Ratio]   12.7 %          Normal          11.8-14.4       [facility]  
   
                                        Comment on above:   Performed By: #### B  
MPX, CDP, MG ####  
[facility] [employer]  
[address] [place] [address]  
[phone]  
: Gomez Canales MD   
   
                                                    Hematocrit (Bld)   
[Volume fraction] 37.2 %          Normal          36.3-47.1       [facility]  
   
                                        Comment on above:   Performed By: #### B  
MPX, CDP, MG ####  
[facility] [employer]  
[address] [place] [address]  
[phone]  
: Gomez Canales MD   
   
                                                    Hemoglobin (Bld)   
[Mass/Vol]      12.4 g/dL       Normal          11.9-15.1       [facility]  
   
                                        Comment on above:   Performed By: #### B  
MPX, CDP, MG ####  
[facility] [employer]  
[address] [place] [address]  
[phone]  
: Gomez Canales MD   
   
                                                    MCH (RBC) [Entitic   
mass]           29.3 pg         Normal          25.2-33.5       [facility]  
   
                                        Comment on above:   Performed By: #### B  
MPX, CDP, MG ####  
[facility] [employer]  
[address] [place] [address]  
[phone]  
: Gomez Canales MD   
   
                      MCHC (RBC) [Mass/Vol] 33.3 g/dL  Normal     28.4-34.8  [facility]  
   
                                        Comment on above:   Performed By: #### B  
MPX, CDP, MG ####  
[facility] [employer]  
[address] [place] [address]  
[phone]  
: Gomez Canales MD   
   
                                                    MCV (RBC) [Entitic   
vol]            87.9 fL         Normal          82.6-102.9      [facility]  
   
                                        Comment on above:   Performed By: #### B  
MPX, CDP, MG ####  
[facility] [employer]  
[address] [place] [address]  
[phone]  
: Gomez Canales MD   
   
                      NRBC Automated 0.0 per 100 WBC Normal     0.0        [facility]  
   
                                        Comment on above:   Performed By: #### B  
MPX, CDP, MG ####  
[facility]  
[address] [place] [address]  
[phone]  
: Gomez Canales MD   
   
                                                    Platelet mean volume   
(Bld) [Entitic vol] 10.1 fL         Normal          8.1-13.5        [facility]  
   
                                        Comment on above:   Performed By: #### B  
MPX, CDP, MG ####  
[facility]  
[address] [place] [address]  
[phone]  
: Gomez Canales MD   
   
                                                    Platelets (Bld)   
[#/Vol]         266 10*3/uL     Normal          138-453         [facility]  
   
                                        Comment on above:   Performed By: #### B  
MPX, CDP, MG ####  
[facility] [employer]  
[address] [place] [address]  
[phone]  
: Gomez Canales MD   
   
                      RBC (Bld) [#/Vol] 4.23 10*6/uL Normal     3.95-5.11  [facility]  
   
                                        Comment on above:   Performed By: #### B  
MPX, CDP, MG ####  
[facility]  
[address] [place] [address]  
[phone]  
: Gomez Canales MD   
   
                      WBC (Bld) [#/Vol] 10.3 10*3/uL Normal     3.5-11.3   [facility]  
   
                                        Comment on above:   Performed By: #### B  
MPX, CDP, MG ####  
[facility]  
[address] [place] [address]  
[phone]  
: Gomez Canales MD   
   
                      Auto Diff Performed NOT REPORTED Normal                [facility]  
   
                                        Comment on above:   Performed By: #### B  
MPX, CDP, MG ####  
[facility]  
[address] [place] [address]  
[phone]  
: Gomez Canales MD   
   
                                                    Platelets (Bld)   
[#/Vol]         NOT REPORTED    Normal                          [facility]  
   
                                        Comment on above:   Performed By: #### B  
MPX, CDP, MG ####  
[facility]  
[address] [place] [address]  
[phone]  
: Gomez Canales MD   
   
                                                    RBC morphology   
finding Nom (Bld) NOT REPORTED    Normal                          [facility]  
   
                                        Comment on above:   Performed By: #### B  
MPX, CDP, MG ####  
[facility]  
[address] [place] [address]  
[phone]  
: Gomez Canales MD   
   
                      WBC Morphology NOT REPORTED Normal                [facility]  
   
                                        Comment on above:   Performed By: #### B  
MPX, CDP, MG ####  
[facility]  
[address] [place] [address]  
[phone]  
: Gomez Canales MD   
   
                                                    Magnesiumon 2020   
   
                      Magnesium [Mass/Vol] 2.2 mg/dL  Normal     1.6-2.6    [facility]  
   
                                        Comment on above:   Performed By: #### C  
DP, BMPX ####  
[facility]  
[address] [place] [address]  
[phone]  
: Gomez Canales MD   
   
                          Magnesium [Mass/Vol] 2.2 mg/dL                 1.6 - 2  
.6   
mg/dL                                   [facility] [employer]  
Work Phone:   
[phone]  
1  
   
                                                    Basic Metab w/rfx MGon    
   
                      (cont.)               Normal                [facility]  
   
                                        Comment on above:   Result Comment: Aver  
age GFR for 40-49 years old:  
99 mL/min/1.73sq m  
Chronic Kidney Disease:  
<60 mL/min/1.73sq m  
Kidney failure:  
<15 mL/min/1.73sq m  
eGFR calculated using average adult body mass. Additional eGFR   
calculator  
available at:  
http://www.[employer].com/multiple_crcl_2012.htm   
   
                                                            Performed By: #### C  
DP, BMPX ####  
[facility]  
[address] [place] [address]  
[phone]  
: Gomez Canales MD   
   
                      Anion gap [Moles/Vol] 14 mmol/L  Normal     9-17       [facility]  
   
                                        Comment on above:   Performed By: #### C  
DP, BMPX ####  
[facility] [employer]  
[address] [place] [address]  
[phone]  
: Gomez Canales MD   
   
                      Calcium [Mass/Vol] 8.1 mg/dL  Low        8.6-10.4   [facility]  
   
                                        Comment on above:   Performed By: #### C  
DP, BMPX ####  
[facility] [employer]  
[address] [place] [address]  
[phone]  
: Gomez Canales MD   
   
                      Chloride [Moles/Vol] 101 mmol/L Normal          [facility]  
   
                                        Comment on above:   Performed By: #### C  
DP, BMPX ####  
[address] [address]  
[phone]  
: Gomez Canales MD   
   
                      CO2 [Moles/Vol] 23 mmol/L  Normal     20-31      [facility]  
   
                                        Comment on above:   Performed By: #### C  
DP, BMPX ####  
[address] [address]  
[phone]  
: Gomez Canales MD   
   
                      Creatinine [Mass/Vol] 0.51 mg/dL Normal     0.50-0.90  [facility]  
   
                                        Comment on above:   Performed By: #### C  
DP, BMPX ####  
[facility] [employer]  
[address] [place] [address]  
[phone]  
: Gomez Canales MD   
   
                      GFR, Amer >60        Normal     >60        [facility]  
   
                                        Comment on above:   Performed By: #### C  
DP, BMPX ####  
[facility] [employer]  
[address] [place] [address]  
[phone]  
: Gomez Canales MD   
   
                      GFR,non  Amer >60        Normal     >60        [facility]  
   
                                        Comment on above:   Performed By: #### C  
DP, BMPX ####  
[facility] [employer]  
[address] [place] [address]  
[phone]  
: Gomez Canales MD   
   
                      Glucose [Mass/Vol] 145 mg/dL  High       70-99      [facility]  
   
                                        Comment on above:   Performed By: #### C  
DP, BMPX ####  
[facility]  
[address] [place] [address]  
[phone]  
: Gomez Canales MD   
   
                      Potassium [Moles/Vol] 3.8 mmol/L Normal     3.7-5.3    [facility]  
   
                                        Comment on above:   Result Comment: SPEC  
IMEN MODERATELY HEMOLYZED, RESULTS MAY BE   
ADVERSELY AFFECTED   
   
                                                            Performed By: #### C  
DP, BMPX ####  
[address] [address]  
[phone]  
: Gomez Canales MD   
   
                      Sodium [Moles/Vol] 138 mmol/L Normal     135-144    [facility]  
   
                                        Comment on above:   Performed By: #### C  
DP, BMPX ####  
[facility] [employer]  
[address] [place] [address]  
[phone]  
: Gomez Canales MD   
   
                                                    Urea nitrogen   
[Mass/Vol]      21 mg/dL        High            6-20            [facility]  
   
                                        Comment on above:   Performed By: #### C  
DP, BMPX ####  
[facility] [employer]  
[address] [place] [address]  
[phone]  
: Gomez Canales MD   
   
                      BUN/CRE Ratio NOT REPORTED Normal     9-20       [facility]  
   
                                        Comment on above:   Performed By: #### C  
DP, BMPX ####  
[facility] [employer]  
[address] [place] [address]  
[phone]  
: Gomez Canales MD   
   
                      Staging:   NOT REPORTED Normal                [facility]  
   
                                        Comment on above:   Performed By: #### C  
DP, BMPX ####  
[facility] [employer]  
[address] [place] [address]  
[phone]  
: Gomez Canales MD   
   
                                                    Basic Metabolic Panel w/ Ref  
rachel to [facility] 2020   
   
                          Anion gap [Moles/Vol] 14 mmol/L                 9 - 17  
   
mmol/L                                  [email or web address] Phone:   
[phone]  
1  
   
                      Bun/Cre Ratio NOT REPORTED                       [employer]  
[employer]  
Work Phone:   
[phone]  
1  
   
                          Calcium [Mass/Vol] 8.1 mg/dL    Low          8.6 - 10.  
4   
mg/dL                                   [email or web address] Phone:   
[phone]  
1  
   
                          Chloride [Moles/Vol] 101 mmol/L                98 - 10  
7   
mmol/L                                  [email or web address] Phone:   
[phone]  
1  
   
                          CO2 [Moles/Vol] 23 mmol/L                 20 - 31   
mmol/L                                  [email or web address] Phone:   
[phone]  
1  
   
                          Creatinine [Mass/Vol] 0.51 mg/dL                0.5 -   
0.9   
mg/dL                                   [email or web address] Phone:   
[phone]  
1  
   
                      GFR  >60                   >60 mL/min [facility]  
[facility]  
Work Phone:   
[phone]  
1  
   
                                                    GFR Non-   
American        >60                             >60 mL/min      [email or web address] Phone:   
[phone]  
1  
   
                                                    GFR/1.73 sq M   
predicted among   
non-blacks MDRD   
(S/P/Bld) [Vol   
rate/Area]                                                      [email or web address] Phone:   
[phone]  
1  
   
                                        Comment on above:   Average GFR for 40-4  
9 years old:  
99 mL/min/1.73sq m  
Chronic Kidney Disease:  
<60 mL/min/1.73sq m  
Kidney failure:  
<15 mL/min/1.73sq m  
  
  
eGFR calculated using average adult body mass. Additional eGFR   
calculator available at:  
  
http://www.[employer]/multiple_crcl_2012.htm  
  
  
   
   
                                                    GFR/1.73 sq M   
predicted among   
non-blacks MDRD   
(S/P/Bld) [Vol   
rate/Area]      NOT REPORTED                                    [email or web address] Phone:   
[phone]  
1  
   
                          Glucose [Mass/Vol] 145 mg/dL    High         70 - 99   
mg/dL                                   [email or web address] Phone:   
[phone]  
1  
   
                                                    Interpretation and   
review of laboratory   
results         Abnormal                                        [email or web address] Phone:   
[phone]  
1  
   
                          Potassium [Moles/Vol] 3.8 mmol/L                3.7 -   
5.3   
mmol/L                                  [email or web address] Phone:   
[phone]  
1  
   
                                        Comment on above:   SPECIMEN MODERATELY   
HEMOLYZED, RESULTS MAY BE ADVERSELY   
AFFECTED   
   
                          Sodium [Moles/Vol] 138 mmol/L                135 - 144  
   
mmol/L                                  [email or web address] Phone:   
[phone]  
1  
   
                                                    Urea nitrogen   
[Mass/Vol]      21 mg/dL        High            6 - 20 mg/dL    [email or web address] Phone:   
[phone]  
1  
   
                                                    CBC auto differentialon    
   
                                                    Basophils (Bld)   
[#/Vol]         10*3/uL                                         [email or web address] Phone:   
[phone]  
1  
   
                                                    Basophils/100 WBC   
(Bld)           0 %                             0 - 2 %         [email or web address] Phone:   
[phone]  
1  
   
                      Differential Type NOT REPORTED                       [email or web address] Phone:   
[phone]  
1  
   
                                                    Eosinophils (Bld)   
[#/Vol]         10*3/uL                                         [email or web address] Phone:   
[phone]  
1  
   
                                                    Eosinophils/100 WBC   
(Bld)           0 %             Low             1 - 4 %         [email or web address] Phone:   
[phone]  
1  
   
                                                    Erythrocyte   
distribution width   
(RBC) [Ratio]       13.0 %                                  11.8 - 14.4   
%                                       [email or web address] Phone:   
[phone]  
1  
   
                                                    Hematocrit (Bld)   
[Volume fraction]   38.8 %                                  36.3 - 47.1   
%                                       [email or web address] Phone:   
[phone]  
1  
   
                                                    Hemoglobin (Bld)   
[Mass/Vol]          12.9 g/dL                               11.9 - 15.1   
g/dL                                    [email or web address] Phone:   
[phone]  
1  
   
                                                    Immature granulocytes   
(Bld) [#/Vol]   0.17 10*3/uL                                    [email or web address] Phone:   
[phone]  
1  
   
                                                    Immature granulocytes   
(Bld) [#/Vol]   2 %             High            0               [email or web address] Phone:   
[phone]  
1  
   
                                                    Interpretation and   
review of laboratory   
results         Abnormal                                        [email or web address] Phone:   
[phone]  
1  
   
                                                    Lymphocytes (Bld)   
[#/Vol]         0.71 10*3/uL    Low                             [email or web address] Phone:   
[phone]  
1  
   
                                                    Lymphocytes/100 WBC   
(Bld)           7 %             Low             24 - 43 %       [facility]  
Work Phone:   
[phone]  
1  
   
                                                    MCH (RBC) [Entitic   
mass]               29.5 pg                                 25.2 - 33.5   
pg                                      [email or web address] Phone:   
[phone]  
1  
   
                          MCHC (RBC) [Mass/Vol] 33.2 g/dL                 28.4 -  
 34.8   
g/dL                                    [facility]  
Work Phone:   
[phone]  
1  
   
                                                    MCV (RBC) [Entitic   
vol]                88.6 fL                                 82.6 - 102.9   
fL                                      [facility]  
Work Phone:   
[phone]  
1  
   
                                                    Monocytes (Bld)   
[#/Vol]         0.35 10*3/uL                                    [email or web address] Phone:   
[phone]  
1  
   
                                                    Monocytes/100 WBC   
(Bld)           3 %                             3 - 12 %        [facility]  
Work Phone:   
[phone]  
1  
   
                                                    Platelet mean volume   
(Bld) [Entitic vol] 10.2 fL                                 8.1 - 13.5   
fL                                      [facility]  
Work Phone:   
[phone]  
1  
   
                                                    Platelets (Bld)   
[#/Vol]         282 10*3/uL                                     [facility]  
Work Phone:   
[phone]  
1  
   
                                                    Platelets (Bld)   
[#/Vol]         NOT REPORTED                                    [email or web address] Phone:   
[phone]  
1  
   
                          RBC (Bld) [#/Vol] 4.38 10*6/uL              3.95 - 5.1  
1   
m/uL                                    [facility]  
Work Phone:   
[phone]  
1  
   
                                                    RBC morphology   
finding Nom (Bld) NOT REPORTED                                    [facility]  
Work Phone:   
[phone]  
1  
   
                                                    Segmented   
neutrophils/100 WBC   
(Bld)           88 %            High            36 - 65 %       [facility]  
Work Phone:   
[phone]  
1  
   
                      Segs Absolute 9.48       High                  [employer]  
Work Phone:   
[phone]  
1  
   
                          WBC (Bld) [#/Vol] 0.0 10*3/uL               0.0 per 10  
0   
WBC                                     [facility]  
Work Phone:   
[phone]  
1  
   
                      WBC (Bld) [#/Vol] 10.7 10*3/uL                       [facility]  
Work Phone:   
[phone]  
1  
   
                      WBC Morphology NOT REPORTED                       [facility]  
Work Phone:   
[phone]  
1  
   
                                                    CBC with Diffon 2020   
   
                      Abs. Basophil <0.03      Normal     0.00-0.20  [facility]  
   
                                        Comment on above:   Performed By: #### C  
DP, BMPX ####  
[facility] [employer]  
[address] [place] [address]  
[phone]  
: Gomez Canales MD   
   
                      Abs.Imm.Granulocyte 0.17 k/uL  Normal     0.00-0.30  [facility]  
   
                                        Comment on above:   Performed By: #### C  
DP, BMPX ####  
[facility] [employer]  
[address] [place] [address]  
[phone]  
: Gomez Canales MD   
   
                      Abs.Neutrophil (Seg) 9.48 k/uL  High       1.50-8.10  [facility]  
   
                                        Comment on above:   Performed By: #### C  
DP, BMPX ####  
[facility] [employer]  
[address] [place] [address]  
[phone]  
: Gomez Canales MD   
   
                                                    Basophils/100 WBC   
(Bld)           0 %             Normal          0-2             [facility]  
   
                                        Comment on above:   Performed By: #### C  
DP, BMPX ####  
[facility] [employer]  
[address] [place] [address]  
[phone]  
: Gomez Canales MD   
   
                                                    Eosinophils (Bld)   
[#/Vol]         10*3/uL         Normal          0.00-0.44       [facility]  
   
                                        Comment on above:   Performed By: #### C  
DP, BMPX ####  
[facility] [employer]  
[address] [place] [address]  
[phone]  
: Gomze Canales MD   
   
                                                    Eosinophils/100 WBC   
(Bld)           0 %             Low             1-4             [facility]  
   
                                        Comment on above:   Performed By: #### C  
DP, BMPX ####  
[facility] [employer]  
[address] [place] [address]  
[phone]  
: Gomez Canales MD   
   
                                                    Erythrocyte   
distribution width   
(RBC) [Ratio]   13.0 %          Normal          11.8-14.4       [facility]  
   
                                        Comment on above:   Performed By: #### C  
DP, BMPX ####  
[facility] [employer]  
[address] [place] [address]  
[phone]  
: Gomez Canales MD   
   
                                                    Hematocrit (Bld)   
[Volume fraction] 38.8 %          Normal          36.3-47.1       [facility]  
   
                                        Comment on above:   Performed By: #### C  
DP, BMPX ####  
[facility] [employer]  
[address] [place] [address]  
[phone]  
: Gomez Canales MD   
   
                                                    Hemoglobin (Bld)   
[Mass/Vol]      12.9 g/dL       Normal          11.9-15.1       [facility]  
   
                                        Comment on above:   Performed By: #### C  
DP, BMPX ####  
[address] [address]  
[phone]  
: Gomez Canales MD   
   
                                                    Immature granulocytes   
(Bld) [#/Vol]   2 %             High            0               [facility]  
   
                                        Comment on above:   Performed By: #### C  
DP, BMPX ####  
[address] [address]  
[phone]  
: Gomez Canales MD   
   
                                                    Lymphocytes (Bld)   
[#/Vol]         0.71 10*3/uL    Low             1.10-3.70       [facility]  
   
                                        Comment on above:   Performed By: #### C  
DP, BMPX ####  
[facility] [employer]  
[address] [place] [address]  
[phone]  
: Gomez Canales MD   
   
                                                    Lymphocytes/100 WBC   
(Bld)           7 %             Low             24-43           [facility]  
   
                                        Comment on above:   Performed By: #### C  
DP, BMPX ####  
[facility] [employer]  
[address] [place] [address]  
[phone]  
: Gomez Canales MD   
   
                                                    MCH (RBC) [Entitic   
mass]           29.5 pg         Normal          25.2-33.5       [facility]  
   
                                        Comment on above:   Performed By: #### C  
DP, BMPX ####  
[place] [address]  
[phone]  
: Gomez Canales MD   
   
                      MCHC (RBC) [Mass/Vol] 33.2 g/dL  Normal     28.4-34.8  [facility]  
   
                                        Comment on above:   Performed By: #### C  
DP, BMPX ####  
[place] [address]  
[phone]  
: Gomez Canales MD   
   
                                                    MCV (RBC) [Entitic   
vol]            88.6 fL         Normal          82.6-102.9      [facility]  
   
                                        Comment on above:   Performed By: #### C  
DP, BMPX ####  
[place] [address]  
[phone]  
: Gomez Canales MD   
   
                                                    Monocytes (Bld)   
[#/Vol]         0.35 10*3/uL    Normal          0.10-1.20       [facility]  
   
                                        Comment on above:   Performed By: #### C  
DP, BMPX ####  
[place] [address]  
[phone]  
: Gomez Canales MD   
   
                                                    Monocytes/100 WBC   
(Bld)           3 %             Normal          3-12            [facility]  
   
                                        Comment on above:   Performed By: #### C  
DP, BMPX ####  
[place] [address]  
[phone]  
: Gomez Canales MD   
   
                      Neutrophil (Seg) 88 %       High       36-65      [facility]  
   
                                        Comment on above:   Performed By: #### C  
DP, BMPX ####  
[place] [address]  
[phone]  
: Gomez Canales MD   
   
                      NRBC Automated 0.0 per 100 WBC Normal     0.0        [facility]  
   
                                        Comment on above:   Performed By: #### C  
DP, BMPX ####  
[address] [address]  
[phone]  
: Gomez Canales MD   
   
                                                    Platelet mean volume   
(Bld) [Entitic vol] 10.2 fL         Normal          8.1-13.5        [facility]  
   
                                        Comment on above:   Performed By: #### C  
DP, BMPX ####  
[address] [address]  
[phone]  
: Gomez Canales MD   
   
                                                    Platelets (Bld)   
[#/Vol]         282 10*3/uL     Normal          138-453         [facility]  
   
                                        Comment on above:   Performed By: #### C  
DP, BMPX ####  
[address] [address]  
[phone]  
: Gomez Canales MD   
   
                      RBC (Bld) [#/Vol] 4.38 10*6/uL Normal     3.95-5.11  [facility]  
   
                                        Comment on above:   Performed By: #### C  
DP, BMPX ####  
[address] [address]  
[phone]  
: Gomez Canales MD   
   
                      WBC (Bld) [#/Vol] 10.7 10*3/uL Normal     3.5-11.3   [facility]  
   
                                        Comment on above:   Performed By: #### C  
DP, BMPX ####  
[address] [address]  
[phone]  
: Gomez Canales MD   
   
                      Auto Diff Performed NOT REPORTED Normal                [facility]  
   
                                        Comment on above:   Performed By: #### C  
DP, BMPX ####  
[address] [address]  
[phone]  
: Gomez Canales MD   
   
                                                    Platelets (Bld)   
[#/Vol]         NOT REPORTED    Normal                          [facility]  
   
                                        Comment on above:   Performed By: #### C  
DP, BMPX ####  
[address] [address]  
[phone]  
: Gomez Canales MD   
   
                                                    RBC morphology   
finding Nom (Bld) NOT REPORTED    Normal                          [facility]  
   
                                        Comment on above:   Performed By: #### C  
DP, BMPX ####  
[address] [address]  
[phone]  
: Gomez Canales MD   
   
                      WBC Morphology NOT REPORTED Normal                [facility]  
   
                                        Comment on above:   Performed By: #### C  
DP, BMPX ####  
[facility] [employer]  
[address] [place] [address]  
[phone]  
: Gomez Canales MD   
   
                                                    Basic Metab w/rfx MGon    
   
                      (cont.)               Normal                [facility]  
   
                                        Comment on above:   Result Comment: Aver  
age GFR for 40-49 years old:  
99 mL/min/1.73sq m  
Chronic Kidney Disease:  
<60 mL/min/1.73sq m  
Kidney failure:  
<15 mL/min/1.73sq m  
eGFR calculated using average adult body mass. Additional eGFR   
calculator  
available at:  
http://www.[employer]/multiple_crcl_2012.htm   
   
                                                            Performed By: #### C  
DP, BMPX ####  
[address] [address]  
[phone]  
: Gomez Canales MD   
   
                      Anion gap [Moles/Vol] 14 mmol/L  Normal     9-17       [facility]  
   
                                        Comment on above:   Performed By: #### C  
DP, BMPX ####  
[facility] [employer]  
[address] [place] [address]  
[phone]  
: Gomez Canales MD   
   
                      Calcium [Mass/Vol] 8.9 mg/dL  Normal     8.6-10.4   [facility]  
   
                                        Comment on above:   Performed By: #### C  
DP, BMPX ####  
[facility] [employer]  
[address] [place] [address]  
[phone]  
: Gomez Canales MD   
   
                      Chloride [Moles/Vol] 98 mmol/L  Normal          [facility]  
   
                                        Comment on above:   Performed By: #### C  
DP, BMPX ####  
[facility] [employer]  
[address] [place] [address]  
[phone]  
: Gomez Canales MD   
   
                      CO2 [Moles/Vol] 25 mmol/L  Normal     20-31      [facility]  
   
                                        Comment on above:   Performed By: #### C  
DP, BMPX ####  
[address] [address]  
[phone]  
: Gomez Canales MD   
   
                      Creatinine [Mass/Vol] 0.52 mg/dL Normal     0.50-0.90  [facility]  
   
                                        Comment on above:   Performed By: #### C  
DP, BMPX ####  
[facility] [employer]  
[address] [place] [address]  
[phone]  
: Gomez Canales MD   
   
                      GFR, Amer >60        Normal     >60        [facility]  
   
                                        Comment on above:   Performed By: #### C  
DP, BMPX ####  
[facility] [employer]  
[address] [place] [address]  
[phone]  
: Gomez Canales MD   
   
                      GFR,non  Amer >60        Normal     >60        [facility]  
   
                                        Comment on above:   Performed By: #### C  
DP, BMPX ####  
[facility] [employer]  
[address] [place] [address]  
[phone]  
: Gomez Canales MD   
   
                      Glucose [Mass/Vol] 141 mg/dL  High       70-99      [facility]  
   
                                        Comment on above:   Performed By: #### C  
DP, BMPX ####  
[facility] [employer]  
[address] [place] [address]  
[phone]  
: Gomez Canales MD   
   
                      Potassium [Moles/Vol] 3.9 mmol/L Normal     3.7-5.3    [facility]  
   
                                        Comment on above:   Result Comment: SPEC  
IMEN SLIGHTLY HEMOLYZED, RESULTS MAY BE   
ADVERSELY AFFECTED.   
   
                                                            Performed By: #### C  
DP, BMPX ####  
[facility] [employer]  
[address] [place] [address]  
[phone]  
: Gomez Canalse MD   
   
                      Sodium [Moles/Vol] 137 mmol/L Normal     135-144    [facility]  
   
                                        Comment on above:   Performed By: #### C  
DP, BMPX ####  
[facility]  
[address] [place] [address]  
[phone]  
: Gomez Canales MD   
   
                                                    Urea nitrogen   
[Mass/Vol]      20 mg/dL        Normal          6-20            [facility]  
   
                                        Comment on above:   Performed By: #### C  
DP, BMPX ####  
[facility]  
[address] [place] [address]  
[phone]  
: Gomez Canales MD   
   
                      BUN/CRE Ratio NOT REPORTED Normal     -       [facility]  
   
                                        Comment on above:   Performed By: #### C  
DP, BMPX ####  
[facility]  
[address] [place] [address]  
[phone]  
: Gomez Canales MD   
   
                      Staging:   NOT REPORTED Normal                [facility]  
   
                                        Comment on above:   Performed By: #### C  
DP, BMPX ####  
[facility]  
[address] [place] [address]  
[phone]  
: Gomez Canales MD   
   
                                                    Basic Metabolic Panel w/ Ref  
rachel to [facility] 2020   
   
                          Anion gap [Moles/Vol] 14 mmol/L                 9 - 17  
   
mmol/L                                  [facility] Phone:   
[phone]  
1  
   
                      Bun/Cre Ratio NOT REPORTED                       [facility]  
Work Phone:   
[phone]  
1  
   
                          Calcium [Mass/Vol] 8.9 mg/dL                 8.6 - 10.  
4   
mg/dL                                   [facility]  
Work Phone:   
[phone]  
1  
   
                          Chloride [Moles/Vol] 98 mmol/L                 98 - 10  
7   
mmol/L                                  [facility]  
Work Phone:   
[phone]  
1  
   
                          CO2 [Moles/Vol] 25 mmol/L                 20 - 31   
mmol/L                                  [facility]  
Work Phone:   
[phone]  
1  
   
                          Creatinine [Mass/Vol] 0.52 mg/dL                0.5 -   
0.9   
mg/dL                                   [email or web address] Phone:   
[phone]  
1  
   
                      GFR  >60                   >60 mL/min [facility]  
[facility]  
Work Phone:   
[phone]  
1  
   
                                                    GFR Non-   
American        >60                             >60 mL/min      [email or web address] Phone:   
[phone]  
1  
   
                                                    GFR/1.73 sq M   
predicted among   
non-blacks MDRD   
(S/P/Bld) [Vol   
rate/Area]                                                      [email or web address] Phone:   
[phone]  
1  
   
                                        Comment on above:   Average GFR for 40-4  
9 years old:  
99 mL/min/1.73sq m  
Chronic Kidney Disease:  
<60 mL/min/1.73sq m  
Kidney failure:  
<15 mL/min/1.73sq m  
  
  
eGFR calculated using average adult body mass. Additional eGFR   
calculator available at:  
  
http://www.[employer]/multiple_crcl_2012.htm  
  
  
   
   
                                                    GFR/1.73 sq M   
predicted among   
non-blacks MDRD   
(S/P/Bld) [Vol   
rate/Area]      NOT REPORTED                                    [email or web address] Phone:   
[phone]  
1  
   
                          Glucose [Mass/Vol] 141 mg/dL    High         70 - 99   
mg/dL                                   [email or web address] Phone:   
[phone]  
   
                                                    Interpretation and   
review of laboratory   
results         Abnormal                                        [email or web address] Phone:   
[phone]  
1  
   
                          Potassium [Moles/Vol] 3.9 mmol/L                3.7 -   
5.3   
mmol/L                                  [email or web address] Phone:   
[phone]  
   
                                        Comment on above:   SPECIMEN SLIGHTLY HE  
MOLYZED, RESULTS MAY BE ADVERSELY   
AFFECTED.   
   
                          Sodium [Moles/Vol] 137 mmol/L                135 - 144  
   
mmol/L                                  [email or web address] Phone:   
[phone]  
1  
   
                                                    Urea nitrogen   
[Mass/Vol]      20 mg/dL                        6 - 20 mg/dL    [email or web address] Phone:   
[phone]  
1  
   
                                                    CBC auto differentialon    
   
                                                    Basophils (Bld)   
[#/Vol]         10*3/uL                                         [email or web address] Phone:   
[phone]  
1  
   
                                                    Basophils/100 WBC   
(Bld)           0 %                             0 - 2 %         [email or web address] Phone:   
[phone]  
1  
   
                      Differential Type NOT REPORTED                       [employer] Phone:   
[phone]  
1  
   
                                                    Eosinophils (Bld)   
[#/Vol]         10*3/uL                                         [employer] Phone:   
[phone]  
1  
   
                                                    Eosinophils/100 WBC   
(Bld)           0 %             Low             1 - 4 %         [employer] Phone:   
[phone]  
1  
   
                                                    Erythrocyte   
distribution width   
(RBC) [Ratio]       13.0 %                                  11.8 - 14.4   
%                                       [email or web address] Phone:   
[phone]  
1  
   
                                                    Hematocrit (Bld)   
[Volume fraction]   38.9 %                                  36.3 - 47.1   
%                                       [email or web address] Phone:   
[phone]  
1  
   
                                                    Hemoglobin (Bld)   
[Mass/Vol]          13.2 g/dL                               11.9 - 15.1   
g/dL                                    [email or web address] Phone:   
[phone]  
1  
   
                                                    Immature granulocytes   
(Bld) [#/Vol]   0.20 10*3/uL                                    [email or web address] Phone:   
[phone]  
1  
   
                                                    Immature granulocytes   
(Bld) [#/Vol]   2 %             High            0               [employer] Phone:   
[phone]  
1  
   
                                                    Interpretation and   
review of laboratory   
results         Abnormal                                        [email or web address] Phone:   
[phone]  
1  
   
                                                    Lymphocytes (Bld)   
[#/Vol]         0.84 10*3/uL    Low                             [email or web address] Phone:   
[phone]  
1  
   
                                                    Lymphocytes/100 WBC   
(Bld)           8 %             Low             24 - 43 %       [employer] Phone:   
[phone]  
1  
   
                                                    MCH (RBC) [Entitic   
mass]               29.8 pg                                 25.2 - 33.5   
pg                                      [email or web address] Phone:   
[phone]  
1  
   
                          MCHC (RBC) [Mass/Vol] 33.9 g/dL                 28.4 -  
 34.8   
g/dL                                    [email or web address] Phone:   
[phone]  
1  
   
                                                    MCV (RBC) [Entitic   
vol]                87.8 fL                                 82.6 - 102.9   
fL                                      [email or web address] Phone:   
[phone]  
1  
   
                                                    Monocytes (Bld)   
[#/Vol]         0.44 10*3/uL                                    [email or web address] Phone:   
[phone]  
1  
   
                                                    Monocytes/100 WBC   
(Bld)           4 %                             3 - 12 %        [facility]  
Work Phone:   
[phone]  
1  
   
                                                    Platelet mean volume   
(Bld) [Entitic vol] 10.2 fL                                 8.1 - 13.5   
fL                                      [facility]  
Work Phone:   
[phone]  
1  
   
                                                    Platelets (Bld)   
[#/Vol]         NOT REPORTED                                    [facility]  
Work Phone:   
[phone]  
1  
   
                                                    Platelets (Bld)   
[#/Vol]         313 10*3/uL                                     [facility]  
Work Phone:   
[phone]  
1  
   
                          RBC (Bld) [#/Vol] 4.43 10*6/uL              3.95 - 5.1  
1   
m/uL                                    [facility]  
Work Phone:   
[phone]  
1  
   
                                                    RBC morphology   
finding Nom (Bld) NOT REPORTED                                    [facility]  
Work Phone:   
[phone]  
1  
   
                                                    Segmented   
neutrophils/100 WBC   
(Bld)           86 %            High            36 - 65 %       [facility]  
Work Phone:   
[phone]  
1  
   
                      Segs Absolute 8.56       High                  [place]  
[employer]  
Work Phone:   
[phone]  
1  
   
                      WBC (Bld) [#/Vol] 10.1 10*3/uL                       [facility]  
Work Phone:   
[phone]  
1  
   
                          WBC (Bld) [#/Vol] 0.0 10*3/uL               0.0 per 10  
0   
WBC                                     [facility]  
Work Phone:   
[phone]  
1  
   
                      WBC Morphology NOT REPORTED                       [facility]  
[facility]  
Work Phone:   
[phone]  
1  
   
                                                    CBC with Diffon 2020   
   
                      Abs. Basophil <0.03      Normal     0.00-0.20  [facility]  
   
                                        Comment on above:   Performed By: #### C  
DP, BMPX ####  
[employer] [facility]  
[address] [place] [address] [phone]  
: Gomez Canales MD   
   
                      Abs.Imm.Granulocyte 0.20 k/uL  Normal     0.00-0.30  [facility]  
   
                                        Comment on above:   Performed By: #### C  
DP, BMPX ####  
[employer] [facility]  
[address] [place] [address]  
[phone]  
: Gomez Canales MD   
   
                      Abs.Neutrophil (Seg) 8.56 k/uL  High       1.50-8.10  [facility]  
   
                                        Comment on above:   Performed By: #### C  
DP, BMPX ####  
[address] [address]  
[phone]  
: Gomez Canales MD   
   
                                                    Basophils/100 WBC   
(Bld)           0 %             Normal          0-2             [facility]  
   
                                        Comment on above:   Performed By: #### C  
DP, BMPX ####  
[address] [address]  
[phone]  
: Gomez Canales MD   
   
                                                    Eosinophils (Bld)   
[#/Vol]         10*3/uL         Normal          0.00-0.44       [facility]  
   
                                        Comment on above:   Performed By: #### C  
DP, BMPX ####  
[address] [address]  
[phone]  
: Gomez Canales MD   
   
                                                    Eosinophils/100 WBC   
(Bld)           0 %             Low             1-4             [facility]  
   
                                        Comment on above:   Performed By: #### C  
DP, BMPX ####  
[address] [address]  
[phone]  
: Gomez Canales MD   
   
                                                    Erythrocyte   
distribution width   
(RBC) [Ratio]   13.0 %          Normal          11.8-14.4       [facility]  
   
                                        Comment on above:   Performed By: #### C  
DP, BMPX ####  
[facility] [employer]  
[address] [place] [address]  
[phone]  
: Gomez Canales MD   
   
                                                    Hematocrit (Bld)   
[Volume fraction] 38.9 %          Normal          36.3-47.1       [facility]  
   
                                        Comment on above:   Performed By: #### C  
DP, BMPX ####  
[facility] [employer]  
[address] [place] [address]  
[phone]  
: Gomez Canales MD   
   
                                                    Hemoglobin (Bld)   
[Mass/Vol]      13.2 g/dL       Normal          11.9-15.1       [facility]  
   
                                        Comment on above:   Performed By: #### C  
DP, BMPX ####  
[address] [address]  
[phone]  
: Gomez Canales MD   
   
                                                    Immature granulocytes   
(Bld) [#/Vol]   2 %             High            0               [facility]  
   
                                        Comment on above:   Performed By: #### C  
DP, BMPX ####  
[place] [address]  
[phone]  
: Gomez Canales MD   
   
                                                    Lymphocytes (Bld)   
[#/Vol]         0.84 10*3/uL    Low             1.10-3.70       [facility]  
   
                                        Comment on above:   Performed By: #### C  
DP, BMPX ####  
[place] [address]  
[phone]  
: Gomez Canales MD   
   
                                                    Lymphocytes/100 WBC   
(Bld)           8 %             Low             24-43           [facility]  
   
                                        Comment on above:   Performed By: #### C  
DP, BMPX ####  
[place] [address]  
[phone]  
: Gomez Canales MD   
   
                                                    MCH (RBC) [Entitic   
mass]           29.8 pg         Normal          25.2-33.5       [facility]  
   
                                        Comment on above:   Performed By: #### C  
DP, BMPX ####  
[place] [address]  
[phone]  
: Gomez Canales MD   
   
                      MCHC (RBC) [Mass/Vol] 33.9 g/dL  Normal     28.4-34.8  [facility]  
   
                                        Comment on above:   Performed By: #### C  
DP, BMPX ####  
[address] [address]  
[phone]  
: Gomez Canales MD   
   
                                                    MCV (RBC) [Entitic   
vol]            87.8 fL         Normal          82.6-102.9      [facility]  
   
                                        Comment on above:   Performed By: #### C  
DP, BMPX ####  
[address] [address]  
[phone]  
: Gomez Canales MD   
   
                                                    Monocytes (Bld)   
[#/Vol]         0.44 10*3/uL    Normal          0.10-1.20       [facility]  
   
                                        Comment on above:   Performed By: #### C  
DP, BMPX ####  
[address] [address]  
[phone]  
: Gomez Canales MD   
   
                                                    Monocytes/100 WBC   
(Bld)           4 %             Normal          3-12            [facility]  
   
                                        Comment on above:   Performed By: #### C  
DP, BMPX ####  
[address] [address]  
[phone]  
: Gomez Canales MD   
   
                      Neutrophil (Seg) 86 %       High       36-65      [facility]  
   
                                        Comment on above:   Performed By: #### C  
DP, BMPX ####  
[address] [address]  
[phone]  
: Gomez Canales MD   
   
                      NRBC Automated 0.0 per 100 WBC Normal     0.0        [facility]  
   
                                        Comment on above:   Performed By: #### C  
DP, BMPX ####  
[address] [address]  
[phone]  
: Gomez Canales MD   
   
                                                    Platelet mean volume   
(Bld) [Entitic vol] 10.2 fL         Normal          8.1-13.5        [facility]  
   
                                        Comment on above:   Performed By: #### C  
DP, BMPX ####  
[address] [address]  
[phone]  
: Gomez Canales MD   
   
                                                    Platelets (Bld)   
[#/Vol]         313 10*3/uL     Normal          138-453         [facility]  
   
                                        Comment on above:   Performed By: #### C  
DP, BMPX ####  
[address] [address]  
[phone]  
: Gomez Canales MD   
   
                      RBC (Bld) [#/Vol] 4.43 10*6/uL Normal     3.95-5.11  [facility]  
   
                                        Comment on above:   Performed By: #### C  
DP, BMPX ####  
[address] [address]  
[phone]  
: Gomez Canales MD   
   
                      WBC (Bld) [#/Vol] 10.1 10*3/uL Normal     3.5-11.3   [facility]  
   
                                        Comment on above:   Performed By: #### C  
DP, BMPX ####  
[address] [address]  
[phone]  
: Gomez Canales MD   
   
                      Auto Diff Performed NOT REPORTED Normal                [facility]  
   
                                        Comment on above:   Performed By: #### C  
DP, BMPX ####  
[address] [address]  
[phone]  
: Gomez Canales MD   
   
                                                    Platelets (Bld)   
[#/Vol]         NOT REPORTED    Normal                          [facility]  
   
                                        Comment on above:   Performed By: #### C  
DP, BMPX ####  
[address] [address]  
[phone]  
: Gomez Canales MD   
   
                                                    RBC morphology   
finding Nom (Bld) NOT REPORTED    Normal                          [facility]  
   
                                        Comment on above:   Performed By: #### C  
DP, BMPX ####  
[address] [address]  
[phone]  
: Gomez Canales MD   
   
                      WBC Morphology NOT REPORTED Normal                [facility]  
   
                                        Comment on above:   Performed By: #### C  
DP, BMPX ####  
[address] [address]  
[phone]  
: Gomez Canales MD   
   
                                                    FL MODIFIED BARIUM SWALLOW W  
 VIDEOon 2020   
   
                                                    FL MODIFIED BARIUM   
SWALLOW W VIDEO                         EXAMINATION:  
MODIFIED BARIUM   
SWALLOW WAS PERFORMED   
IN CONJUNCTION WITH   
SPEECH PATHOLOGY  
SERVICES  
TECHNIQUE:  
Fluoroscopic   
evaluation of the   
swallowing mechanism   
was performed with  
multiple consistency   
of barium product.  
FLUOROSCOPY DOSE AND   
TYPE OR TIME AND   
EXPOSURES:  
DAP 0.872Ijoq0  
COMPARISON:  
None  
HISTORY:  
ORDERING SYSTEM   
PROVIDED HISTORY:   
can't swallow  
TECHNOLOGIST PROVIDED   
HISTORY:  
can't swallow  
Reason for Exam: pt   
states can't swallow.   
1.4min ft dap   
0.044Mkxq3  
FINDINGS:  
Patient was given thin   
liquid, puree, soft   
solid and cookie: No   
laryngeal  
penetration or   
aspiration.  
IMPRESSION:  
No laryngeal   
penetration or   
aspiration.  
Please see separate   
speech pathology   
report for full   
discussion of findings  
and recommendations.  
Interpreted by:  
Nish Lopez MD  
Signed by:  
Nish Lopez MD  
20  
Final result        Normal                                  [facility]  
   
                                                            EXAMINATION: MODIFIE  
D   
BARIUM SWALLOW WAS   
PERFORMED IN   
CONJUNCTION WITH   
SPEECH PATHOLOGY   
SERVICES TECHNIQUE:   
Fluoroscopic   
evaluation of the   
swallowing mechanism   
was performed with   
multiple consistency   
of barium product.   
FLUOROSCOPY DOSE AND   
TYPE OR TIME AND   
EXPOSURES: DAP   
0.575Lzyv4 COMPARISON:   
None HISTORY: ORDERING   
SYSTEM PROVIDED   
HISTORY: can't swallow   
TECHNOLOGIST PROVIDED   
HISTORY: can't swallow   
Reason for Exam: pt   
states can't swallow.   
1.4min ft dap   
0.782Zpfy8 FINDINGS:   
Patient was given thin   
liquid, puree, soft   
solid and cookie: No   
laryngeal penetration   
or aspiration.                                              [email or web address] Phone:   
[phone]  
1  
   
                                                            [place], [facility] Incoming   
Radiant Results From   
[facility] -   
2020 3:03 PM EST   
EXAMINATION:  
MODIFIED BARIUM   
SWALLOW WAS PERFORMED   
IN CONJUNCTION WITH   
SPEECH PATHOLOGY  
SERVICES  
  
TECHNIQUE:  
Fluoroscopic   
evaluation of the   
swallowing mechanism   
was performed with  
multiple consistency   
of barium product.  
  
FLUOROSCOPY DOSE AND   
TYPE OR TIME AND   
EXPOSURES:  
DAP 0.442Umvi9  
  
COMPARISON:  
None  
  
HISTORY:  
ORDERING SYSTEM   
PROVIDED HISTORY:   
can't swallow  
TECHNOLOGIST PROVIDED   
HISTORY:  
can't swallow  
Reason for Exam: pt   
states can't swallow.   
1.4min ft dap   
0.424Iaiv0  
  
FINDINGS:  
Patient was given thin   
liquid, puree, soft   
solid and cookie: No   
laryngeal  
penetration or   
aspiration.  
  
IMPRESSION:  
No laryngeal   
penetration or   
aspiration.  
  
Please see separate   
speech pathology   
report for full   
discussion of findings  
and recommendations.  
                                                            [email or web address] Phone:   
[phone]  
1  
   
                                                            No laryngeal   
penetration or   
aspiration. Please see   
separate speech   
pathology report for   
full discussion of   
findings and   
recommendations.                                            [facility] [employer]  
Work Phone:   
[phone]  
1  
   
                                                    MRI BRAIN W WO CONTRASTon    
   
                                                    MRI BRAIN W WO   
CONTRAST                                EXAMINATION:  
MRI OF THE BRAIN   
WITHOUT AND WITH   
CONTRAST 2020   
11:46 am  
TECHNIQUE:  
Multiplanar   
multisequence MRI of   
the head/brain was   
performed without and  
with the   
administration of   
intravenous contrast.  
COMPARISON:  
CT brain performed   
2020.  
HISTORY:  
ORDERING SYSTEM   
PROVIDED HISTORY: MS  
TECHNOLOGIST PROVIDED   
HISTORY:  
MS  
Is the patient   
pregnant?->No  
FINDINGS:  
INTRACRANIAL   
STRUCTURES/VENTRICLES:   
The sellar and   
suprasellar   
structures,  
optic chiasm, corpus   
callosum, pineal   
gland, tectum, and   
midline brainstem  
structures are   
unremarkable. The   
craniocervical   
junction is   
unremarkable.  
There is no acute   
intracranial   
hemorrhage, mass   
effect, or midline   
shift.  
There is satisfactory   
overall gray-white   
matter   
differentiation. There   
are  
few scattered FLAIR   
signal abnormalities   
in the subcortical and  
periventricular white   
matter most pronounced   
in the frontal lobes.   
The  
ventricular structures   
are symmetric and   
unremarkable. The   
infratentorial  
structures including   
the cerebellopontine   
angles and internal   
auditory canals  
are unremarkable.   
There is no abnormal   
restricted diffusion.   
There is no  
abnormal blooming   
artifact on   
susceptibility   
weighted imaging.   
There is no  
abnormal postcontrast   
enhancement.  
ORBITS: The visualized   
portion of the orbits   
demonstrate no acute   
abnormality.  
SINUSES: The   
visualized paranasal   
sinuses and mastoid   
air cells are well  
aerated.  
BONES/SOFT TISSUES:   
The bone marrow signal   
intensity appears   
normal. The soft  
tissues demonstrate no   
acute abnormality.  
IMPRESSION:  
Scattered FLAIR signal   
abnormalities most   
pronounced in the   
frontal lobes  
that may correlate   
with patient's history   
of a demyelinating   
process. There  
is no evidence for   
active or acute   
demyelination.  
Interpreted by:  
Hemanth Washington MD  
Signed by:  
Hemanth Washington MD  
20  
Final result        Normal                                  [facility]  
   
                                                            EXAMINATION: MRI OF   
THE BRAIN WITHOUT AND   
WITH CONTRAST   
2020 11:46 am   
TECHNIQUE: Multiplanar   
multisequence MRI of   
the head/brain was   
performed without and   
with the   
administration of   
intravenous contrast.   
COMPARISON: CT brain   
performed 2020.   
HISTORY: ORDERING   
SYSTEM PROVIDED   
HISTORY: MS   
TECHNOLOGIST PROVIDED   
HISTORY: MS Is the   
patient pregnant?->No   
FINDINGS: INTRACRANIAL   
STRUCTURES/VENTRICLES:   
The sellar and   
suprasellar   
structures, optic   
chiasm, corpus   
callosum, pineal   
gland, tectum, and   
midline brainstem   
structures are   
unremarkable. The   
craniocervical   
junction is   
unremarkable. There is   
no acute intracranial   
hemorrhage, mass   
effect, or midline   
shift. There is   
satisfactory overall   
gray-white matter   
differentiation. There   
are few scattered   
FLAIR signal   
abnormalities in the   
subcortical and   
periventricular white   
matter most pronounced   
in the frontal lobes.   
The ventricular   
structures are   
symmetric and   
unremarkable. The   
infratentorial   
structures including   
the cerebellopontine   
angles and internal   
auditory canals are   
unremarkable. There is   
no abnormal restricted   
diffusion. There is no   
abnormal blooming   
artifact on   
susceptibility   
weighted imaging.   
There is no abnormal   
postcontrast   
enhancement. ORBITS:   
The visualized portion   
of the orbits   
demonstrate no acute   
abnormality. SINUSES:   
The visualized   
paranasal sinuses and   
mastoid air cells are   
well aerated.   
BONES/SOFT TISSUES:   
The bone marrow signal   
intensity appears   
normal. The soft   
tissues demonstrate no   
acute abnormality.                                          [employer]  
Work Phone:   
[phone]  
1  
   
                                                            [place], [facility]   
Radiant Results From   
[facility]/[employer] -   
2020 1:44 PM EST   
EXAMINATION:  
MRI OF THE BRAIN   
WITHOUT AND WITH   
CONTRAST 2020   
11:46 am  
  
TECHNIQUE:  
Multiplanar   
multisequence MRI of   
the head/brain was   
performed without and  
with the   
administration of   
intravenous contrast.  
  
COMPARISON:  
CT brain performed   
2020.  
  
HISTORY:  
ORDERING SYSTEM   
PROVIDED HISTORY: MS  
TECHNOLOGIST PROVIDED   
HISTORY:  
MS  
Is the patient   
pregnant?->No  
  
FINDINGS:  
INTRACRANIAL   
STRUCTURES/VENTRICLES:   
The sellar and   
suprasellar   
structures,  
optic chiasm, corpus   
callosum, pineal   
gland, tectum, and   
midline brainstem  
structures are   
unremarkable. The   
craniocervical   
junction is   
unremarkable.  
There is no acute   
intracranial   
hemorrhage, mass   
effect, or midline   
shift.  
There is satisfactory   
overall gray-white   
matter   
differentiation. There   
are  
few scattered FLAIR   
signal abnormalities   
in the subcortical and  
periventricular white   
matter most pronounced   
in the frontal lobes.   
The  
ventricular structures   
are symmetric and   
unremarkable. The   
infratentorial  
structures including   
the cerebellopontine   
angles and internal   
auditory canals  
are unremarkable.   
There is no abnormal   
restricted diffusion.   
There is no  
abnormal blooming   
artifact on   
susceptibility   
weighted imaging.   
There is no  
abnormal postcontrast   
enhancement.  
  
ORBITS: The visualized   
portion of the orbits   
demonstrate no acute   
abnormality.  
  
SINUSES: The   
visualized paranasal   
sinuses and mastoid   
air cells are well  
aerated.  
  
BONES/SOFT TISSUES:   
The bone marrow signal   
intensity appears   
normal. The soft  
tissues demonstrate no   
acute abnormality.  
  
IMPRESSION:  
Scattered FLAIR signal   
abnormalities most   
pronounced in the   
frontal lobes  
that may correlate   
with patient's history   
of a demyelinating   
process. There  
is no evidence for   
active or acute   
demyelination.  
                                                            [email or web address] Phone:   
[phone]  
1  
   
                                                            Scattered FLAIR sign  
al   
abnormalities most   
pronounced in the   
frontal lobes that may   
correlate with   
patient's history of a   
demyelinating process.   
There is no evidence   
for active or acute   
demyelination.                                              [email or web address] Phone:   
[phone]  
1  
   
                                                    MRI CERVICAL SPINE W WO CONT  
[place] 2020   
   
                                                    MRI CERVICAL SPINE W   
WO CONTRAST                             EXAMINATION:  
MRI OF THE CERVICAL   
SPINE WITHOUT AND WITH   
CONTRAST 2020   
11:46 am:  
TECHNIQUE:  
Multiplanar   
multisequence MRI of   
the cervical spine was   
performed without and  
with the   
administration of   
intravenous contrast.  
COMPARISON:  
Cervical spine   
performed 2014.  
HISTORY:  
ORDERING SYSTEM   
PROVIDED HISTORY: MS  
TECHNOLOGIST PROVIDED   
HISTORY:  
MS  
Is the patient   
pregnant?->No  
FINDINGS:  
BONES/ALIGNMENT: There   
is anterior hardware   
fixation at the C5-6   
level  
without complication.   
The vertebral body   
heights are   
maintained. There is  
age-appropriate bone   
marrow signal. There   
is multilevel   
degenerative disc  
disease with loss of   
disc signal. There is   
no spondylolisthesis.  
SPINAL CORD: The   
spinal cord is normal   
in caliber and signal.   
The visualized  
intracranial   
structures are   
unremarkable. There is   
no abnormal   
postcontrast  
enhancement.  
SOFT TISSUES:   
Posterior paraspinal   
soft tissues are   
unremarkable. The  
prevertebral soft   
tissues are   
unremarkable.  
C2-C3: There is no   
significant disc   
protrusion, spinal   
canal stenosis or  
neural foraminal   
narrowing.  
C3-C4: There is a mild   
disc osteophyte   
complex with   
uncovertebral and   
facet  
hypertrophy. There is   
canal stenosis   
measuring 8 mm in AP   
dimension. There  
is no significant   
foraminal narrowing.  
C4-C5: There is a mild   
disc osteophyte   
complex with   
uncovertebral and   
facet  
hypertrophy. There is   
no significant canal   
stenosis or foraminal   
narrowing.  
C5-C6: There is   
artificial disc   
material. There is no   
canal stenosis or  
foraminal narrowing.  
C6-C7: There is no   
significant disc   
protrusion, spinal   
canal stenosis or  
neural foraminal   
narrowing.  
C7-T1: There is no   
significant disc   
protrusion, spinal   
canal stenosis or  
neural foraminal   
narrowing.  
IMPRESSION:  
Anterior hardware   
fixation at C5-6   
without complication.  
Mild multilevel   
degenerative disc   
disease with   
uncovertebral and   
facet  
hypertrophy resulting   
in canal stenosis at   
C3-4.  
Unremarkable pre and   
post-contrast   
evaluation of the   
spinal cord.  
Interpreted by:  
Hemanth Washington MD  
Signed by:  
Hemanth Washington MD  
20  
Final result        Normal                                  [facility]  
   
                                                    Erythrocyte   
distribution width   
(RBC) [Ratio]                           Anterior hardware   
fixation at C5-6   
without complication.   
Mild multilevel   
degenerative disc   
disease with   
uncovertebral and   
facet hypertrophy   
resulting in canal   
stenosis at C3-4.   
Unremarkable pre and   
post-contrast   
evaluation of the   
spinal cord.                                                [email or web address] Phone:   
[phone]  
1  
   
                                                            EXAMINATION: MRI OF   
THE CERVICAL SPINE   
WITHOUT AND WITH   
CONTRAST 2020   
11:46 am: TECHNIQUE:   
Multiplanar   
multisequence MRI of   
the cervical spine was   
performed without and   
with the   
administration of   
intravenous contrast.   
COMPARISON: Cervical   
spine performed   
2014. HISTORY:   
ORDERING SYSTEM   
PROVIDED HISTORY: MS   
TECHNOLOGIST PROVIDED   
HISTORY: MS Is the   
patient pregnant?->No   
FINDINGS:   
BONES/ALIGNMENT: There   
is anterior hardware   
fixation at the C5-6   
level without   
complication. The   
vertebral body heights   
are maintained. There   
is age-appropriate   
bone marrow signal.   
There is multilevel   
degenerative disc   
disease with loss of   
disc signal. There is   
no spondylolisthesis.   
SPINAL CORD: The   
spinal cord is normal   
in caliber and signal.   
The visualized   
intracranial   
structures are   
unremarkable. There is   
no abnormal   
postcontrast   
enhancement. SOFT   
TISSUES: Posterior   
paraspinal soft   
tissues are   
unremarkable. The   
prevertebral soft   
tissues are   
unremarkable. C2-C3:   
There is no   
significant disc   
protrusion, spinal   
canal stenosis or   
neural foraminal   
narrowing. C3-C4:   
There is a mild disc   
osteophyte complex   
with uncovertebral and   
facet hypertrophy.   
There is canal   
stenosis measuring 8   
mm in AP dimension.   
There is no   
significant foraminal   
narrowing. C4-C5:   
There is a mild disc   
osteophyte complex   
with uncovertebral and   
facet hypertrophy.   
There is no   
significant canal   
stenosis or foraminal   
narrowing. C5-C6:   
There is artificial   
disc material. There   
is no canal stenosis   
or foraminal   
narrowing. C6-C7:   
There is no   
significant disc   
protrusion, spinal   
canal stenosis or   
neural foraminal   
narrowing. C7-T1:   
There is no   
significant disc   
protrusion, spinal   
canal stenosis or   
neural foraminal   
narrowing.                                                  [email or web address] Phone:   
[phone]  
   
                                                            [place], [facility] Incoming   
Radiant Results From   
[facility] -   
2020 1:50 PM EST   
EXAMINATION:  
MRI OF THE CERVICAL   
SPINE WITHOUT AND WITH   
CONTRAST 2020   
11:46 am:  
  
TECHNIQUE:  
Multiplanar   
multisequence MRI of   
the cervical spine was   
performed without and  
with the   
administration of   
intravenous contrast.  
  
COMPARISON:  
Cervical spine   
performed 2014.  
  
HISTORY:  
ORDERING SYSTEM   
PROVIDED HISTORY: MS  
TECHNOLOGIST PROVIDED   
HISTORY:  
MS  
Is the patient   
pregnant?->No  
  
FINDINGS:  
BONES/ALIGNMENT: There   
is anterior hardware   
fixation at the C5-6   
level  
without complication.   
The vertebral body   
heights are   
maintained. There is  
age-appropriate bone   
marrow signal. There   
is multilevel   
degenerative disc  
disease with loss of   
disc signal. There is   
no spondylolisthesis.  
  
SPINAL CORD: The   
spinal cord is normal   
in caliber and signal.   
The visualized  
intracranial   
structures are   
unremarkable. There is   
no abnormal   
postcontrast  
enhancement.  
  
SOFT TISSUES:   
Posterior paraspinal   
soft tissues are   
unremarkable. The  
prevertebral soft   
tissues are   
unremarkable.  
  
C2-C3: There is no   
significant disc   
protrusion, spinal   
canal stenosis or  
neural foraminal   
narrowing.  
  
C3-C4: There is a mild   
disc osteophyte   
complex with   
uncovertebral and   
facet  
hypertrophy. There is   
canal stenosis   
measuring 8 mm in AP   
dimension. There  
is no significant   
foraminal narrowing.  
  
C4-C5: There is a mild   
disc osteophyte   
complex with   
uncovertebral and   
facet  
hypertrophy. There is   
no significant canal   
stenosis or foraminal   
narrowing.  
  
C5-C6: There is   
artificial disc   
material. There is no   
canal stenosis or  
foraminal narrowing.  
  
C6-C7: There is no   
significant disc   
protrusion, spinal   
canal stenosis or  
neural foraminal   
narrowing.  
  
C7-T1: There is no   
significant disc   
protrusion, spinal   
canal stenosis or  
neural foraminal   
narrowing.  
  
IMPRESSION:  
Anterior hardware   
fixation at C5-6   
without complication.  
  
Mild multilevel   
degenerative disc   
disease with   
uncovertebral and   
facet  
hypertrophy resulting   
in canal stenosis at   
C3-4.  
  
Unremarkable pre and   
post-contrast   
evaluation of the   
spinal cord.  
                                                            [email or web address] [facility] [employer] Phone:   
[phone]  
1  
   
                                                    MRI THORACIC SPINE W WO CONT  
[place] 2020   
   
                                                    MRI THORACIC SPINE W   
WO CONTRAST                             EXAMINATION:  
MRI OF THE THORACIC   
SPINE WITHOUT AND WITH   
CONTRAST 2020   
11:46 am  
TECHNIQUE:  
Multiplanar   
multisequence MRI of   
the thoracic spine was   
performed without and  
with the   
administration of   
intravenous contrast.  
COMPARISON:  
Thoracic spine MRI   
performed 2013.  
HISTORY:  
ORDERING SYSTEM   
PROVIDED HISTORY: ms  
TECHNOLOGIST PROVIDED   
HISTORY:  
ms  
Is the patient   
pregnant?->No  
FINDINGS:  
BONES/ALIGNMENT: There   
is a normal thoracic   
kyphosis. The   
vertebral body  
heights are   
maintained. There is   
age-appropriate bone   
marrow signal. There  
is no   
spondylolisthesis.  
SPINAL CORD: The   
spinal cord is normal   
in caliber and signal.   
There is no  
abnormal postcontrast   
enhancement.  
SOFT TISSUES:   
Posterior paraspinal   
soft tissues are   
unremarkable. The  
visualized thoracic   
and upper abdominal   
soft tissues are   
unremarkable.  
DEGENERATIVE CHANGES:   
There is mild   
multilevel   
degenerative disc   
disease and  
mild multilevel   
degenerative facet   
hypertrophy. There is   
no canal stenosis  
or foraminal   
narrowing.  
IMPRESSION:  
Multilevel   
degenerative disc   
disease and multilevel   
degenerative facet  
hypertrophy without   
canal stenosis or   
foraminal narrowing.  
Interpreted by:  
Hemanth Washington MD  
Signed by:  
Hemanth Washington MD  
20  
Final result        Normal                                  [facility]  
   
                                                            EXAMINATION: MRI OF   
THE THORACIC SPINE   
WITHOUT AND WITH   
CONTRAST 2020   
11:46 am TECHNIQUE:   
Multiplanar   
multisequence MRI of   
the thoracic spine was   
performed without and   
with the   
administration of   
intravenous contrast.   
COMPARISON: Thoracic   
spine MRI performed   
2013. HISTORY:   
ORDERING SYSTEM   
PROVIDED HISTORY: ms   
TECHNOLOGIST PROVIDED   
HISTORY: ms Is the   
patient pregnant?->No   
FINDINGS:   
BONES/ALIGNMENT: There   
is a normal thoracic   
kyphosis. The   
vertebral body heights   
are maintained. There   
is age-appropriate   
bone marrow signal.   
There is no   
spondylolisthesis.   
SPINAL CORD: The   
spinal cord is normal   
in caliber and signal.   
There is no abnormal   
postcontrast   
enhancement. SOFT   
TISSUES: Posterior   
paraspinal soft   
tissues are   
unremarkable. The   
visualized thoracic   
and upper abdominal   
soft tissues are   
unremarkable.   
DEGENERATIVE CHANGES:   
There is mild   
multilevel   
degenerative disc   
disease and mild   
multilevel   
degenerative facet   
hypertrophy. There is   
no canal stenosis or   
foraminal narrowing.                                         [email or web address] [facility] [employer] Phone:   
[phone]  
1  
   
                                                            Multilevel   
degenerative disc   
disease and multilevel   
degenerative facet   
hypertrophy without   
canal stenosis or   
foraminal narrowing.                                         [email or web address] [facility] [employer] Phone:   
[phone]  
1  
   
                                                            Brandin, [facility] Incoming   
Radiant Results From   
Urgent Group/Pacs -   
2020 1:53 PM EST   
EXAMINATION:  
MRI OF THE THORACIC   
SPINE WITHOUT AND WITH   
CONTRAST 2020   
11:46 am  
  
TECHNIQUE:  
Multiplanar   
multisequence MRI of   
the thoracic spine was   
performed without and  
with the   
administration of   
intravenous contrast.  
  
COMPARISON:  
Thoracic spine MRI   
performed 2013.  
  
HISTORY:  
ORDERING SYSTEM   
PROVIDED HISTORY: ms  
TECHNOLOGIST PROVIDED   
HISTORY:  
ms  
Is the patient   
pregnant?->No  
  
FINDINGS:  
BONES/ALIGNMENT: There   
is a normal thoracic   
kyphosis. The   
vertebral body  
heights are   
maintained. There is   
age-appropriate bone   
marrow signal. There  
is no   
spondylolisthesis.  
  
SPINAL CORD: The   
spinal cord is normal   
in caliber and signal.   
There is no  
abnormal postcontrast   
enhancement.  
  
SOFT TISSUES:   
Posterior paraspinal   
soft tissues are   
unremarkable. The  
visualized thoracic   
and upper abdominal   
soft tissues are   
unremarkable.  
  
DEGENERATIVE CHANGES:   
There is mild   
multilevel   
degenerative disc   
disease and  
mild multilevel   
degenerative facet   
hypertrophy. There is   
no canal stenosis  
or foraminal   
narrowing.  
  
IMPRESSION:  
Multilevel   
degenerative disc   
disease and multilevel   
degenerative facet  
hypertrophy without   
canal stenosis or   
foraminal narrowing.  
                                                            [email or web address] [facility] [employer] Phone:   
[phone]  
1  
   
                                                    Basic Metabolic Panel w/ Ref  
rachel to MGon 02-   
   
                          Anion gap [Moles/Vol] 13 mmol/L                 9 - 17  
   
mmol/L                                  [email or web address] [facility] [employer] Phone:   
[phone]  
1  
   
                      Bun/Cre Ratio 27         High                  [facility]  
Work Phone:   
[phone]  
1  
   
                          Calcium [Mass/Vol] 8.8 mg/dL                 8.6 - 10.  
4   
mg/dL                                   [email or web address] [facility] [employer] Phone:   
[phone]  
1  
   
                          Chloride [Moles/Vol] 94 mmol/L    Low          98 - 10  
7   
mmol/L                                  [email or web address] [facility] [employer] Phone:   
[phone]  
1  
   
                          CO2 [Moles/Vol] 28 mmol/L                 20 - 31   
mmol/L                                  [email or web address] [facility] [employer] Phone:   
[phone]  
1  
   
                          Creatinine [Mass/Vol] 0.59 mg/dL                0.5 -   
0.9   
mg/dL                                   [email or web address] [facility] [employer] Phone:   
[phone]  
1  
   
                      GFR  >60                   >60 mL/min [facility]  
[facility]  
Work Phone:   
[phone]  
1  
   
                                                    GFR Non-   
American        >60                             >60 mL/min      [email or web address] [facility] [employer] Phone:   
[phone]  
1  
   
                          Glucose [Mass/Vol] 145 mg/dL    High         70 - 99   
mg/dL                                   [email or web address] [facility] [employer] Phone:   
[phone]  
1  
   
                                                    Interpretation and   
review of laboratory   
results         Abnormal                                        [email or web address] [facility] [employer] Phone:   
[phone]  
1  
   
                          Potassium [Moles/Vol] 3.5 mmol/L   Low          3.7 -   
5.3   
mmol/L                                  [email or web address] [facility] [employer] Phone:   
[phone]  
1  
   
                          Sodium [Moles/Vol] 135 mmol/L                135 - 144  
   
mmol/L                                  [email or web address] [facility] [employer] Phone:   
[phone]  
1  
   
                                                    Urea nitrogen   
[Mass/Vol]      16 mg/dL                        6 - 20 mg/dL    [email or web address] [facility] [employer] Phone:   
[phone]  
1  
   
                                                    C-reactive proteinon 02-10-  
020   
   
                      CRP [Mass/Vol] 0.7 mg/L              0 - 5 mg/L [facility]  
  
Work Phone:   
[phone]  
1  
   
                                                    CBC Auto Differentialon   
0-2020   
   
                                                    Basophils (Bld)   
[#/Vol]         10*3/uL                                         [email or web address] [facility] [employer] Phone:   
[phone]  
1  
   
                                                    Basophils/100 WBC   
(Bld)           0 %                             0 - 2 %         [email or web address] [facility] [employer] Phone:   
[phone]  
1  
   
                      Differential Type NOT REPORTED                       [email or web address] [facility] [employer] Phone:   
[phone]  
1  
   
                                                    Eosinophils (Bld)   
[#/Vol]         10*3/uL                                         [email or web address] [facility] [employer] Phone:   
[phone]  
1  
   
                                                    Eosinophils/100 WBC   
(Bld)           0 %             Low             1 - 4 %         [email or web address] [facility] [employer] Phone:   
[phone]  
1  
   
                                                    Erythrocyte   
distribution width   
(RBC) [Ratio]       12.8 %                                  11.8 - 14.4   
%                                       [email or web address] [facility] [employer] Phone:   
[phone]  
1  
   
                                                    Hematocrit (Bld)   
[Volume fraction]   37.3 %                                  36.3 - 47.1   
%                                       [email or web address] [facility] [employer] Phone:   
[phone]  
1  
   
                                                    Hemoglobin (Bld)   
[Mass/Vol]          12.6 g/dL                               11.9 - 15.1   
g/dL                                    [email or web address] [facility] [employer] Phone:   
[phone]  
1  
   
                                                    Immature granulocytes   
(Bld) [#/Vol]   0.19 10*3/uL                                    [email or web address] [facility] [employer] Phone:   
[phone]  
1  
   
                                                    Immature granulocytes   
(Bld) [#/Vol]   2 %             High            0               [email or web address] [facility] [employer] Phone:   
[phone]  
1  
   
                                                    Interpretation and   
review of laboratory   
results         Abnormal                                        [email or web address] [facility] [employer] Phone:   
[phone]  
1  
   
                                                    Lymphocytes (Bld)   
[#/Vol]         0.73 10*3/uL    Low                             [facility]  
Work Phone:   
[phone]  
1  
   
                                                    Lymphocytes/100 WBC   
(Bld)           7 %             Low             24 - 43 %       [facility]  
Work Phone:   
[phone]  
1  
   
                                                    MCH (RBC) [Entitic   
mass]               29.2 pg                                 25.2 - 33.5   
pg                                      [facility]  
Work Phone:   
[phone]  
1  
   
                          MCHC (RBC) [Mass/Vol] 33.8 g/dL                 28.4 -  
 34.8   
g/dL                                    [facility]  
Work Phone:   
[phone]  
1  
   
                                                    MCV (RBC) [Entitic   
vol]                86.5 fL                                 82.6 - 102.9   
fL                                      [facility]  
Work Phone:   
[phone]  
1  
   
                                                    Monocytes (Bld)   
[#/Vol]         0.26 10*3/uL                                    [facility]  
Work Phone:   
[phone]  
1  
   
                                                    Monocytes/100 WBC   
(Bld)           2 %             Low             3 - 12 %        [facility]  
Work Phone:   
[phone]  
1  
   
                                                    Platelet mean volume   
(Bld) [Entitic vol] 9.6 fL                                  8.1 - 13.5   
fL                                      [facility]  
Work Phone:   
[phone]  
1  
   
                                                    Platelets (Bld)   
[#/Vol]         287 10*3/uL                                     [facility]  
Work Phone:   
[phone]  
1  
   
                                                    Platelets (Bld)   
[#/Vol]         NOT REPORTED                                    [email or web address] [facility] [employer] Phone:   
[phone]  
1  
   
                          RBC (Bld) [#/Vol] 4.31 10*6/uL              3.95 - 5.1  
1   
m/uL                                    [facility]  
Work Phone:   
[phone]  
1  
   
                                                    RBC morphology   
finding Nom (Bld) NOT REPORTED                                    [facility]  
Work Phone:   
[phone]  
1  
   
                                                    Segmented   
neutrophils/100 WBC   
(Bld)           89 %            High            36 - 65 %       [facility]  
Work Phone:   
[phone]  
1  
   
                      Segs Absolute 9.55       High                  [facility]  
Work Phone:   
[phone]  
1  
   
                      WBC (Bld) [#/Vol] 10.7 10*3/uL                       [facility]  
Work Phone:   
[phone]  
1  
   
                          WBC (Bld) [#/Vol] 0.0 10*3/uL               0.0 per 10  
0   
WBC                                     [facility]  
Work Phone:   
[phone]  
1  
   
                      WBC Morphology NOT REPORTED                       [facility]  
[facility]  
Work Phone:   
[phone]  
1  
   
                                                    Magnesiumon 02-   
   
                          Magnesium [Mass/Vol] 2.1 mg/dL                 1.6 - 2  
.6   
mg/dL                                   [facility]  
Work Phone:   
[phone]  
1  
   
                                                    Metabolic Panelon 02-  
   
   
                                                    GFR/1.73 sq M   
predicted among   
non-blacks MDRD   
(S/P/Bld) [Vol   
rate/Area]                                                      [facility]  
Work Phone:   
[phone]  
1  
   
                                        Comment on above:   Average GFR for 40-4  
9 years old:  
99 mL/min/1.73sq m  
Chronic Kidney Disease:  
<60 mL/min/1.73sq m  
Kidney failure:  
<15 mL/min/1.73sq m  
  
  
eGFR calculated using average adult body mass. Additional eGFR   
calculator available at:  
  
http://www.[employer]/multiple_crcl_2012.htm  
  
  
   
   
                                                            Stage 1: Some kidney  
 damage normal GFR  
Stage 2: Mild kidney damage GFR 60-89  
Stage 3: Moderate kidney damage GFR 30-59  
Stage 4: Severe kidney damage GFR 15-29  
Stage 5: Severe kidney damage GFR <15  
ESRD - chronic treatment by dialysis or transplant  
  
  
   
   
                                                    Sedimentation Rateon 02-10-2  
020   
   
                      Sed Rate   12 mm                 0 - 20 mm  [facility]  
Work Phone:   
[phone]  
1  
   
                                                    Urinalysis, reflex to micros  
[facility] 02-   
   
                      Bilirubin Urine Negative              NEGATIVE   [facility]  
[facility]  
Work Phone:   
[phone]  
1  
   
                      Color, UA  YELLOW                YELLOW     [facility]  
Work Phone:   
[phone]  
1  
   
                      Glucose, Ur Negative              NEGATIVE   [facility]  
Work Phone:   
[phone]  
1  
   
                      Ketones Ql (U) Negative              NEGATIVE   [facility]  
  
Work Phone:   
[phone]  
1  
   
                                                    Leukocyte esterase   
Test strip Ql (U) Negative                        NEGATIVE        [facility]  
Work Phone:   
[phone]  
1  
   
                      Nitrite, Urine Negative              NEGATIVE   [facility]  
  
Work Phone:   
[phone]  
1  
   
                      pH, UA     7.0                              [facility]  
Work Phone:   
[phone]  
1  
   
                                                    Protein (U)   
[Mass/Vol]      Negative                        NEGATIVE        [facility]  
Work Phone:   
[phone]  
1  
   
                      Specific Ackerman, UA 1.015                            [facility]  
[employer]  
Work Phone:   
[phone]  
1  
   
                      Turbidity UA CLEAR                 CLEAR      [facility]  
Work Phone:   
[phone]  
1  
   
                      Urinalysis Comments NOT REPORTED                       [facility] [employer]  
Work Phone:   
[phone]  
1  
   
                      Urine Hgb  Negative              NEGATIVE   [facility]  
Work Phone:   
[phone]  
1  
   
                      Urobilinogen, Urine Normal                Normal     [facility]  
Work Phone:   
[phone]  
1  
   
                                                    Basic Metabolic Panelon -0  
   
   
                          Anion gap [Moles/Vol] 14 mmol/L                 9 - 17  
   
mmol/L                                  [facility]  
Work Phone:   
[phone]  
1  
   
                      Bun/Cre Ratio 19                               [facility]  
Work Phone:   
[phone]  
1  
   
                          Calcium [Mass/Vol] 9.1 mg/dL                 8.6 - 10.  
4   
mg/dL                                   [facility]  
Work Phone:   
[phone]  
1  
   
                          Chloride [Moles/Vol] 101 mmol/L                98 - 10  
7   
mmol/L                                  [facility]  
Work Phone:   
[phone]  
1  
   
                          CO2 [Moles/Vol] 23 mmol/L                 20 - 31   
mmol/L                                  [facility]  
Work Phone:   
[phone]  
1  
   
                          Creatinine [Mass/Vol] 0.64 mg/dL                0.5 -   
0.9   
mg/dL                                   [email or web address] Phone:   
[phone]  
1  
   
                      GFR  >60                   >60 mL/min [facility]  
[facility]  
Work Phone:   
[phone]  
1  
   
                                                    GFR Non-   
American        >60                             >60 mL/min      [facility]  
Work Phone:   
[phone]  
1  
   
                          Glucose [Mass/Vol] 110 mg/dL    High         70 - 99   
mg/dL                                   [facility]  
Work Phone:   
[phone]  
1  
   
                                                    Interpretation and   
review of laboratory   
results         Abnormal                                        [facility]  
Work Phone:   
[phone]  
1  
   
                          Potassium [Moles/Vol] 4.4 mmol/L                3.7 -   
5.3   
mmol/L                                  [facility]  
Work Phone:   
[phone]  
1  
   
                          Sodium [Moles/Vol] 138 mmol/L                135 - 144  
   
mmol/L                                  [email or web address] [facility] [employer] Phone:   
[phone]  
1  
   
                                                    Urea nitrogen   
[Mass/Vol]      12 mg/dL                        6 - 20 mg/dL    [email or web address] [facility] [employer] Phone:   
[phone]  
1  
   
                                                    CBC Auto Differentialon 02-0  
5-2020   
   
                                                    Basophils (Bld)   
[#/Vol]         0.00 10*3/uL                                    [email or web address] [facility] [employer] Phone:   
[phone]  
1  
   
                                                    Basophils/100 WBC   
(Bld)           0 %                             0 - 2 %         [email or web address] [facility] [employer] Phone:   
[phone]  
1  
   
                      Differential Type NOT REPORTED                       [email or web address] [facility] [employer] Phone:   
[phone]  
1  
   
                                                    Eosinophils (Bld)   
[#/Vol]         0.00 10*3/uL                                    [email or web address] [facility] [employer] Phone:   
[phone]  
1  
   
                                                    Eosinophils/100 WBC   
(Bld)           0 %             Low             1 - 4 %         [email or web address] [facility] [employer] Phone:   
[phone]  
1  
   
                                                    Erythrocyte   
distribution width   
(RBC) [Ratio]       13.2 %                                  11.8 - 14.4   
%                                       [email or web address] [facility] [employer] Phone:   
[phone]  
1  
   
                                                    Hematocrit (Bld)   
[Volume fraction]   41.2 %                                  36.3 - 47.1   
%                                       [email or web address] [facility] [employer] Phone:   
[phone]  
1  
   
                                                    Hemoglobin (Bld)   
[Mass/Vol]          13.5 g/dL                               11.9 - 15.1   
g/dL                                    [email or web address] [facility] [employer] Phone:   
[phone]  
1  
   
                                                    Immature granulocytes   
(Bld) [#/Vol]   0 %                             0               [email or web address] [facility] [employer] Phone:   
[phone]  
1  
   
                                                    Immature granulocytes   
(Bld) [#/Vol]   0.00 10*3/uL                                    [email or web address] [facility] [employer] Phone:   
[phone]  
1  
   
                                                    Interpretation and   
review of laboratory   
results         Abnormal                                        [email or web address] [facility] [employer] Phone:   
[phone]  
1  
   
                                                    Lymphocytes (Bld)   
[#/Vol]         1.50 10*3/uL                                    [email or web address] [facility] [employer] Phone:   
[phone]  
1  
   
                                                    Lymphocytes/100 WBC   
(Bld)           16 %            Low             24 - 43 %       [email or web address] Phone:   
[phone]  
1  
   
                                                    MCH (RBC) [Entitic   
mass]               29.2 pg                                 25.2 - 33.5   
pg                                      [employer] Phone:   
[phone]  
1  
   
                          MCHC (RBC) [Mass/Vol] 32.8 g/dL                 28.4 -  
 34.8   
g/dL                                    [employer] Phone:   
[phone]  
1  
   
                                                    MCV (RBC) [Entitic   
vol]                89.0 fL                                 82.6 - 102.9   
fL                                      [employer] Phone:   
[phone]  
1  
   
                                                    Monocytes (Bld)   
[#/Vol]         0.28 10*3/uL                                    [employer] Phone:   
[phone]  
1  
   
                                                    Monocytes/100 WBC   
(Bld)           3 %                             3 - 12 %        [employer] Phone:   
[phone]  
1  
   
                                                    Morphology Jayce (Bld)   
[Interp]                                Platelet clumps   
present, count appears   
adequate.                                                   [email or web address] Phone:   
[phone]  
1  
   
                                                    Platelet mean volume   
(Bld) [Entitic vol] NOT REPORTED                            8.1 - 13.5   
fL                                      [employer] Phone:   
[phone]  
1  
   
                                                    Platelets (Bld)   
[#/Vol]         NOT REPORTED                                    [employer] Phone:   
[phone]  
1  
   
                                                    Platelets (Bld)   
[#/Vol]                                 See Reflexed IPF   
Result                                                      [employer] Phone:   
[phone]  
1  
   
                          RBC (Bld) [#/Vol] 4.63 10*6/uL              3.95 - 5.1  
1   
m/uL                                    [employer] Phone:   
[phone]  
1  
   
                                                    RBC morphology   
finding Nom (Bld) NOT REPORTED                                    [employer] Phone:   
[phone]  
1  
   
                                                    Segmented   
neutrophils/100 WBC   
(Bld)           81 %            High            36 - 65 %       [employer] Phone:   
[phone]  
1  
   
                      Segs Absolute 7.62                             [employer]  
Work Phone:   
[phone]  
1  
   
                          WBC (Bld) [#/Vol] 0.0 10*3/uL               0.0 per 10  
0   
WBC                                     [employer] Phone:   
[phone]  
1  
   
                      WBC (Bld) [#/Vol] 9.4 10*3/uL                       [employer] Phone:   
[phone]  
1  
   
                      WBC Morphology NOT REPORTED                       [facility]  
[facility]  
Work Phone:   
[phone]  
1  
   
                                                    CT Head WO Contraston 2020   
   
                                                            No acute intracrania  
l   
abnormality.                                                [employer] Phone:   
[phone]  
1  
   
                                                            EXAMINATION: CT OF JANELLE SALGADO   
HEAD WITHOUT CONTRAST   
2020 7:28 pm   
TECHNIQUE: CT of the   
head was performed   
without the   
administration of   
intravenous contrast.   
Dose modulation,   
iterative   
reconstruction, and/or   
weight based   
adjustment of the   
mA/kV was utilized to   
reduce the radiation   
dose to as low as   
reasonably achievable.   
COMPARISON: CT head   
2018 HISTORY:   
ORDERING SYSTEM   
PROVIDED HISTORY: L   
sided weakness   
TECHNOLOGIST PROVIDED   
HISTORY: L sided   
weakness Is the   
patient pregnant?->No   
FINDINGS:   
BRAIN/VENTRICLES:   
There is no acute   
intracranial   
hemorrhage, mass   
effect or midline   
shift. No abnormal   
extra-axial fluid   
collection. The   
gray-white   
differentiation is   
maintained without   
evidence of an acute   
infarct. There is no   
evidence of   
hydrocephalus. ORBITS:   
The visualized portion   
of the orbits   
demonstrate no acute   
abnormality. SINUSES:   
The visualized   
paranasal sinuses and   
mastoid air cells   
demonstrate no acute   
abnormality. SOFT   
TISSUES/SKULL: No   
acute abnormality of   
the visualized skull   
or soft tissues.                                            [email or web address] Phone:   
[phone]  
1  
   
                                                            Brandin, [facility] Incoming   
Radiant Results From   
[facility] -   
2020 8:02 PM EST   
EXAMINATION:  
CT OF THE HEAD WITHOUT   
CONTRAST 2020 7:28   
pm  
  
TECHNIQUE:  
CT of the head was   
performed without the   
administration of   
intravenous  
contrast. Dose   
modulation, iterative   
reconstruction, and/or   
weight based  
adjustment of the   
mA/kV was utilized to   
reduce the radiation   
dose to as low  
as reasonably   
achievable.  
  
COMPARISON:  
CT head 2018  
  
HISTORY:  
ORDERING SYSTEM   
PROVIDED HISTORY: L   
sided weakness  
TECHNOLOGIST PROVIDED   
HISTORY:  
  
L sided weakness  
Is the patient   
pregnant?->No  
  
FINDINGS:  
BRAIN/VENTRICLES:   
There is no acute   
intracranial   
hemorrhage, mass   
effect or  
midline shift. No   
abnormal extra-axial   
fluid collection. The   
gray-white  
differentiation is   
maintained without   
evidence of an acute   
infarct. There is  
no evidence of   
hydrocephalus.  
  
ORBITS: The visualized   
portion of the orbits   
demonstrate no acute   
abnormality.  
  
SINUSES: The   
visualized paranasal   
sinuses and mastoid   
air cells demonstrate  
no acute abnormality.  
  
SOFT TISSUES/SKULL: No   
acute abnormality of   
the visualized skull   
or soft  
tissues.  
  
IMPRESSION:  
No acute intracranial   
abnormality.  
                                                            [email or web address] Phone:   
[phone]  
1  
   
                                                    Immature Platelet Fractionon  
 2020   
   
                                                    Platelet,   
Fluorescence    168                                             [email or web address] Phone:   
[phone]  
1  
   
                                                    Platelet, Immature   
Fraction        4.5 %                           1.1 - 10.3 %    [email or web address] Phone:   
[phone]  
1  
   
                                                    Metabolic Panelon 2020  
   
   
                                                    GFR/1.73 sq M   
predicted among   
non-blacks MDRD   
(S/P/Bld) [Vol   
rate/Area]                                                      [email or web address] Phone:   
[phone]  
1  
   
                                        Comment on above:   Average GFR for 40-4  
9 years old:  
99 mL/min/1.73sq m  
Chronic Kidney Disease:  
<60 mL/min/1.73sq m  
Kidney failure:  
<15 mL/min/1.73sq m  
  
  
eGFR calculated using average adult body mass. Additional eGFR   
calculator available at:  
  
http://www.[employer]/multiple_crcl_2012.htm  
  
  
   
   
                                                            Stage 1: Some kidney  
 damage normal GFR  
Stage 2: Mild kidney damage GFR 60-89  
Stage 3: Moderate kidney damage GFR 30-59  
Stage 4: Severe kidney damage GFR 15-29  
Stage 5: Severe kidney damage GFR <15  
ESRD - chronic treatment by dialysis or transplant  
  
  
   
   
                                                    SPECIMEN REJECTIONon   
020   
   
                      Ordered Test CDP,SED                          [email or web address] Phone:   
[phone]  
1  
   
                                        Reason for Rejection Unable to perform   
testing: Specimen   
clotted.                                                    [email or web address] Phone:   
[phone]  
1  
   
                                                    Specimen source Nom   
(Unsp spec)     .BLOOD                                          [email or web address] Phone:   
[phone]  
1  
   
                      -          NOT REPORTED                       [employer] Phone:   
[phone]  
1  
   
                                                    Sedimentation Rateon   
020   
   
                      Sed Rate   6 mm                  0 - 20 mm  [email or web address] Phone:   
[phone]  
1  
  
  
  
Vital Signs  
  
  
                      Date Time  Vital Sign Value      Performing Clinician Faci  
lity  
   
                                                    2023   
23:          Body temperature    97.52 [degF]        Ned Henry  
Work Phone:   
[phone]                           [facility]  
   
                                                    2023   
23:                              Diastolic blood   
pressure                  93 mm[Hg]                 Ned Henry  
Work Phone:   
[phone]                           [facility]  
   
                                                    2023   
23:          Heart rate          68 /min             Ned Carl  
Work Phone:   
[phone]                           [facility]  
   
                                                    2023   
23:          Hourly Rounding                         Ned Carl  
Work Phone:   
[phone]                           [facility]  
   
                                                    2023   
23:          Mean blood pressure 104 mm[Hg]          Ned Carl  
Work Phone:   
[phone]                           [facility]  
   
                                                    2023   
23:          Respiratory rate    19 /min             Ned Carl  
Work Phone:   
[phone]                           [facility]  
   
                                                    2023   
23:                              SaO2% (BldA) [Mass   
fraction]                 97 %                      Ned Carl  
Work Phone:   
[phone]                           [facility]  
   
                                                    2023   
23:                              Systolic blood   
pressure                  127 mm[Hg]                Ned Carl  
Work Phone:   
[phone]                           [facility]  
   
                                                    2023   
22:                              Diastolic blood   
pressure                  85 mm[Hg]                 Ned Carl  
Work Phone:   
[phone]                           [facility]  
   
                                                    2023   
22:          Heart rate          65 /min             Ned Carl  
Work Phone:   
[phone]                           [facility]  
   
                                                    2023   
22:          Hourly Rounding                         Ned Wattse  
Work Phone:   
[phone]                           [facility]  
   
                                                    2023   
22:          Mean blood pressure 100 mm[Hg]          Ned Carl  
Work Phone:   
[phone]                           [facility]  
   
                                                    2023   
22:          Respiratory rate    22 /min             Ned Carl  
Work Phone:   
[phone]                           [facility]  
   
                                                    2023   
22:                              Systolic blood   
pressure                  129 mm[Hg]                Ned Carl  
Work Phone:   
[phone]                           [facility]  
   
                                                    2023   
21:          Body temperature    97.7 [degF]         Ned Carl  
Work Phone:   
[phone]                           [facility]  
   
                                                    2023   
21:                              Diastolic blood   
pressure                  97 mm[Hg]                 Ned Henry  
Work Phone:   
[phone]                           [facility]  
   
                                                    2023   
21:          Heart rate          77 /min             Ned Henry  
Work Phone:   
[phone]                           [facility]  
   
                                                    2023   
21:          Mean blood pressure 110 mm[Hg]          Ned Henry  
Work Phone:   
[phone]                           [facility]  
   
                                                    2023   
21:          Respiratory rate    26 /min             Ned Henry  
Work Phone:   
[phone]                           [facility]  
   
                                                    2023   
21:                              SaO2% (BldA) [Mass   
fraction]                 96 %                      Ned Henry  
Work Phone:   
[phone]                           [facility]  
   
                                                    2023   
21:                              Systolic blood   
pressure                  135 mm[Hg]                Ned Henry  
Work Phone:   
[phone]                           [facility]  
   
                                                    2023   
20:          Hourly Rounding                         Ned Henry  
Work Phone:   
[phone]                           [facility]  
   
                                                    2023   
16:          Body temperature    98.6 [degF]         Ned Henry  
Work Phone:   
[phone]                           [facility]  
   
                                                    2023   
16:          Heart rate          89 /min             Ned Henry  
Work Phone:   
[phone]                           [facility]  
   
                                                    2023   
16:          Respiratory rate    20 /min             Ned Henry  
Work Phone:   
[phone]                           [facility]  
   
                                                    01-   
18:      Hourly Rounding                 [facility]  
   
                                                    01-   
18:      Promise to Return                 [facility]  
   
                                                    01-   
17:      Hourly Rounding                 [facility]  
   
                                                    01-   
17:      Promise to Return                 [facility]  
   
                                                    01-   
16:      Hourly Rounding                 [facility]  
   
                                                    01-   
16:      Promise to Return                 [facility]  
   
                                                    01-   
12:      Heart rate      73 /min         [facility]  
   
                                                    01-   
12:                              SaO2% (BldA) [Mass   
fraction]           96 %                [facility]  
   
                                                    01-   
12:      Body temperature 98.06 [degF]    [facility]  
   
                                                    01-   
12:      Respiratory rate 17 /min         [facility]  
   
                                                    01-   
12:                              Diastolic blood   
pressure            87 mm[Hg]           [facility]  
   
                                                    01-   
12:      Mean blood pressure 101 mm[Hg]      [facility]  
   
                                                    01-   
12:                              Systolic blood   
pressure            129 mm[Hg]          [facility]  
   
                                                    01-   
08:      Heart rate      82 /min         [facility]  
   
                                                    01-   
08:                              SaO2% (BldA) [Mass   
fraction]           100 %               [facility]  
   
                                                    01-   
08:      Body temperature 97.88 [degF]    [facility]  
   
                                                    01-   
08:      Respiratory rate 18 /min         [facility]  
   
                                                    01-   
08:                              Diastolic blood   
pressure            109 mm[Hg]          [facility]  
   
                                                    01-   
08:      Mean blood pressure 124 mm[Hg]      [facility]  
   
                                                    01-   
08:                              Systolic blood   
pressure            155 mm[Hg]          [facility]  
   
                                                    01-   
07:                              SaO2% (BldA) [Mass   
fraction]           96 %                [facility]  
   
                                                    01-   
03:                              Blood Pressure   
Location                                [facility]  
   
                                                    01-   
03:                              Diastolic blood   
pressure            71 mm[Hg]           [facility]  
   
                                                    01-   
03:      Heart rate      79 /min         [facility]  
   
                                                    01-   
03:                              Systolic blood   
pressure            127 mm[Hg]          [facility]  
   
                                                    01-   
00:                              Blood Pressure   
Location                                [facility]  
   
                                                    01-   
00:      Body temperature 97.7 [degF]     [facility]  
   
                                                    01-   
00:      Heart rate      75 /min         [facility]  
   
                                                    01-   
00:      Body temperature 98.24 [degF]    [facility]  
   
                                                    01-   
00:      Heart rate      72 /min         [facility]  
   
                                                    01-   
00:      Mean blood pressure 127 mm[Hg]      [facility]  
   
                                                    01-   
00:      Respiratory rate 14 /min         [facility]  
   
                                                    2023   
23:      Mean blood pressure 134 mm[Hg]      [facility]  
   
                                                    2023   
23:      Respiratory rate 12 /min         [facility]  
   
                                                    2023   
22:      Heart rate      82 /min         [facility]  
   
                                                    2023   
22:      Mean blood pressure 122 mm[Hg]      [facility]  
   
                                                    2023   
22:      Respiratory rate 17 /min         [facility]  
   
                                                    2023   
21:      gluc            87 mg/dL        [facility]  
   
                                                    2023   
21:      gluc                            [facility]  
   
                                                    2021   
19:      BP Diastolic    109 mm[Hg]      Matt Conti  
  
Work Phone:   
[phone]  
   
                                                    2021   
19:      BP Systolic     147 mm[Hg]      Matt Vut  
h  
Work Phone:   
[phone]  
   
                                                    2021   
18:      Pulse (Heart Rate) 76 /min         Matt Jaramillo [facility]  
Work Phone:   
[phone]  
   
                                                    2021   
18:      Pulse Oximetry  99 %            [facility]  
  
Work Phone:   
[phone]  
   
                                                    2021   
16:      BMI (Body Mass Index) 26.63 kg/m2     Matt Jaramillo  
 [facility]  
Work Phone:   
[phone]  
   
                                                    2021   
16:      Body Temperature 98.91 [degF]    Matt Jaramillo [facility]  
Work Phone:   
[phone]  
   
                                                    2021   
16:      Body weight     77.11 kg        [facility]  
Work Phone:   
[phone]  
   
                                                    2021   
16:      Respiratory Rate 16 /min         Matt Jaramillo [facility]  
Work Phone:   
[phone]  
   
                                                    2020   
09:      Body Temperature 98.8 [degF]     [facility]  
[place]   
[place]  
   
                                                    2020   
09:      BP Diastolic    94 mm[Hg]       [facility] [place]  
,   
[place]  
   
                                                    2020   
09:      BP Systolic     139 mm[Hg]      [facility] [place]  
,   
[place]  
   
                                                    2020   
09:      Pulse (Heart Rate) 87 /min         [facility]  
 [place],   
[place]  
   
                                                    2020   
09:      Pulse Oximetry  100 %           [facility] [place]  
,   
[place]  
   
                                                    2020   
09:      Respiratory Rate 17 /min         [facility]  
[place],   
[place]  
   
                                                    2020   
03:      BMI (Body Mass Index) 30.46 kg/m2     [facility]   
[place]  
   
                                                    2020   
03:      Body weight     88.22 kg        [facility]  
,   
[place]  
   
                                                    2020   
03:      Height          170.2 cm        [facility]  
,   
[place]  
   
                                                    2020   
01:      BMI (Body Mass Index) 26.62 kg/m2     [facility] Jaramillo [facility],   
[place]  
   
                                                    2020   
01:      Body weight     77.11 kg        [place]  
[place]   
[place]  
   
                                                    2020   
23:      Body Temperature 97.39 [degF]    [facility]   
[place]  
   
                                                    2020   
23:      Pulse (Heart Rate) 102 /min        [facility],   
[place]  
   
                                                    2020   
23:      Pulse Oximetry  96 %            [facility]  
,   
[place]  
   
                                                    2020   
23:      Respiratory Rate 16 /min         [facility] [facility]  [facility]   
[place]  
   
                                                    2020   
10:      BMI (Body Mass Index) 28.19 kg/m2     Lamont Crawford     [facility],   
[place]  
   
                                                    2020   
10:      Body Temperature 97.5 [degF]     [facility]   
[place]  
   
                                                    2020   
10:      Body weight     81.65 kg        [facility]  
,   
[place]  
   
                                                    2020   
10:      BP Diastolic    115 mm[Hg]      [facility]  
,   
[place]  
   
                                                    2020   
10:      BP Systolic     162 mm[Hg]      [facility]  
,   
[place]  
   
                                                    2020   
10:      Pulse (Heart Rate) 88 /min         [facility]   
[place]  
   
                                                    2020   
10:      Pulse Oximetry  98 %            [place] [facility]  
,   
[place]  
   
                                                    2020   
10:      Respiratory Rate 16 /min         [facility]  
H,   
[place]  
   
                                                    2020   
11:      Body Temperature 98.01 [degF]    Gatito Jaramillo [employer]  
Work Phone:   
[phone]  
   
                                                    2020   
11:      BP Diastolic    84 mm[Hg]       Gatito Jaramillo [facility]  
Work Phone:   
[phone]  
   
                                                    2020   
11:      BP Systolic     145 mm[Hg]      Gatito Jaramillo [employer]  
Work Phone:   
[phone]  
   
                                                    2020   
11:      Pulse (Heart Rate) 64 /min         Gatito Jaramillo [employer]  
Work Phone:   
[phone]  
   
                                                    2020   
11:      Pulse Oximetry  98 %            Gatito Brock BPTkajal [employer]  
Work Phone:   
[phone]  
   
                                                    2020   
11:      Respiratory Rate 20 /min         Gatito Jaramillo [employer]  
Work Phone:   
[phone]  
   
                                                    2020   
04:      BMI (Body Mass Index) 28.31 kg/m2     Gatito Jaramillo [facility]  
Work Phone:   
[phone]  
   
                                                    2020   
04:      Body weight     82 kg           Gatito Jaramillo [employer]  
Work Phone:   
[phone]  
   
                                                    2020   
04:      Height          170.2 cm        Gatito Jaramillo [employer]  
Work Phone:   
[phone]  
   
                                                    2020   
01:      BP Diastolic    114 mm[Hg]      Andrei Hayes    BPTy [employer]  
Work Phone:   
[phone]  
   
                                                    2020   
01:      BP Systolic     172 mm[Hg]      Andrei Brijesh    [facility]PTy [employer]  
Work Phone:   
[phone]  
   
                                                    2020   
01:      Pulse (Heart Rate) 77 /min         Andrei Kayleighbrant    [facility]  
Work Phone:   
[phone]  
   
                                                    2020   
01:      Pulse Oximetry  97 %            Andrei Jaramillo [facility]  
Work Phone:   
[phone]  
   
                                                    2020   
01:      Respiratory Rate 18 /min         Andrei Jaramillo [facility]  
Work Phone:   
[phone]  
   
                                                    02-   
14:      BMI (Body Mass Index) 28.19 kg/m2     Andrei Jaramillo [facility]  
Work Phone:   
[phone]  
   
                                                    02-   
14:      Body Temperature 98.4 [degF]     Andrei Jaramillo [facility]  
Work Phone:   
[phone]  
   
                                                    02-   
14:      Body weight     81.65 kg        Andrei Hayes    [facility] [employer]  
Work Phone:   
[phone]  
   
                                                    2020   
22:      BP Diastolic    116 mm[Hg]      [facility]  
Work Phone:   
[phone]  
   
                                                    2020   
22:      BP Systolic     168 mm[Hg]      [facility]  
Work Phone:   
[phone]  
   
                                                    2020   
22:      Pulse (Heart Rate) 77 /min         Caden Jaramillo [facility]  
Work Phone:   
[phone]  
   
                                                    2020   
22:      Pulse Oximetry  95 %            [facility] Daniel [facility]  
Work Phone:   
[phone]  
   
                                                    2020   
22:      Respiratory Rate 20 /min         Caden Jaramillo [facility]  
Work Phone:   
[phone]  
   
                                                    2020   
15:      BMI (Body Mass Index) 28.19 kg/m2     [facility]  
Work Phone:   
[phone]  
   
                                                    2020   
15:      Body Temperature 99.7 [degF]     Caden Jaramillo [facility]  
Work Phone:   
[phone]  
   
                                                    2020   
15:      Body weight     81.65 kg        [facility]  
Work Phone:   
[phone]  
   
                                                    2020   
15:50050      Height          170.2 cm        Caden Jaramillo [employer]  
Work Phone:   
[phone]  
  
  
  
Encounters  
  
  
                          Encounter Date Encounter Type Care Provider Facility  
   
                                                    Start: 2024  
End: 2024     ambulatory          HENRY KOJO MCCAIN  [facility]  
   
                                                    Start: 10-  
End: 10-     ambulatory          ETHAN PEREZKAJAL      [facility]  
   
                                                    Start: 10-  
End: 10-                         Evaluation and   
management of inpatient                 Goitom Andom Grace SMITH                                      Facility:[facility]  
   
                                                    Start: 10-  
End: 10-           ambulatory                Martin Tello                             Facility:[facility]  
   
                                                    Start: 10-  
End: 10-           ambulatory                MD Matt Castellon  
Work Phone:   
[phone]                          [facility]  
Work Phone:   
[phone]  
   
                                                    Start: 10-  
End: 10-           Discharged Recurring      MD Matt Castellon  
Work Phone:   
[phone]                          [facility]   
Therapy - O/P  
Work Phone:   
[phone]  
   
                                                    Start: 2023  
End: 2023                         Evaluation and   
management of inpatient   Luis Alberto Castellon MD     Facility:[facility]  
   
                          Start: 2023 ambulatory   [facility]  
   
                                                    Start: 2023  
End: 2023     ambulatory          [facility]  
   
                                                    Start: 2023  
End: 2023           ambulatory                [facility]                               Facility:  
   
                                                    Start: 2023  
End: 2023     ambulatory          [facility]  
   
                          Start: 2023 ambulatory   [facility]  
   
                                        Start: 2023   Encounter for   
preprocedural laboratory   
examination               [facility]  
   
                                                    Start: 2023  
End: 2023                         Emergency department   
patient visit             Ned Henry              Facility:[facility]  
   
                                                    Start: 2023  
End: 2023                         Emergency department   
patient visit                           Ned Henry  
Work Phone:   
[phone]                           [facility]  
Work Phone:   
[phone]  
   
                                                    Start: 2023  
End: 2023           ambulatory                Henry Mccain MD                                      Facility:[facility]  
   
                                Start: 2023 ambulatory      [facility]                               Facility:  
   
                                                    Start: 01-  
End: 01-                         Evaluation and   
management of inpatient   Yobani HILL             Facility:[facility]  
   
                                                    Start: 2023  
End: 01-                         Evaluation and   
management of inpatient   Yobani BELLAMY SEGRIO             [facility]  
Work Phone:   
[phone]  
   
                                                    Start: 2022  
End: 2022     ambulatory          MONIKA AVALOS        Facility:  
   
                                                    Start: 2021  
End: 2021     ambulatory          ANDREI HAYES        Facility:[facility]  
   
                                                    Start: 2021  
End: 2021                         Emergency department   
patient visit             [facility]  
   
                                                    Start: 2021  
End: 2021                         Emergency department   
patient visit             [facility]   
ED  
   
                                        Comment on above:   Chronic left hip ammon  
n (Primary Dx)   
   
                                                    Start: 2020  
End: 2020                         Evaluation and   
management of inpatient   KENYON HEALY           [facility]  
   
                                                    Start: 2020  
End: 2020                         Evaluation and   
management of inpatient                 Kenyon Healy  
Work Phone:   
[phone]                          [address] Onc/Med Surg  
   
                                                    Start: 2020  
End: 2020                         Emergency department   
patient visit             [facility]  
   
                                                    Start: 2020  
End: 2020                         Emergency department   
patient visit                           Caden Salgado  
Work Phone:   
[phone]                          [facility]   
ED  
   
                                        Comment on above:   Acute deep vein thro  
mbosis (DVT) of brachial vein of right   
upper   
extremity (HCC) (Primary Dx)   
   
                                                    Start: 2020  
End: 2020                         Emergency department   
patient visit             LAMONT CRAWFORD               [facility]  
   
                                                    Start: 2020  
End: 2020                         Emergency department   
patient visit                           Lamont Crawford  
Work Phone:   
[phone]                          [facility]   
ED  
   
                                                    Start: 2020  
End: 2020                         Evaluation and   
management of inpatient   GATITO BROCK    [facility]  
   
                                                    Start: 2020  
End: 2020                         Evaluation and   
management of inpatient                 Gatito Brock  
Work Phone:   
[phone]                          [address] Neuro  
   
                                                    Start: 02-  
End: 2020                         Emergency department   
patient visit             Andrei Hayes              [facility]   
ED  
   
                                        Comment on above:   Multiple sclerosis (  
HCC) (Primary Dx)   
   
                                                    Start: 2020  
End: 2020                         Emergency department   
patient visit             Caden GALLARDO María     [facility]   
ED  
   
                                        Comment on above:   Multiple sclerosis (  
HCC) (Primary Dx)   
   
                                        Start: 2019   Patient encounter   
procedure                 UNKNOWN PROVIDER          Facility:[facility]  
  
  
  
Procedures  
  
  
                          Date         Procedure    Procedure Detail Performing   
Clinician  
   
                                        Start: 2021   Magnetic resonance   
imaging                                             Yobani HILL  
   
                                        Comment on above:   W/ SEDATION   
   
                          Start: 2020 DISCHARGE PATIENT              ASHANTI  
DO BROCK  
   
                                        Start: 2020   PULSE OXIMETRY,   
CONTINUOUS                                          GATITO BROCK  
   
                                        Start: 2020   PULSE OXIMETRY,   
CONTINUOUS                                          GATITO BROCK  
   
                                        Start: 2020   INITIATE OXYGEN THER  
APY   
PROTOCOL                                            GATITO BROCK  
   
                                        Start: 2020   PULSE OXIMETRY,   
CONTINUOUS                                          GATITO BROCK  
   
                                        Start: 2020   PULSE OXIMETRY,   
CONTINUOUS                                          GATITO BROCK  
   
                          Start: 2020 INTAKE AND OUTPUT              ASHANTI  
DO BROCK  
   
                                        Start: 2020   PULSE OXIMETRY,   
CONTINUOUS                                          GATITO BROCK  
   
                                        Start: 2020   PULSE OXIMETRY,   
CONTINUOUS                                          GATITO BROCK  
   
                          Start: 2020 IP CONSULT TO IV TEAM              E  
DUARDO BROCK  
   
                                        Start: 2020   PULSE OXIMETRY,   
CONTINUOUS                                          GATITO BROCK  
   
                                        Start: 2020   PULSE OXIMETRY,   
CONTINUOUS                                          GATITO BROCK  
   
                                        Start: 2020   INITIATE OXYGEN THER  
APY   
PROTOCOL                                            GATITO BROCK  
   
                                        Start: 2020   PULSE OXIMETRY,   
CONTINUOUS                                          GATITO BROCK  
   
                                        Start: 2020   Blood count complete  
   
automated                                           GATITO BROCK  
   
                                        Start: 2020   Comprehensive metabo  
lic   
panel                                               GATITO BROCK  
   
                                        Start: 2020   IP CONSULT TO VASCUL  
AR   
SURGERY                                             GATITO BROCK  
   
                          Start: 2020 IP CONSULT TO IV TEAM              E  
KLEBER BROCK  
   
                                        Start: 2020   ADVANCE DIET AS   
TOLERATED (NURSING   
COMMUNICATION)                                      GATITO BROCK  
   
                                        Start: 2020   Cul bact xcpt urine   
blood/stool aerobic isol                            GATITO BROCK  
   
                          Start: 2020 DAILY WEIGHTS              GATITO WHITEHEAD  
   
                          Start: 2020 DIET GENERAL              GATITO LANGERON  
   
                          Start: 2020 ELEVATE EXTREMITY              ASHANTI  
DO BROCK  
   
                          Start: 2020 FULL CODE                 GATITO LANGERON  
   
                                        Start: 2020   INITIATE OXYGEN THER  
APY   
PROTOCOL                                            GATITO BROCK  
   
                          Start: 2020 INTAKE AND OUTPUT              ASHANTI GREEN  
   
                                        Start: 2020   NOTIFY PHYSICIAN   
(SPECIFY)                                           GATITO BROCK  
   
                                        Start: 2020   PLACE INTERMITTENT   
PNEUMATIC COMPRESSION   
DEVICE                                              GATITO BROCK  
   
                                        Start: 2020   PULSE OXIMETRY,   
CONTINUOUS                                          GATITO BROCK  
   
                                        Start: 2020   Smr prim src gram/gi  
emsa   
stain bct fungi/cell                                GATITO BROCK  
   
                                        Start: 2020   TOBACCO CESSATION   
EDUCATION                                           GATITO BROCK  
   
                          Start: 2020 VITAL SIGNS               GATTIO LANGERON  
   
                                        Start: 2020   BASIC METABOLIC PANE  
L W/   
REFLEX TO MG FOR LOW K                              Gregoria Chong Vincentandrew  
Work Phone:   
[phone]  
   
                                        Start: 2020   Blood count complete  
   
automated                                           Gregoria Chong Vincentandrew  
Work Phone:   
[phone]  
   
                          Start: 2020 PATIENT STATUS (DIRECT)               
 GATITO BROCK  
   
                          Start: 2020 PATIENT STATUS (DIRECT)               
 GATITO BROCK  
   
                                        Start: 2020   Basic metabolic pane  
l   
calcium total                                       MATT VISHAL CANDE  
   
                                        Start: 2020   Blood count complete  
   
auto&auto difrntl wbc                               MATT COLETTEAND CANDE  
   
                                        Start: 2020   Urnls dip stick/tabl  
et   
rgnt auto w/o microscopy                            MATT VISHAL CANDE  
   
                          Start: 2020 DISCHARGE PATIENT              ASHANTI ALMANZA BROCK  
   
                                        Start: 2020   IP CONSULT TO HOME C  
ARE   
NEEDS                                               GATITOYOHANNES BROCK  
   
                                        Start: 2020   INITIATE OXYGEN THER  
APY   
PROTOCOL                                            GATITOYOHANNES LITTLEON  
   
                          Start: 2020 DIET NPO, NOW              GATITO C  
ALDERON  
   
                          Start: 2020 Assay of magnesium              HERB LITTLEON  
   
                                        Start: 2020   Blood count complete  
   
auto&auto difrntl wbc                               GATITO LITTLEON  
   
                                        Start: 2020   Comprehensive metabo  
lic   
panel                                               GATITO BROCK  
   
                          Start: 2020 Assay of magnesium              Moha  
mmad Jack  
Work Phone:   
[phone]  
   
                                        Start: 2020   BASIC METABOLIC PANE  
L W/   
REFLEX TO MG FOR LOW K                              Mohammad Jack  
Work Phone:   
[phone]  
   
                                        Start: 2020   Blood count complete  
   
auto&auto difrntl wbc                               Mohammad Jack  
Work Phone:   
[phone]  
   
                          Start: 2020 INTAKE AND OUTPUT              ASHANTI  
DO LINTONBROCK  
   
                                        Start: 2020   PLACE INTERMITTENT   
PNEUMATIC COMPRESSION   
DEVICE                                              GATITO LINTONDERON  
   
                          Start: 2020 IP CONSULT TO GI              BECKIE BROCK  
   
                                        Start: 2020   IP CONSULT TO INTERN  
AL   
MEDICINE                                            GATITO LITTLEON  
   
                                        Start: 2020   INITIATE OXYGEN THER  
APY   
PROTOCOL                                            GATITO BROCK  
   
                                        Start: 2020   Blood count complete  
   
auto&auto difrntl wbc                               GATITO BROCK  
   
                                        Start: 2020   Comprehensive metabo  
lic   
panel                                               GATITO BROCK  
   
                                        Start: 2020   BASIC METABOLIC PANE  
L W/   
REFLEX TO MG FOR LOW K                              Mohammad Jack  
Work Phone:   
[phone]  
   
                                        Start: 2020   Blood count complete  
   
auto&auto difrntl wbc                               Mohammad Jack  
Work Phone:   
[phone]  
   
                          Start: 2020 INTAKE AND OUTPUT              ASHANTI  
DO BROCK  
   
                                        Start: 2020   Mri brain brain stem  
 w/o   
w/contrast material                                 GATITO LITTLEON  
   
                                        Start: 2020   Mri spinal canal   
cervical w/o & w/contr   
matrl                                               GATITO BROCK  
   
                                        Start: 2020   Mri spinal canal   
thoracic w/o & w/contr   
matrl                                               GATITO BROCK  
   
                                        Start: 2020   Mri brain brain stem  
 w/o   
w/contrast material                                 Edel Santiago  
Work Phone:   
[phone]  
   
                                        Start: 2020   Mri spinal canal   
cervical w/o & w/contr   
matrl                                               Edel Santiago  
Work Phone:   
[phone]  
   
                                        Start: 2020   Mri spinal canal   
thoracic w/o & w/contr   
matrl                                               Kandace Del Rio  
Work Phone:   
[phone]  
   
                                        Start: 2020   Swallowing funcj   
w/cineradiograpy/vidradi  
og                                                  GATITO BROCK  
   
                          Start: 2020 OT EVAL AND TREAT              ASHANTI  
DO BROCK  
   
                          Start: 2020 PT EVAL AND TREAT              ASHANTI  
DO BROCK  
   
                                        Start: 2020   Swallowing funcj   
w/cineradiograpy/vidradi  
og                                                  Edel Santiago  
Work Phone:   
[phone]  
   
                                        Start: 2020   INITIATE OXYGEN THER  
APY   
PROTOCOL                                            GATITO BROCK  
   
                                        Start: 2020   Blood count complete  
   
auto&auto difrntl wbc                               GATITO BROCK  
   
                                        Start: 2020   Comprehensive metabo  
lic   
panel                                               GATITO BROCK  
   
                          Start: 2020 ELEVATE EXTREMITY              ASHANTI  
DO BROCK  
   
                          Start: 2020 ELEVATE HOB               GATITO CA  
LDERON  
   
                                        Start: 2020   ENCOURAGE DEEP BREAT  
PABLO   
AND COUGHING                                        GATITO BROCK  
   
                                        Start: 2020   INITIATE OXYGEN THER  
APY   
PROTOCOL                                            GATITO BROCK  
   
                                        Start: 2020   IP CONSULT TO SOCIAL  
   
WORK                                                GATITO BROCK  
   
                          Start: 2020 OT EVAL AND TREAT              ASHANTI  
DO BROCK  
   
                                        Start: 2020   PULSE OXIMETRY SPOT   
CHECK                                               GATITO BROCK  
   
                                        Start: 2020   REASON FOR NO MECHAN  
ICAL   
VTE PROPHYLAXIS                                     GATITO BROCK  
   
                          Start: 2020 DAILY WEIGHTS              GATTIO VARMA  
ALDERON  
   
                          Start: 2020 FULL CODE                 GATITO CA  
LDERON  
   
                          Start: 2020 INTAKE AND OUTPUT              ASHANTI  
DO BROCK  
   
                                        Start: 2020   NOTIFY PHYSICIAN   
(SPECIFY)                                           GATITO BROCK  
   
                          Start: 2020 VITAL SIGNS               GATITO MEDINA  
LDERON  
   
                                        Start: 2020   BASIC METABOLIC PANE  
L W/   
REFLEX TO MG FOR LOW K                              Edel Santiago  
Work Phone:   
[phone]  
   
                                        Start: 2020   Blood count complete  
   
auto&auto difrntl wbc                               Edel Santiago  
Work Phone:   
[phone]  
   
                                        Start: 02-   Urnls dip stick/tabl  
et   
rgnt auto w/o microscopy                            Yanna Michaelth  
Work Phone:   
[phone]  
   
                          Start: 02- PATIENT STATUS (DIRECT)               
 G[facility]O BROCK  
   
                          Start: 02- Assay of magnesium              Johnathon  
s P Delvin  
Work Phone:   
[phone]  
   
                                        Start: 02-   BASIC METABOLIC PANE  
L W/   
REFLEX TO MG FOR LOW K                              Yanna P Delvin  
Work Phone:   
[phone]  
   
                                        Start: 02-   Blood count complete  
   
auto&auto difrntl wbc                               Yanna P Delvin  
Work Phone:   
[phone]  
   
                          Start: 02- C-reactive protein              Johnathon  
s P Delvin  
Work Phone:   
[phone]  
   
                                        Start: 02-   Sedimentation rate r  
bc   
automated                                           Yanna Hargrove  
Work Phone:   
[phone]  
   
                                        Start: 2020   Ct head/brain w/o   
contrast material                                   Lionel Crawford  
Work Phone:   
[phone]  
   
                                        Start: 2020   Blood count complete  
   
auto&auto difrntl wbc                               Caden Daniel  
   
                                        Start: 2020   IMMATURE PLATELET   
FRACTION                                            Caden Daniel  
   
                                        Start: 2020   Sedimentation rate r  
bc   
automated                                           Caden Daniel  
   
                                        Start: 2020   Basic metabolic pane  
l   
calcium total                                       Caden Daniel  
   
                          Start: 2020 SPECIMEN REJECTION              Sam  
clayton Daniel  
   
                                        Start: 2020   Magnetic resonance   
imaging                                             Yobani HILL  
   
                                        Comment on above:   with sedation   
   
                          Start: 2019 MRI arthrography              Yobani HILL  
   
                                        Comment on above:   MRI W/ SEDATION   
   
                                       Back fusion               Yobani HILL  
   
                                                            Decompression of med  
gennaro   
nerve                                               Yobani HILL  
   
                                                            [place] filter,   
device (physical object)                            Yobani HILL  
   
                                                H/O: surgery    S/P dilatation o  
f   
esophageal stricture                    MD Matt Castellon  
Work Phone:   
[phone]  
   
                                       Hysterectomy              Yobani SERGIO  
  
  
  
Plan of Treatment  
  
  
                          Date         Care Activity Detail       Author  
   
                                Start: 2023 Shingles Vaccine (1 of 2) Shin  
gles Vaccine (1 of   
2)                                      [facility] [employer] Phone:   
[phone]  
   
                          Start: 2021 Creatinine measurement Creatinine mo  
[place]  
   
                          Start: 2021 Potassium monitoring Potassium monit  
[place], [place]  
   
                          Start: 2021 Creatinine measurement Creatinine mo  
[place]  
   
                          Start: 2021 Creatinine monitoring Creatinine mon  
[place], [place]  
   
                          Start: 2021 Potassium monitoring Potassium monit  
[place]  
   
                          Start: 2021 Creatinine monitoring Creatinine mon  
[facility]  
[employer] Phone:   
[phone]  
   
                          Start: 2021 Potassium monitoring Potassium monit  
[facility]  
Work Phone:   
[phone]  
   
                          Start: 2020 Influenza vaccination              M  
[place], [place]  
   
                                Start: 2020 Hospital Encounter 2020   
Hospital   
Encounter Kenyon Healy MD [address]   
[place]   
[address] [phone] (Fax)                      [place], [place]  
   
                          Start: 2019 Influenza vaccination Flu vaccine (#  
1) [facility] [employer] Phone:   
[phone]  
   
                          Start: 2017 Diabetes screen Diabetes screen [facility] [employer] Phone:   
[phone]  
   
                          Start: 2016 Lipid panel  Lipid screen [place]  
   
                          Start: 2016 Lipid screen Lipid screen [facility]  
Work Phone:   
[phone]  
   
                          Start: 1994 Cervical cancer screen Cervical canc  
er screen [facility]  
[employer] Phone:   
[phone]  
   
                                        Start: 1994   Screening for malign  
ant   
neoplasm of cervix        Cervical cancer screen    [place], [place]  
   
                                        Start: 1992   DTaP/Tdap/Td vaccine  
 (1 -   
Tdap)                                   DTaP/Tdap/Td vaccine (1   
- Tdap)                                 [place]  
   
                          Start: 1988 HIV screen   HIV screen   [facility]  
Work Phone:   
[phone]  
   
                          Start: 1988 HIV screening [facility]  
[place], [place]  
   
                                        Start: 1984   DTaP/Tdap/Td vaccine  
 (1 -   
Tdap)                                   DTaP/Tdap/Td vaccine (1   
- Tdap)                                 [facility]  
[employer] Phone:   
[phone]  
   
                                                      
End: 2020                         Basic metabolic 2000   
panel                                   Basic Metabolic Panel   
Lab STAT One Time for 1   
Occurrences starting   
2020 until   
2020                              [place]  
   
                                        Comment on above:   One Time for 1 Occur  
rences starting 2020 until   
2020   
   
                                                            Basic Metabolic Pane  
l w/   
Reflex to MG                            Basic Metabolic Panel w/   
Reflex to MG Lab Routine   
Daily until discontinued   
starting 2020, 3   
completed                               [facility]   
[phone]  
   
                                        Comment on above:   Daily until disconti  
nued starting 2020, 3 completed   
   
                                                CBC auto differential CBC auto d  
ifferential   
Lab Routine Daily until   
discontinued starting   
2020, 3 completed                 [email or web address] Phone:   
[phone]  
   
                                        Comment on above:   Daily until disconti  
nued starting 2020, 3 completed   
   
                                                      
End: 2020           CBC Auto Differential     CBC Auto Differential   
Lab STAT One Time for 1   
Occurrences starting   
2020 until   
2020                              [place]  
   
                                        Comment on above:   One Time for 1 Occur  
rences starting 2020 until   
2020   
   
                                                      
End: 2020           CRP [Mass/Vol]            C-Reactive Protein Lab   
STAT One Time for 1   
Occurrences starting   
2020 until   
2020                              [email or web address] Phone:   
[phone]  
   
                                        Comment on above:   One Time for 1 Occur  
rences starting 2020 until   
2020   
   
                                                CRP [Mass/Vol]  C-Reactive Prote  
in Lab   
STAT 2020 5:15 PM   
EST                                     [facility] [employer] Phone:   
[phone]  
   
                                                            Initiate Oxygen Ther  
apy   
Protocol                                            [email or web address] Phone:   
[phone]  
   
                                        Comment on above:   Daily until disconti  
nued starting 2020   
   
                                                            Daily until disconti  
nued starting 2020   
   
                                                      
End: 2020           Pulse Oximetry Spot Check Pulse Oximetry Spot   
Check Respiratory Care   
Routine One Time for 1   
Occurrences starting   
2020 until   
2020                              [email or web address] Phone:   
[phone]  
   
                                        Comment on above:   One Time for 1 Occur  
rences starting 2020 until   
2020   
   
                                                            Pulse oximetry,   
continuous                              Pulse oximetry,   
continuous Respiratory   
Care Routine Every 4hr   
until discontinued   
starting 2020                     [facility],   
[place]  
   
                                        Comment on above:   Every 4hr until disc  
ontinued starting 2020   
   
                                                      
End: 2020                         Urinalysis Reflex to   
Culture                                 Urinalysis Reflex to   
Culture Lab STAT One   
Time for 1 Occurrences   
starting 2020   
until 2020                        [facility],   
[place]  
   
                                        Comment on above:   One Time for 1 Occur  
rences starting 2020 until   
2020   
   
                                                      
End: 2020           Wound Culture             Wound Culture   
Microbiology Routine One   
Time for 1 Occurrences   
starting 2020   
until 2020                        [facility],   
[place]  
   
                                        Comment on above:   One Time for 1 Occur  
rences starting 2020 until   
2020   
   
                                                      
End: 2020           Wound Gram stain          Wound Gram stain   
Microbiology Routine One   
Time for 1 Occurrences   
starting 2020   
until 2020                        [facility],   
[place]  
   
                                        Comment on above:   One Time for 1 Occur  
rences starting 2020 until   
2020   
  
  
  
Payers  
  
  
                          Date         Payer Category Payer        Policy ID  
   
                          2023   Unknown                     
   
                                2017      Unknown         [facility]  
[facility]   
MEDICAID xxxxxxxxxxx   
2017-Present [id number]   
CLAIMS DEPARTMENT [address]   
[place] [address]                        xxxxxxxxxxx   
[id number]  
[id number]  
   
                          1973   Unknown                   [id number]   
[id number]  
   
                          1973   Unknown                   [id number]   
[id number]  
   
                          1973   Unknown                   [id number]   
[id number]  
   
                          1973   Unknown                   [id number]   
[id number]  
   
                          1973   Unknown                   [id number]   
[id number]  
   
                          1973   Unknown                   [id number]   
[id number]  
   
                          1973   Unknown                   [id number]   
[id number]  
   
                          1973   Unknown                   [id number]   
[id number]  
   
                          1973   Unknown                   [id number]   
[id number]  
   
                          1973   Unknown                   [id number]   
[id number]  
   
                          1973   Unknown                   [id number]   
[id number]  
   
                          1973   Unknown                   [id number]   
[id number]  
   
                          1973   Unknown                   [id number]   
[id number]  
   
                          1973   Unknown                   [id number]   
[id number]  
   
                          1973   Unknown                   [id number]   
[id number]  
   
                          1973   Unknown                   [id number]   
[id number]  
   
                          1960   Self-pay                  [id number]  
   
                          1960   Unknown                   [id number]  
   
                          1960   Unknown                   [id number]  
   
                                       Self-pay     Self Pay     [id number]  
[id number]  
[id number]  
  
  
  
Social History  
  
  
                          Date         Type         Detail       Facility  
   
                                                    Start: 2018  
End: 2020                         Tobacco smoking status   
[facility]                                    Current every day   
smoker                                  [email or web address] Phone:   
[phone]  
   
                                       History of tobacco use Cigarette Smoker M  
[employer] Phone:   
[phone]  
   
                                                    Start: 2018  
End: 2021                         Cigarettes smoked   
current (pack per day)   
- Reported                                          [email or web address] Phone:   
[phone]  
   
                                                    Start: 2018  
End: 2021           Alcohol intake            Current non-drinker of   
alcohol (finding)                       [email or web address] Phone:   
[phone]  
   
                                       Sex Assigned At Birth Not on file  [email or web address] Phone:   
[phone]  
   
                                                    Start: 2020  
End: 2021                         Tobacco smoking status   
[facility]IS                      Former smoker             [facility]  
   
                                                            Exposure to SARS-CoV  
-2   
(event)                   Unable to assess          [facility]  
   
                                        Start: 2021   Tobacco use and   
exposure                  Former user               [email or web address] Phone:   
[phone]  
   
                                                      
End: 2016           History of tobacco use    User of smokeless   
tobacco                                 [email or web address] Phone:   
[phone]  
   
                                                            Exposure to SARS-CoV  
-2   
(event)                   Not sure                  [email or web address] Phone:   
[phone]  
   
                                Start: 2021 Tobacco smoking status Heavy t  
obacco smoker   
(finding)                               [facility]  
   
                                       Sex Assigned At Birth Female       [facility]  
   
                                        Start: 2023   [facility] stat  
[facility]          [facility]  
   
                          Start: 1975 Sex Assigned At Birth Female       F  
[facility]  
  
  
  
Functional Status  
  
  
                          Date         Assessment   Result       Facility  
   
                          2023   Functional Status N/A          [facility]  
   
                          01-   Functional Status No           [facility]  
   
                          2023   Functional Status              [facility]  
  
  
  
Clinical Notes 01- to 10-  
  
  
                                Note Date & Type Note            Facility  
   
                                        10- Note     Chief Complaint: lbp  
History of MS  
HPI  
When did this problem begin: Long   
time  
Timing/frequency of occurrence:   
Constant  
Pain description: dull ache  
Pain severity: 8  
Radicular pain: No  
Numbness/tingling: Yes from MS  
Pain is getting: gradually   
worsening  
Weakness: No  
What improves symptoms: Rest  
What makes symptoms worse:   
Activity  
Gait disturbance: No  
Fine hand dexterity problem: No  
Previous treatment for this   
problem: L5-S1 fusion 2 years ago  
ROS  
Constitutional:  
Fatigue: No  
Weight loss: No  
Fever: No  
Chills: No  
Past Surgical History:  
Procedure Laterality Date  
BACK SURGERY  
CAGE IMPLANTED  
CARPAL TUNNEL RELEASE  
CERVICAL FUSION  
 SECTION, LOW TRANSVERSE  
COLONOSCOPY  
HYSTERECTOMY  
PORTACATH PLACEMENT  
MULTIPLE PORTS PLACED.  
SPINAL CORD STIMULATOR IMPLANT  
AND REMOVAL  
TONSILLECTOMY  
UPPER GASTROINTESTINAL ENDOSCOPY  
Past Medical History:  
Diagnosis Date  
Chronic pain disorder  
NECK / LOWER BACK  
Diabetes mellitus (CMS/HCC)  
DVT (deep venous thrombosis)   
(CMS/[facility])  
GERD (gastroesophageal reflux   
disease)  
GI (gastrointestinal bleed)  
Hypertension  
Left-sided weakness  
Multiple sclerosis (CMS/[facility])  
Munchausen syndrome  
PTSD (post-traumatic stress   
disorder)  
Pulmonary embolism (CMS/[facility])  
S/P IVC filter  
Substance abuse (CMS/[facility])  
Past Surgical History:  
Procedure Laterality Date  
BACK SURGERY  
CAGE IMPLANTED  
CARPAL TUNNEL RELEASE  
CERVICAL FUSION  
 SECTION, LOW TRANSVERSE  
COLONOSCOPY  
HYSTERECTOMY  
PORTACATH PLACEMENT  
MULTIPLE PORTS PLACED.  
SPINAL CORD STIMULATOR IMPLANT  
AND REMOVAL  
TONSILLECTOMY  
UPPER GASTROINTESTINAL ENDOSCOPY  
Allergies  
Allergen Reactions  
Blood-Group Specific Substance   
Hives  
Welts and hives to FFP  
Welts and hives to FFP  
Iodides Hives and Shortness of   
breath  
MRI  
Pantoprazole Anaphylaxis and Hives  
Other reaction(s): Hives, other,   
Rash  
Patient denies hx throat   
swelling/SOB with this. Tolerates   
with benadryl.  
Patient denies hx throat   
swelling/SOB with this. Tolerates   
with benadryl.  
Patient denies hx throat   
swelling/SOB with this. Tolerates   
with benadryl.  
pt on protonix iv currently-pt   
states is ok as long as benedryl   
is given  
Penicillins Anaphylaxis, Hives and   
Nausea And Vomiting  
Other reaction(s): other  
Methylprednisolone Rash  
Other reaction(s): hives  
Adhesive Itching  
Adhesive Tape-Silicones Itching  
Atropine Hives  
Dye  
MRI and CT contrast  
Famotidine  
Other reaction(s): Hives  
Ketorolac Hives  
Other reaction(s): other  
Tolerates ibuprofen  
Metoclopramide Hcl Hives and   
Itching  
Nalbuphine Hives  
Other reaction(s): itching  
Ondansetron Nausea Only and Nausea   
And Vomiting  
Other reaction(s): Hives  
Other reaction(s): Nausea And   
Vomiting  
Current Outpatient Medications:  
amLODIPine (Norvasc) 10 mg tablet,   
Take 10 mg by mouth in the   
morning., Disp:  
, Rfl:  
LORazepam (Ativan) 1 mg tablet,   
Take 1 mg by mouth every 6 (six)   
hours if  
needed., Disp: , Rfl:  
pregabalin (Lyrica) 100 mg   
capsule, Take 100 mg by mouth   
every 4 (four) hours  
if needed., Disp: , Rfl:  
Premarin 0.9 mg tablet, Take 0.9   
mg by mouth in the morning., Disp:   
, Rfl:  
traMADol (Ultram) 50 mg tablet,   
Take 150 mg by mouth every 6 (six)   
hours if  
needed., Disp: , Rfl:  
cyclobenzaprine (Flexeril) 5 mg   
tablet, Take 1 tablet (5 mg) by   
mouth if  
needed in the morning, at noon,   
and at bedtime for muscle spasms.,   
Disp: 30  
tablet, Rfl: 0  
Social History  
Socioeconomic History  
Marital status:   
Spouse name: Not on file  
Number of children: Not on file  
Years of education: Not on file  
Highest education level: Not on   
file  
Occupational History  
Not on file  
Tobacco Use  
Smoking status: Every Day  
Packs/day: 1.00  
Types: Cigarettes  
Smokeless tobacco: Never  
Vaping Use  
Vaping Use: Never used  
Substance and Sexual Activity  
Alcohol use: Not Currently  
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   
20s  
Drug use: Yes  
Types: Marijuana  
Comment: GUMMIES  
Sexual activity: Not on file  
Other Topics Concern  
Not on file  
Social History Narrative  
Not on file  
Social Determinants of Health  
Financial Resource Strain: Not on   
file  
Food Insecurity: Not on file  
Transportation Needs: Not on file  
Physical Activity: Not on file  
Stress: Not on file  
Social Connections: Not on file  
Intimate Partner Violence: Not At   
Risk (10/20/2023)  
Humiliation, Afraid, Rape, and   
Kick questionnaire  
Fear of Current or Ex-Partner: No  
Emotionally Abused: No  
Physically Abused: No  
Sexually Abused: No  
Housing Stability: Not on file  
No family history on file.  
Physical Exam  
There were no vitals taken for   
this visit.  
Musculoskeletal  
Ortho spine musculoskeletal   
examination:  
Alignment spine: normal  
Tenderness: lumbar paraspinal  
Range of motion Cervical spine:   
normal  
Range of motion lumbar spine:   
limited  
\  
Neurological  
Biceps strength: 5  
Wrist extension: 5  
Triceps strength: 5  
Finger flexor: 5  
Finger abduction strength: 5  
Flexion at the hip (more content   
not included)...                        [facility]  
   
                                        10- Note     Admission Informatio  
n  
Patient:  
Cantu, Nichole Lynn  
:  
1975  
Date of Admission:  
10/11/2023 19:54:00  
Date of Discharge:  
10/18/2023 13:09:00  
Code Status:  
Full Resuscitation  
PCP:  
Luis Alberto Castellon MD  
Consult:  
Henry Mccain MD  
Follow Up with Provider:  
With:  
Address:  
When:  
Henry Mccain MD  
[address] [place] [address]  
[phone]  
Within 1 month  
With:  
Address:  
When:  
Luis Alberto Castellon MD  
[address] [place] [id number]  
[phone]  
Within 1 to 2 weeks  
Brief Hospital Course Summary:  
This is a 48-year-old female with   
a history of multiple sclerosis on   
ozanimod, avascular necrosis of   
the left hip, DVT, Lakesha-Skinner   
tear, multiple gastric ulcers,   
chronic pain, drug-seeking   
behavior, factitious disorder and   
Munchhausen syndrome who presents   
the ED complaining of relapsing   
multiple sclerosis symptoms for   
the last 3 days. In the ED vital   
signs were stable. UA shows a WBC   
of 1 and RBC of 0. ED physician   
spoke with neurologist on-call Dr. Mccain. He recommends IV   
methylprednisone 1 g. Patient   
received 4 days of 1 g daily   
methylprednisolone per neurology.   
Patient symptoms improved.   
Additionally a left upper arm   
venous duplex was completed to   
rule out DVT and CT angio ruled   
out pulmonary embolism while   
hospitalized. Plan of care was   
discussed with patient she   
verbalized understanding and   
denied any further questions at   
this time. The patient was advised   
to follow-up with Dr. Mccain   
within 1 to 2 weeks of discharge.   
Dr. Mccain assessed the patient   
on 10/17 and recommended another   
MRI of brain. The patient refused   
MRI on 10/18 and requested several   
anxiolytics and pain   
medication/sedation prior to MRI   
exam. The patient was explained   
that she can obtain MRI   
outpatient. The patient spoke to   
Dr. Mccain on the phone   
herself. The patient is agreeable   
for discharge at this time.  
Assessment:  
Relapsing multiple sclerosis   
symptoms  
History of avascular necrosis of   
the left hip  
History of deep vein thrombosis  
History of Lakesha-Skinner tear and   
multiple gastric ulcers  
Chronic pain history of   
drug-seeking behavior, factitious   
disorder and Munchhausen syndrome  
refused to discharge. Medically   
stable for discharge  
Medications New Medications  
[id number], [address], [place] [id number], [phone]  
DULoxetine (DULoxetine 30 mg oral   
delayed release capsule) 30   
Milligram Oral (given by mouth) 2   
times a day for 30 Days. Refills:   
0.  
Last Dose:____________________  
Medications That Were Updated -   
Follow Current Instructions  
[id number], [address], [place] [id number], [phone]  
Current pregabalin (Lyrica 150 mg   
oral capsule) 150 Milligram Oral   
(given by mouth) 3 times a day for   
30 Days. Refills: 0.  
Last Dose:____________________  
Medications that have not changed  
Other Medications  
amLODIPine (amLODIPine 10 mg oral   
tablet) 1 Tabs Oral (given by   
mouth) once a day (in the   
morning).  
Last Dose:____________________  
conjugated estrogens (Premarin   
0.625 mg oral tablet) 1 Tabs Oral   
(given by mouth) once a day (in   
the morning).  
Last Dose:____________________  
DME Refills: 0.  
Last Dose:____________________  
LORazepam (LORazepam 1 mg oral   
tablet) 1 Tabs Oral (given by   
mouth) 3 times a day. pt takes   
scheduled.  
Last Dose:____________________  
oxyCODONE-acetaminophen   
(oxyCODONE-acetaminophen 10 mg-325   
mg oral tablet) 1 Tabs Oral (given   
by mouth) 4 times a day as needed   
as needed for pain.  
Last Dose:____________________  
ozanimod (Zeposia 0.92 mg oral   
capsule) 1 Capsules Oral (given by   
mouth) every day.  
Last Dose:____________________  
traMADol (traMADol 50 mg oral   
tablet) 1 Tabs Oral (given by   
mouth) 4 times a day as needed as   
needed for pain.  
Last Dose:____________________  
Patient Discharge Condition  
stable  
Discharge Disposition  
home  
Objective  
Physical Exam  
General: Alert oriented x3,   
patient appears in no acute   
cardiorespiratory distress.  
Eye: normal conjunctiva  
HENT: Normocephalic, moist oral   
mucosa, no scleral icterus.  
Neck: Supple, non-tender.  
Lungs: Clear to auscultation, no   
crepitations or wheeze.  
Heart: S1-S2 is normal. No rubs or   
gallops.  
Abdomen: Soft, non-tender,   
non-distended, normal bowel   
sounds, no palpable masses  
Extremities: No cyanosis clubbing   
or edema  
Skin: no open sores or wounds  
Neurologic: strength 5/5   
extremity.  
Psychiatric: Calm and cooperative  
Vitals & Measurements  
T: 36.7 ?C (Oral) HR: 77   
(Peripheral) RR: 16 BP: 122/73   
SpO2: 95%  
HT: 169 cm WT: 75.5 kg BMI: 24.26  
Additional Vitals  
No qualifying data available.  
Lab Results  
Labs (Last four charted values)  
WBC 9.6 (OCT 18) 8.3 (OCT 17) H   
11.3 (OCT 16) H 12.5 (OCT 15)  
Hgb 13.4 (OCT 18) 13.8 (OCT 17)   
12.8 (OCT 16) 12.7 (OCT 15)  
Hct 39.2 (OCT 18) 39.6 (OCT 17)   
38.3 (OCT 16) 37.0 (OCT 15)  
Plt 271 (OCT 18) 267 (OCT 17) 226   
(OCT 16) 203 (OCT 15)  
Na 139 (OCT 18) 137 (OCT 17) 138   
(OCT 16) 142 (OCT 15)  
K 3.9 (OCT 18) 3.7 (more content   
not included)...                        [facility]  
   
                                        10- Note     Admission Informatio  
n  
Admission Information  
Patient:  
Cantu, Nichole Lynn  
:  
1975  
Date of Admission:  
10/11/2023 19:54:00  
Date of Discharge:  
Code Status:  
Full Resuscitation  
PCP:  
Cande SMITH, Luis Alberto MATAMOROS  
Consult:  
Kalyn SMITH, Henry Reyes  
[facility] Hospital Course Summary:  
This is a 48-year-old female with   
a history of multiple sclerosis on   
ozanimod, avascular necrosis of   
the left hip, DVT, Lakesha-Skinner   
tear, multiple gastric ulcers,   
chronic pain, drug-seeking   
behavior, factitious disorder and   
Munchhausen syndrome who presents   
the ED complaining of relapsing   
multiple sclerosis symptoms for   
the last 3 days. In the ED vital   
signs were stable. UA shows a WBC   
of 1 and RBC of 0. ED physician   
spoke with neurologist on-call Dr. Mccain. He recommends IV   
methylprednisone 1 g.  
Assessment:  
Relapsing multiple sclerosis   
symptoms  
History of avascular necrosis of   
the left hip  
History of deep vein thrombosis  
History of Lakesha-Skinner tear and   
multiple gastric ulcers  
Chronic pain history of   
drug-seeking behavior, factitious   
disorder and Munchhausen syndrome  
Plan:  
continue care on 6th floor  
venous doppler pending  
patient getting a new midline   
placed currently to receive the   
rest of her solumedrol  
2 more doses according to review   
of Dr. Mccain's note  
Continues on Dilaudid every 4   
hours for breakthrough pain as   
well as her Percocet and tramadol.  
Neurochecks and fall precautions  
DVT prophylaxis--enoxaparin  
Medical necessity for ongoing   
hospitalization: ongoing symptom   
management  
Total Time Spent in patient care:   
approximately 25 minutes.  
Medications New Medications  
[id number], [address], [place] [id number], [phone]  
DULoxetine (DULoxetine 30 mg oral   
delayed release capsule) 30   
Milligram Oral (given by mouth) 2   
times a day for 30 Days. Refills:   
0.  
Last Dose:____________________  
Medications That Were Updated -   
Follow Current Instructions  
[id number], [address], [place] [id number], [phone]  
Current pregabalin (Lyrica 150 mg   
oral capsule) 150 Milligram Oral   
(given by mouth) 3 times a day for   
30 Days. Refills: 0.  
Last Dose:____________________  
Medications that have not changed  
Other Medications  
amLODIPine (amLODIPine 10 mg oral   
tablet) 1 Tabs Oral (given by   
mouth) once a day (in the   
morning).  
Last Dose:____________________  
conjugated estrogens (Premarin   
0.625 mg oral tablet) 1 Tabs Oral   
(given by mouth) once a day (in   
the morning).  
Last Dose:____________________  
DME Refills: 0.  
Last Dose:____________________  
LORazepam (LORazepam 1 mg oral   
tablet) 1 Tabs Oral (given by   
mouth) 3 times a day. pt takes   
scheduled.  
Last Dose:____________________  
oxyCODONE-acetaminophen   
(oxyCODONE-acetaminophen 10 mg-325   
mg oral tablet) 1 Tabs Oral (given   
by mouth) 4 times a day as needed   
as needed for pain.  
Last Dose:____________________  
ozanimod (Zeposia 0.92 mg oral   
capsule) 1 Capsules Oral (given by   
mouth) every day.  
Last Dose:____________________  
traMADol (traMADol 50 mg oral   
tablet) 1 Tabs Oral (given by   
mouth) 4 times a day as needed as   
needed for pain.  
Last Dose:____________________  
Discharge Plan  
Patient was discharged home   
independently with medication   
changes as listed above she can   
follow-up Dr. Mccain in the   
outpatient setting  
Patient Discharge Condition  
Stable  
Procedure/Surgical History  
Cholecystectomy  
Cervical spinal fusion   
(2000)  
Esophagogastroduodenoscopy   
(2018)  
Esophagogastroduodenoscopy   
(2020)  
Insertion Implantable Venous   
Access Port (Left) (2020)  
Fusion Spine Lumbar Posterior with   
Navigation (2021)  
Electronically signed by  
__________________________________  
_______________  
Srikanth MONCADA, Gemini Patricio   
10/17/23 16:56 EDT                      [facility]  
   
                                        Comment on above:   Order Comment: Pt di  
d not leave   
   
                                        10- Note     Chief Complaint  
Multiple sclerosis symptoms  
Reason for Consultation  
Evaluate for worsening of multiple   
sclerosis associated with more   
pain and weakness in the legs left   
greater than right  
History of Present Illness  
This is a 48-year-old female   
well-known to me with a history of   
multiple sclerosis dating back to   
 and a history of bilateral   
aseptic necrosis involving both   
hips with significant improvement   
from MRIs in July but only mild   
disease now on the left and no   
disease seen on the right who just   
was in the hospital from 2022 for exacerbation of MS being   
readmitted because of increased   
weakness in her left leg   
associated with shooting pain   
behind the eyes as well as into   
the arms associated with increased   
inability to walk. She presented   
to the emergency room late last   
night she will receive their first   
dose of IV Solu-Medrol last night   
and will receive 4 more doses at a   
gram a day for 5 days. When she   
was in the hospital in July she   
initially had IVIG for 3 days   
which she was unable to tolerate   
but given that her MRIs of her hip   
look much better we did give her a   
course of IV Solu-Medrol a gram a   
day for 5 days. Her last brain MRI   
was in 2023 and it was done   
without and with contrast and it   
also included the orbits and it   
did show stable mild to moderate   
white matter disease and some   
questionable demyelination of her   
left optic nerve. She has had   
multiple hospitalizations for her   
MS although at times some of her   
weakness does not appear organic.   
She does suffer from a chronic   
pain syndrome she is using Lyrica   
100 mg 3 times a day Cymbalta 60   
mg 2 capsules daily Percocet 10 mg   
every 4 hours as needed and   
tramadol 50 mg 4 times a day. She   
has on a pain contract and is   
always passed her toxicology   
screens. Pertinent labs UA 1 WBC   
no RBCs GFR greater than 60   
calcium 8.9 phosphorus 3.6   
magnesium 2 C-reactive protein   
0.22 COVID-19 negative she does   
have very poor venous access she   
already has a line placed by   
ultrasound guidance last night.   
She is alert and coherent she is   
afebrile she has been using   
Zeposia 0.92 mg daily for her MS   
previous medicines have included   
Aubagio and Gilenya. She is   
receptive to 4 more days of IV   
Solu-Medrol it was very   
encouraging that her MRI of the   
hip showed significant   
improvement. She also required a   
previous lumbar surgery in 2021 when she presented with   
radicular symptoms with good   
results.  
Review of Systems  
Constitutional: [No fevers,   
chills, sweats]  
Eye: [No recent visual problems]  
ENMT: [No ear pain, nasal   
congestion, sore throat]  
Respiratory: [No shortness of   
breath, cough]  
Cardiovascular: [No Chest pain,   
palpitations, syncope]  
Gastrointestinal: [No nausea,   
vomiting, diarrhea]  
Genitourinary: [No hematuria]  
Hema/Lymph: [Negative for bruising   
tendency, swollen lymph glands]  
Endocrine: [Negative for excessive   
thirst, excessive hunger]  
Musculoskeletal: has had diffuse   
muscle pain  
Integumentary: [No rash, pruritus,   
abrasions]  
Neurologic: [Alert & oriented X 4]  
Psychiatric: [No anxiety,   
depression]  
Physical Exam  
Vitals & Measurements  
T: 36.7 ?C (Oral) TMIN: 36.4 ?C   
(Oral) TMAX: 36.7 ?C (Oral) HR: 77   
(Peripheral) RR: 16 BP: 125/83   
SpO2: 94% HT: 169 cm WT: 67.9 kg   
BMI: 24.26  
48-year-old female well-developed   
well-nourished alert oriented x3   
friendly cooperative for exam   
HEENT shows the head is   
normocephalic atraumatic pupils   
react to light neck is supple   
there is no papilledema. Cranial   
nerves II through XII show full   
extraocular movements jaw and   
facial strength are normal hearing   
is intact tongue trapezius and   
sternocleidomastoid strength are   
normal motor exam shows good   
strength in her upper extremity   
she has giveaway strength in her   
left leg some minimal weakness in   
the right leg deep tendon reflexes   
are 1+ throughout toes are   
downgoing there is no gross   
dysmetria  
Additional Vitals  
No qualifying data available.  
Assessment/Plan  
1. Multiple sclerosis exacerbation  
I did increase her Lyrica to 150   
mg 3 times a day I did order her   
methylprednisolone at a gram a day   
for 4 more days. Time spent with   
patient chart electronic records   
55 minutes  
Orders:  
diphenhydrAMINE, 25 mg, IV Push,   
Injection, q4hr, PRN itching,   
First Dose: 10/12/23 9:08:00 EDT,   
Dispense From Location:   
[id number], 10/12/23 9:08:00   
EDT  
pregabalin, 150 mg, Oral, Cap,   
TID, First Dose: 10/12/23 14:00:00   
EDT, Dispense From Location:   
[id number], 10/12/23 13:17:00   
EDT  
Problem List/Past Medical History  
Ongoing  
Avascular necrosis of bone of left   
hip  
Chronic pain  
Deep vein thrombosis (DVT) of   
right upper extremity  
Degenerative tear of acetabular   
labrum of right hip  
Drug-seeking behavior  
H/O factitious disorder  
Hypertension  
Intentional self-exposure to   
bleach  
Lakesha-Skinner tear  
Multiple gastric ulcers  
Multiple sclerosis  
Munchausen's syndrome  
Historical  
No qualifying data  
Procedure/Surgical History  
Cholecystectomy  
Cervical spinal fusion   
(2000)  
Esophagogastroduodenoscopy   
(2018)  
Esoph (more content not   
included)...                            [facility]  
   
                                        10- Note     Chief Complaint  
Multiple sclerosis symptoms  
Assessment/Plan  
Brief Hospital Course Summary:  
This is a 48-year-old female with   
a history of multiple sclerosis on   
ozanimod, avascular necrosis of   
the left hip, DVT, Lakesha-Skinner   
tear, multiple gastric ulcers,   
chronic pain, drug-seeking   
behavior, factitious disorder and   
Munchhausen syndrome who presents   
the ED complaining of relapsing   
multiple sclerosis symptoms for   
the last 3 days. In the ED vital   
signs were stable. UA shows a WBC   
of 1 and RBC of 0. ED physician   
spoke with neurologist on-call Dr. Mccain. He recommends IV   
methylprednisone 1 g.  
Assessment: _  
Relapsing multiple sclerosis   
symptoms  
History of avascular necrosis of   
the left hip  
History of deep vein thrombosis  
History of Lakesha-Skinner tear and   
multiple gastric ulcers  
Chronic pain history of   
drug-seeking behavior, factitious   
disorder and Munchhausen syndrome  
Plan: _  
In the ED vital signs were stable.   
UA shows a WBC of 1 and RBC of 0.   
ED physician spoke with   
neurologist on-call Dr. Mccain.   
He recommends IV methylprednisone   
1 g.  
Continue other home medications  
Neurochecks and fall precautions  
Monitor vital signs and letting   
her food  
DVT prophylaxis  
Medical necessity for ongoing   
hospitalization: _  
Total Time Spent in patient care:   
approximately 45_ minutes.  
Code Status:  
Full Resuscitation  
History of Present Illness  
This is a 48-year-old female with   
a history of multiple sclerosis on   
ozanimod, avascular necrosis of   
the left hip, DVT, Lakesha-Skinner   
tear, multiple gastric ulcers,   
chronic pain, drug-seeking   
behavior, factitious disorder and   
Munchhausen syndrome who presents   
the ED complaining of multiple   
sclerosis symptoms. Patient   
reports that her multiple   
sclerosis symptoms are back. She   
reports left eye blurry vision,   
left-sided facial numbness and   
tingling and left lower extremity   
weakness. This have been going on   
for the last 3 days. She denies   
any other weakness numbness or   
tingling in other areas. She   
denies any dizziness dysarthria or   
dysphagia. Patient has been taking   
her medication ozanimod regularly.   
Otherwise patient denies any fever   
chills or sweating. She denies any   
nausea vomiting abdominal pain   
change in bowel habit or color. No   
urinary complaints. No skin rash.   
She denies any cough shortness of   
breath chest pain wheezing   
orthopnea or leg swelling. In the   
ED vital signs were stable. UA   
shows a WBC of 1 and RBC of 0. ED   
physician spoke with neurologist   
on-call Dr. Mccain. He   
recommends IV methylprednisone 1   
g.  
Review of systems  
All 10 systems have been reviewed  
Objective  
Physical Exam  
General: Alert oriented x3,   
patient appears in no acute   
cardiorespiratory distress.  
Eye: normal conjunctiva  
HENT: Normocephalic, moist oral   
mucosa, no scleral icterus.  
Neck: Supple, non-tender, no   
lymphadenopathy.  
Lungs: Clear to auscultation, no   
crepitations or wheeze.  
Heart: S1-S2 is normal. No rubs or   
gallops.  
Abdomen: Soft, non-tender,   
non-distended, normal bowel   
sounds, no masses  
Extremities: No cyanosis clubbing   
or edema  
Skin: no open sores or wounds  
Neurologic: Decreased sensation on   
the left facial area. Strength 3   
out of 5 left lower extremity.   
Decreased sensation left lower   
extremity. Rest intact strength   
and sensation. Deep tendon reflex   
2+ throughout.  
Psychiatric: Calm and cooperative  
Vitals & Measurements  
T: 36.4 ?C (Oral) HR: 73   
(Peripheral) RR: 16 BP: 141/91   
SpO2: 97% HT: 169 cm WT: 69.3 kg   
BMI: 24.26  
Additional Vitals  
No qualifying data available.  
Problem List/Past Medical History  
Ongoing  
Avascular necrosis of bone of left   
hip  
Chronic pain  
Deep vein thrombosis (DVT) of   
right upper extremity  
Degenerative tear of acetabular   
labrum of right hip  
Drug-seeking behavior  
H/O factitious disorder  
Hypertension  
Intentional self-exposure to   
bleach  
Lakesha-Skinner tear  
Multiple gastric ulcers  
Multiple sclerosis  
Munchausen's syndrome  
Procedure/Surgical History  
Cholecystectomy  
Cervical spinal fusion   
(2000)  
Esophagogastroduodenoscopy   
(2018)  
Esophagogastroduodenoscopy   
(2020)  
Insertion Implantable Venous   
Access Port (Left) (2020)  
Fusion Spine Lumbar Posterior with   
Navigation (2021)  
Medications  
Inpatient  
acetaminophen, 650 mg, Oral, q6hr,   
PRN  
acetaminophen, 650 mg, Oral, q6hr,   
PRN  
enoxaparin, 40 mg= 0.4 mL,   
Subcutaneous, Daily  
morphine, 2 mg= 1 mL, IV Push,   
q2hr, PRN  
naloxone, 0.4 mg= 1 mL, IV Push,   
q2min, PRN  
Normal Saline Flush 0.9%   
injectable solution, 10 mL, IV   
Push, As Indicated, PRN  
Normal Saline Flush 0.9%   
injectable solution, 10 mL, IV   
Push, BID  
Home  
amLODIPine 10 mg oral tablet, 10   
mg= 1 tabs, Oral, qAM  
amoxicillin 875 mg oral tablet,   
875 mg= 1 tabs, Oral, BID  
DME, See Instructions  
LORazepam 1 mg oral tablet, 1 mg=   
1 tabs, Oral, TID  
Lyrica 100 mg oral capsule, 100   
mg= 1 caps, Oral, TID  
magnesium oxide 250 mg oral   
tablet, 500 mg= 2 tabs, Oral, qAM  
potassium chloride 99 mg oral   
tablet, 99 mg= 1 tabs, Oral, qAM  
Premari (more content not   
included)...                            [facility]  
   
                                        2023 Note     Admission Informatio  
n  
Patient:  
Cantu, Nichole Lynn  
:  
1975  
Date of Admission:  
2023 08:30:57  
Date of Discharge:  
2023 13:25:00  
Code Status:  
Full Resuscitation  
PCP:  
Luis Alberto Castellon MD  
Consult:  
Henry Mccain MD  
Follow Up with Provider:  
With:  
Address:  
When:  
Henry Mccain MD  
In 1 month  
Comments:  
Call for followup appointment  
With:  
Address:  
When:  
Luis Alberto Castellon MD  
[address] [place] [id number]  
[phone]  
Within 1 week  
Comments:  
Call for followup appointment  
Brief Hospital Course Summary:  
This is a 47 Years old Female with   
pertinent history of it DVT,   
avascular necrosis of the hip bone   
hypertension, MS along with   
multiple medical problems brought   
in from Home with eye pain.   
Patient is admitted for optic   
neuritis from her MS. she was   
initially treated with IVIG   
however developed severe nausea   
vomiting and headache she was then   
switched to Solu-Medrol. Patient   
finished 5-day course of 1 g   
Solu-Medrol. Patient was   
discharged home independently   
flare resolved patient may   
follow-up with Dr. Mccain at   
discharge no additional narcotics   
were prescribed at discharge  
Assessment:  
Optic neuritis versus MS flare  
History of DVT, avascular necrosis   
of the hip bone hypertension, MS  
Fall  
Medications New Medications  
[id number], [address], [place] [id number], [phone]  
nystatin (nystatin 100,000   
units/mL oral suspension) 5   
Milliliter Oral (given by mouth) 4   
times a day for 7 Days. Refills:   
0.  
Last Dose:____________________  
Medications That Have Not Changed  
Other Medications  
amLODIPine (amLODIPine 10 mg oral   
tablet) 1 Tabs Oral (given by   
mouth) once a day (in the   
morning).  
Last Dose:____________________  
amoxicillin (amoxicillin 875 mg   
oral tablet) 1 Tabs Oral (given by   
mouth) 2 times a day for 10 Days.  
Last Dose:____________________  
conjugated estrogens (Premarin 0.9   
mg oral tablet) 1 Tabs Oral (given   
by mouth) once a day (in the   
morning).  
Last Dose:____________________  
DME Refills: 0.  
Last Dose:____________________  
LORazepam (LORazepam 1 mg oral   
tablet) 1 Tabs Oral (given by   
mouth) 3 times a day. pt takes   
scheduled.  
Last Dose:____________________  
magnesium oxide (magnesium oxide   
250 mg oral tablet) 2 Tabs Oral   
(given by mouth) once a day (in   
the morning). for spasms.  
Last Dose:____________________  
ozanimod (Zeposia 0.23 mg-0.46 mg   
oral capsule) 1 Capsules Oral   
(given by mouth) once a day (in   
the morning).  
Last Dose:____________________  
potassium chloride (potassium   
chloride 99 mg oral tablet) 1 Tabs   
Oral (given by mouth) once a day   
(in the morning). for spasms.  
Last Dose:____________________  
pregabalin (Lyrica 100 mg oral   
capsule) 1 Capsules Oral (given by   
mouth) 3 times a day.  
Last Dose:____________________  
traMADol (traMADol 50 mg oral   
tablet) 1 Tabs Oral (given by   
mouth) 4 times a day as needed as   
needed for pain.  
Last Dose:____________________  
Discharge Plan  
Patient was discharged home   
independently with no medication   
changes  
Patient Discharge Condition  
Stable  
Procedure/Surgical History  
Cholecystectomy  
Cervical spinal fusion   
(2000)  
Esophagogastroduodenoscopy   
(2018)  
Esophagogastroduodenoscopy   
(2020)  
Insertion Implantable Venous   
Access Port (Left) (2020)  
Fusion Spine Lumbar Posterior with   
Navigation (2021)  
Electronically signed by  
__________________________________  
_______________  
Gemini Clemons   
23 14:50 [facility]  
   
                                        2023 Note     Procedure: MRI of th  
e bony pelvis   
without contrast.  
Sequences: Coronal, sagittal, and   
axial inversion recovery; coronal   
and axial T1.  
Clinical Information: 47-year-old   
female with bilateral hip pain and   
chronic steroid use for multiple   
sclerosis. Suspected   
osteonecrosis.  
Comparison: CT left hip 7/15/2023.  
CT pelvis 2022.  
Findings:  
Bones: Sagittal STIR   
hyperintensity within the superior   
left femoral head and the   
subchondral marrow.  
Corresponding linear T1   
hypointensity slightly displaced   
from the subchondral marrow and   
paralleling the articular surface.  
Normal signal intensity of the   
right femoral head.  
Joints: No substantial   
chondromalacia, marginal   
osteophytes, or effusion.  
Muscles/tendons: Severe STIR   
hyperintensity within the   
paraspinal muscles at the level of   
the sacrum caudal to prior   
multilevel lumbar spine fusion,   
likely postoperative atrophy.  
Body wall: Scarring of the midline   
lower back.  
No discrete mass or pathologic   
lymph node enlargement.  
Intrapelvic viscera: Surgically   
absent uterus.  
Colonic diverticulosis.  
IMPRESSION:  
1. Subtle signal abnormalities in   
the subchondral marrow at the   
superior aspect of the left   
femoral head, nonspecific but   
possible early changes of   
avascular necrosis. Consider   
follow-up with CT or MRI as   
clinically indicated.  
2. Normal signal intensity of the   
right femoral head.  
3. Likely postoperative atrophy   
with concomitant signal   
abnormality in the lower   
paraspinal muscles.   
***** Final *****  
Dictated by: Alden Randhawa MD  
Dictated DT/TM: 2023 3:15 pm  
Signed by: Alden Randhawa MD  
Signed (Electronic Signature):   
2023 3:27 pm  
(If Report Is Signed,   
Electronically Signed in Other   
Vendor System)                          [facility]  
   
                                        2023 Note     While receiving repo  
rt I was   
informed pt refused IVIG despite   
education during the day. I   
assessed and educated the patient   
at 1815 and recommended the pt   
allow me to start her IVIG. The   
patient refused the IVIG stating   
that since she is experiencing   
nausea and vomiting she is   
refusing the IVIG.  
Electronically signed by  
__________________________________  
_______________  
Liam Porter 23 18:31 EDT        [facility]  
   
                                        07- Note     Procedure: AP and la  
teral views of   
the left ankle.  
Clinical information: 47-year-old   
female with pain.  
Comparison: None.  
Findings:  
Bones: No acute fracture,   
dislocation, or aggressive   
abnormality.  
Joints: Intact spaces. No   
tibiotalar effusion.  
Soft tissues: Mild soft tissue   
swelling.  
IMPRESSION: Mild soft tissue   
swelling. No acute osseous   
abnormality.  
***** Final *****  
Dictated by: Alden Randhawa MD  
Dictated DT/TM: 07/15/2023 1:41 pm  
Signed by: Alden Randhawa MD  
Signed (Electronic Signature):   
07/15/2023 1:42 pm  
(If Report Is Signed,   
Electronically Signed in Other   
Vendor System)                          [facility]  
   
                                        07- Note     Procedure: AP, obliq  
ue, and   
lateral views of the left wrist.  
Clinical Information: 47-year-old   
female fell. Left wrist soreness.  
Comparison: From the [facility] 2023.  
Findings:  
Bones: Probable prior high-grade   
partial resection of the   
trapezium.  
Osteopenia.  
No acute fracture, dislocation, or   
aggressive lesion.  
Joints: Mild triscaphe   
degenerative joint disease (DJD).  
Soft tissues: Swelling about the   
carpus.  
IMPRESSION: Soft tissue swelling.   
No acute osseous abnormality.  
***** Final *****  
Dictated by: Alden Randhawa MD  
Dictated DT/TM: 07/15/2023 11:55   
am  
Signed by: Alden Randhawa MD  
Signed (Electronic Signature):   
07/15/2023 11:57 am  
(If Report Is Signed,   
Electronically Signed in Other   
Vendor System)                          [facility]  
   
                                        07- Note     Procedure: AP view o  
f the pelvis   
and frog-leg lateral view of the   
left hip.  
Clinical Information: 47-year-old   
female with fall. Bilateral hip   
pain, mostly on the left.  
Comparison: 10/18/2021.  
Findings:  
Bones: Osteopenia.  
No acute fracture, dislocation, or   
aggressive abnormality.  
Joints: Minimal sacroiliac (SI)   
degenerative changes.  
Prior posterior lumbar interbody   
fusion (PLIF) at L5-S1.  
No gross evidence for hardware   
failure or loosening.  
Soft tissues: Unremarkable.  
IMPRESSION: No acute abnormality   
of the pelvis or left hip.  
***** Final *****  
Dictated by: Alden Randhawa MD  
Dictated DT/TM: 07/15/2023 11:02   
am  
Signed by: Alden Randhawa MD  
Signed (Electronic Signature):   
07/15/2023 11:04 am  
(If Report Is Signed,   
Electronically Signed in Other   
Vendor System)                          [facility]  
   
                                        2023 Note     Chief Complaint  
Pt states that she was Seen in Dr. Castro yesterday and that he   
would like the patient to be   
evaluated in the ER for admission   
of MS exacerbation, and for a 5   
day IVIG treatment. Pt complains   
of Blurred vision and pain in   
extemites.  
Assessment/Plan  
Brief Hospital Course Summary:  
This is a 47 Years old Female with   
pertinent history of it DVT,   
avascular necrosis of the hip bone   
hypertension, MS along with   
multiple medical problems brought   
in from Home with eye pain.   
Patient is admitted for optic   
neuritis.  
Assessment:  
Optic neuritis versus MS flare  
History of DVT, avascular necrosis   
of the hip bone hypertension, MS  
Plan:  
Admit to 6 floor  
Pain control  
Consult neurology neurology has   
recommended IVIG for 5 days  
IVIG as ordered  
Regular diet  
No further labs unless neurology   
wants them  
Vitals every 4  
Medical necessity for ongoing   
hospitalization: ongoing workup   
for active diagnosis  
Total Time Spent in patient care:   
approximately 55 minutes.  
Code Status:  
Full Resuscitation  
History of Present Illness  
This is a 47-year-old female with   
past medical history significant   
for MS, DVT, avascular necrosis of   
the hip, Munchhausen syndrome,   
hypertension, gastric ulcers. She   
states that she allegedly saw Dr. Mccain yesterday and he told   
her to come to the emergency   
department for a 5-day IVIG   
treatment for an MS exacerbation.   
She complains of blurred vision   
and pain in her extremities. She   
states that approximately 4 AM on   
Tuesday she woke up started having   
left eye pain specific to eye   
movements there is no erythema or   
edema noted she does state that   
she feels that her eye has been   
tearful she is also having mild   
muscle cramping. According to ED   
physician ocular pressures normal   
and no abnormal findings.. Should   
not is still currently complaining   
of severe pain in her left eye no   
improvement since she was   
admitted.  
Review of Systems  
10 point ROS completed and   
negative except what is mentioned   
subjective interview  
Objective  
General: Alert and oriented, well   
nourished, no acute distress.  
Eye: PERRL, EOMI, normal   
conjunctiva.  
HENT: Normocephalic, moist oral   
mucosa, no scleral icterus.  
Neck: Supple, non-tender, no   
carotid bruits, no JVD, no   
lymphadenopathy.  
Lungs: Clear to auscultation,   
non-labored respiration on room   
air  
Heart: Normal rate, regular   
rhythm, no murmur, gallop or   
edema.  
Abdomen: Soft, non-tender,   
non-distended, normal bowel sounds  
Musculoskeletal: Normal range of   
motion and strength, no tenderness   
or swelling.  
Skin: Skin is warm, dry and pink,   
no rashes or lesions.  
Neurologic: Awake, alert, and   
oriented X3.  
Psychiatric: Cooperative,   
appropriate mood and affect.  
Vitals & Measurements  
T: 36.5 ?C (Oral) TMIN: 36.5 ?C   
(Oral) TMAX: 36.7 ?C (Oral) HR: 79   
(Peripheral) HR: 70 (Monitored)   
RR: 16 BP: 147/93 SpO2: 98% HT:   
170.2 cm WT: 64.00 kg WT: 64 kg   
(Dosing) BMI: 22.09  
Problem List/Past Medical History  
Ongoing  
Avascular necrosis of bone of left   
hip  
Chronic pain  
Deep vein thrombosis (DVT) of   
right upper extremity  
Degenerative tear of acetabular   
labrum of right hip  
Drug-seeking behavior  
H/O factitious disorder  
Hypertension  
Intentional self-exposure to   
bleach  
Lakesha-Skinner tear  
Multiple gastric ulcers  
Multiple sclerosis  
Munchausen's syndrome  
Historical  
No qualifying data  
Procedure/Surgical History  
Cholecystectomy  
Cervical spinal fusion   
(2000)  
Esophagogastroduodenoscopy   
(2018)  
Esophagogastroduodenoscopy   
(2020)  
Insertion Implantable Venous   
Access Port (Left) (2020)  
Fusion Spine Lumbar Posterior with   
Navigation (2021)  
Medications  
Inpatient  
acetaminophen, 650 mg, Oral, q6hr,   
PRN  
acetaminophen, 650 mg, Oral, q6hr,   
PRN  
amLODIPine, 10 mg, Oral, qAM  
amoxicillin, 875 mg, Oral, BID  
Dilaudid, 1 mg= 1 mL, IV Push,   
q4hr, PRN  
diphenhydrAMINE, 25 mg= 0.5 mL, IV   
Push, q6hr, PRN  
enoxaparin, 40 mg= 0.4 mL,   
Subcutaneous, Daily  
Gammagard Liquid, 30 g= 300 mL, IV   
Piggyback, q24hr  
hydrocodone-acetaminophen 5 mg-325   
mg oral tablet, 1 tabs, Oral,   
q4hr, PRN  
hydrocodone-acetaminophen 5 mg-325   
mg oral tablet, 2 tabs, Oral,   
q4hr, PRN  
LORazepam, 1 mg, Oral, TID  
Lyrica, 100 mg, Oral, TID  
magnesium oxide, 400 mg, Oral, qAM  
naloxone, 0.4 mg= 1 mL, IV Push,   
q2min, PRN  
Normal Saline Flush 0.9%   
injectable solution, 10 mL, IV   
Push, As Indicated, PRN  
Normal Saline Flush 0.9%   
injectable solution, 10 mL, IV   
Push, BID  
Pepcid, 20 mg= 2 mL, IV Push,   
q12hr-Standard Times  
Premarin, 0.9 mg, Oral, qAM  
traMADol, 50 mg, Oral, QID, PRN  
Home  
amLODIPine 10 mg oral tablet, 10   
mg= 1 tabs, Oral, qAM  
amoxicillin 875 mg oral tablet,   
875 mg= 1 tabs, Oral, BID  
DME, See Instructions  
LORazepam 1 mg oral tablet, 1 mg=   
1 tabs, Oral, TID  
Lyrica 100 mg oral capsule, 100   
mg= 1 caps, Oral, TID  
magnesium oxide 250 mg oral   
tablet, 500 mg= 2 tabs, Oral, qAM  
potassium chloride 99 mg oral   
tablet, 99 mg= 1 tabs, Oral, qAM  
Premarin 0.9 mg oral tablet, 0.9   
mg= 1 tabs, Ora (more content not   
included)...                            [facility]  
   
                                        2023 Note     Orthopedic Surgery  
Subjective  
Chief complaint:  
Chief Complaint  
Patient presents with  
Left Hand - Follow-up  
Pt fell last night  
Nichole Lynn Cantu is a 47 y.o.   
year old female presenting for   
evaluation of A  
new injury to her left hand. We   
just saw her a couple days ago for   
her first  
postoperative visit after Burnette   
arthroplasty on the left palm. She   
states that  
she was walking down a very steep   
hill going towards the river last   
night when  
she lost her balance and fell onto   
an outstretched left arm. She was   
seen at  
[facility] where   
x-rays were taken. There was   
concern about a  
fracture of the trapezium and the   
base of the thumb metacarpal. She   
was placed  
into a short arm thumb spica   
fiberglass splint. She comes in   
today complaining  
of pain and swelling in the hand.  
Previous Treatments: Splinting  
Patient History  
Past Surgical History:  
Procedure Laterality Date  
BACK SURGERY  
CAGE IMPLANTED  
CARPAL TUNNEL RELEASE  
CERVICAL FUSION  
 SECTION, LOW TRANSVERSE  
COLONOSCOPY  
HYSTERECTOMY  
PORTACATH PLACEMENT  
MULTIPLE PORTS PLACED.  
SPINAL CORD STIMULATOR IMPLANT  
AND REMOVAL  
TONSILLECTOMY  
UPPER GASTROINTESTINAL ENDOSCOPY  
Past Medical History:  
Diagnosis Date  
Chronic pain disorder  
NECK / LOWER BACK  
Diabetes mellitus (CMS/HCC)  
DVT (deep venous thrombosis)   
(CMS/HCC)  
GERD (gastroesophageal reflux   
disease)  
GI (gastrointestinal bleed)  
Hypertension  
Left-sided weakness  
Multiple sclerosis (CMS/HCC)  
Munchausen syndrome  
PTSD (post-traumatic stress   
disorder)  
Pulmonary embolism (CMS/HCC)  
S/P IVC filter  
Substance abuse (CMS/HCC)  
Objective  
General:  
There is no height or weight on   
file to calculate BMI.  
No acute distress, comfortable  
Respiratory: Unlabored breathing   
with normal rate, no cough  
Cardiovascular: Warm well perfused   
extremities  
Psych: Appropriate mood behavior  
Examination of the left hand shows   
her to have considerable swelling   
over the  
dorsum of the hand. Some of this   
may be just the Ace bandage being   
tight. I  
removed her splint. The wound is   
healing well. She did not open   
that. Despite  
all the swelling she has no   
tenderness of the ulnar aspect of   
the hand over the  
small, ring or long fingers. There   
is mild tenderness of the base of   
the index  
finger. She does have some   
tenderness along the base of the   
thumb and thenar  
eminence and area of the previous   
surgery.  
Today on stressing the thumb is   
stable to axial load and lateral   
subluxation.  
Sensation is intact to light   
touch, and there is no capillary   
refill.  
Imaging: the radiology report was   
available, but I could not see the   
actual  
images today. The report states a   
possible small fracture of the   
trapezium and  
possibly something at the base of   
the thumb metacarpal.  
Assessment/Plan  
Nichole Lynn Cantu is a 47 y.o.   
year old female with Acute   
postoperative pain  
Arthritis of carpometacarpal (CMC)   
joint of left thumb  
PLAN: I am pretty sure that the   
radiology findings are just   
consistent with  
previous surgery with resection of   
the trapezium, and drilling hole   
through the  
base of the thumb metacarpal. I do   
not think it is worth repeating   
x-rays, and  
I will try to access those over   
the weekend or on Monday.  
I am going to place her into a   
removable thumb spica splint to   
protect this. If  
the x-ray shows something   
concerning, I will call her.   
Otherwise, we will see  
her back in 4 weeks as scheduled.       [facility]  
   
                                        2023 Note     Orthopedic Surgery  
Subjective  
Chief complaint:  
Chief Complaint  
Patient presents with  
Left Hand - Post-op  
Nichole Lynn Cantu is a 47 y.o.   
year old female presenting for   
follow-up post  
burnette's arthroplasty on the left   
hand on 23. 5 days post-op   
her  
granddaughter fell on her left   
arm, she heard a pop in her   
forearm and has been  
experiencing a constant sharp and   
crampy pain since then.  
Previous Treatments: Perc-5 for   
breakthrough pain, Muscle relaxer  
Patient History  
Past Surgical History:  
Procedure Laterality Date  
BACK SURGERY  
CAGE IMPLANTED  
CARPAL TUNNEL RELEASE  
CERVICAL FUSION  
 SECTION, LOW TRANSVERSE  
COLONOSCOPY  
HYSTERECTOMY  
PORTACATH PLACEMENT  
MULTIPLE PORTS PLACED.  
SPINAL CORD STIMULATOR IMPLANT  
AND REMOVAL  
TONSILLECTOMY  
UPPER GASTROINTESTINAL ENDOSCOPY  
Past Medical History:  
Diagnosis Date  
Chronic pain disorder  
NECK / LOWER BACK  
Diabetes mellitus (CMS/HCC)  
DVT (deep venous thrombosis)   
(CMS/HCC)  
GERD (gastroesophageal reflux   
disease)  
GI (gastrointestinal bleed)  
Hypertension  
Left-sided weakness  
Multiple sclerosis (CMS/HCC)  
Munchausen syndrome  
PTSD (post-traumatic stress   
disorder)  
Pulmonary embolism (CMS/[facility])  
S/P IVC filter  
Substance abuse (CMS/[facility])  
Objective  
General:  
There is no height or weight on   
file to calculate BMI.  
No acute distress, comfortable  
Respiratory: Unlabored breathing   
with normal rate, no cough  
Cardiovascular: Warm well perfused   
extremities  
Psych: Appropriate mood behavior  
On examination there is:  
-Numbness in palmar aspect of left   
hand,  
-Fingers of left hand are intact   
to sensation  
-Tenderness on palpation of medial   
aspect of wrist to medial aspect   
of forearm  
Assessment/Plan  
Nichole Lynn Cantu is a 47 y.o.   
year old female presenting for   
follow up post  
left burnette's arthroplasty on   
23.  
PLAN:  
The sutures were removed in the   
clinic today. I instructed her on   
how to do  
scar massage and then apply lotion   
to the incisional site. A   
removable splint  
was applied, which can be removed   
for bathing and gentle range of   
motion.  
Instructions were given on   
activity restrictions until the   
next appointment. We  
will see her back in the clinic 4   
wks.  
 As the teaching physician, I have   
personally performed or   
re-performed the  
history of present illness,   
physical exam and medical   
decision-making activities  
of the encounter and verified the   
medical student's documentation. I   
made  
pertinent changes as necessary to   
ensure accurate documentation.   
Additional Comments: Agree with   
above                                   [facility]  
   
                                        2023 Note     Attempted to reach p  
atient on both   
listed numbers (Home and Mobile)   
regarding  
recent procedure with Dr. Triana   
() as well as to confirm her   
post-operative  
appointment on . Patient was   
unavailable and voicemail box was   
full (Home  
number) and listed Mobile number   
is not in service.                      [facility]  
   
                                        2023 Note     Patient: Nichole Lyn n Cantu  
Procedure Summary  
Date: 23 Room / Location:   
[facility] OR [address] [place] [address] [facility] OR  
Anesthesia Start: 952 Anesthesia   
Stop:   
Procedure: BURTONS ARTHROPLASTY   
(Left: Thumb) Diagnosis:  
Arthritis of carpometacarpal (CMC)   
joint of left thumb  
(Arthritis of carpometacarpal   
(CMC) joint of left thumb   
[M18.12])  
Surgeons: Deep Triana MD   
Responsible Provider: Agustin Gusman MD  
Anesthesia Type: regional ASA   
Status: 2  
Anesthesia Type: regional  
Vitals Value Taken Time  
/87 23 1120  
Temp 36.2 ???C (97.2 ???F)   
23 1120  
Pulse 70 23 1120  
Resp 16 23 1120  
SpO2 100 % 23 1120  
Anesthesia Post Evaluation  
Patient location during   
evaluation: PACU  
Patient participation: complete -   
patient participated  
Level of consciousness: awake  
Pain score: 1  
Pain management: adequate  
Airway patency: patent  
Cardiovascular status: acceptable  
Respiratory status: acceptable  
Patient is hemodynamically stable   
and is able to be discharged from   
PACU per  
anesthesia protocol.  
No notable events documented.           [facility]  
   
                                        2023 Note     Patient: Nichole Lyn n Cantu  
Procedure Summary  
Date: 23 Room / Location:   
[facility] OR [address] [place] [address] [facility] OR  
Anesthesia Start: 952 Anesthesia   
Stop:   
Procedure: BURTONS ARTHROPLASTY   
(Left: Thumb) Diagnosis:  
Arthritis of carpometacarpal (CMC)   
joint of left thumb  
(Arthritis of carpometacarpal   
(CMC) joint of left thumb   
[M18.12])  
Surgeons: Deep Triana MD   
Responsible Provider: Agustin Gusman MD  
Anesthesia Type: regional ASA   
Status: 2  
Anesthesia Post Transport Note  
[facility]  
[facility] Route: room air  
Patient Monitor: direct   
observation  
Transport: uneventful  
Patient condition is: stable            [facility]  
   
                                        2023 Note     Central Venous Line:  
Date/Time: 2023 10:06 AM  
A central venous line was placed   
in the pre-op for the following   
indication(s):  
central venous access.  
Sterility preparation included the   
following: provider hand hygiene   
performed  
prior to central venous catheter   
insertion, all 5 sterile barriers   
used (gloves,  
gown, cap, mask, large sterile   
drape) during central venous   
catheter insertion,  
antiseptic used during central   
venous catheter insertion and skin   
prep agent  
completely dried prior to   
procedure.  
The patient was placed in   
Trendelenburg position.  
Right internal jugular vein was   
prepped.  
The site was prepped with   
Chlorhexidine.  
Catheter size: 20g angiocath 5in.  
Number of Lumens: single lumen  
During the procedure, the   
following specific steps were   
taken: target vein  
identified, needle advanced into   
vein and blood aspirated and   
guidewire advanced  
into vein.  
Seldinger technique used  
Procedure performed using   
ultrasound guidance.  
Sterile gel and probe cover used   
in ultrasound-guided central   
venous catheter  
insertion.  
Intravenous verification was   
obtained by ultrasound and venous   
blood return.  
Post insertion care included: all   
ports aspirated, all ports flushed   
easily,  
guidewire removed intact and   
dressing applied.  
During the procedure the patient   
experienced: patient tolerated   
procedure well  
with no complications.  
Staffing  
Performed: resident/CRNA/CAA  
Anesthesiologist: Agustin Gusman MD  
Resident/CRNA: Jonny Pinto MD         [facility]  
   
                                        2023 Note     Peripheral Block  
Patient location during procedure:   
pre-op  
Start time: 2023 9:29 AM  
End time: 2023 9:44 AM  
Reason for block: primary   
anesthetic  
Staffing  
Performed: anesthesiologist  
Anesthesiologist: Agustin Gusman MD  
Preanesthetic Checklist  
Completed: patient identified, IV   
checked, site marked, risks and   
benefits  
discussed, surgical consent,   
monitors and equipment checked and   
pre-op  
evaluation  
Peripheral Block  
Patient position: supine  
Prep: ChloraPrep  
Patient monitoring: continuous   
pulse ox  
Block type: supraclavicular   
brachial plexus  
Laterality: left  
Injection technique: single-shot  
Guidance: ultrasound guided  
Local infiltration: bupivicaine  
Infiltration strength: 0.5 %  
Dose: 20 mL  
Needle  
Needle gauge: 22 G  
Needle length: 2 in  
Needle localization: ultrasound   
guidance  
Medications Administered  
Midazolam (VERSED) IV, 2 mg  
fentaNYL (SUBLIMAZE) IV, 100 mcg  
Assessment  
Injection assessment: negative   
aspiration for heme, no   
paresthesia on injection  
and incremental injection  
Heart rate change: no  
Slow fractionated injection: yes        [facility]  
   
                                        2023 Note     Patient: Nichole Lyn n Cantu  
Procedure Information  
Date/Time: 23 1000  
Procedure: BURTONS ARTHROPLASTY   
(Left: Thumb)  
Location: [facility] OR [address] [place] [address] OR  
Surgeons: Deep Triana MD  
Relevant Problems  
Anesthesia (within normal limits)  
Cardio  
> 4 METS denies chest pain/SOB  
Neuro/Psych  
multiple sclerosis  
Pulmonary (within normal limits)  
Other  
(+) Arthritis of carpometacarpal   
(CMC) joint of left thumb  
Clinical information reviewed:  
Tobacco   Allergies   Meds   Med   
Hx   Surg Hx   Fam Hx    
Physical Exam  
Airway  
Mallampati: II  
TM distance: >3 FB  
Neck ROM: full  
Cardiovascular - normal exam  
Dental  
Pulmonary - normal exam  
Abdominal  
Anesthesia Plan  
ASA 2  
regional  
The patient is not a current   
smoker.  
Patient was not previously   
instructed to abstain from smoking   
on day of  
procedure.  
Patient did not smoke on day of   
procedure.  
intravenous induction  
Anesthetic plan and risks   
discussed with patient.  
Plan discussed with resident.  
Additional Equipment Requests           [facility]  
   
                                        2023 Note     Orthopedic Surgery  
Subjective  
Chief complaint:  
Chief Complaint  
Patient presents with  
Left Thumb - New Patient  
Nichole Lynn Cantu is a 47 y.o.   
year old female presenting for   
evaluation of  
pain at the basilar area of the   
left thumb. She states that this   
has been  
present for a little over a year.   
It is significantly getting worse   
fairly  
rapidly and starting to limit her   
ability to do her daily   
activities. She has  
tried a CMC wrap which helps a   
little bit she also had a   
corticosteroid  
injection which helped for about a   
month or so. She is done some   
research on  
this and feels as though she would   
like to try a soft tissue   
arthroplasty.  
Previous Treatments: Splinting and   
cortisone injection  
Patient History  
No past surgical history on file.  
Past Medical History:  
Diagnosis Date  
Diabetes mellitus (CMS/[facility])  
Hypertension  
Objective  
General:  
There is no height or weight on   
file to calculate BMI.  
No acute distress, comfortable  
Respiratory: Unlabored breathing   
with normal rate, no cough  
Cardiovascular: Warm well perfused   
extremities  
Psych: Appropriate mood behavior  
Hand/Wrist Musculoskeletal Exam  
Inspection  
Left  
Left hand/wrist inspection is   
normal.  
Palpation  
Left  
Left hand palpation is normal.  
Thumb tenderness to palpation:   
carpometacarpal joint  
Dorsal hand - 1st metacarpal   
tenderness to palpation: CMC  
Range of Motion  
Left Hand  
Left hand range of motion is   
normal.  
Strength  
Left Hand  
Left hand strength is normal.  
Neurovascular  
Left  
Left neurovascular exam is normal.  
Special Tests  
Left  
Finkelstein's test: negative  
CMC grind test: positive  
Tinel's - carpal tunnel: negative  
Imaging: Imaging from outside   
showed some mild arthritic changes   
with some  
subluxation of the joint.  
Assessment/Plan  
Nichole Lynn Cantu is a 47 y.o.   
year old female with Arthritis of  
carpometacarpal (CMC) joint of   
left thumb  
Encounter for pre-operative   
laboratory testing  
PLAN: We discussed options for   
treatment. She is a little bit on   
the younger  
side for this but her physical   
examination is clearly consistent   
with CMC joint  
arthritis. She would very much   
like to proceed with surgical   
treatment I think  
that is appropriate.  
We will set her up for a Burnette   
arthroplasty of her left thumb.   
The surgical  
procedure along with the risks and   
benefits were discussed today in   
the clinic.  
Consent for the surgery was   
reviewed and signed. We will see   
them back in  
clinic following the procedure.         [facility]  
   
                                                    2023 Hospital   
Discharge instructions                    
  
  
Patient Education  
  
  
2023 23:37:09  
  
  
Nonspecific Chest Pain, Adult  
  
  
Nonspecific Chest Pain, Adult  
Chest pain is an uncomfortable,   
tight, or painful feeling in the   
chest. The pain can feel like a   
crushing, aching, or squeezing   
pressure. A person can feel a   
burning or tingling sensation.   
Chest pain can also be felt in   
your back, neck, jaw, shoulder, or   
arm. This pain can be worse when   
you move, sneeze, or take a deep   
breath.  
Chest pain can be caused by a   
condition that is   
life-threatening. This must be   
treated right away. It can also be   
caused by something that is not   
life-threatening. If you have   
chest pain, it can be hard to know   
the difference, so it is important   
to get help right away to make   
sure that you do not have a   
serious condition.  
Some life-threatening causes of   
chest pain include:  
Heart attack.  
A tear in the body's main blood   
vessel (aortic dissection).  
Inflammation around your heart   
(pericarditis).  
A problem in the lungs, such as a   
blood clot (pulmonary embolism) or   
a collapsed lung (pneumothorax).  
Some non life-threatening causes   
of chest pain include:  
Heartburn.  
Anxiety or stress.  
Damage to the bones, muscles, and   
cartilage that make up your chest   
wall.  
Pneumonia or bronchitis.  
Shingles infection   
(varicella-zoster virus).  
Your chest pain may come and go.   
It may also be constant. Your   
health care provider will do tests   
and other studies to find the   
cause of your pain. Treatment will   
depend on the cause of your chest   
pain.  
Follow these instructions at home:  
Medicines  
Take over-the-counter and   
prescription medicines only as   
told by your health care provider.  
If you were prescribed an   
antibiotic medicine, take it as   
told by your health care provider.   
Do not stop taking the antibiotic   
even if you start to feel better.  
Activity  
Avoid any activities that cause   
chest pain.  
Do not lift anything that is   
heavier than 10 lb (4.5 kg), or   
the limit that you are told, until   
your health care provider says   
that it is safe.  
Rest as directed by your health   
care provider.  
Return to your normal activities   
only as told by your health care   
provider. Ask your health care   
provider what activities are safe   
for you.  
Lifestyle  
Do not use any products that   
contain nicotine or tobacco, such   
as cigarettes, e-cigarettes, and   
chewing tobacco. If you need help   
quitting, ask your health care   
provider.  
Do not drink alcohol.  
Make healthy lifestyle changes as   
recommended. These may include:  
?Getting regular exercise. Ask   
your health care provider to   
suggest some exercises that are   
safe for you.  
?Eating a heart-healthy diet. This   
includes plenty of fresh fruits   
and vegetables, whole grains,   
low-fat (lean) protein, and   
low-fat dairy products. A   
dietitian can help you find   
healthy eating options.  
?Maintaining a healthy weight.  
?Managing any other health   
conditions you may have, such as   
high blood pressure (hypertension)   
or diabetes.  
?Reducing stress, such as with   
yoga or relaxation techniques.  
General instructions  
Pay attention to any changes in   
your symptoms.  
It is up to you to get the results   
of any tests that were done. Ask   
your health care provider, or the   
department that is doing the   
tests, when your results will be   
ready.  
Keep all follow-up visits as told   
by your health care provider. This   
is important.  
You may be asked to go for further   
testing if your chest pain does   
not go away.  
Contact a health care provider if:  
Your chest pain does not go away.  
You feel depressed.  
You have a fever.  
You notice changes in your   
symptoms or develop new symptoms.  
Get help right away if:  
Your chest pain gets worse.  
You have a cough that gets worse,   
or you cough up blood.  
You have severe pain in your   
abdomen.  
You faint.  
You have sudden, unexplained chest   
discomfort.  
You have sudden, unexplained   
discomfort in your arms, back,   
neck, or jaw.  
You have shortness of breath at   
any time.  
You suddenly start to sweat, or   
your skin gets clammy.  
You feel nausea or you vomit.  
You suddenly feel lightheaded or   
dizzy.  
You have severe weakness, or   
unexplained weakness or fatigue.  
Your heart begins to beat quickly,   
or it feels like it is skipping   
beats.  
These symptoms may represent a   
serious problem that is an   
emergency. Do not wait to see if   
the symptoms will go away. Get   
medical help right away. Call your   
local emergency services (911 in   
the [place] Do not drive yourself   
to the hospital.  
Summary  
Chest pain can be caused by a   
condition that is serious and   
requires urgent treatment. It may   
also be caused by something that   
is not life-threatening.  
Your health care provider may do   
lab tests and other studies to   
find the cause of your pain.  
Follow your health care provider's   
instructions on taking medicines,   
making lifestyle changes, and   
getting emergency treatment if   
symptoms become worse.  
Keep all follow-up visits as told   
by your health care provider. This   
includes visits for any further   
testing if your chest pain does   
not go away.  
This information is not intended   
to replace advice given to you by   
your health care provider. Make   
sure you discuss any questions you   
have with your health care   
provider.  
Document Revised: 2022   
Document Reviewed: 2022  
[employer] Patient Education    
[employer]  
  
  
  
Follow Up Care  
  
  
2023 15:59:01  
  
  
With:DAVE CASTELLON  
Address:  
[id number] [place], [place] [address]  
[phone] [facility] (1)  
  
When:2023  
Comments:Return to the emergency   
room if your pain gets worse or   
any new symptoms.                       [facility]  
   
                                        01- Note     Admission and Discha  
rge   
Information  
Admit Date/Time:2023 23:29  
Admitting Physician -  
Yobani HILL MD  
Consulting Physician -  
De Sanders MD  
Admitting Diagnoses:  
Discharge Order Date  
Discharge Patient - Ordered  
-- 01/10/23 16:59:00 EST  
Discharge Diagnoses  
1. Multiple sclerosis   
exacerbation, 2023  
2. Left optic neuritis, 2023  
3. History of DVT in adulthood,   
2023  
4. PTSD (post-traumatic stress   
disorder), 2023  
5. Anxiety, 2023  
6. Chronic pain, 2023  
7. No contraindication to deep   
vein thrombosis (DVT) prophylaxis,   
2023  
Eye pain, 2023  
Focal motor weakness, 2023  
Medical screening exam, 2023  
Paresthesia, 2023  
Procedure History  
MRI (2021), MRI   
(2020), MRI arthrography   
(2019), Back fusion, Carpal   
tunnel release, Bella filter,   
Hysterectomy.  
Hospital Course  
47-year-old female admitted to the   
hospital with possible MS   
exacerbation. CT of the head   
showed no acute findings. Carotid   
ultrasound was negative for   
stenosis. Patient was seen by   
neurology on consultation for MS   
exacerbation with questionable   
left optic neuritis. Patient was   
to undergo an MRI/MRA of the brain   
but was unable to do even with   
sedation. During hospitalization   
patient was restarted on home   
medications. Vitals and labs   
during hospitalization have been   
normotensive. Patient with   
frequent requests for home   
Dilaudid with additional doses of   
IV request as well as Ativan. I   
did discuss refusal for MRI/MRA   
with Dr. Judd who cleared   
patient for discharge home with   
intent for patient to follow with   
her own neurologist.  
Case was discussed with Dr. Alexander   
and Dr. Goetz who are in agreement   
with current discharge plan. Above   
was discussed with patient who   
verbalizes understanding and   
agreeable to discharge home.  
Services Consulted  
Consult to Dietitian Adult -   
Ordered  
-- 01/10/23 8:54:05 EST  
Consult to Neurology - Ordered  
-- 23 23:29:00 EST, TIA,   
Consult and Co-manage  
Physical Exam  
Vitals & Measurements  
T: 36.7 ?C(Axillary) TMIN: 36.5   
?C(Oral) TMAX: 36.8 ?C(Oral) HR:   
73(Monitored) RR: 17 BP: 129/87   
SpO2: 96% HT: 170.18 cm WT: 61.9   
kg  
General: alert, no acute distress  
ENMT: oral mucosa moist, no   
pharyngeal erythema or exudate;   
Pupils PERRLA 3mm. non icteric.  
Cardiovascular: regular rate and   
rhythm, normal peripheral   
perfusion  
Respiratory: Lungs CTA,   
respirations non labored  
Abdomen: Soft, nontender, without   
rebound or rigidity, positive   
bowel sounds  
Skin: No pallor, warm, dry and   
Intact  
Hematological: No signs of large   
bruising/ecchymosis or petechiae  
Neurological: oriented x 4, LOC   
appropriate for age, CN II-XII   
intact, motor strength equal &   
normal bilaterally, sensation   
decreased on left, speech normal  
Psych: Denies suicidal ideation or   
homicidal ideation; anxious and   
tearful at times.  
Laboratory Results  
Automated Diff (2023)  
Neutro Auto - 61.7 %  
Lymph Auto - 29.7 %  
Mono Auto - 5.4 %  
Eos Auto - 2.0 %  
Basophil Auto - 1.2 %  
Neutro Absolute - 5.4 E9/L  
Lymph Absolute - 2.6 E9/L  
Mono Absolute - 0.5 E9/L  
Eos Absolute - 0.2 E9/L  
Basophil Absolute - 0.1 E9/L  
BMP (2023)  
Glucose Lvl - 83 mg/dL  
BUN - 9 mg/dL  
Creatinine - 0.6 mg/dL  
BUN/Creat Ratio - 15  
Sodium Lvl - 138 mmol/L  
Potassium Lvl - 3.7 mmol/L  
Chloride - 105 mmol/L  
CO2 - 25 mmol/L  
AGAP - 12 mEq/L  
Calcium Lvl - 8.9 mg/dL  
C-Reactive Protein (2023)  
CRP - 0.5 mg/dL  
Capillary Glucose POC (2023)  
Glucose Cap - 87 mg/dL  
POC Device SN - [id number]  
POC User ID - [id number]  
POC Username - LIAM CORTÉS  
CBC w/ Auto Diff (2023)  
WBC - 8.8 E9/L  
RBC - 4.8 E12/L  
Hgb - 13.9 gm/dL  
Hct - 41.9 %  
MCV - 87.8 fL  
MCH - 29.1 pg  
MCHC - 33.2 gm/dL  
RDW - 13.4 %  
Platelet - 354.0 E9/L  
MPV - 7.4 fL  
eGFR (2023)  
eGFR - >60 mL/min/1.73 m2  
eGFR AA - >60 mL/min/1.73 m2  
HgbA1c (2023)  
Hgb A1C % - 5.6 %  
Lipid Panel (2023)  
Chol - 212 mg/dL  
Trig - 180 mg/dL  
HDL - 62 mg/dL  
LDL Direct - 115 mg/dL  
VLDL - 36 mg/dL  
PT & PTT (2023)  
PT - 10.9 second(s)  
INR - 1.0  
PTT - 38.5 second(s)  
Sedimentation Rate Automated   
(2023)  
Sed Rate Automated - 13 mm/hr  
Troponin 0 Hr. (2023)  
Troponin - 9.30 pg/mL  
Tests Performed  
Automated Diff  
BMP  
C-Reactive Protein  
Capillary Glucose POC  
CBC w/ Auto Diff  
eGFR  
HgbA1c  
Lipid Panel  
PT & PTT  
Sedimentation Rate Automated  
Troponin 0 Hr.  
Urinalysis with Culture Reflex --   
Results Pending --  
Carotid Duplex US Bilateral  
CT Head or Brain w/o Contrast  
MRA Head w/o Contrast -- Results   
Pending --  
XR Chest Single View  
Please visit your patient portal   
for your results or contact your   
primary care physician.  
Discharge Plan  
Discharge Disposition  
Discharged to -  
Home with family care  
Discharge Diet  
Discharge Diet(s): Fat Modified-   
Low cholesterol (01/10/23   
16:57:00)  
Discharge Medication List  
Prescriptions  
No active prescription (more   
content not included)...                [facility]  
   
                                        Comment on above:   Result Comment: Elec  
tronically Signed By: Lucy BAE\.br\Date and Time Signed: 01/10/23 17:04   
EST\.br\Electronically Co-Signed By: Breezy Goetz MD\.br\Date and Time Co-Signed: 01/10/23 17:05 EST   
   
                                                    01- Hospital   
Discharge instructions                    
  
  
Patient Education  
  
  
01/10/2023 16:58:23  
  
  
Multiple Sclerosis  
  
  
Multiple Sclerosis  
Multiple sclerosis (MS) is a   
disease of the brain, spinal cord,   
and optic nerves (central nervous   
system). It causes the body's   
disease-fighting (immune) system   
to destroy the protective covering   
(myelin sheath) around nerves in   
the brain. When this happens,   
signals (nerve impulses) going to   
and from the brain and spinal cord   
do not get sent properly or may   
not get sent at all.  
There are several types of MS:  
Relapsing-remitting MS. This is   
the most common type. This causes   
sudden attacks of symptoms. After   
an attack, you may recover   
completely until the next attack,   
or some symptoms may remain   
permanently.  
Secondary progressive MS. This   
usually develops after the onset   
of relapsing-remitting MS. Similar   
to relapsing-remitting MS, this   
type also causes sudden attacks of   
symptoms. Attacks may be less   
frequent, but symptoms slowly get   
worse (progress) over time.  
Primary progressive MS. This   
causes symptoms that steadily   
progress over time. This type of   
MS does not cause sudden attacks   
of symptoms.  
The age of onset of MS varies, but   
it often develops between 20 40   
years of age. MS is a lifelong   
(chronic) condition. There is no   
cure, but treatment can help slow   
down the progression of the   
disease.  
What are the causes?  
The cause of this condition is not   
known.  
What increases the risk?  
You are more likely to develop   
this condition if:  
You are a woman.  
You have a relative with MS.   
However, the condition is not   
passed from parent to child   
(inherited).  
You have a lack (deficiency) of   
vitamin D.  
You smoke.  
MS is more common in the [place] than in the [place]   
[place].  
What are the signs or symptoms?  
Relapsing-remitting and secondary   
progressive MS cause symptoms to   
occur in episodes or attacks that   
may last weeks to months. There   
may be long periods between   
attacks in which there are almost   
no symptoms. Primary progressive   
MS causes symptoms to steadily   
progress after they develop.  
Symptoms of MS vary because of the   
many different ways it affects the   
central nervous system. The main   
symptoms include:  
Vision problems and eye pain.  
Numbness.  
Weakness.  
Inability to move your arms,   
hands, feet, or legs (paralysis).  
Balance problems.  
Shaking that you cannot control   
(tremors).  
Muscle spasms.  
Problems with thinking (cognitive   
changes).  
MS can also cause symptoms that   
are associated with the disease,   
but are not always the direct   
result of an MS attack. They may   
include:  
Inability to control urination or   
bowel movements (incontinence).  
Headaches.  
Fatigue.  
Inability to tolerate heat.  
Emotional changes.  
Depression.  
Pain.  
How is this diagnosed?  
This condition is diagnosed based   
on:  
Your symptoms.  
A neurological exam. This involves   
checking central nervous system   
function, such as nerve function,   
reflexes, and coordination.  
MRIs of the brain and spinal cord.  
Lab tests, including a lumbar   
puncture that tests the fluid that   
surrounds the brain and spinal   
cord (cerebrospinal fluid).  
Tests to measure the electrical   
activity of the brain in response   
to stimulation (evoked   
potentials).  
How is this treated?  
There is no cure for MS, but   
medicines can help decrease the   
number and frequency of attacks   
and help relieve nuisance   
symptoms. Treatment options may   
include:  
Medicines that reduce the   
frequency of attacks. These   
medicines may be given by   
injection, by mouth (orally), or   
through an IV.  
Medicines that reduce inflammation   
(steroids). These may provide   
short-term relief of symptoms.  
Medicines to help control pain,   
depression, fatigue, or   
incontinence.  
Vitamin D, if you have a   
deficiency.  
Using devices to help you move   
around (assistive devices), such   
as braces, a cane, or a walker.  
Physical therapy to strengthen and   
stretch your muscles.  
Occupational therapy to help you   
with everyday tasks.  
Alternative or complementary   
treatments such as exercise,   
massage, or acupuncture.  
Follow these instructions at home:  
Take over-the-counter and   
prescription medicines only as   
told by your health care provider.  
Do not drive or use heavy   
machinery while taking   
prescription pain medicine.  
Use assistive devices as   
recommended by your physical   
therapist or your health care   
provider.  
Exercise as directed by your   
health care provider.  
Return to your normal activities   
as told by your health care   
provider. Ask your health care   
provider what activities are safe   
for you.  
Reach out for support. Share your   
feelings with friends, family, or   
a support group.  
Keep all follow-up visits as told   
by your health care provider and   
therapists. This is important.  
Where to find more information  
National Multiple Sclerosis   
Society:   
https://www.nationalmssociety.org  
Contact a health care provider if:  
You feel depressed.  
You develop new pain or numbness.  
You have tremors.  
You have problems with sexual   
function.  
Get help right away if:  
You develop paralysis.  
You develop numbness.  
You have problems with your   
bladder or bowel function.  
You develop double vision.  
You lose vision in one or both   
eyes.  
You develop suicidal thoughts.  
You develop severe confusion.  
If you ever feel like you may hurt   
yourself or others, or have   
thoughts about taking your own   
life, get help right away. You can   
go to your nearest emergency   
department or call:  
Your local emergency services (911   
in the [place]  
A suicide crisis helpline, such as   
the National Suicide Prevention   
[employer] at 1-409.719.2828. This   
is open 24 hours a day.  
Summary  
Multiple sclerosis (MS) is a   
disease of the central nervous   
system that causes the body's   
immune system to destroy the   
protective covering (myelin   
sheath) around nerves in the   
brain.  
There are 3 types of MS:   
relapsing-remitting, secondary   
progressive, and primary   
progressive. Relapsing-remitting   
and secondary progressive MS cause   
symptoms to occur in episodes or   
attacks that may last weeks to   
months. Primary progressive MS   
causes symptoms to steadily   
progress after they develop.  
There is no cure for MS, but   
medicines can help decrease the   
number and frequency of attacks   
and help relieve nuisance   
symptoms. Treatment may also   
include physical or occupational   
therapy.  
If you develop numbness,   
paralysis, vision problems, or   
other neurological symptoms, get   
help right away.  
This information is not intended   
to replace advice given to you by   
your health care provider. Make   
sure you discuss any questions you   
have with your health care   
provider.  
Document Released: 12/15/2001   
Document Revised: 2018   
Document Reviewed: 2018  
[employer] Patient Education 2020   
[employer]  
  
  
  
Follow Up Care  
  
  
2023 21:12:03  
  
  
With:DAVE CASTELLON  
Address:  
[address] [place] [address]- [phone] Business (1)  
  
When:  
Unknown  
Comments:Call for followup   
appointment  
  
  
With:De Sanders MD, NEU  
Address:  
[address] [place] [address]  
  
When:2 to 4 weeks                       [facility]  
  
  
  
                                        01- Evaluation + Plan note Extrac  
leonardo from:  
  
  
  
                                Title:Discharge Note Author:Curtis BAE Date:1/10/23  
  
  
  
                                                      
  
Discharged to -  
Home with family care  
  
  
Discharge Diet(s): Fat Modified- Low cholesterol (01/10/23 16:57:00)  
  
  
Prescriptions  
No active prescription medications  
  
Home  
Dilaudid 4 mg Tab, 4 mg= 1 tab(s), Oral, q4hr, PRN  
ibuprofen 200 mg Tab, 400 mg= 2 tab(s), Oral, PRN  
LORazepam 1 mg Tab, 1 mg= 1 tab(s), Oral, TID  
Norvasc 5 mg Tab, 5 mg= 1 tab(s), Oral, Daily  
pregabalin 100 mg Cap, 100 mg= 1 cap(s), Oral, TID  
Premarin 0.9 mg Tab, 0.9 mg= 1 tab(s), Oral, Daily  
tramadol 50 mg oral tablet, 50 mg= 1 tab(s), Oral, QID, PRN  
  
  
  
  
With When Contact Information DAVE CASTELLON  
[address] [place] [address]- [phone] Business (1)  
Additional Instructions: Call for followup appointment  
De Sanders MD, NEU Within 2 to 4 weeks  
[address] [place] [address]  
Additional Instructions:  
  
  
  
  
Multiple Sclerosis  
  
  
  
  
  
Extracted from:  
  
                                Title:APSO Note Author:Jose BAE Date:1/10/23  
  
  
  
                                                    PLAN:  
47-year-old female with past medical history of multiple sclerosis, anxiety, 
history   
of DVT x2 status post IVC filter, hip avascular necrosis due to chronic use of   
steroids presents to emergency department due to pins and needle sensation in 
the   
left lower extremity along with pain behind left eyeball and blurry vision.  
  
  
1. Multiple sclerosis exacerbation (G35: Multiple sclerosis)  
We will get MRI brain with and without contrast--pending.  
Neurology consultation--appreciated.  
-Await MRI of the brain if no changes or acute will not require IVIG or   
Methylprednisolone.  
PT OT evaluation--refused  
Fall precaution  
  
  
  
  
  
2. Left optic neuritis (H46.9: Unspecified optic neuritis)  
-See #1  
Per neurology no optic neuritis.  
  
  
  
3. History of DVT in adulthood (Z86.718: Personal history of other venous 
thrombosis   
and embolism)  
We will wait for final home med recs and continue with that  
  
  
4. PTSD (post-traumatic stress disorder) (F43.10: Post-traumatic stress 
disorder,   
unspecified)  
Continue home meds once verified  
  
  
5. Anxiety (F41.9: Anxiety disorder, unspecified)  
Continue home meds once verified  
  
  
6. Chronic pain (G89.29: Other chronic pain)  
Will order morphine for breakthrough pain for right now  
  
  
7. No contraindication to deep vein thrombosis (DVT) prophylaxis (Z78.9: Other   
specified health status)  
Heparin twice a day  
  
  
  
  
  
  
  
  
  
  
  
  
  
  
  
  
  
  
Extracted from:  
  
                                Title:Consult Note- Neurology Author:Lorri Rivera RN Date:1/10/23  
  
  
  
                                                      
ASSESSMENT:  
  
1. Chronic cerebral white matter changes. I remain unconvinced that these are   
demyelinating in nature. My suspicion for her having multiple sclerosis is low, 
and   
my suspicion for an acute exacerbation of such is very low. She has had similar   
retro-orbital and periorbital pains and ocular problems on the left that have 
been   
worked up for optic neuritis, at least in 2021 and in 2019 and probably 
other   
times I was unable to review, and there has never been any imaging evidence of 
optic   
neuritis. We are getting updated brain MRI images with and without contrast to 
ensure   
there is no active lesion and to make sure that her white matter changes look   
relatively unchanged from priors. Previous MRI of her cervical spine shows some   
postsurgical changes but no evidence of demyelinating lesions of the cord.  
2. Left ocular pain and visual loss. Doubt optic neuritis related to 
demyelinating   
disease but we will again attempt to rule this out. This has been a recurrent 
issue   
for her. Consider other optic neuropathies. In terms of the painful sensation,   
cranial nerve V1 syndromes could also be considered.  
3. Based on my assessment today it seems that the left hemiparesis is 
functional. She   
also has a longstanding history of what is believed to be functional 
neurological   
symptoms. We are imaging the brain regardless.  
  
PLAN:  
  
1. MRI brain with and without contrast  
2. Further recommendations to follow, but in general if the MRI is without any 
acute   
findings then there would be no need for IVIG or IV methylprednisolone and she 
could   
be discharged to follow-up with her outpatient neurologist.  
  
  
  
  
  
  
  
  
  
  
  
  
1. Multiple sclerosis exacerbation (G35: Multiple sclerosis)  
2. Left optic neuritis (H46.9: Unspecified optic neuritis)  
3. History of DVT in adulthood (Z86.718: Personal history of other venous 
thrombosis   
and embolism)  
4. PTSD (post-traumatic stress disorder) (F43.10: Post-traumatic stress 
disorder,   
unspecified)  
5. Anxiety (F41.9: Anxiety disorder, unspecified)  
6. Chronic pain (G89.29: Other chronic pain)  
7. No contraindication to deep vein thrombosis (DVT) prophylaxis (Z78.9: Other   
specified health status)  
  
  
  
Extracted from:  
  
                                Title:Admission H & P Author:Yobani HILL MD  
ate:23  
  
  
  
                                                    47-year-old female with past  
 medical history of multiple sclerosis, anxiety,   
history   
of DVT x2 status post IVC filter, hip avascular necrosis due to chronic use of   
steroids presents to emergency department due to pins and needle sensation in 
the   
left lower extremity along with pain behind left eyeball and blurry vision.  
1. Multiple sclerosis exacerbation (G35: Multiple sclerosis)  
We will get MRI brain with and without contrast  
Neurology consultation  
Patient will likely benefit from systemic high-dose steroids once determined by   
neurology  
Admit under inpatient level of care as anticipate she will require greater than 
2   
midnight stay  
PT OT evaluation  
Fall precaution  
Patient has no difficulty swallowing and should be able to eat  
Bedside dysphagia screen done by me  
  
  
  
  
  
  
2. Left optic neuritis (H46.9: Unspecified optic neuritis)  
Once MRI with and without contrast is performed we will start treatment as per   
neurology  
3. History of DVT in adulthood (Z86.718: Personal history of other venous 
thrombosis   
and embolism)  
We will wait for final home med recs and continue with that  
4. PTSD (post-traumatic stress disorder) (F43.10: Post-traumatic stress 
disorder,   
unspecified)  
Continue home meds once verified  
5. Anxiety (F41.9: Anxiety disorder, unspecified)  
Continue home meds once verified  
6. Chronic pain (G89.29: Other chronic pain)  
Will order morphine for breakthrough pain for right now  
7. No contraindication to deep vein thrombosis (DVT) prophylaxis (Z78.9: Other   
specified health status)  
Heparin twice a day  
Orders:  
acetaminophen, 650 mg = 2 tab(s), Tab, Oral, q6hr PRN Pain, Routine, Start date   
23 23:30:00 EST, 23 23:30:00 EST  
Al hydroxide/Mg hydroxide/simethicone, 30 mL, Susp-Oral, Oral, q6hr PRN 
Indigestion,   
Routine, Start date 23 23:30:00 EST  
aspirin, 81 mg = 1 tab(s), Tab-EC, Oral, Daily, Routine, Start date 01/10/23 
9:00:00   
EST, 23 23:30:00 EST  
heparin, 5,000 unit(s) = 1 mL, Injection, SubCutaneous, q12hr, Routine, Start 
date   
01/10/23 0:00:00 EST, 23 23:29:00 EST  
hydrALAZINE, 10 mg = 0.5 mL, Injection, IV Push, q6hr PRN Other (see comment),   
Routine, Start date 23 23:29:00 EST, 23 23:29:00 EST  
Ambulate with Assistance  
Below the Knee Intermittent Pneumatic Compression Device  
Cardiac Monitoring  
Communication Order  
Communication Order Physician to Nursing  
Consult to Neurology  
Dysphagia Screen  
Evaluate Need For Continued Telemetry  
HgbA1c  
Intake and Output  
Lipid Panel  
MRA Head w/o Contrast  
MRI Brain w/ + w/o Contrast  
Notify Provider Vital Signs  
Notify Provider Vital Signs  
Occupational Therapy Evaluate Patient, Develop a Plan of Care and Implement Plan  
Oxygen Protocol  
Physical Therapy Evaluate Patient, Develop a Plan of Care and Implement Plan  
Place in Status  
Precautions  
Pulse Oximetry  
Stroke Education  
Stroke Quality Measures  
US Carotid Duplex Bilateral  
Vital Signs  
Weight  
Plan discussed with patient at bedside in ER bed #5  
Full code as per patient  
52 minutes spent in addressing above issue and in medical decision making  
This report was transcribed using voice recognition software. Every effort was 
made   
to ensure accuracy, however, inadvertently computerized transcription mistakes 
may be   
present.  
  
Dr. Yobani Hill  
Hospitalist at [facility]  
  
  
  
  
  
  
  
  
Extracted from:  
  
                                Title:ED Note   Author:Olu NEVAREZ, Eddie VARMA Leonel  
e:23  
  
  
  
                                                    1. Multiple sclerosis exacer  
bation (G35: Multiple sclerosis)  
Orders:  
diphenhydrAMINE, 12.5 mg = 0.25 mL, Injection, IV Push, Once, Stop date 23
   
23:06:00 EST, STAT, Start date 23 23:06:00 EST, 23 23:06:00 EST  
methylPREDNISolone, 125 mg = 2 mL, Injection, IV Push, Once, Stop date 23   
23:05:00 EST, STAT, Start date 23 23:05:00 EST, 23 23:05:00 EST  
Basic Metabolic Panel  
C-Reactive Protein  
CBC w/ Auto Diff  
Continuous Pulse Oximetry  
CT Head or Brain w/o Contrast  
ED Cardiac Monitoring  
ED Physician consult Hospitalist for continued care  
eGFR  
Oxygen Therapy  
PT & PTT  
Routine Capillary Glucose POC  
Saline Lock Insert  
Sedimentation Rate Automated  
Stroke Quality Measures  
Troponin 0 Hr.  
UA With Cult Reflex  
XR Chest Single View  
  
  
  
  
Diagnostic Tests Pending  
  
* UA With Cult Reflex 23  
  
  
[facility]01- NoteAttempted PT Evaluation, but pt 
defers PT needs. No Evaluation given[facility]
Basic Information  
Accompanied by: No Accompaniment  
Source of History: Self  
Present at Bedside: Medical personnel  
Referral Source: ED  
History Limitation: None  
Chief Complaint  
left side numbness, MS flare up. left eye pain. symptoms began yesterday morning  
History of Present Illness  
47-year-old female with past medical history of multiple sclerosis, anxiety, 
history of DVT x2 status post IVC filter, hip avascular necrosis due to chronic 
use of steroids presents to emergency department due to pins and needle 
sensation in the left lower extremity along with pain behind left eyeball and 
blurry vision. Patient states that symptoms started yesterday and today she 
cannot put any weight on her left lower extremity. That was not the most 
difficult to deal with but today she is having blurry vision and difficulty 
seeing out of left eye with pain behind her left eyeball which is the reason she
 presented to emergency department. Patient was worked up with a CT scan and 
referred to hospitalist service for further management.  
Review of Systems  
Constitutional: no fever, no chills, no sweats, mild weakness  
Respiratory: no shortness of breath, no cough, no orthopnea, no wheezing  
Cardiovascular: no chest pain, no palpitations, no edema  
Additional ROS info: Except as noted in the above Review of Systems and in the 
History of Present Illness all other systems have been reviewed and are negative
 or noncontributory.  
Scoring  
Sellers Fall Risk Score: 45 (23)  
Physical Exam  
Vitals & Measurements  
T: 36.6 ?C(Oral) HR: 82(Peripheral) RR: 18 BP: 153/114 SpO2: 100% HT: 170.18 cm 
WT: 61.9 kg  
General: alert, no acute distress on Room air  
ENMT: TM's clear, oral mucosa moist, no pharyngeal erythema or exudate  
Cardiovascular: regular rate and rhythm, normal peripheral perfusion  
Respiratory: Lungs CTA, respirations non labored  
Abdomen: Soft, nontender, without rebound or rigidity, positive bowel sounds  
Extremities: no deformity, no trauma  
Genitourinary: Deferred  
Skin: Intact  
Hematological: No signs of large bruising/ecchymosis or petechiae  
Neurological: oriented x 4, LOC appropriate for age, CN II-XII intact, motor 
strength equal & normal bilaterally, sensation decreased on left, speech normal  
Psych: Denies suicidal ideation or homicidal ideation  
Lab Results  
WBC: 8.8 E9/L (23 23:24:00)  
RBC: 4.8 E12/L (23 23:24:00)  
HGB: 13.9 gm/dL (23 23:24:00)  
Hct: 41.9 % (23 23:24:00)  
MCV: 87.8 fL (23 23:24:00)  
MCH: 29.1 pg (23 23:24:00)  
MCHC: 33.2 gm/dL (23 23:24:00)  
RDW: 13.4 % (23 23:24:00)  
Platelet: 354 E9/L (23 23:24:00)  
MPV: 7.4 fL (23 23:24:00)  
Neutro Auto: 61.7 % (23:24:00)  
Lymph Auto: 29.7 % (23:24:00)  
Mono Auto: 5.4 % (23:24:00)  
Eos Auto: 2 % (23:24:00)  
Basophil Auto: 1.2 % (23::00)  
Neutro Absolute: 5.4 E9/L (23:24:00)  
Lymph Absolute: 2.6 E9/L (23:24:00)  
Mono Absolute: 0.5 E9/L (23::00)  
Eos Absolute: 0.2 E9/L (23:24:00)  
Basophil Absolute: 0.1 E9/L (23:24:00)  
Glucose Lvl: 83 mg/dL (23 23:24:00)  
Sodium Lvl: 138 mmol/L (23 23:24:00)  
Potassium Lvl: 3.7 mmol/L (23 23:24:00)  
Chloride: 105 mmol/L (23:24:00)  
CO2: 25 mmol/L (23:24:00)  
AGAP: 12 mEq/L (23:24:00)  
Calcium Lvl: 8.9 mg/dL (23:24:00)  
Glucose Cap: 87 mg/dL (23 22:41:00)  
POC Device SN: [id number] (23 22:41:00)  
POC User ID: [id number] (23 22:41:00)  
POC Username: [email or web address] (23 22:41:00)  
Images  
XR Chest Single View * Preliminary *  
  
23 23:42:09  
NEGATIVE: No infiltrate, mass or other acute cardiopulmonary abnormality  
  
Read By: Ulises Paz DO  
Assessment/Plan  
47-year-old female with past medical history of multiple sclerosis, anxiety, 
history of DVT x2 status post IVC filter, hip avascular necrosis due to chronic 
use of steroids presents to emergency department due to pins and needle 
sensation in the left lower extremity along with pain behind left eyeball and 
blurry vision.  
1. Multiple sclerosis exacerbation (G35: Multiple sclerosis)  
We will get MRI brain with and without contrast  
Neurology consultation  
Patient will likely benefit from systemic high-dose steroids once determined by 
neurology  
Admit under inpatient level of care as anticipate she will require greater than 
2 midnight stay  
PT OT evaluation  
Fall precaution  
Patient has no difficulty swallowing and should be able to eat  
Bedside dysphagia screen done by me  
2. Left optic neuritis (H46.9: Unspecified optic neuritis)  
Once MRI with and without contrast is performed we will start treatment as per 
neurology  
3. History of DVT in adulthood (Z86.718: Personal history of other venous 
thrombosis and embolism)  
We will wait for final home med recs and continue with that  
4. PTSD (post-traumatic stress disorder) (F43.10: Post-traumatic stress disorde 
(more content not included)...[facility] Hospital[facility]:Result
 Comment: Electronically Signed By: Yoabni HILL MD\.br\Date and Time Signed: 
01/10/23 00:04 ESTEvaluation noteNo assessment information availableUC [facility]  
Work Phone: [phone] course Narrative  
  
No data available for this section  
  
[facility]  
  
No data available for this section  
  
[facility]  
  
Summary Purpose  
  
  
                                                      
  
  
  
Family History  
No Family History Records Found  
  
                          Relationship Condition    Age at Onset Recorded Date/T  
nicole  
   
                          brother      Diabetes mellitus Unknown        
   
                          grandparent  Malignant neoplasm of throat Unknown       
   
  
  
  
Advance Directives  
No Advanced Directives Records FoundDocuments on File  
  
                          Type         Date Recorded Patient Representative Expl  
anation  
   
                          Advance Directives and Living Will                      
         
   
                          Power of                              
  
                           Latest Code Status on File  
  
                          Code Status  Date Activated Date Inactivated Comments  
   
                          Full Code    2018 9:53 PM 2018 4:32 PM   
  
  
  
                                                                   
   
                          Full Code    2018 9:53 PM 2018 9:53 PM   
  
  
  
                                                                   
   
                          Full Code    2016 11:21 AM 2016 8:51 PM   
  
  
  
                                                                   
   
                          Full Code    2016 10:17 AM 2016 7:01 PM   
  
  
  
                                                                   
   
                          Full Code    2015 8:09 AM 2015 4:21 PM   
  
                                Documents on File  
  
                          Type         Date Recorded Patient Representative Expl  
anation  
   
                          Advance Directives and Living Will                      
         
   
                          Power of                              
  
                           Latest Code Status on File  
  
                          Code Status  Date Activated Date Inactivated Comments  
   
                          Full Code    2020 6:01 AM                
  
  
  
                                                                   
   
                          Full Code    2018 9:53 PM 2018 4:32 PM   
  
  
  
                                                                   
   
                          Full Code    2018 9:53 PM 2018 9:53 PM   
  
  
  
                                                                   
   
                          Full Code    2016 11:21 AM 2016 8:51 PM   
  
  
  
                                                                   
   
                          Full Code    2016 10:17 AM 2016 7:01 PM   
  
                           Latest Code Status on File  
  
                          Code Status  Date Activated Date Inactivated Comments  
   
                          Full Code    2020 6:01 AM 2020 3:55 PM   
  
  
  
                                                                   
   
                          Full Code    2018 9:53 PM 2018 4:32 PM   
  
                           Latest Code Status on File  
  
                          Code Status  Date Activated Date Inactivated Comments  
   
                          Full Code    2020 4:42 AM                
  
  
  
                                                                   
   
                          Full Code    2020 6:01 AM 2020 3:55 PM   
  
                                Documents on File  
  
                          Type         Date Recorded Patient Representative Expl  
anation  
   
                          ACP-Advance Directive                             
   
                          ACP-Power of                              
  
                           Latest Code Status on File  
  
                          Code Status  Date Activated Date Inactivated Comments  
   
                          Full Code    2020 4:42 AM 2020 8:09 PM   
  
  
  
                                                                   
   
                          Full Code    2020 6:01 AM 2020 3:55 PM   
  
  
  
                                Advance Directive Response        Recorded Date/  
Time  
   
                                Advance Directives No              May 3rd, 2018  
 6:04pm  
  
  
  
Assessments  
  
  
                                                    Diagnosis  
   
                                                      
  
  
Multiple sclerosis (HCC)- Primary  
  
  
Multiple sclerosis  
  
  
  
                                                    Diagnosis  
   
                                                      
  
  
Multiple sclerosis (HCC)- Primary  
  
  
Multiple sclerosis  
  
  
  
                                                    Diagnosis  
   
                                                      
  
  
Multiple sclerosis exacerbation (HCC)- Primary  
  
  
Multiple sclerosis  
   
                                                      
  
  
Hematemesis without nausea  
   
                                                      
  
  
M nchausen's syndrome  
  
  
Chronic factitious illness with physical symptoms  
   
                                                      
  
  
hx of Self-inflicted injury  
   
                                                      
  
  
Essential hypertension  
  
  
Unspecified essential hypertension  
  
  
  
                                                    Diagnosis  
   
                                                      
  
  
Acute deep vein thrombosis (DVT) of brachial vein of right upper extremity (HCC)  
  
  
  
                                                    Diagnosis  
   
                                                      
  
  
Cellulitis  
  
  
Cellulitis and abscess of unspecified site  
   
                                                      
  
  
Multiple sclerosis (HCC)  
  
  
Multiple sclerosis  
   
                                                      
  
  
Post traumatic stress disorder  
  
  
Posttraumatic stress disorder  
   
                                                      
  
  
M nchausen's syndrome  
  
  
Chronic factitious illness with physical symptoms  
   
                                                      
  
  
S/P IVC filter  
  
  
Other postprocedural status  
   
                                                      
  
  
Acute deep vein thrombosis (DVT) of right upper extremity (HCC)  
   
                                                      
  
  
Oral mucositis  
  
  
Stomatitis and mucositis, unspecified  
  
  
  
                                                    Diagnosis  
   
                                                      
  
  
Cellulitis  
  
  
Cellulitis and abscess of unspecified site  
  
  
  
                                                    Diagnosis  
   
                                                      
  
  
Chronic left hip pain- Primary  
  
  
Pain in joint, pelvic region and thigh  
  
  
  
Discharge Instructions  
* Discharge Instr - JESUS*   
  
Bárbara Mallorie L, RN - 2020 1:21 PM EST  
  
  
  
Formatting of this note might be different from the original.  
Continuity of Care Form  
  
Patient Name: Nichole L Cantu  
: 1975 MRN: [id number]  
  
Admit date: 2020 Discharge date: ***  
  
Code Status Order: Full Code  
Advance Directives:  
  
Admitting Physician: Gatito Brock MD  
PCP: Andrei Hayes MD  
  
Discharging Nurse: ***  
Discharging Hospital Unit/Room#: 0543/0543-01  
Discharging Unit Phone Number: ***  
  
Emergency Contact:  
Extended Emergency Contact Information  
Primary Emergency Contact: Arlette Storey  
[facility]  
Home Phone: [phone]  
Relation: Parent  
  
Past Surgical History:  
Past Surgical History:  
Procedure Laterality Date  
ABDOMEN SURGERY  
BACK SURGERY  
CARPAL TUNNEL RELEASE  
CERVICAL FUSION  
 SECTION  
COLONOSCOPY  
ENDOSCOPY, COLON, DIAGNOSTIC  
HYSTERECTOMY  
LYMPHADENECTOMY  
TONSILLECTOMY  
VASCULAR SURGERY  
VENA CAVA FILTER PLACEMENT 2011  
GFF Placed in   
  
Immunization History:  
  
There is no immunization history on file for this patient.  
  
Active Problems:  
Patient Active Problem List  
Diagnosis Code  
Substance abuse ([facility]) F19.10  
Anxiety F41.9  
Post traumatic stress disorder F43.10  
Multiple sclerosis (HCC) G35  
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  
S/P IVC filter Z95.828  
Spinal cord stimulator status Z96.89  
hx of Self-inflicted injury Z72.89  
Left-sided weakness R53.1  
Chronic neck pain M54.2, G89.29  
Chronic low back pain M54.5, G89.29  
Multiple sclerosis exacerbation (HCC) G35  
Acute extremity pain M79.609  
hx of GI bleeding K92.2  
History of DVT (deep vein thrombosis) Z86.718  
Diplopia H53.2  
Dysphagia R13.10  
Blurry vision, left eye H53.8  
Numbness R20.0  
Anemia D64.9  
Hematemesis without nausea K92.0  
Essential hypertension I10  
  
Isolation/Infection:  
Isolation  
  
No Isolation  
  
  
Patient Infection Status  
None to display  
  
  
Nurse Assessment:  
Last Vital Signs: BP (!) 145/84   Pulse 64   Temp 98 F (36.7 C) (Oral)   Resp 20
   Ht 5' 7.01  (1.702 m)   Wt 180 lb 12.4 oz (82 kg)   SpO2 98%   BMI 28.31 kg/m  
Last documented pain score (0-10 scale): Pain Level: 8  
Last Weight:  
Wt Readings from Last 1 Encounters:  
20 180 lb 12.4 oz (82 kg)  
  
Mental Status: oriented  
  
IV Access:  
- None  
  
Nursing Mobility/ADLs:  
Walking Assisted  
Transfer Assisted  
Bathing Independent  
Dressing Independent  
Toileting Independent  
Feeding Independent  
Med Admin Independent  
Med Delivery whole  
  
Wound Care Documentation and Therapy:  
  
  
Elimination:  
Continence:  
Bowel: Yes  
Bladder: Yes  
Urinary Catheter: None  
Colostomy/Ileostomy/Ileal Conduit: No  
  
  
Date of Last BM: ***  
No intake or output data in the 24 hours ending 20 1321  
No intake/output data recorded.  
  
Safety Concerns:  
None  
  
Impairments/Disabilities:  
None  
  
Nutrition Therapy:  
Current Nutrition Therapy:  
- Oral Diet: General  
  
Routes of Feeding: Oral  
Liquids: No Restrictions  
Daily Fluid Restriction: no  
Last Modified Barium Swallow with Video (Video Swallowing Test): not done  
  
Treatments at the Time of Hospital Discharge:  
Respiratory Treatments: ***  
Oxygen Therapy: is not on home oxygen therapy.  
Ventilator:  
- No ventilator support  
  
Rehab Therapies: Physical Therapy  
Weight Bearing Status/Restrictions: No weight bearing restirctions  
Other Medical Equipment (for information only, NOT a DME order): ***  
Other Treatments: ***  
  
Patient's personal belongings (please select all that are sent with patient):  
None  
  
RN SIGNATURE: Electronically signed by Mallorie Granger RN on 20 at 1:25 
PM  
  
CASE MANAGEMENT/SOCIAL WORK SECTION  
  
Inpatient Status Date: ***  
  
Readmission Risk Assessment Score:  
Readmission Risk  
  
Risk of Unplanned Readmission:  
17  
  
  
  
Discharging to Facility/ Agency  
Name:  
Address:  
Phone:  
Fax:  
  
Dialysis Facility (if applicable)  
Name:  
Address:  
Dialysis Schedule:  
Phone:  
Fax:  
  
/ signature: {Esignature:[id number]}  
  
PHYSICIAN SECTION  
  
Prognosis: Fair  
  
Condition at Discharge: Stable  
  
Rehab Potential (if transferring to Rehab): Good  
  
Recommended Labs or Other Treatments After Discharge:  
  
Physician Certification: I certify the above information and transfer of Nichole L Cantu is necessary for the continuing treatment of the diagnosis listed and 
that she requires Home Care for less 30 days.  
  
Update Admission H&P: No change in H&P  
  
PHYSICIAN SIGNATURE: Electronically signed by Gatito Brock MD on 20 at
 1:21 PM  
  
  
  
Electronically signed by Mallorie Granger RN at 2020 1:25 PM EST  
  
  
  
  
documented in this encounter* Attachments  
  
The following attachments cannot be sent through Care Everywhere.  
  
* DVT (Deep Vein Thrombosis): General Info (English)  
  
documented in this encounter* Discharge Instr - Activity*   
  
Shannon Thomas APRN - NP - 2020 8:42 AM EDT  
  
  
  
Activity as tolerated  
  
  
  
Electronically signed by ELVA Zimmer NP at 2020 8:42 AM EDT  
  
  
  
  
* Discharge Instr - Diet*   
  
Shannon Thomas APRN - NP - 2020 8:42 AM EDT  
  
  
  
? Good nutrition is important when healing from an illness, injury, or surgery. 
Follow any nutrition recommendations given to you during your hospital stay.  
? If you were given an oral nutrition supplement while in the hospital, continue
 to take this supplement at home. You can take it with meals, in-between meals, 
and/or before bedtime. These supplements can be purchased at most local grocery 
stores, pharmacies, and [employer].  
? If you have any questions about your diet or nutrition, call the hospital and 
ask for the dietitian.  
  
  
  
  
  
Electronically signed by ELVA Zimmer NP at 2020 4:10 PM EDT  
  
  
  
  
* Attachments  
  
The following attachments cannot be sent through Care Everywhere.  
  
* apixaban (English)  
* DVT (Deep Vein Thrombosis) (English)  
  
documented in this encounter* Attachments  
  
The following attachments cannot be sent through Care Everywhere.  
  
* Hip Pain (English)  
  
documented in this encounter  
  
History of Present Illness  
* Mallorie Granger RN - 2020 12:58 PM EST  
  
Patient stated that she wanted to leave AMA. Physician notified....Mallorie Granger RN  
  
  
  
  
Electronically signed by Mallorie Granger RN at 2020 1:02 PM EST  
  
  
* Tesfaye Amelie - 2020 11:48 AM EST  
  
Formatting of this note might be different from the original.  
Physical Therapy  
  
Facility/Department: [address] [facility]  
Initial Assessment  
  
NAME: Nichole L Cantu  
: 1975  
MRN: [id number]  
  
Date of Service: 2020  
  
Discharge Recommendations:  
Further therapy recommended at discharge.  
PT Equipment Recommendations  
Equipment Needed: No  
Other: pt reports having equiptment at baseline  
  
Assessment  
Body structures, Functions, Activity limitations: Decreased functional mobility 
;Decreased ROM;Decreased strength;Decreased safe awareness;Decreased 
balance;Decreased endurance;Decreased sensation  
Assessment: Pt is supervision to MOD I with bed mobility from flattened position
 without use of bedrails. Has inconsistencies in ROM and strength testing, 
initially demos tremoring in R UE and inability to lift past 30 deg then reaches
 out to grab for water on traytable; LE demos significantly reduced strength 
when sitting EOB and then has no buckling during amb. Amb x 15 ft RW with CGA 
for safety with varying inconsistencies in gait mechanics pertaining to R ankle 
and LE, no LOB noted. Pt perseverates throughout session on steriods helping her
 and thinking getting more will help fix her problems. Based on presentation pt 
is currently unsafe to return to home setting. Pt would benefit from acute PT 
services at this time to address deficits.  
Prognosis: Good  
Decision Making: Medium Complexity  
PT Education: PT Role;Plan of Care;General Safety;Gait Training;Transfer 
Training;Equipment  
REQUIRES PT FOLLOW UP: Yes  
Activity Tolerance  
Activity Tolerance: Patient limited by fatigue;Patient limited by 
endurance;Patient limited by pain  
  
Patient Diagnosis(es): There were no encounter diagnoses.  
  
has a past medical history of Blood transfusion reaction, Essential 
hypertension, GERD (gastroesophageal reflux disease), [place] filter in 
place, manchu, Movement disorder, MS (multiple sclerosis) (HCC), Neuromuscular 
disorder (HCC), Optic neuritis due to multiple sclerosis (HCC), Psychiatric pr
oblem, Seizures (HCC), Self inflicted injury, and Spinal cord stimulator status.  
has a past surgical history that includes  section; cervical fusion; 
Tonsillectomy; lymphadenectomy; Carpal tunnel release; Hysterectomy; back 
surgery; Endoscopy, colon, diagnostic; Colonoscopy; vascular surgery; Abdomen 
surgery; and Vena Cava Filter Placement (2011).  
  
Restrictions  
Restrictions/Precautions  
Restrictions/Precautions: General Precautions, Fall Risk, Up as Tolerated  
Required Braces or Orthoses?: No  
Position Activity Restriction  
Other position/activity restrictions: Inconsistent symptoms in R extremities 
noted, sitter in room  
Vision/Hearing  
Vision: Within Functional Limits  
Hearing: Within functional limits  
Subjective  
General  
Chart Reviewed: Yes  
Patient assessed for rehabilitation services?: Yes  
Response To Previous Treatment: Not applicable  
Family / Caregiver Present: No  
Follows Commands: Within Functional Limits  
General Comment  
Comments: co-eval with OT  
Subjective  
Subjective: RN reports pt is agreeable to therapy. Pt is lying in bed upon 
arrival and reports she has been waiting for therapy to come she knows she has 
to do it.  
Pain Screening  
Patient Currently in Pain: Yes  
Pain Assessment  
Pain Assessment: 0-10  
Pain Level: 9(no grimaces or evidence of distress during session)  
Pain Type: Acute pain  
Pain Location: Generalized( whole R side from my face and tongue to toes )  
Pain Orientation: Right  
Pain Descriptors: Aching;Discomfort;Tingling;Numbness  
Pain Frequency: Continuous  
Non-Pharmaceutical Pain Intervention(s): Ambulation/Increased 
Activity;Distraction;Emotional support  
Response to Pain Intervention: Patient Satisfied  
Vital Signs  
Patient Currently in Pain: Yes  
Pre Treatment Pain Screening  
Intervention List: Patient able to continue with treatment  
  
Orientation  
Orientation  
Overall Orientation Status: Within Functional Limits  
Social/Functional History  
Social/Functional History  
Lives With: Significant other  
Type of Home: Mobile home  
Home Layout: One level  
Home Access: Stairs to enter with rails  
Entrance Stairs - Number of Steps: 4 ANGIE (sometimes scoots on bottom)  
Entrance Stairs - Rails: Left  
Bathroom Shower/Tub: Tub/Shower unit  
Bathroom Toilet: Standard  
Bathroom Equipment: Grab bars in shower  
Bathroom Accessibility: Accessible  
Home Equipment: Rolling walker(uses during MS exacerbations)  
ADL Assistance: Independent  
Homemaking Assistance: Independent  
Homemaking Responsibilities: Yes  
Ambulation Assistance: Independent  
Transfer Assistance: Independent  
Active : Yes( On good days )  
Mode of Transportation: Car  
Occupation: On disability  
Leisure & Hobbies: movies, hang with sig other  
Additional Comments: Sig other home often  
Cognition  
Cognition  
Overall Cognitive Status: WFL  
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 
diagnosed with Munchausen's syndrome, pt cooperative with therapy  
  
Objective  
AROM RLE (degrees)  
RLE General AROM: hip flex <90, trace ankle DF, lacking 45 deg knee ext though 
all WFL when moved passively  
AROM LLE (degrees)  
LLE AROM : WFL  
AROM RUE (degrees)  
RUE General AROM: shoulder flex/abd ~35 deg (with visible tremoring), trace 
wrist flex/ext, elbow flex ~75 deg, cannot oppose to 4th and 5th digits; ; then 
does reach out to grab water glass from tryand itch ear during session  
AROM LUE (degrees)  
LUE AROM : WFL  
Strength RLE  
Comment: inconsistent results; grossly 3+/5 based on observed functional 
mobility  
Strength LLE  
Strength LLE: WFL  
Strength RUE  
Comment: at least 3+/5 based on observed fuctional mobility; per MMT unable to 
move antigravity  
Strength LUE  
Comment: at least 3+/5 based on observed fuctional mobility  
Strength Other  
Other: demos inconsistent testing with MMT - felt resistance when moving into 
LAQ then unable to hold HS positioning for testing, PF's activated when moving 
actively in DF  
Tone RLE  
RLE Tone: Normotonic  
Tone LLE  
LLE Tone: Normotonic  
Motor Control  
Gross Motor?: WFL  
Sensation  
Overall Sensation Status: Impaired(reports N/T R side face, tongue, UE/LE)  
Bed mobility  
Supine to Sit: Supervision  
Sit to Supine: Supervision  
Scooting: Modified independent  
Comment: completed with bed in flattened position and without use of bedrails  
Transfers  
Sit to Stand: Contact guard assistance  
Stand to sit: Contact guard assistance  
Comment: trasnfers to RW, CGA for safety; proper hand placement with transfers  
Ambulation  
Ambulation?: Yes  
More Ambulation?: No  
Ambulation 1  
Surface: level tile  
Device: Rolling Walker  
Assistance: Contact guard assistance  
Quality of Gait: narrow ZA, inconsistent R foot placement with amb - sometimes 
dragging foot sometimes able to lift and advance it, sometimes crossing midline 
though no LOB instance occur  
Distance: 15ft  
Comments: pt has inconsistencies in gait mechanics when amb; ankle moves from 
excessive supination to pronation, able to DF ~25% of the time. stands on L LE 
with L UE support on RW with R ankle crossed behind L without any evidence of 
imbalance  
Stairs/Curb  
Stairs?: Yes  
Wheelchair Activities  
Level of Assistance for pressure relief activities: Contact guard assistance  
  
Balance  
Sitting - Static: Good  
Sitting - Dynamic: Good  
Standing - Static: Fair  
Standing - Dynamic: Fair  
Comments: standing balance assessed with RW  
  
  
Plan  
Plan  
Times per week: 5-6x/wk  
Current Treatment Recommendations: Strengthening, ROM, Balance Training, 
Functional Mobility Training, Transfer Training, Safety Education & Training, 
Home Exercise Program, Endurance Training, Pain Management, Equipment 
Evaluation, Education, & procurement, Stair training, Gait Training, Pa
tient/Caregiver Education & Training  
Safety Devices  
Type of devices: All fall risk precautions in place, Sitter present, Nurse 
notified, Gait belt, Left in bed, Patient at risk for falls, Call light within 
reach  
Restraints  
Initially in place: No  
  
AM-PAC Score  
AM-PAC Inpatient Mobility Raw Score : 21 (20 114)  
AM-PAC Inpatient T-Scale Score : 50.25 (20 114)  
Mobility Inpatient CMS 0-100% Score: 28.97 (20 114)  
Mobility Inpatient CMS G-Code Modifier : CJ (20)  
  
  
  
Goals  
Short term goals  
Time Frame for Short term goals: 12 visits  
Short term goal 1: Complete x6 steps with supervision using L handrail ascending
 for safe home entry/exit  
Short term goal 2: Amb x100ft with supervision using RW or least restrictive 
device for increased independence in home/community  
Short term goal 3: Improve standing static and dynamic balance to good to reduce
 risk of falls and injury  
Short term goal 4: Tolerate 30 minutes of activity to increase tolerance to 
therapy interventions  
Short term goal 5: Participate in AROM ther-ex to build strength to aide in 
return to prior level  
  
  
Therapy Time  
Individual Concurrent Group Co-treatment  
Time In 1020  
Time Out 1050  
Minutes 30  
Timed Code Treatment Minutes: 12 Minutes  
  
  
Amelie Carlin  
This treatment/evaluation completed by signing SPT. Signing PT agrees with 
treatment and documentation.  
  
  
  
  
  
  
  
Electronically signed by Ezequiel Espinoza PT at 2020 1:12 PM EST  
  
  
* Alden Silver OT - 2020 11:32 AM EST  
  
Formatting of this note might be different from the original.  
Occupational Therapy  
Occupational Therapy Initial Assessment  
Date: 2020  
Patient Name: Nichole L Cantu  
MRN: [id number]  
: 1975  
  
Date of Service: 2020  
  
Discharge Recommendations: Further therapy recommended at discharge.  
Based on the symptoms pt is demonstrating, pt is unsafe to go home, however, 
inconsistencies noted in R symptoms, CTA.  
  
OT Equipment Recommendations  
Equipment Needed: Yes  
Mobility Devices: Walker;ADL Assistive Devices  
Walker: Rolling  
ADL Assistive Devices: Transfer Tub Bench  
  
Assessment  
Performance deficits / Impairments: Decreased functional mobility ;Decreased 
high-level IADLs;Decreased ADL status;Decreased endurance;Decreased 
sensation;Decreased strength;Decreased balance;Decreased safe 
awareness;Decreased coordination  
Prognosis: Good  
Decision Making: Medium Complexity  
OT Education: Plan of Care;OT Role;ADL Adaptive Strategies;Transfer Training  
REQUIRES OT FOLLOW UP: Yes  
Activity Tolerance  
Activity Tolerance: Patient limited by pain;Patient limited by fatigue  
Safety Devices  
Safety Devices in place: Yes  
Type of devices: All fall risk precautions in place;Left in bed;Call light 
within reach;Nurse notified;Gait belt(Sitter in room)  
Restraints  
Initially in place: No  
  
  
Patient Diagnosis(es): There were no encounter diagnoses.  
  
has a past medical history of Blood transfusion reaction, Essential 
hypertension, GERD (gastroesophageal reflux disease), [place] filter in 
place, manchu, Movement disorder, MS (multiple sclerosis) ([facility]), Neuromuscular 
disorder ([facility]), Optic neuritis due to multiple sclerosis ([facility]), Psychiatric pr
oblem, Seizures ([facility]), Self inflicted injury, and Spinal cord stimulator status.  
has a past surgical history that includes  section; cervical fusion; 
Tonsillectomy; lymphadenectomy; Carpal tunnel release; Hysterectomy; back 
surgery; Endoscopy, colon, diagnostic; Colonoscopy; vascular surgery; Abdomen 
surgery; and Vena Cava Filter Placement (2011).  
  
  
Restrictions  
Restrictions/Precautions  
Restrictions/Precautions: General Precautions, Fall Risk, Up as Tolerated  
Required Braces or Orthoses?: No  
Position Activity Restriction  
Other position/activity restrictions: Inconsistent symptoms in R extremities 
noted, sitter in room  
  
Subjective  
General  
Patient assessed for rehabilitation services?: Yes  
Family / Caregiver Present: No  
Diagnosis: MS exacerbation, Munchhausen's syndrome, R weakness/numbness/pain  
Patient Currently in Pain: Yes  
Pain Assessment  
Pain Assessment: 0-10  
Pain Level: 9(no grimaces or evidence of distress during session)  
Pain Type: Acute pain  
Pain Location: Generalized( whole R side from my face and tongue to toes )  
Pain Orientation: Right  
Pain Descriptors: Aching;Discomfort;Tingling;Numbness  
Pain Frequency: Continuous  
Non-Pharmaceutical Pain Intervention(s): Ambulation/Increased 
Activity;Distraction;Emotional support  
Response to Pain Intervention: Patient Satisfied  
Vital Signs  
Temp: 98 F (36.7 C)  
Temp Source: Oral  
Pulse: 64  
Heart Rate Source: Monitor  
Resp: 20  
BP: (!) 145/84  
BP Location: Left lower arm  
BP Upper/Lower: Lower  
MAP (mmHg): 95  
Patient Position: Supine  
Patient Currently in Pain: Yes  
Oxygen Therapy  
SpO2: 98 %  
O2 Device: None (Room air)  
Social/Functional History  
Social/Functional History  
Lives With: Significant other  
Type of Home: Mobile home  
Home Layout: One level  
Home Access: Stairs to enter with rails  
Entrance Stairs - Number of Steps: 4 ANGIE (sometimes scoots on bottom)  
Entrance Stairs - Rails: Left  
Bathroom Shower/Tub: Tub/Shower unit  
Bathroom Toilet: Standard  
Bathroom Equipment: Grab bars in shower  
Bathroom Accessibility: Accessible  
Home Equipment: Rolling walker(uses during MS exacerbations)  
ADL Assistance: Independent  
Homemaking Assistance: Independent  
Homemaking Responsibilities: Yes  
Ambulation Assistance: Independent  
Transfer Assistance: Independent  
Active : Yes( On good days )  
Mode of Transportation: Car  
Occupation: On disability  
Leisure & Hobbies: movies, hang with sig other  
Additional Comments: Sig other home often  
  
Objective  
Vision: Within Functional Limits  
Hearing: Within functional limits  
Orientation  
Overall Orientation Status: Within Functional Limits  
  
Balance  
Sitting Balance: Supervision  
Standing Balance: Contact guard assistance  
Standing Balance  
Time: 9 min  
Activity: Pt stood bedside, short func mob around room, required 1 seated rest 
break d/t unsteadiness mainly  
Functional Mobility  
Functional - Mobility Device: Rolling Walker  
Activity: Other  
Assist Level: Moderate assistance  
Functional Mobility Comments: Pt's R foot was internally rotating, sliding 
behind L foot, and moving in inconsistent ways this date, pt observed to be able
 to dorsiflex ankle at times-other times pt dragging foot and taking both arms 
from RW handles seperately, fall risk  
ADL  
Feeding: Stand by assistance;Setup;Increased time to complete  
Grooming: Setup;Increased time to complete;Contact guard assistance  
UE Bathing: Minimal assistance;Setup;Increased time to complete  
LE Bathing: Moderate assistance;Setup;Increased time to complete  
UE Dressing: Moderate assistance;Setup;Increased time to complete  
LE Dressing: Setup;Increased time to complete;Moderate assistance  
Toileting: Setup;Increased time to complete;Moderate assistance  
Additional Comments: Pt donned socks sitting up in bed-had to use LUE to bring 
foot to opposite thigh to reach foot d/t weakness/numbness, inconsistencies 
noted throughout session were pt would scratch ear with RUE and then later hand 
would fall off RW handle, pt was provided a built-up handle, pt's ADL's graded 
based on current deficits shown to writer this date  
Tone RUE  
RUE Tone: Normotonic  
Tone LUE  
LUE Tone: Normotonic  
Coordination  
Movements Are Fluid And Coordinated: No  
Coordination and Movement description: Fine motor impairments;Gross motor 
impairments;Right UE;Decreased speed;Decreased accuracy  
  
Bed mobility  
Supine to Sit: Supervision  
Sit to Supine: Supervision  
Scooting: Modified independent  
Transfers  
Sit to stand: Contact guard assistance  
Stand to sit: Contact guard assistance  
Transfer Comments: Pt educated on pushing through LUE during transfers, able to 
reach back with LUEwhen sitting onto EOB appropriately  
  
Cognition  
Overall Cognitive Status: [facility]  
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 
diagnosed with Munchausen's syndrome, pt cooperative with therapy  
  
Sensation  
Overall Sensation Status: Impaired(reports N/T R side face, tongue, UE/LE)  
Light Touch: Partial deficits in the RLE;Partial deficits in the RUE(And R side 
of face/tongue)  
  
  
LUE AROM (degrees)  
LUE AROM : WFL  
RUE AROM (degrees)  
RUE AROM : Exceptions  
R Shoulder Flexion 0-180: Observed to 90 degrees, during official MMT only able 
to acheive ~40 degrees,later observed reaching for cup on table, pt dropping RUE
 quickly as if the arm is weak and  giving out   
R Elbow Flexion 0-145: WFL's  
R Elbow Extension 145-0: WFL's  
Right Hand AROM (degrees)  
Right Hand AROM: L  
Right Hand General AROM: Slow coordination, later in session pt demo'd inability
 to grasp RW handleproperly, pt mentioned not being able to feel it d/t 
numbness, pt observed to make full composite fist this date  
LUE Strength  
Gross LUE Strength: [facility]  
RUE Strength  
Gross RUE Strength: Exceptions to WFL  
R Shoulder Flex: 3-/5  
R Elbow Flex: 4-/5  
R Elbow Ext: 4-/5  
R Hand General: 4/5  
RUE Strength Comment: Inconsistent symptoms noted this date, pt demo's tremors 
at times at times  
  
Plan  
Plan  
Times per week: 3-4x  
Current Treatment Recommendations: Functional Mobility Training, Balance 
Training, Endurance Training, Home Management Training, Equipment Evaluation, 
Education, & procurement, Safety Education &amp; Training, Self-Care / ADL, 
Patient/Caregiver Education & Training, Pain Management, Strengthening, 
Neuromuscular Re-education  
  
  
AM-[facility] Inpatient Daily Activity Raw Score: 17 (20)  
AM-PAC Inpatient ADL T-Scale Score : 37.26 (20)  
ADL Inpatient CMS 0-100% Score: 50.11 (20)  
ADL Inpatient CMS G-Code Modifier : CK (20)  
  
Goals  
Short term goals  
Time Frame for Short term goals: Pt will by discharge  
Short term goal 1: demo ADL UB/LB dressing/bathing activity with adaptive 
tech's, increased time and SUP  
Short term goal 2: demo good safety awareness during func mob around room using 
LRD and SBA  
Short term goal 3: demo RUE strength of 5/5 grossly for use in ADL completion  
Short term goal 4: demo standing during func activity for 11 min with 1 seated 
rest break PRN, LRD,and CGA  
  
Therapy Time  
Individual Concurrent Group Co-treatment  
Time In 1020  
Time Out 1050  
Minutes 30  
Timed Code Treatment Minutes: 19 Minutes  
  
  
Alden Silver OTR/L  
  
  
  
  
Electronically signed by Alden Silver OT at 2020 11:36 AM EST  
  
  
* Mallorie Granger RN - 2020 10:02 AM EST  
  
Patient called out stated that she was throwing up blood. Patient appeared to be
 spitting out a small amount of. Physician notified. Sitter at bedside stated it
 appeared that the patient had her hands in her mouth scratching the inside of 
her mouth. Patient no longer spitting out blood. It was a one time occurrence. 
Will continue to monitor..Mallorie Granger RN  
  
  
  
  
Electronically signed by Mallorie Granger RN at 2020 1:05 PM EST  
  
  
* Mallorie Granger RN - 2020 9:04 AM EST  
  
Patient is refusing to take her morning medications till she speaks to her 
doctor. She would like her medications to be changed so that she receives 
stronger pain medication and phenergan...Mallorie Granger RN  
  
  
  
  
Electronically signed by Mallorie Granger RN at 2020 9:07 AM EST  
  
  
* Irene Tavarez RN - 2020 12:57 AM EST  
  
2316- Paged Neuro Resident Dr. Santiago to inform him pt is throwing up blood. The
 vomit in the pan appeared to be strictly blood. Resident Jack arrived at 
bedside. Pt refusing to take PO medications. Pt stated,  I want all of my 
medications to be IV. I cannot take oral medications they upset my stomach. Also
 I have taken 50 of benadryl for 5 years before my steroid and I want it that 
way.  Pt refused to accept teaching provided by Dr. Santiago stating,  I will talk
 to my doctor in the morning.  RN will continue to monitor.  
  
  
  
Electronically signed by Irene Tavarez RN at 2020 1:02 AM EST  
  
  
* Khoa Spence MD - 2020 9:42 PM EST  
  
Formatting of this note might be different from the original.  
  
  
This is a 44 y.o. female admitted 2020 for Multiple sclerosis exacerbation 
(HCC) [G35].  
Patient admitted for chief complaint of face numbness and difficulty swallowing 
with bilateral lower extremity weakness, concern for MS exacerbation. Patient 
was admitted under neurology and was receiving high-dose Solu-Medrol. Patient 
today started complaining of nausea and bright red blood hematemesis in the 
central medicine was consulted. Patient also complains of severe pain throughout
 her body.  
Patient has had similar history of throwing up bright red blood, concern of 
munchausen syndrome. Inthe past patient has self-inflicted throat cuts, she has 
even sucked up her IV line blood to throw back as hematemesis.  
She has history of significant opioid use.  
  
Patient has had EGD in 2019 which was negative.  
  
BMP:  
Recent Labs  
02/10/20  
1632 20  
0648 20  
0503  
 137 138  
K 3.5* 3.9 3.8  
CL 94* 98 101  
CO2 28 25 23  
BUN 16 20 21*  
CREATININE 0.59 0.52 0.51  
GLUCOSE 145* 141* 145*  
  
CBC: )  
Recent Labs  
02/10/20  
1632 20  
0648 20  
0503  
WBC 10.7 10.1 10.7  
HGB 12.6 13.2 12.9  
HCT 37.3 38.9 38.8  
 313 282  
  
  
Assessment  
  
Principal Problem:  
Multiple sclerosis exacerbation (HCC)  
Active Problems:  
M nchausen's syndrome  
hx of Self-inflicted injury  
Hematemesis without nausea  
Resolved Problems:  
* No resolved hospital problems. *  
  
Plan  
Start Protonix 40 mg IV Daily and Pepcid 20 mg twice a day as patient is on 
high-dose Solu-Medrol  
Start Carafate  
Sitter at bedside  
GI consulted by neurology, no acute intervention  
  
Hypertension start amlodipine 5 mg  
  
MS treatment as per neurology on Solu-Medrol  
  
Hold Lovenox as concern for hematemesis, put on EPC cuffs.  
  
Khoa Spence MD  
Department of Internal Medicine  
[facility], [place]  
2020, 9:51 PM  
  
  
  
  
Electronically signed by Khoa Spence MD at 2020 9:51 PM EST  
  
  
* Jovita Cameron RCP - 2020 3:05 PM EST  
  
Smoking Cessation - topics covered  
[] Health Risks  
[] Benefits of Quitting  
[] Smoking Cessation  
[] Patient has no history of tobacco use per note in significant history.  
[] Patient is former smoker per note in significant history. Patient quit in  
[] No need for tobacco cessation education.  
[] Booklet given  
[x] Patient verbalizes understanding.  
[x] Patient denies need for tobacco cessation education.  
[] Unable to meet with patient today. Will follow up as able.  
JOVITA CAMERON 3:05 PM  
  
  
  
  
Electronically signed by Jovita Cameron RCP at 2020 3:05 PM EST  
  
  
* Lisa Rosas - 2020 11:14 AM EST  
  
Formatting of this note might be different from the original.  
NEUROLOGY INPATIENT PROGRESS NOTE  
  
2020  
  
Subjective: Nichole L Cantu is a 44 y.o. female admitted on 2020 with 
Multiple sclerosis exacerbation (HCC) [G35]  
  
Briefly, this is a 44 y.o. female admitted on 2020 with c/o MS 
exacerbation, last known flare-up was 5 days ago. She initially presented to 
[facility] from home with 3 days of bilateral face, tongue, and mouth numbness. The 
tongue and mouth numbness made it where she cannot swallow and she has notbeen 
eat/drinking for 3 days d/t fear of choking. She also endorses left sided arm 
and leg numbnessand weakness, she cannot bear weight and needs a walker to 
ambulate. Additionally, she has left sided eye pain with movement and blurry 
vision, stating that  it feels like I have vaseline covering myeye . These 
symptoms are worse than her flare-up 5 days ago, which is what brought her back 
to the hospital so soon after discharge. Between flare-ups she is completely 
symptom free. She does not seea neurologist outpatient, and last received 
outpatient treatment with [facility] over 4 years ago. Shesaw Dr. Sanders 6 months
 ago, but does not want to pursue treatment there. Of note, she was admited to 
[facility] in  where she was diagnosed with Factitious Disorder 
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  
  
  
She has a past medical history significant for PTSD, anxiety, and chronic pain 
syndrome.  
  
Overnight, the patient repeatedly asked for pain medication, requesting 
narcotics. She was was toldshe could not get any narcotics, she settled for 
benadryl and Toradol. This morning she was once again asking for pain medication
 and c/o non-specific pain. A few minutes later she began to vomit blood. The 
blood was bright red with some large, dark clots. There was no food or bile 
mixed with the blood. She has experienced this before several years ago, but 
does not remember the cause of hematemesis at that time. Prior to the episode 
she was in the bathroom and the nurse notes that there was blood in the IV. She 
has a documented history of multiple EGD's which showed evidence of swallowed 
blood and no acute causes of the bleeding. She has a documented history of 
swallowing blood from her central line and then coughing it up in the presence 
of nurses. She later had another episode of hematemesis and her IV was found to 
be uncapped and bloody. The IV was wrapped by the nurse and a sitter was placed 
in the room. GI was consulted and we will wait on their guidance as how to 
proceed.  
  
In regards to the MS flare, pt reports no improvement of symptoms. She is still 
experiencing facialnumbness, throat and mouth numbness, left eye pain with 
movement, and left sided arm and leg weakness. She states that she is no better 
than she was when she was admitted. She is still experiencing severe anxiety and
 requesting versed for the symptoms. She has xanax TID ordered.  
  
She is requesting to be placed back on her salu-medrol and IV benadryl, which 
was being held d/t hematemesis.  
  
No current facility-administered medications on file prior to encounter.  
  
Current Outpatient Medications on File Prior to Encounter  
Medication Sig Dispense Refill  
busPIRone HCl (BUSPAR PO) Take 0.3 mg by mouth 3 times daily  
ferrous sulfate 325 (65 Fe) MG EC tablet Take 1 tablet by mouth 2 times daily 
(with meals) (Patientnot taking: Reported on 2020) 90 tablet 3  
oxyCODONE-acetaminophen (PERCOCET)  MG per tablet Take 1 tablet by mouth 
every 6 hours as needed for Pain..  
ALPRAZolam (XANAX) 1 MG tablet Take 1 mg by mouth 3 times daily  
vitamin D (ERGOCALCIFEROL) 06116 UNITS capsule Take 1 capsule by mouth once a 
week for 6 doses 6 capsule 0  
sertraline (ZOLOFT) 100 MG tablet Take 2 tablets by mouth daily Verified by 
[facility] pharmacy on 2014 30 tablet 3  
estrogens, conjugated, (PREMARIN) 1.25 MG tablet Take 1 tablet by mouth daily 21
 tablet 3  
traZODone (DESYREL) 100 MG tablet Take 1.5 tablets by mouth nightly 1/2 tab to 1
 tab ;  
Verified by [facility] pharmacy on2014 30 tablet 3  
  
Allergies: Nichole L Cantu is allergic to iv dye [iodides]; protonix 
[pantoprazole sodium]; fentanyl; solu-medrol [methylprednisolone]; ketorolac 
tromethamine; pcn [penicillins]; and zofran.  
  
Past Medical History:  
Diagnosis Date  
Blood transfusion reaction  
GERD (gastroesophageal reflux disease)  
Bella filter in place  
manchu  
Movement disorder  
MS (multiple sclerosis) (HCC)  
Neuromuscular disorder (HCC)  
Optic neuritis due to multiple sclerosis (HCC)  
Psychiatric problem  
depression  
Seizures (HCC)  
Self inflicted injury  
Spinal cord stimulator status  
placement and removal  
  
Past Surgical History:  
Procedure Laterality Date  
ABDOMEN SURGERY  
BACK SURGERY  
CARPAL TUNNEL RELEASE  
CERVICAL FUSION  
 SECTION  
COLONOSCOPY  
ENDOSCOPY, COLON, DIAGNOSTIC  
HYSTERECTOMY  
LYMPHADENECTOMY  
TONSILLECTOMY  
VASCULAR SURGERY  
VENA CAVA FILTER PLACEMENT 2011  
GFF Placed in   
  
Medications:  
pantoprazole 40 mg Intravenous Daily  
And  
sodium chloride (PF) 10 mL Intravenous Daily  
sucralfate 1 g Oral 4 times per day  
sodium chloride flush 10 mL Intravenous 2 times per day  
famotidine 20 mg Oral BID  
enoxaparin 40 mg Subcutaneous Daily  
[Held by provider] methylPREDNISolone 500 mg Intravenous Q12H  
diphenhydrAMINE 50 mg Oral Once  
ALPRAZolam 1 mg Oral TID  
traZODone 150 mg Oral Nightly  
  
PRN Meds include: sodium chloride flush, potassium chloride **OR** potassium 
alternative oral replacement **OR** potassium chloride, magnesium sulfate, 
magnesium hydroxide, ondansetron, [Held by provider] acetaminophen, sodium 
chloride flush, [Held by provider] HYDROcodone 5 mg - acetaminophen, diph
enhydrAMINE  
  
Objective:  
BP (!) 149/97   Pulse 80   Temp 97.1 F (36.2 C) (Oral)   Resp 20   Ht 5' 7  
(1.702 m)   Wt 180 lb 8.9 oz (81.9 kg)   SpO2 100%   BMI 28.28 kg/m  
Blood pressure range: Systolic (24hrs), Av , Min:125 , Max:177  
; Diastolic (24hrs), Av, Min:81, Max:97  
  
ROS:  
CONSTITUTIONAL: Positive fatigue and malaise  
EYES: Positive for painful EOM and blurred vision on the left  
HEENT: Positive for numb throat  
RESPIRATORY: negative for cough, shortness of breath  
CARDIOVASCULAR: negative for chest pain, palpitations, or syncope  
GASTROINTESTINAL: Positive for midepigastric pain. Positive for hematemesis.  
GENITOURINARY: negative for incontinence or retention  
MUSCULOSKELETAL: Positive for neck and back pain  
NEUROLOGICAL: Positive for weakness and numbness.  
PSYCHIATRIC: Positive for agitation and anxiety  
SKIN Negative for spontaneous contusions, rashes, or lesions  
  
NEUROLOGIC EXAMINATION  
GENERAL In anxious distress.  
HEENT NC/ AT. Left eye: 20/400, right eye: 20/50.  
HEART S1 and S2 heard; palpation of pulses: radial pulse  
NECK Supple and no bruits heard  
MENTAL STATUS: Alert, oriented, intact memory, no confusion, normal speech, 
normal language, no hallucination or delusion  
CRANIAL NERVES: II - Decreases confrontation on the left side.  
III,IV,VI - PERR, EOMs full but painful on the left, no ptosis  
V - Normal facial sensation  
VII - Normal facial symmetry  
VIII - Intact hearing  
IX,X - Symmetrical palate  
XI - Symmetrical shoulder shrug  
XII - Midline tongue, no atrophy  
MOTOR FUNCTION: Difficult to assess d/t poor patient effort.  
  
Normal bulk, normal tone and no involuntary movements, no tremor  
SENSORY FUNCTION: Decreased sensation to touch and pinprick on face b/l and 
lower extremities b/l.  
CEREBELLAR FUNCTION: Intact fine motor control over upper limbs and lower limbs  
REFLEX FUNCTION: Symmetric in upper and lower extremities.  
STATION and GAIT Gait normal  
  
Data:  
  
Lab Results:  
CBC:  
Recent Labs  
02/10/20  
1632 20  
0648 20  
0503  
WBC 10.7 10.1 10.7  
HGB 12.6 13.2 12.9  
 313 282  
  
BMP:  
Recent Labs  
02/10/20  
1632 20  
0648 20  
0503  
 137 138  
K 3.5* 3.9 3.8  
CL 94* 98 101  
CO2 28 25 23  
BUN 16 20 21*  
CREATININE 0.59 0.52 0.51  
GLUCOSE 145* 141* 145*  
  
Lab Results  
Component Value Date  
CHOL 163 2011  
LDLCHOLESTEROL 83 2011  
HDL 38 (L) 2011  
TRIG 211 (H) 2011  
ALT 8 2018  
AST 14 2018  
TSH 0.25 (L) 2016  
INR 1.0 2018  
LABA1C 4.5 2014  
YFPQGTTK57 446 2016  
  
No results found for: PHENYTOIN, PHENYTOIN, VALPROATE, CBMZ  
  
IMAGING  
CT Head WO Contrast 2020:  
No acute intracranial abnormality.  
  
MRI THORACIC SPINE W WO CONTRAST 2020:  
Multilevel degenerative disc disease and multilevel degenerative facet  
hypertrophy without canal stenosis or foraminal narrowing.  
  
MRI CERVICAL SPINE W WO CONTRAST 2020:  
Anterior hardware fixation at C5-6 without complication.  
Mild multilevel degenerative disc disease with uncovertebral and facet  
hypertrophy resulting in canal stenosis at C3-4.  
Unremarkable pre and post-contrast evaluation of the spinal cord.  
  
MRI BRAIN 2020:  
Scattered FLAIR signal abnormalities most pronounced in the frontal lobes  
that may correlate with patient's history of a demyelinating process. There  
is no evidence for active or acute demyelination.  
  
Assessment and Plan  
This is a 44 year old female with a pmh significant for MS, PTSD, and anxiety 
who presents to the ED for left UE and LE extremity weakness and tingling 
associated with b/l facial numbness, tongue numbness, and mouth numbness. She 
believes that she is having a MS flare. Pt is having lots of anxiety and is 
requesting versed and morphine.  
  
MS Flare:  
MRI studies show no acute demyelinating processes.  
Continue IV salu-medrol  
Encourage her to participate in PT/OT  
  
Dysphagia:  
Pt passed swallow study and can have a normal diet  
  
Anxiety:  
.5 mg Ativan q6 PRN  
  
BOUBACAR Ronquillo  
2020  
11:15 AM  
  
  
  
  
  
Electronically signed by Gatito Brock MD at 2020 5:39 PM EST  
  
  
  
  
Associated attestation - Gatito Brockas, MD - 2020 5:39 PM EST  
  
  
  
I have seen and examined the patient and the key elements of all parts of the 
encounter have been performed by me. I agree with the assessment, plan and 
orders as documented by the fellow/resident, after I modified exam findings and 
plan of treatments, and the final version is my approved version of the 
assessment.  
  
Active problem possible multiple sclerosis exacerbation . The condition is MRI 
of Head with nonspecific white matter intensities . MRI cervical spine with 
anterior fixation C5-6 with C3-4 disc osteophyte complex with mild canal 
stenosis , MRI of thoracic spine with multi level degenerative changes . She has
received total solumedrol 1500 mg IVPB 500 mg IVPB q 12 hours x 6 . She began 
vomiting blood with nausea vomiting today .There is concern that she has bene 
ingesting own blood from IV havingdone this in the past with multiple prior 
EGD's seen by GI with question of Munchausen's syndrome .She has been cleared by
GI for continues IV solumedrol . Left eye acuety remains at 20/400. Left arm 4/5
with giveway left leg 3-/ 5 right leg 4-/5 with giveway . Decreased sensation 
left face , leftarm and left leg. She has been refusing PT . She is complaining 
of bilateral face throat and right side pain with percocet making her nauseous .
She passed swallow study . She presents with numbness of face and throat , 
trouble swallowing with numbness left arm and leg with bilateral lower extremity
weakness . She has history of possible multiple sclerosis with question of 
somatic overlay in the past . She has seen multiple local neurologist along with
lately Dr Sanders in [place] area . She has been on gilenya having stopped
this 4 years ago when Dr Spaulding closed his practice . She reports to have 
intermittent exacerbation every six months usually with left side numbness , 
weakness of legs getting IV solumedrol having nomal exam in between . She 
reports that she is being consideredfor possible tecfidera . Patient was at 
[facility] this past week with weakness of both legs , left side 
numbness, weakness of left arm with left eye pain and blurriness .She was pulsed
with IV solumedrol for 4 days noting partial improvement sent home although gain
developed greaterweakness of legs left more tidwell right with trouble sustaining 
weight only walking few steps with walker . There was also numbness in bilateral
face and throat unable to swallow not eating anything thepast 3 days . She went 
to [facility] yesterday requesting to be seen at another neurological
facility . MRI of Head in  at [facility] with bilateral frontal nonspecific white
matter intensities CSF analysis non inflammatory 0 RBC, 0 WBC , total protein 
108 , glucose 27 , IgG synthesis oligoclonal bands negative She has PTSD and 
bipolar disease  
On exam she is alert ad oriented x 3. Right eye 20/50 , left eye 20/400 There is
decreased sensation left face . Left arm 4/5 with giveway left leg 3-/ 5 right 
leg 4-/5 with giveway . Decreased sensation left arm and left leg . Reflexes 
symmetrical  
Impression Possible multiple sclerosis exacerbation . 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 
syndrome  
Plan Finish solumedrol 200 mg IVPB q 8 hours x 6 . PT/OT  
  
  
* Alden Silver OT - 2020 8:30 AM EST  
  
Occupational Therapy  
  
Occupational Therapy Not Seen Note  
  
DATE: 2020  
Name: Nichole L Cantu  
: 1975  
MRN: [id number]  
  
Patient not available for Occupational Therapy due to:  
  
Patient Declined: D/t pain, RN informed and states pt just received pain meds, 
hold OT eval.  
  
Next Scheduled Treatment: Attempt in pm as time allows.  
  
Electronically signed by Adlen Silver OT on 2020 at 8:30 AM  
  
  
  
  
  
Electronically signed by Alden Silver OT at 2020 8:31 AM EST  
  
  
* Amelie Carlin - 2020 8:30 AM EST  
  
Physical Therapy  
DATE: 2020  
NAME: Nichole L Cantu  
MRN: [id number]  
: 1975  
  
Patient not seen this date for Physical Therapy due to:  
[] Blood transfusion in progress  
[] Hemodialysis  
[] Patient Declined  
[] Spine Precautions  
[] Strict Bedrest  
[] Surgery/ Procedure  
[] Testing  
[x] Other: pt refused at this time d/t pain, nursing notified; ck back in pm as 
able or 20  
  
[] PT being discontinued at this time. Patient independent. No further needs.  
[] PT being discontinued at this time as the patient has been transferred to 
palliative care. No further needs.  
  
Amelie Carlin  
This treatment/evaluation completed by signing SPT. Signing PT agrees with 
treatment and documentation.  
  
  
  
  
  
Electronically signed by Ezequiel Espinoza PT at 2020 9:33 AM EST  
  
  
* Jovita Cameron RCP - 2020 3:20 PM EST  
  
Smoking Cessation - topics covered  
[] Health Risks  
[] Benefits of Quitting  
[] Smoking Cessation  
[] Patient has no history of tobacco use per note in significant history.  
[] Patient is former smoker per note in significant history. Patient quit in  
[] No need for tobacco cessation education.  
[] Booklet given  
[] Patient verbalizes understanding.  
[] Patient denies need for tobacco cessation education.  
[x] Unable to meet with patient today despite several attempts to do so. Will 
follow up as able.  
JOVITA CAMERON 3:20 PM  
  
  
  
  
Electronically signed by Jovita Cameron RCP at 2020 3:20 PM EST  
  
  
* Alden Silver OT - 2020 10:03 AM EST  
  
Occupational Therapy  
  
Occupational Therapy Not Seen Note  
  
DATE: 2020  
Name: Nichole L Cantu  
: 1975  
MRN: [id number]  
  
Patient not available for Occupational Therapy due to:  
  
Patient Declined: D/t pain and anxiety, RN informed.  
  
Next Scheduled Treatment: Attempt in pm as time allows.  
  
Electronically signed by Alden Silver OT on 2020 at 10:03 AM  
  
  
  
  
  
Electronically signed by Alden Silver OT at 2020 10:03 AM EST  
  
  
documented in this encounter* Mallorie Asif RN - 2020 5:57 PM EDT  
  
Loudly talking on phone saying that we are discharging her when she has an 
infection in her blood. Discharged per ambulatory.  
  
  
  
Electronically signed by Mallorie Asif RN at 2020 6:03 PM EDT  
  
  
* Shannon Thomas APRN - NP - 2020 8:33 AM EDT  
  
Formatting of this note might be different from the original.  
  
  
[facility]  
IN-PATIENT SERVICE  
[facility]  
  
Progress Note  
  
2020 10:54 AM  
  
Name: Nichole L Cantu  
MRN: [id number]  
Acct: [id number]  
Room: [id number]-  
 Day: 1  
Admit Date: 2020 3:46 AM  
PCP: Matt Castellon MD  
Code Status: Full Code  
  
Subjective:  
  
C/C: Right upper extremity/hand swelling  
Interval History Status: not changed.  
  
Patient seen and evaluated in room tearful at bedside but in no acute distress. 
Her vitals are stable. Right upper extremity continues to exhibit significant 
redness with swelling. IV access is stillnot been obtained. Patient states she 
cannot tolerate p.o. medications/intake due to mucositis  
  
Brief History:  
  
Per records:  
  
Nichole L Cantu is a 44 y.o. Non-/non  female who presents with No
 chief complaint onfile.  
and is admitted to the hospital for the management of Cellulitis.  
  
This is a 44 yr old female with a very complex past medical history including M 
nchhausen, PTSD, Anxiety, MS, hx: DVT/PE s/p IVC filter (uncertain if diagnosed 
with factor v leiden), and chronic pain  
  
Patient initially presents to [facility] on 4/15 with complaints of RUE 
swelling and 'leakingfrom IV site'. Patient has history of MS with a reported 
recent flare and had midline placed for 'IV infusions by her neurologist'. On 
the , she began noticing 'leaking clear fluid from the IV site' and 
associated swelling. She was found to have a RUE DVT and was discharged home on 
Xarelto. It seems for the last few days, the patient has been bouncing back and 
forth to [facility] with the same presentation requesting pain 
medications. It is also documented the patient has been noncompliant with her 
Xarelto. Today, the patient returned to [place] ER with increased redness and 
swelling of the RUE, along with mild erythema and swelling to the left hand. She
 was started on Clindamycin and transferred to our facility for further abx 
therapy for suspected cellulitis and vascular surgery consultation given RUE 
DVT.  
  
Vascular evaluation without intervention  
Started on eliquis for anticoagulation  
Pt refusing abx therapy  
  
Review of Systems:  
  
Constitutional: negative for chills, fevers, sweats  
Respiratory: negative for cough, dyspnea on exertion, shortness of breath, 
wheezing  
Cardiovascular: negative for chest pain, chest pressure/discomfort, lower 
extremity edema, palpitations  
Gastrointestinal: negative for abdominal pain, constipation, diarrhea, nausea, 
vomiting  
Neurological: negative for dizziness, headache  
  
Medications:  
  
Allergies:  
Allergies  
Allergen Reactions  
Iv Dye [Iodides] Hives and Shortness Of Breath  
MRI  
Protonix [Pantoprazole Sodium] Anaphylaxis  
Fentanyl Itching  
Solu-Medrol [Methylprednisolone] Rash  
Ketorolac Tromethamine Hives  
Pcn [Penicillins] Nausea And Vomiting  
Zofran Nausea And Vomiting  
  
Current Meds:  
Scheduled Meds:  
sodium chloride flush 10 mL Intravenous 2 times per day  
nicotine 1 patch Transdermal Q24H  
traZODone 150 mg Oral Nightly  
vitamin D 50,000 Units Oral Weekly  
LORazepam 1 mg Oral BID  
apixaban 10 mg Oral BID  
[START ON 2020] apixaban 5 mg Oral BID  
  
Continuous Infusions:  
PRN Meds: Magic Mouthwash, sodium chloride flush, potassium chloride **OR** 
potassium alternative oral replacement **OR** potassium chloride, magnesium 
sulfate, acetaminophen **OR** acetaminophen, polyethylene glycol, promethazine 
**OR** ondansetron, nicotine, HYDROmorphone, diphenhydrAMINE  
  
Data:  
  
Past Medical History: has a past medical history of Blood transfusion reaction, 
Essential hypertension, GERD (gastroesophageal reflux disease), [place] [employer] 
filter in place, manchu, Movement disorder, MS (multiple sclerosis) (HCC), 
Neuromuscular disorder (HCC), Optic neuritis due to multiple sclerosis (HCC), 
Psychiatric problem, Seizures (HCC), Self inflicted injury, and Spinal cord 
stimulator status.  
  
Social History: reports that she has quit smoking. Her smoking use included 
cigarettes. She has a 6.50 pack-year smoking history. She quit smokeless tobacco
use about 4 years ago. She reports that she does not drink alcohol or use drugs.  
  
Family History:  
Family History  
Problem Relation Age of Onset  
Cancer Mother  
High Blood Pressure Father  
Diabetes Brother  
  
Vitals:  
BP (!) 139/94   Pulse 87   Temp 98.8 F (37.1 C)   Resp 17   Ht 5' 7  (1.702 m)  
Wt 194 lb 8 oz (88.2 kg)   SpO2 100%   BMI 30.46 kg/m  
Temp (24hrs), Av.8 F (37.1 C), Min:98.8 F (37.1 C), Max:98.8 F (37.1 C)  
  
No results for input(s): POCGLU in the last 72 hours.  
  
I/O (24Hr):  
No intake or output data in the 24 hours ending 20 1054  
  
Labs:  
Hematology:  
Recent Labs  
20  
0638  
WBC 8.1  
RBC 3.98  
HGB 11.8*  
HCT 35.6*  
MCV 89.4  
MCH 29.6  
MCHC 33.1  
RDW 14.0  
  
MPV 9.7  
  
Chemistry:  
Recent Labs  
20  
0638  
  
K 4.1  
CL 99  
CO2 27  
GLUCOSE 100*  
BUN 12  
CREATININE 0.51  
ANIONGAP 10  
LABGLOM >60  
GFRAA >60  
CALCIUM 8.7  
No results for input(s): PROT, LABALBU, LABA1C, Y3TACGF, V5OANBG, FT4, TSH, AST,
ALT, LDH, GGT, ALKPHOS, LABGGT, BILITOT, BILIDIR, AMMONIA, AMYLASE, LIPASE, 
LACTATE, CHOL, HDL, LDLCHOLESTEROL, CHOLHDLRATIO, TRIG, VLDL, ALB46LA, 
PHENYTOIN, PHENYF, URICACID, POCGLU in the last 72 hours.  
ABG:  
Lab Results  
Component Value Date  
PH 7.46 2014  
PCO2 30 2014  
HCO3 21.5 2014  
  
Lab Results  
Component Value Date/Time  
SPECIAL L WRIST 1ML 2016 01:51 PM  
SPECIAL L WRIST 1ML 2016 01:51 PM  
  
Lab Results  
Component Value Date/Time  
CULTURE NO GROWTH 6 DAYS 2016 01:51 PM  
CULTURE 2016 01:51 PM  
[facility] [address] [place] [address] [id number] [phone]  
CULTURE NO GROWTH 6 DAYS 2016 01:51 PM  
CULTURE 2016 01:51 PM  
[facility] [address] [place] [id number] [phone]  
  
Radiology:  
No results found.  
  
Physical Examination:  
  
  
General appearance: alert, cooperative and no distress  
Mental Status: oriented to person, place and time and normal affect  
Lungs: clear to auscultation bilaterally, normal effort  
Heart: regular rate and rhythm, no murmur  
Abdomen: soft, nontender, nondistended, normal bowel sounds, no masses, 
hepatomegaly, splenomegaly  
Extremities: Upper extremity edema with associated redness, no tenderness in the
calves  
Skin: Right upper extremity swelling, no gross lesions, rashes, induration. Mild
erosive oral lesions R>L  
  
Assessment:  
  
  
Hospital Problems  
Last Modified POA  
Post traumatic stress disorder 2020 Yes  
Multiple sclerosis (HCC) (Chronic) 2020 Yes  
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  
S/P IVC filter 2020 Yes  
Acute deep vein thrombosis (DVT) of right upper extremity (HCC) 2020 Yes  
Oral mucositis 2020 Yes  
  
  
Plan:  
  
  
1. Right upper extremity DVT: Vascular surgery was consulted, patient started on
Eliquis. Status post IVC filter.  
2. Cellulitis: Ruled out. Patient has been refusing clindamycin. 
Redness/swelling most likely sequelae of right upper extremity DVT. discontinue 
all abx for now  
3. MS: Chronic without exacerbation.  
4. Oral mucositis: Magic mouthwash 4 times daily as needed  
5. Discharge planning: Home once tolerating po intake  
  
ELVA Zimmer NP  
2020  
10:54 AM  
  
  
  
Electronically signed by ELVA Zimmer NP at 2020 10:55 AM EDT  
  
  
* Yannick Quiroz RN - 2020 8:28 PM EDT  
  
Pt refusing oral clindamycin; pt states: it upsets my stomach . Discussed risks 
of regimen non-compliance; pt continues to refuse.  
  
  
  
Electronically signed by Yannick Quiroz RN at 2020 10:04 PM EDT  
  
  
* Yannick Quiroz RN - 2020 5:30 AM EDT  
  
Poor IV access, await IV team to start PIV in AM.  
  
  
  
Electronically signed by Yannick Quiroz RN at 2020 5:59 AM EDT  
  
  
* Yannick Quiroz RN - 2020 4:32 AM EDT  
  
Pt hands over pill bottle labeled lorazepam with two pills in it. Pt reports one
is ativan and one is dilaudid. Pill bottle given to Derian; pharmacist for 
verification and safe keeping.  
  
  
  
Electronically signed by Yannick Quiroz RN at 2020 5:29 AM EDT  
  
  
documented in this encounter  
  
Chief Complaint and Reason for Visit  
  
  
                                        Chief Complaint     abscess tooth  
  
  
  
Additional Source Comments  
  
  
  
                                                    INFORMATION SOURCE (unrecogn  
ized section and content)  
   
                                          
  
  
  
                                        DATE CREATED        AUTHOR  
   
                                2019                      [employer]   
System  
  
  
  
                                DATE CREATED    AUTHOR          AUTHOR'S LORENE  
ATION  
   
                                2020                      [facility]  
  
  
  
                                DATE CREATED    AUTHOR          AUTHOR'S ORGANIZ  
ATION  
   
                                2021                      [facility]  
  
  
  
                                DATE CREATED    AUTHOR          AUTHOR'S ORGANIZ  
ATION  
   
                                2022                      [facility]  
  
  
  
                                DATE CREATED    AUTHOR          AUTHOR'S ORGANIZ  
ATION  
   
                                2023                      [facility]  
  
  
  
                                DATE CREATED    AUTHOR          AUTHOR'S ORGANIZ  
ATION  
   
                                2023                      [facility]  
  
  
  
                                DATE CREATED    AUTHOR          AUTHOR'S ORGANIZ  
ATION  
   
                                10/07/2023                      [facility]  
  
  
  
                                DATE CREATED    AUTHOR          AUTHOR'S ORGANIZ  
ATION  
   
                                10/23/2023                      [facility]  
  
  
  
                                DATE CREATED    AUTHOR          AUTHOR'S ORGANIZ  
ATION  
   
                                2023                      [facility]  
  
  
  
                                DATE CREATED    AUTHOR          AUTHOR'S ORGANIZ  
ATION  
   
                                2024                      [facility]  
  
  
  
  
  
                                                    Reason for Visit (unrecogniz  
ed section and content)  
   
                                          
  
  
  
                                        Reason              Comments  
   
                                        Other               patient reports MS f  
lare-up onset two days ago and worsening  
  
  
  
                                        Reason              Comments  
   
                                        Multiple Sclerosis  pt states she starte  
d having a flare three days ago  
  
  
  
                    Status    Reason    Specialty Diagnoses / Procedures Referre  
d By Contact Referred To   
Contact  
   
                                                                   
  
  
Diagnoses  
  
  
Multiple sclerosis   
exacerbation (HCC)  
  
  
MS flare up  
  
  
  
                                          
  
  
Gatito Brock MD  
  
  
[address], [address]  
  
  
[place] [address]  
  
  
Phone: [phone]  
  
  
Fax: [phone]                         
  
  
[facility]  
  
  
Phone:   
[phone]  
  
  
  
                                        Reason              Comments  
   
                                        Other               pt states had midlin  
e in right upper arm taken out 3 hours ago, Dr. Bustamante   
told to come to ER for blood clot  
  
  
  
                    Status    Reason    Specialty Diagnoses / Procedures Referre  
d By Contact Referred To   
Contact  
   
                                                                   
  
  
Diagnoses  
  
  
Cellulitis  
  
  
Cellulitis  
  
  
Cellulitis  
  
  
  
  
  
  
                                          
  
  
Kenyon Healy MD  
  
  
[facility]   
DR  
  
  
[address]   
[address]  
  
  
Phone: [phone]  
  
  
Fax: [phone]                         
  
  
[facility]  
  
  
Phone: [phone]  
  
  
  
                                        Reason              Comments  
   
                                        Joint Swelling      pt states she is hav  
ing a flare of her MS  
  
  
  
                                        Reason              Comments  
   
                                        Hip Pain            pt states she has joseph  
ne loss due to steroid use with her MS. Pt states   
her   
bilateral hip pain started getting worse 2 days ago  
  
  
  
  
  
                                                    Patient Care team informatio  
n (unrecognized section and content)  
   
                                          
  
  
  
                                                    Team Status: Active   
   
                          Member       Role         Status       Dates  
   
                          Matt Castellon MD Primary Care Provider Active  
         
  
  
  
                                                    Team Status: Inactive   
   
                          Member       Role         Status       Dates  
   
                          Matt Castellon MD Primary Care Provider Active  
         
   
                          Martin Tello DDS Attending Provider Act  
maria teresa         
  
  
  
  
  
                                                    Goals (unrecognized section   
and content)  
   
                                                    Goals may be documented in a  
n alternate section  
  
FOR RECORDS PERTAINING TO PATIENTS WHO ARE OR HAVE BEEN ENROLLED IN A CHEMICAL 
DEPENDENCY/SUBSTANCEABUSE PROGRAM, SOME INFORMATION MAY BE OMITTED. This 
clinical summary was aggregated from multiple sources. Caution should be 
exercised in using it in the provision of clinical care. This summary normalizes
information from multiple sources, and as a consequence, information in this 
document may materially change the coding, format and clinical context of 
patient data. In addition, data may be omitted in some cases. CLINICAL DECISIONS
SHOULD BE BASED ON THE PRIMARY CLINICAL RECORDS. [facility] [employer] provides 
no warranty or guarantee of the accuracy or completeness of information in this 
document.

## 2025-01-17 NOTE — ED_ITS
HPI    
HPI - General Adult    
General    
Stated complaint: fever, old port pain    
Related Data    
                                Home Medications    
    
    
    
?Medication ?Instructions ?Recorded ?Confirmed    
     
amlodipine 10 mg tablet mg 12/05/24     
     
clonazepam 1 mg tablet mg 12/05/24     
     
oxycodone-acetaminophen 10 mg-325 tab 12/05/24     
    
mg tablet       
     
pregabalin 150 mg capsule mg 12/05/24     
     
tramadol 50 mg tablet mg 12/05/24     
    
    
                                  Previous Rx's    
    
    
    
?Medication ?Instructions ?Recorded    
     
clonazepam 1 mg tablet (Klonopin) 1 mg PO Q8H 1 day #3 tabs 12/08/24    
     
oxycodone-acetaminophen 10 mg-325 1 tab PO Q8H PRN pain #3 tabs 12/08/24    
    
mg tablet (Percocet)      
    
    
    
                                    Allergies    
    
    
    
Allergy/AdvReac Type Severity Reaction Status Date / Time    
     
Penicillins Allergy Severe Anaphylaxis Verified 12/05/24 20:58    
     
ketorolac (From Toradol) Allergy Intermediate Hives Verified 12/05/24 20:58    
     
ondansetron (From Zofran) Allergy Intermediate Hives Verified 12/05/24 20:58    
    
    
    
    
Opioid HPI    
Opioid Management    
Most Recent Opioid Data:     
    
    
                                        No Data to Display    
    
    
    
PFSH    
PFSH    
Social History (Reviewed 12/05/24 @ 21:51 by Lionel Breaux MD)    
Little interest or pleasure in doing things:  not at all     
Feeling down, depressed, or hopeless:  not at all     
    
    
    
Medical Decision Making    
MDM Narrative    
Medical decision making narrative:     
Patient LWBS.     
    
Discharge Plan    
Discharge    
Patient Disposition: Left Without Being Seen    
    
Discharge Date/Time: 01/16/25 22:40
